# Patient Record
Sex: FEMALE | Race: WHITE | Employment: OTHER | URBAN - METROPOLITAN AREA
[De-identification: names, ages, dates, MRNs, and addresses within clinical notes are randomized per-mention and may not be internally consistent; named-entity substitution may affect disease eponyms.]

---

## 2017-01-01 ENCOUNTER — CLINICAL SUPPORT (OUTPATIENT)
Dept: CARDIOLOGY CLINIC | Age: 77
End: 2017-01-01

## 2017-01-01 ENCOUNTER — TELEPHONE (OUTPATIENT)
Dept: FAMILY MEDICINE CLINIC | Age: 77
End: 2017-01-01

## 2017-01-01 ENCOUNTER — OFFICE VISIT (OUTPATIENT)
Dept: FAMILY MEDICINE CLINIC | Age: 77
End: 2017-01-01

## 2017-01-01 ENCOUNTER — TELEPHONE (OUTPATIENT)
Dept: CARDIOLOGY CLINIC | Age: 77
End: 2017-01-01

## 2017-01-01 ENCOUNTER — OFFICE VISIT (OUTPATIENT)
Dept: CARDIOLOGY CLINIC | Age: 77
End: 2017-01-01

## 2017-01-01 ENCOUNTER — APPOINTMENT (OUTPATIENT)
Dept: CT IMAGING | Age: 77
DRG: 689 | End: 2017-01-01
Attending: EMERGENCY MEDICINE
Payer: MEDICARE

## 2017-01-01 ENCOUNTER — PATIENT OUTREACH (OUTPATIENT)
Dept: FAMILY MEDICINE CLINIC | Age: 77
End: 2017-01-01

## 2017-01-01 ENCOUNTER — APPOINTMENT (OUTPATIENT)
Dept: GENERAL RADIOLOGY | Age: 77
DRG: 689 | End: 2017-01-01
Attending: EMERGENCY MEDICINE
Payer: MEDICARE

## 2017-01-01 ENCOUNTER — HOSPITAL ENCOUNTER (INPATIENT)
Age: 77
LOS: 1 days | Discharge: HOME HEALTH | DRG: 689 | End: 2017-06-09
Attending: EMERGENCY MEDICINE | Admitting: FAMILY MEDICINE
Payer: MEDICARE

## 2017-01-01 VITALS
TEMPERATURE: 97.3 F | RESPIRATION RATE: 26 BRPM | WEIGHT: 139.6 LBS | DIASTOLIC BLOOD PRESSURE: 69 MMHG | HEIGHT: 62 IN | BODY MASS INDEX: 25.69 KG/M2 | HEART RATE: 89 BPM | SYSTOLIC BLOOD PRESSURE: 129 MMHG | OXYGEN SATURATION: 92 %

## 2017-01-01 VITALS
OXYGEN SATURATION: 94 % | WEIGHT: 134.6 LBS | HEART RATE: 60 BPM | SYSTOLIC BLOOD PRESSURE: 127 MMHG | BODY MASS INDEX: 24.77 KG/M2 | DIASTOLIC BLOOD PRESSURE: 70 MMHG | HEIGHT: 62 IN | TEMPERATURE: 97.5 F | RESPIRATION RATE: 24 BRPM

## 2017-01-01 VITALS
BODY MASS INDEX: 24.48 KG/M2 | BODY MASS INDEX: 24.22 KG/M2 | SYSTOLIC BLOOD PRESSURE: 121 MMHG | HEIGHT: 62 IN | HEART RATE: 96 BPM | OXYGEN SATURATION: 96 % | RESPIRATION RATE: 17 BRPM | TEMPERATURE: 97.1 F | SYSTOLIC BLOOD PRESSURE: 142 MMHG | DIASTOLIC BLOOD PRESSURE: 71 MMHG | OXYGEN SATURATION: 97 % | RESPIRATION RATE: 20 BRPM | HEART RATE: 98 BPM | WEIGHT: 133 LBS | WEIGHT: 131.61 LBS | TEMPERATURE: 97.8 F | HEIGHT: 62 IN | DIASTOLIC BLOOD PRESSURE: 63 MMHG

## 2017-01-01 VITALS
HEART RATE: 96 BPM | TEMPERATURE: 98.7 F | BODY MASS INDEX: 24.27 KG/M2 | TEMPERATURE: 97.1 F | WEIGHT: 137 LBS | HEIGHT: 63 IN | RESPIRATION RATE: 20 BRPM | DIASTOLIC BLOOD PRESSURE: 68 MMHG | WEIGHT: 134 LBS | HEIGHT: 62 IN | RESPIRATION RATE: 20 BRPM | HEART RATE: 90 BPM | DIASTOLIC BLOOD PRESSURE: 70 MMHG | OXYGEN SATURATION: 94 % | BODY MASS INDEX: 24.66 KG/M2 | SYSTOLIC BLOOD PRESSURE: 108 MMHG | OXYGEN SATURATION: 93 % | SYSTOLIC BLOOD PRESSURE: 110 MMHG

## 2017-01-01 VITALS
BODY MASS INDEX: 24.48 KG/M2 | RESPIRATION RATE: 18 BRPM | DIASTOLIC BLOOD PRESSURE: 84 MMHG | SYSTOLIC BLOOD PRESSURE: 116 MMHG | HEART RATE: 98 BPM | WEIGHT: 133 LBS | HEIGHT: 62 IN | OXYGEN SATURATION: 92 %

## 2017-01-01 VITALS
OXYGEN SATURATION: 93 % | SYSTOLIC BLOOD PRESSURE: 122 MMHG | RESPIRATION RATE: 16 BRPM | WEIGHT: 137.8 LBS | DIASTOLIC BLOOD PRESSURE: 70 MMHG | HEART RATE: 100 BPM | HEIGHT: 63 IN | BODY MASS INDEX: 24.41 KG/M2

## 2017-01-01 VITALS
OXYGEN SATURATION: 94 % | WEIGHT: 134 LBS | BODY MASS INDEX: 24.66 KG/M2 | HEIGHT: 62 IN | HEART RATE: 109 BPM | SYSTOLIC BLOOD PRESSURE: 116 MMHG | RESPIRATION RATE: 16 BRPM | TEMPERATURE: 97.5 F | DIASTOLIC BLOOD PRESSURE: 72 MMHG

## 2017-01-01 VITALS
SYSTOLIC BLOOD PRESSURE: 115 MMHG | TEMPERATURE: 98.2 F | RESPIRATION RATE: 20 BRPM | WEIGHT: 131 LBS | BODY MASS INDEX: 23.21 KG/M2 | HEIGHT: 63 IN | HEART RATE: 117 BPM | DIASTOLIC BLOOD PRESSURE: 66 MMHG | OXYGEN SATURATION: 94 %

## 2017-01-01 DIAGNOSIS — N39.0 URINARY TRACT INFECTION WITHOUT HEMATURIA, SITE UNSPECIFIED: Primary | ICD-10-CM

## 2017-01-01 DIAGNOSIS — E11.9 CONTROLLED TYPE 2 DIABETES MELLITUS WITHOUT COMPLICATION, WITHOUT LONG-TERM CURRENT USE OF INSULIN (HCC): ICD-10-CM

## 2017-01-01 DIAGNOSIS — J44.9 CHRONIC OBSTRUCTIVE PULMONARY DISEASE, UNSPECIFIED COPD TYPE (HCC): ICD-10-CM

## 2017-01-01 DIAGNOSIS — Z95.2 S/P AVR: ICD-10-CM

## 2017-01-01 DIAGNOSIS — I25.10 CORONARY ARTERY DISEASE INVOLVING NATIVE CORONARY ARTERY OF NATIVE HEART WITHOUT ANGINA PECTORIS: ICD-10-CM

## 2017-01-01 DIAGNOSIS — E11.9 CONTROLLED TYPE 2 DIABETES MELLITUS WITHOUT COMPLICATION, WITHOUT LONG-TERM CURRENT USE OF INSULIN (HCC): Primary | ICD-10-CM
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
## 2017-01-01 DIAGNOSIS — H53.9 VISUAL DISTURBANCES: ICD-10-CM

## 2017-01-01 DIAGNOSIS — E78.5 DYSLIPIDEMIA: ICD-10-CM

## 2017-01-01 DIAGNOSIS — Z00.00 MEDICARE ANNUAL WELLNESS VISIT, SUBSEQUENT: ICD-10-CM

## 2017-01-01 DIAGNOSIS — Z79.01 CHRONIC ANTICOAGULATION: ICD-10-CM

## 2017-01-01 DIAGNOSIS — I48.0 PAROXYSMAL ATRIAL FIBRILLATION (HCC): ICD-10-CM

## 2017-01-01 DIAGNOSIS — J84.10 PULMONARY FIBROSIS (HCC): ICD-10-CM

## 2017-01-01 DIAGNOSIS — I65.23 CAROTID STENOSIS, BILATERAL: ICD-10-CM

## 2017-01-01 DIAGNOSIS — I10 ESSENTIAL HYPERTENSION: ICD-10-CM

## 2017-01-01 DIAGNOSIS — I35.0 NONRHEUMATIC AORTIC VALVE STENOSIS: ICD-10-CM

## 2017-01-01 DIAGNOSIS — I50.32 CHRONIC DIASTOLIC HEART FAILURE (HCC): ICD-10-CM

## 2017-01-01 DIAGNOSIS — R05.9 COUGH: Primary | ICD-10-CM

## 2017-01-01 DIAGNOSIS — R26.9 GAIT ABNORMALITY: ICD-10-CM

## 2017-01-01 DIAGNOSIS — N39.3 FEMALE STRESS INCONTINENCE: ICD-10-CM

## 2017-01-01 DIAGNOSIS — I50.32 CHRONIC DIASTOLIC HEART FAILURE (HCC): Primary | ICD-10-CM

## 2017-01-01 DIAGNOSIS — Z95.2 S/P AVR: Primary | ICD-10-CM

## 2017-01-01 DIAGNOSIS — H54.62 VISUAL LOSS, LEFT EYE: ICD-10-CM

## 2017-01-01 DIAGNOSIS — Z23 ENCOUNTER FOR IMMUNIZATION: ICD-10-CM

## 2017-01-01 DIAGNOSIS — N39.0 URINARY TRACT INFECTION WITHOUT HEMATURIA, SITE UNSPECIFIED: ICD-10-CM

## 2017-01-01 DIAGNOSIS — N39.0 RECURRENT URINARY TRACT INFECTION: ICD-10-CM

## 2017-01-01 DIAGNOSIS — E86.1 HYPOVOLEMIA: ICD-10-CM

## 2017-01-01 DIAGNOSIS — I45.10 RBBB: ICD-10-CM

## 2017-01-01 DIAGNOSIS — R35.0 FREQUENT URINATION: Primary | ICD-10-CM

## 2017-01-01 DIAGNOSIS — Z87.440 HISTORY OF URINARY TRACT INFECTION: ICD-10-CM

## 2017-01-01 DIAGNOSIS — Z13.31 DEPRESSION SCREENING: ICD-10-CM

## 2017-01-01 DIAGNOSIS — Z99.81 O2 DEPENDENT: ICD-10-CM

## 2017-01-01 DIAGNOSIS — R63.0 DECREASED APPETITE: ICD-10-CM

## 2017-01-01 DIAGNOSIS — Z87.440 HX OF URINARY TRACT INFECTION: ICD-10-CM

## 2017-01-01 DIAGNOSIS — R42 DIZZINESS: Primary | ICD-10-CM

## 2017-01-01 DIAGNOSIS — E86.0 DEHYDRATION: Primary | ICD-10-CM

## 2017-01-01 DIAGNOSIS — R42 DIZZINESS: ICD-10-CM

## 2017-01-01 DIAGNOSIS — J44.9 CHRONIC OBSTRUCTIVE PULMONARY DISEASE, UNSPECIFIED COPD TYPE (HCC): Primary | ICD-10-CM

## 2017-01-01 DIAGNOSIS — Z13.39 ALCOHOL SCREENING: ICD-10-CM

## 2017-01-01 DIAGNOSIS — I63.9 CEREBROVASCULAR ACCIDENT (CVA), UNSPECIFIED MECHANISM (HCC): ICD-10-CM

## 2017-01-01 DIAGNOSIS — R41.82 ALTERED MENTAL STATUS, UNSPECIFIED: Primary | ICD-10-CM

## 2017-01-01 DIAGNOSIS — R30.0 DYSURIA: ICD-10-CM

## 2017-01-01 DIAGNOSIS — G47.33 OBSTRUCTIVE SLEEP APNEA SYNDROME: ICD-10-CM

## 2017-01-01 DIAGNOSIS — R53.1 WEAKNESS: ICD-10-CM

## 2017-01-01 LAB
ALBUMIN SERPL BCP-MCNC: 3.3 G/DL (ref 3.5–5)
ALBUMIN SERPL-MCNC: 4.4 G/DL (ref 3.5–4.8)
ALBUMIN SERPL-MCNC: 4.4 G/DL (ref 3.5–4.8)
ALBUMIN SERPL-MCNC: 4.7 G/DL (ref 3.5–4.8)
ALBUMIN/GLOB SERPL: 0.9 {RATIO} (ref 1.1–2.2)
ALBUMIN/GLOB SERPL: 1.8 {RATIO} (ref 1.2–2.2)
ALBUMIN/GLOB SERPL: 1.8 {RATIO} (ref 1.2–2.2)
ALBUMIN/GLOB SERPL: 2.1 {RATIO} (ref 1.2–2.2)
ALP SERPL-CCNC: 42 IU/L (ref 39–117)
ALP SERPL-CCNC: 44 IU/L (ref 39–117)
ALP SERPL-CCNC: 51 IU/L (ref 39–117)
ALP SERPL-CCNC: 54 U/L (ref 45–117)
ALT SERPL-CCNC: 14 IU/L (ref 0–32)
ALT SERPL-CCNC: 19 IU/L (ref 0–32)
ALT SERPL-CCNC: 21 IU/L (ref 0–32)
ALT SERPL-CCNC: 54 U/L (ref 12–78)
ANION GAP BLD CALC-SCNC: 4 MMOL/L (ref 5–15)
ANION GAP BLD CALC-SCNC: 6 MMOL/L (ref 5–15)
ANION GAP BLD CALC-SCNC: 7 MMOL/L (ref 5–15)
ANION GAP BLD CALC-SCNC: 8 MMOL/L (ref 5–15)
APPEARANCE UR: ABNORMAL
APTT PPP: 29.3 SEC (ref 22.1–32.5)
AST SERPL W P-5'-P-CCNC: 40 U/L (ref 15–37)
AST SERPL-CCNC: 16 IU/L (ref 0–40)
AST SERPL-CCNC: 19 IU/L (ref 0–40)
AST SERPL-CCNC: 20 IU/L (ref 0–40)
ATRIAL RATE: 91 BPM
BACTERIA SPEC CULT: ABNORMAL
BACTERIA UR CULT: ABNORMAL
BACTERIA UR CULT: NORMAL
BACTERIA UR CULT: NORMAL
BACTERIA URNS QL MICRO: ABNORMAL /HPF
BASOPHILS # BLD AUTO: 0 K/UL (ref 0–0.1)
BASOPHILS # BLD: 0 % (ref 0–1)
BILIRUB SERPL-MCNC: 0.2 MG/DL (ref 0–1.2)
BILIRUB SERPL-MCNC: 0.3 MG/DL (ref 0.2–1)
BILIRUB SERPL-MCNC: 0.4 MG/DL (ref 0–1.2)
BILIRUB SERPL-MCNC: 0.4 MG/DL (ref 0–1.2)
BILIRUB UR QL STRIP: NEGATIVE
BILIRUB UR QL STRIP: NORMAL
BILIRUB UR QL: NEGATIVE
BUN SERPL-MCNC: 18 MG/DL (ref 6–20)
BUN SERPL-MCNC: 23 MG/DL (ref 6–20)
BUN SERPL-MCNC: 28 MG/DL (ref 6–20)
BUN SERPL-MCNC: 29 MG/DL (ref 8–27)
BUN SERPL-MCNC: 30 MG/DL (ref 8–27)
BUN SERPL-MCNC: 34 MG/DL (ref 6–20)
BUN SERPL-MCNC: 34 MG/DL (ref 8–27)
BUN/CREAT SERPL: 21 (ref 12–20)
BUN/CREAT SERPL: 24 (ref 12–28)
BUN/CREAT SERPL: 26 (ref 12–20)
BUN/CREAT SERPL: 28 (ref 12–20)
BUN/CREAT SERPL: 30 (ref 12–20)
BUN/CREAT SERPL: 35 (ref 12–28)
BUN/CREAT SERPL: 37 (ref 12–28)
CALCIUM SERPL-MCNC: 7.4 MG/DL (ref 8.5–10.1)
CALCIUM SERPL-MCNC: 7.7 MG/DL (ref 8.5–10.1)
CALCIUM SERPL-MCNC: 8.3 MG/DL (ref 8.5–10.1)
CALCIUM SERPL-MCNC: 8.7 MG/DL (ref 8.5–10.1)
CALCIUM SERPL-MCNC: 9 MG/DL (ref 8.7–10.3)
CALCIUM SERPL-MCNC: 9.3 MG/DL (ref 8.7–10.3)
CALCIUM SERPL-MCNC: 9.7 MG/DL (ref 8.7–10.3)
CALCULATED P AXIS, ECG09: 56 DEGREES
CALCULATED R AXIS, ECG10: -70 DEGREES
CALCULATED T AXIS, ECG11: 42 DEGREES
CC UR VC: ABNORMAL
CHLORIDE SERPL-SCNC: 101 MMOL/L (ref 97–108)
CHLORIDE SERPL-SCNC: 102 MMOL/L (ref 97–108)
CHLORIDE SERPL-SCNC: 91 MMOL/L (ref 96–106)
CHLORIDE SERPL-SCNC: 94 MMOL/L (ref 97–108)
CHLORIDE SERPL-SCNC: 95 MMOL/L (ref 96–106)
CHLORIDE SERPL-SCNC: 97 MMOL/L (ref 97–108)
CHLORIDE SERPL-SCNC: 99 MMOL/L (ref 96–106)
CHOLEST SERPL-MCNC: 178 MG/DL (ref 100–199)
CHOLEST SERPL-MCNC: 211 MG/DL (ref 100–199)
CK MB CFR SERPL CALC: NORMAL % (ref 0–2.5)
CK MB SERPL-MCNC: <1 NG/ML (ref 5–25)
CK SERPL-CCNC: 61 U/L (ref 26–192)
CO2 SERPL-SCNC: 27 MMOL/L (ref 18–29)
CO2 SERPL-SCNC: 30 MMOL/L (ref 18–29)
CO2 SERPL-SCNC: 32 MMOL/L (ref 18–29)
CO2 SERPL-SCNC: 33 MMOL/L (ref 21–32)
CO2 SERPL-SCNC: 33 MMOL/L (ref 21–32)
CO2 SERPL-SCNC: 34 MMOL/L (ref 21–32)
CO2 SERPL-SCNC: 36 MMOL/L (ref 21–32)
COLOR UR: ABNORMAL
CREAT SERPL-MCNC: 0.79 MG/DL (ref 0.57–1)
CREAT SERPL-MCNC: 0.83 MG/DL (ref 0.55–1.02)
CREAT SERPL-MCNC: 0.87 MG/DL (ref 0.55–1.02)
CREAT SERPL-MCNC: 0.93 MG/DL (ref 0.55–1.02)
CREAT SERPL-MCNC: 0.98 MG/DL (ref 0.57–1)
CREAT SERPL-MCNC: 1.25 MG/DL (ref 0.57–1)
CREAT SERPL-MCNC: 1.3 MG/DL (ref 0.55–1.02)
DIAGNOSIS, 93000: NORMAL
DIGOXIN SERPL-MCNC: 1.1 NG/ML (ref 0.5–0.9)
DIGOXIN SERPL-MCNC: 1.3 NG/ML (ref 0.9–2)
EOSINOPHIL # BLD: 0 K/UL (ref 0–0.4)
EOSINOPHIL NFR BLD: 0 % (ref 0–7)
EOSINOPHIL NFR BLD: 1 % (ref 0–7)
EOSINOPHIL NFR BLD: 1 % (ref 0–7)
EPITH CASTS URNS QL MICRO: ABNORMAL /LPF
ERYTHROCYTE [DISTWIDTH] IN BLOOD BY AUTOMATED COUNT: 13.5 % (ref 11.5–14.5)
ERYTHROCYTE [DISTWIDTH] IN BLOOD BY AUTOMATED COUNT: 13.6 % (ref 11.5–14.5)
ERYTHROCYTE [DISTWIDTH] IN BLOOD BY AUTOMATED COUNT: 13.7 % (ref 11.5–14.5)
ERYTHROCYTE [DISTWIDTH] IN BLOOD BY AUTOMATED COUNT: 13.8 % (ref 11.5–14.5)
ERYTHROCYTE [DISTWIDTH] IN BLOOD BY AUTOMATED COUNT: 14.9 % (ref 12.3–15.4)
EST. AVERAGE GLUCOSE BLD GHB EST-MCNC: 126 MG/DL
EST. AVERAGE GLUCOSE BLD GHB EST-MCNC: 134 MG/DL
GLOBULIN SER CALC-MCNC: 2.1 G/DL (ref 1.5–4.5)
GLOBULIN SER CALC-MCNC: 2.5 G/DL (ref 1.5–4.5)
GLOBULIN SER CALC-MCNC: 2.6 G/DL (ref 1.5–4.5)
GLOBULIN SER CALC-MCNC: 3.8 G/DL (ref 2–4)
GLUCOSE BLD STRIP.AUTO-MCNC: 107 MG/DL (ref 65–100)
GLUCOSE BLD STRIP.AUTO-MCNC: 147 MG/DL (ref 65–100)
GLUCOSE BLD STRIP.AUTO-MCNC: 151 MG/DL (ref 65–100)
GLUCOSE BLD STRIP.AUTO-MCNC: 171 MG/DL (ref 65–100)
GLUCOSE BLD STRIP.AUTO-MCNC: 194 MG/DL (ref 65–100)
GLUCOSE BLD STRIP.AUTO-MCNC: 267 MG/DL (ref 65–100)
GLUCOSE BLD STRIP.AUTO-MCNC: 300 MG/DL (ref 65–100)
GLUCOSE BLD STRIP.AUTO-MCNC: 335 MG/DL (ref 65–100)
GLUCOSE SERPL-MCNC: 113 MG/DL (ref 65–100)
GLUCOSE SERPL-MCNC: 123 MG/DL (ref 65–99)
GLUCOSE SERPL-MCNC: 128 MG/DL (ref 65–99)
GLUCOSE SERPL-MCNC: 131 MG/DL (ref 65–100)
GLUCOSE SERPL-MCNC: 135 MG/DL (ref 65–100)
GLUCOSE SERPL-MCNC: 192 MG/DL (ref 65–100)
GLUCOSE SERPL-MCNC: 87 MG/DL (ref 65–99)
GLUCOSE UR STRIP.AUTO-MCNC: NEGATIVE MG/DL
GLUCOSE UR-MCNC: NEGATIVE MG/DL
HBA1C MFR BLD HPLC: 6.6 %
HBA1C MFR BLD: 6 % (ref 4.8–5.6)
HBA1C MFR BLD: 6.3 % (ref 4.8–5.6)
HCT VFR BLD AUTO: 29.2 % (ref 35–47)
HCT VFR BLD AUTO: 32 % (ref 35–47)
HCT VFR BLD AUTO: 32.1 % (ref 35–47)
HCT VFR BLD AUTO: 33 % (ref 34–46.6)
HCT VFR BLD AUTO: 34.6 % (ref 34–46.6)
HCT VFR BLD AUTO: 36.6 % (ref 35–47)
HCT VFR BLD AUTO: 36.8 % (ref 34–46.6)
HDLC SERPL-MCNC: 67 MG/DL
HDLC SERPL-MCNC: 72 MG/DL
HGB BLD-MCNC: 10.6 G/DL (ref 11.5–16)
HGB BLD-MCNC: 10.7 G/DL (ref 11.5–16)
HGB BLD-MCNC: 10.9 G/DL (ref 11.1–15.9)
HGB BLD-MCNC: 11.1 G/DL (ref 11.1–15.9)
HGB BLD-MCNC: 11.9 G/DL (ref 11.5–16)
HGB BLD-MCNC: 12.4 G/DL (ref 11.1–15.9)
HGB BLD-MCNC: 9.7 G/DL (ref 11.5–16)
HGB UR QL STRIP: NEGATIVE
HYALINE CASTS URNS QL MICRO: ABNORMAL /LPF (ref 0–5)
INR PPP: 1 (ref 0.9–1.1)
KETONES P FAST UR STRIP-MCNC: NEGATIVE MG/DL
KETONES P FAST UR STRIP-MCNC: NORMAL MG/DL
KETONES UR QL STRIP.AUTO: NEGATIVE MG/DL
LDLC SERPL CALC-MCNC: 109 MG/DL (ref 0–99)
LDLC SERPL CALC-MCNC: 76 MG/DL (ref 0–99)
LEUKOCYTE ESTERASE UR QL STRIP.AUTO: NEGATIVE
LYMPHOCYTES # BLD AUTO: 12 % (ref 12–49)
LYMPHOCYTES # BLD AUTO: 17 % (ref 12–49)
LYMPHOCYTES # BLD AUTO: 8 % (ref 12–49)
LYMPHOCYTES # BLD: 0.6 K/UL (ref 0.8–3.5)
LYMPHOCYTES # BLD: 0.7 K/UL (ref 0.8–3.5)
LYMPHOCYTES # BLD: 0.8 K/UL (ref 0.8–3.5)
MAGNESIUM SERPL-MCNC: 2.2 MG/DL (ref 1.6–2.3)
MCH RBC QN AUTO: 30.6 PG (ref 26–34)
MCH RBC QN AUTO: 30.9 PG (ref 26–34)
MCH RBC QN AUTO: 30.9 PG (ref 26–34)
MCH RBC QN AUTO: 31 PG (ref 26.6–33)
MCH RBC QN AUTO: 31.3 PG (ref 26–34)
MCHC RBC AUTO-ENTMCNC: 32.5 G/DL (ref 30–36.5)
MCHC RBC AUTO-ENTMCNC: 33.1 G/DL (ref 30–36.5)
MCHC RBC AUTO-ENTMCNC: 33.2 G/DL (ref 30–36.5)
MCHC RBC AUTO-ENTMCNC: 33.3 G/DL (ref 30–36.5)
MCHC RBC AUTO-ENTMCNC: 33.6 G/DL (ref 31.5–35.7)
MCV RBC AUTO: 92 FL (ref 79–97)
MCV RBC AUTO: 92.5 FL (ref 80–99)
MCV RBC AUTO: 93 FL (ref 80–99)
MCV RBC AUTO: 93.9 FL (ref 80–99)
MCV RBC AUTO: 95.1 FL (ref 80–99)
MONOCYTES # BLD: 0.4 K/UL (ref 0–1)
MONOCYTES # BLD: 0.5 K/UL (ref 0–1)
MONOCYTES # BLD: 0.7 K/UL (ref 0–1)
MONOCYTES NFR BLD AUTO: 11 % (ref 5–13)
MONOCYTES NFR BLD AUTO: 8 % (ref 5–13)
MONOCYTES NFR BLD AUTO: 9 % (ref 5–13)
NEUTS SEG # BLD: 3.2 K/UL (ref 1.8–8)
NEUTS SEG # BLD: 4.2 K/UL (ref 1.8–8)
NEUTS SEG # BLD: 6.6 K/UL (ref 1.8–8)
NEUTS SEG NFR BLD AUTO: 71 % (ref 32–75)
NEUTS SEG NFR BLD AUTO: 79 % (ref 32–75)
NEUTS SEG NFR BLD AUTO: 83 % (ref 32–75)
NITRITE UR QL STRIP.AUTO: POSITIVE
P-R INTERVAL, ECG05: 208 MS
PH UR STRIP: 5 [PH] (ref 4.6–8)
PH UR STRIP: 5.5 [PH] (ref 4.6–8)
PH UR STRIP: 5.5 [PH] (ref 5–8)
PH UR STRIP: 6 [PH] (ref 4.6–8)
PLATELET # BLD AUTO: 133 K/UL (ref 150–400)
PLATELET # BLD AUTO: 147 K/UL (ref 150–400)
PLATELET # BLD AUTO: 158 K/UL (ref 150–400)
PLATELET # BLD AUTO: 159 K/UL (ref 150–400)
PLATELET # BLD AUTO: 226 X10E3/UL (ref 150–379)
POTASSIUM SERPL-SCNC: 3.5 MMOL/L (ref 3.5–5.1)
POTASSIUM SERPL-SCNC: 3.6 MMOL/L (ref 3.5–5.1)
POTASSIUM SERPL-SCNC: 3.8 MMOL/L (ref 3.5–5.1)
POTASSIUM SERPL-SCNC: 4 MMOL/L (ref 3.5–5.1)
POTASSIUM SERPL-SCNC: 4 MMOL/L (ref 3.5–5.2)
POTASSIUM SERPL-SCNC: 4.2 MMOL/L (ref 3.5–5.2)
POTASSIUM SERPL-SCNC: 4.5 MMOL/L (ref 3.5–5.2)
PROT SERPL-MCNC: 6.5 G/DL (ref 6–8.5)
PROT SERPL-MCNC: 6.9 G/DL (ref 6–8.5)
PROT SERPL-MCNC: 7.1 G/DL (ref 6.4–8.2)
PROT SERPL-MCNC: 7.3 G/DL (ref 6–8.5)
PROT UR QL STRIP: NEGATIVE
PROT UR QL STRIP: NEGATIVE MG/DL
PROT UR QL STRIP: NORMAL MG/DL
PROT UR STRIP-MCNC: NEGATIVE MG/DL
PROTHROMBIN TIME: 9.7 SEC (ref 9–11.1)
Q-T INTERVAL, ECG07: 390 MS
QRS DURATION, ECG06: 160 MS
QTC CALCULATION (BEZET), ECG08: 479 MS
RBC # BLD AUTO: 3.14 M/UL (ref 3.8–5.2)
RBC # BLD AUTO: 3.42 M/UL (ref 3.8–5.2)
RBC # BLD AUTO: 3.46 M/UL (ref 3.8–5.2)
RBC # BLD AUTO: 3.58 X10E6/UL (ref 3.77–5.28)
RBC # BLD AUTO: 3.85 M/UL (ref 3.8–5.2)
RBC #/AREA URNS HPF: ABNORMAL /HPF (ref 0–5)
RBC MORPH BLD: ABNORMAL
SERVICE CMNT-IMP: ABNORMAL
SODIUM SERPL-SCNC: 136 MMOL/L (ref 136–145)
SODIUM SERPL-SCNC: 137 MMOL/L (ref 136–145)
SODIUM SERPL-SCNC: 140 MMOL/L (ref 134–144)
SODIUM SERPL-SCNC: 140 MMOL/L (ref 136–145)
SODIUM SERPL-SCNC: 142 MMOL/L (ref 136–145)
SODIUM SERPL-SCNC: 145 MMOL/L (ref 134–144)
SODIUM SERPL-SCNC: 146 MMOL/L (ref 134–144)
SP GR UR REFRACTOMETRY: 1.01 (ref 1–1.03)
SP GR UR STRIP: 1.01 (ref 1–1.03)
THERAPEUTIC RANGE,PTTT: NORMAL SECS (ref 58–77)
TRIGL SERPL-MCNC: 152 MG/DL (ref 0–149)
TRIGL SERPL-MCNC: 175 MG/DL (ref 0–149)
TROPONIN I SERPL-MCNC: 0.09 NG/ML
TROPONIN I SERPL-MCNC: 0.1 NG/ML
TROPONIN I SERPL-MCNC: 0.13 NG/ML
TROPONIN I SERPL-MCNC: 0.15 NG/ML
TSH SERPL DL<=0.005 MIU/L-ACNC: 0.82 UIU/ML (ref 0.45–4.5)
TSH SERPL DL<=0.005 MIU/L-ACNC: 0.99 UIU/ML (ref 0.45–4.5)
TSH SERPL DL<=0.005 MIU/L-ACNC: 1.14 UIU/ML (ref 0.45–4.5)
UA UROBILINOGEN AMB POC: NORMAL (ref 0.2–1)
URINALYSIS CLARITY POC: CLEAR
URINALYSIS CLARITY POC: NORMAL
URINALYSIS COLOR POC: YELLOW
URINE BLOOD POC: NEGATIVE
URINE BLOOD POC: NORMAL
URINE BLOOD POC: NORMAL
URINE LEUKOCYTES POC: NEGATIVE
URINE LEUKOCYTES POC: NORMAL
URINE NITRITES POC: NEGATIVE
URINE NITRITES POC: POSITIVE
UROBILINOGEN UR QL STRIP.AUTO: 0.2 EU/DL (ref 0.2–1)
VENTRICULAR RATE, ECG03: 91 BPM
VLDLC SERPL CALC-MCNC: 30 MG/DL (ref 5–40)
VLDLC SERPL CALC-MCNC: 35 MG/DL (ref 5–40)
WBC # BLD AUTO: 4.5 K/UL (ref 3.6–11)
WBC # BLD AUTO: 5.2 K/UL (ref 3.6–11)
WBC # BLD AUTO: 5.4 K/UL (ref 3.6–11)
WBC # BLD AUTO: 7.4 X10E3/UL (ref 3.4–10.8)
WBC # BLD AUTO: 7.9 K/UL (ref 3.6–11)
WBC URNS QL MICRO: ABNORMAL /HPF (ref 0–4)

## 2017-01-01 PROCEDURE — 71010 XR CHEST PORT: CPT

## 2017-01-01 PROCEDURE — 96360 HYDRATION IV INFUSION INIT: CPT

## 2017-01-01 PROCEDURE — 99218 HC RM OBSERVATION: CPT

## 2017-01-01 PROCEDURE — 82962 GLUCOSE BLOOD TEST: CPT

## 2017-01-01 PROCEDURE — 87186 SC STD MICRODIL/AGAR DIL: CPT | Performed by: FAMILY MEDICINE

## 2017-01-01 PROCEDURE — 74011250636 HC RX REV CODE- 250/636: Performed by: FAMILY MEDICINE

## 2017-01-01 PROCEDURE — 97530 THERAPEUTIC ACTIVITIES: CPT

## 2017-01-01 PROCEDURE — 94640 AIRWAY INHALATION TREATMENT: CPT

## 2017-01-01 PROCEDURE — 80048 BASIC METABOLIC PNL TOTAL CA: CPT | Performed by: FAMILY MEDICINE

## 2017-01-01 PROCEDURE — 70450 CT HEAD/BRAIN W/O DYE: CPT

## 2017-01-01 PROCEDURE — 74011000258 HC RX REV CODE- 258: Performed by: FAMILY MEDICINE

## 2017-01-01 PROCEDURE — 82550 ASSAY OF CK (CPK): CPT | Performed by: EMERGENCY MEDICINE

## 2017-01-01 PROCEDURE — 80048 BASIC METABOLIC PNL TOTAL CA: CPT

## 2017-01-01 PROCEDURE — 74011250637 HC RX REV CODE- 250/637: Performed by: FAMILY MEDICINE

## 2017-01-01 PROCEDURE — 97163 PT EVAL HIGH COMPLEX 45 MIN: CPT

## 2017-01-01 PROCEDURE — 74011250637 HC RX REV CODE- 250/637: Performed by: NURSE PRACTITIONER

## 2017-01-01 PROCEDURE — 96361 HYDRATE IV INFUSION ADD-ON: CPT

## 2017-01-01 PROCEDURE — A9270 NON-COVERED ITEM OR SERVICE: HCPCS | Performed by: FAMILY MEDICINE

## 2017-01-01 PROCEDURE — 74011000250 HC RX REV CODE- 250: Performed by: FAMILY MEDICINE

## 2017-01-01 PROCEDURE — 65660000000 HC RM CCU STEPDOWN

## 2017-01-01 PROCEDURE — 74011250636 HC RX REV CODE- 250/636: Performed by: EMERGENCY MEDICINE

## 2017-01-01 PROCEDURE — 77030011943

## 2017-01-01 PROCEDURE — 85025 COMPLETE CBC W/AUTO DIFF WBC: CPT | Performed by: FAMILY MEDICINE

## 2017-01-01 PROCEDURE — 84484 ASSAY OF TROPONIN QUANT: CPT

## 2017-01-01 PROCEDURE — 93005 ELECTROCARDIOGRAM TRACING: CPT

## 2017-01-01 PROCEDURE — 87077 CULTURE AEROBIC IDENTIFY: CPT | Performed by: FAMILY MEDICINE

## 2017-01-01 PROCEDURE — 36415 COLL VENOUS BLD VENIPUNCTURE: CPT | Performed by: FAMILY MEDICINE

## 2017-01-01 PROCEDURE — 99285 EMERGENCY DEPT VISIT HI MDM: CPT

## 2017-01-01 PROCEDURE — 36415 COLL VENOUS BLD VENIPUNCTURE: CPT

## 2017-01-01 PROCEDURE — 87086 URINE CULTURE/COLONY COUNT: CPT | Performed by: FAMILY MEDICINE

## 2017-01-01 PROCEDURE — 84484 ASSAY OF TROPONIN QUANT: CPT | Performed by: EMERGENCY MEDICINE

## 2017-01-01 PROCEDURE — 85027 COMPLETE CBC AUTOMATED: CPT | Performed by: FAMILY MEDICINE

## 2017-01-01 PROCEDURE — 74011636637 HC RX REV CODE- 636/637: Performed by: FAMILY MEDICINE

## 2017-01-01 PROCEDURE — 85025 COMPLETE CBC W/AUTO DIFF WBC: CPT

## 2017-01-01 PROCEDURE — 85025 COMPLETE CBC W/AUTO DIFF WBC: CPT | Performed by: EMERGENCY MEDICINE

## 2017-01-01 PROCEDURE — 36415 COLL VENOUS BLD VENIPUNCTURE: CPT | Performed by: EMERGENCY MEDICINE

## 2017-01-01 PROCEDURE — 80053 COMPREHEN METABOLIC PANEL: CPT | Performed by: EMERGENCY MEDICINE

## 2017-01-01 PROCEDURE — 77010033678 HC OXYGEN DAILY

## 2017-01-01 PROCEDURE — 80162 ASSAY OF DIGOXIN TOTAL: CPT | Performed by: FAMILY MEDICINE

## 2017-01-01 PROCEDURE — 81001 URINALYSIS AUTO W/SCOPE: CPT | Performed by: EMERGENCY MEDICINE

## 2017-01-01 PROCEDURE — 85610 PROTHROMBIN TIME: CPT | Performed by: EMERGENCY MEDICINE

## 2017-01-01 PROCEDURE — 85730 THROMBOPLASTIN TIME PARTIAL: CPT | Performed by: EMERGENCY MEDICINE

## 2017-01-01 RX ORDER — CEFDINIR 300 MG/1
600 CAPSULE ORAL DAILY
Qty: 16 CAP | Refills: 0 | Status: SHIPPED | OUTPATIENT
Start: 2017-01-01 | End: 2017-01-01

## 2017-01-01 RX ORDER — INSULIN PUMP SYRINGE, 3 ML
EACH MISCELLANEOUS
Qty: 1 KIT | Refills: 0 | Status: SHIPPED | OUTPATIENT
Start: 2017-01-01 | End: 2018-01-01

## 2017-01-01 RX ORDER — BLOOD-GLUCOSE METER
KIT MISCELLANEOUS
Qty: 100 STRIP | Refills: 3 | Status: SHIPPED | OUTPATIENT
Start: 2017-01-01 | End: 2018-01-01

## 2017-01-01 RX ORDER — FUROSEMIDE 40 MG/1
40 TABLET ORAL DAILY
Status: DISCONTINUED | OUTPATIENT
Start: 2017-01-01 | End: 2017-01-01 | Stop reason: HOSPADM

## 2017-01-01 RX ORDER — SODIUM CHLORIDE 9 MG/ML
75 INJECTION, SOLUTION INTRAVENOUS CONTINUOUS
Status: DISCONTINUED | OUTPATIENT
Start: 2017-01-01 | End: 2017-01-01

## 2017-01-01 RX ORDER — CEFDINIR 300 MG/1
300 CAPSULE ORAL 2 TIMES DAILY
Qty: 10 CAP | Refills: 0 | Status: SHIPPED | OUTPATIENT
Start: 2017-01-01 | End: 2017-01-01

## 2017-01-01 RX ORDER — SULFAMETHOXAZOLE AND TRIMETHOPRIM 800; 160 MG/1; MG/1
1 TABLET ORAL 2 TIMES DAILY
Qty: 14 TAB | Refills: 0 | Status: SHIPPED | OUTPATIENT
Start: 2017-01-01 | End: 2017-01-01

## 2017-01-01 RX ORDER — DILTIAZEM HYDROCHLORIDE 240 MG/1
CAPSULE, COATED, EXTENDED RELEASE ORAL
Qty: 90 CAP | Refills: 2 | Status: SHIPPED | OUTPATIENT
Start: 2017-01-01 | End: 2017-01-01 | Stop reason: SDUPTHER

## 2017-01-01 RX ORDER — LANCETS
EACH MISCELLANEOUS
Qty: 100 EACH | Refills: 3 | Status: SHIPPED | OUTPATIENT
Start: 2017-01-01 | End: 2018-01-01

## 2017-01-01 RX ORDER — ALBUTEROL SULFATE 90 UG/1
1 AEROSOL, METERED RESPIRATORY (INHALATION)
COMMUNITY

## 2017-01-01 RX ORDER — INSULIN LISPRO 100 [IU]/ML
INJECTION, SOLUTION INTRAVENOUS; SUBCUTANEOUS
Status: DISCONTINUED | OUTPATIENT
Start: 2017-01-01 | End: 2017-01-01 | Stop reason: HOSPADM

## 2017-01-01 RX ORDER — NITROFURANTOIN MACROCRYSTALS 50 MG/1
50 CAPSULE ORAL
Qty: 90 CAP | Refills: 1 | Status: SHIPPED | OUTPATIENT
Start: 2017-01-01 | End: 2017-01-01

## 2017-01-01 RX ORDER — LANCETS
EACH MISCELLANEOUS
Qty: 100 EACH | Refills: 3 | Status: SHIPPED | OUTPATIENT
Start: 2017-01-01 | End: 2017-01-01 | Stop reason: SDUPTHER

## 2017-01-01 RX ORDER — PREDNISONE 5 MG/1
10 TABLET ORAL DAILY
Status: DISCONTINUED | OUTPATIENT
Start: 2017-01-01 | End: 2017-01-01 | Stop reason: HOSPADM

## 2017-01-01 RX ORDER — RIVAROXABAN 15 MG/1
TABLET, FILM COATED ORAL
Qty: 90 TAB | Refills: 3 | Status: SHIPPED | OUTPATIENT
Start: 2017-01-01

## 2017-01-01 RX ORDER — ASPIRIN 81 MG/1
81 TABLET ORAL DAILY
Status: DISCONTINUED | OUTPATIENT
Start: 2017-01-01 | End: 2017-01-01 | Stop reason: HOSPADM

## 2017-01-01 RX ORDER — FUROSEMIDE 20 MG/1
TABLET ORAL
Qty: 180 TAB | Refills: 3 | Status: SHIPPED | OUTPATIENT
Start: 2017-01-01

## 2017-01-01 RX ORDER — FERROUS SULFATE 325(65) MG
325 TABLET, DELAYED RELEASE (ENTERIC COATED) ORAL DAILY
Status: DISCONTINUED | OUTPATIENT
Start: 2017-01-01 | End: 2017-01-01 | Stop reason: CLARIF

## 2017-01-01 RX ORDER — DIGOXIN 125 UG/1
TABLET ORAL
Qty: 90 TAB | Refills: 2 | Status: SHIPPED | OUTPATIENT
Start: 2017-01-01 | End: 2018-01-01

## 2017-01-01 RX ORDER — ARFORMOTEROL TARTRATE 15 UG/2ML
15 SOLUTION RESPIRATORY (INHALATION)
Status: DISCONTINUED | OUTPATIENT
Start: 2017-01-01 | End: 2017-01-01 | Stop reason: HOSPADM

## 2017-01-01 RX ORDER — CEFDINIR 300 MG/1
600 CAPSULE ORAL DAILY
Status: DISCONTINUED | OUTPATIENT
Start: 2017-01-01 | End: 2017-01-01 | Stop reason: HOSPADM

## 2017-01-01 RX ORDER — CEPHALEXIN 250 MG/1
250 CAPSULE ORAL
Qty: 30 CAP | Refills: 0 | COMMUNITY
Start: 2017-01-01

## 2017-01-01 RX ORDER — ATORVASTATIN CALCIUM 10 MG/1
20 TABLET, FILM COATED ORAL
Status: DISCONTINUED | OUTPATIENT
Start: 2017-01-01 | End: 2017-01-01 | Stop reason: HOSPADM

## 2017-01-01 RX ORDER — SULFAMETHOXAZOLE AND TRIMETHOPRIM 800; 160 MG/1; MG/1
1 TABLET ORAL 2 TIMES DAILY
Qty: 14 TAB | Refills: 0 | Status: SHIPPED | OUTPATIENT
Start: 2017-01-01 | End: 2017-01-01 | Stop reason: SDUPTHER

## 2017-01-01 RX ORDER — SODIUM CHLORIDE 0.9 % (FLUSH) 0.9 %
5-10 SYRINGE (ML) INJECTION EVERY 8 HOURS
Status: DISCONTINUED | OUTPATIENT
Start: 2017-01-01 | End: 2017-01-01 | Stop reason: HOSPADM

## 2017-01-01 RX ORDER — ALBUTEROL SULFATE 90 UG/1
1 AEROSOL, METERED RESPIRATORY (INHALATION)
Qty: 3 INHALER | Refills: 3 | Status: ON HOLD | OUTPATIENT
Start: 2017-01-01 | End: 2017-01-01

## 2017-01-01 RX ORDER — DIGOXIN 125 MCG
0.12 TABLET ORAL
Qty: 90 TAB | Refills: 3 | Status: SHIPPED | OUTPATIENT
Start: 2017-01-01 | End: 2017-01-01 | Stop reason: SDUPTHER

## 2017-01-01 RX ORDER — TIOTROPIUM BROMIDE INHALATION SPRAY 1.56 UG/1
SPRAY, METERED RESPIRATORY (INHALATION)
COMMUNITY
Start: 2017-01-01 | End: 2017-01-01 | Stop reason: SDUPTHER

## 2017-01-01 RX ORDER — FUROSEMIDE 20 MG/1
TABLET ORAL
Qty: 180 TAB | Refills: 3
Start: 2017-01-01 | End: 2017-01-01 | Stop reason: SDUPTHER

## 2017-01-01 RX ORDER — SODIUM CHLORIDE 0.9 % (FLUSH) 0.9 %
5-10 SYRINGE (ML) INJECTION AS NEEDED
Status: DISCONTINUED | OUTPATIENT
Start: 2017-01-01 | End: 2017-01-01 | Stop reason: HOSPADM

## 2017-01-01 RX ORDER — ALBUTEROL SULFATE 0.83 MG/ML
2.5 SOLUTION RESPIRATORY (INHALATION)
Status: DISCONTINUED | OUTPATIENT
Start: 2017-01-01 | End: 2017-01-01 | Stop reason: HOSPADM

## 2017-01-01 RX ORDER — DIGOXIN 125 MCG
0.12 TABLET ORAL
Status: DISCONTINUED | OUTPATIENT
Start: 2017-01-01 | End: 2017-01-01 | Stop reason: HOSPADM

## 2017-01-01 RX ORDER — ASPIRIN 81 MG/1
162 TABLET ORAL DAILY
COMMUNITY

## 2017-01-01 RX ORDER — METFORMIN HYDROCHLORIDE 500 MG/1
500 TABLET ORAL
Qty: 90 TAB | Refills: 5 | Status: SHIPPED | OUTPATIENT
Start: 2017-01-01 | End: 2017-01-01 | Stop reason: SDUPTHER

## 2017-01-01 RX ORDER — MAGNESIUM SULFATE 100 %
4 CRYSTALS MISCELLANEOUS AS NEEDED
Status: DISCONTINUED | OUTPATIENT
Start: 2017-01-01 | End: 2017-01-01 | Stop reason: HOSPADM

## 2017-01-01 RX ORDER — FUROSEMIDE 20 MG/1
TABLET ORAL
Qty: 360 TAB | Refills: 3 | Status: SHIPPED | OUTPATIENT
Start: 2017-01-01 | End: 2017-01-01 | Stop reason: SDUPTHER

## 2017-01-01 RX ORDER — DILTIAZEM HYDROCHLORIDE 240 MG/1
240 CAPSULE, COATED, EXTENDED RELEASE ORAL
Qty: 90 CAP | Refills: 3 | Status: SHIPPED | OUTPATIENT
Start: 2017-01-01 | End: 2018-01-01

## 2017-01-01 RX ORDER — SULFAMETHOXAZOLE AND TRIMETHOPRIM 800; 160 MG/1; MG/1
1 TABLET ORAL 2 TIMES DAILY
Qty: 20 TAB | Refills: 0 | Status: SHIPPED | OUTPATIENT
Start: 2017-01-01 | End: 2017-01-01

## 2017-01-01 RX ORDER — PREDNISONE 5 MG/1
10 TABLET ORAL DAILY
COMMUNITY

## 2017-01-01 RX ORDER — DEXTROSE 50 % IN WATER (D50W) INTRAVENOUS SYRINGE
12.5-25 AS NEEDED
Status: DISCONTINUED | OUTPATIENT
Start: 2017-01-01 | End: 2017-01-01 | Stop reason: HOSPADM

## 2017-01-01 RX ORDER — LANOLIN ALCOHOL/MO/W.PET/CERES
1 CREAM (GRAM) TOPICAL
Status: DISCONTINUED | OUTPATIENT
Start: 2017-01-01 | End: 2017-01-01 | Stop reason: HOSPADM

## 2017-01-01 RX ORDER — DILTIAZEM HYDROCHLORIDE 240 MG/1
240 CAPSULE, COATED, EXTENDED RELEASE ORAL
Status: DISCONTINUED | OUTPATIENT
Start: 2017-01-01 | End: 2017-01-01 | Stop reason: HOSPADM

## 2017-01-01 RX ADMIN — Medication 10 ML: at 15:57

## 2017-01-01 RX ADMIN — DILTIAZEM HYDROCHLORIDE 240 MG: 240 CAPSULE, COATED, EXTENDED RELEASE ORAL at 00:46

## 2017-01-01 RX ADMIN — PREDNISONE 10 MG: 5 TABLET ORAL at 10:00

## 2017-01-01 RX ADMIN — FERROUS SULFATE TAB 325 MG (65 MG ELEMENTAL FE) 325 MG: 325 (65 FE) TAB at 10:00

## 2017-01-01 RX ADMIN — ATORVASTATIN CALCIUM 20 MG: 10 TABLET, FILM COATED ORAL at 20:59

## 2017-01-01 RX ADMIN — Medication 10 ML: at 00:47

## 2017-01-01 RX ADMIN — DIGOXIN 0.12 MG: 0.12 TABLET ORAL at 21:00

## 2017-01-01 RX ADMIN — INSULIN LISPRO 7 UNITS: 100 INJECTION, SOLUTION INTRAVENOUS; SUBCUTANEOUS at 11:30

## 2017-01-01 RX ADMIN — ALBUTEROL SULFATE 2.5 MG: 2.5 SOLUTION RESPIRATORY (INHALATION) at 19:44

## 2017-01-01 RX ADMIN — ARFORMOTEROL TARTRATE 15 MCG: 15 SOLUTION RESPIRATORY (INHALATION) at 19:44

## 2017-01-01 RX ADMIN — PREDNISONE 10 MG: 5 TABLET ORAL at 09:10

## 2017-01-01 RX ADMIN — ALBUTEROL SULFATE 2.5 MG: 2.5 SOLUTION RESPIRATORY (INHALATION) at 15:21

## 2017-01-01 RX ADMIN — Medication 10 ML: at 08:39

## 2017-01-01 RX ADMIN — INSULIN LISPRO 2 UNITS: 100 INJECTION, SOLUTION INTRAVENOUS; SUBCUTANEOUS at 07:30

## 2017-01-01 RX ADMIN — FERROUS SULFATE TAB 325 MG (65 MG ELEMENTAL FE) 325 MG: 325 (65 FE) TAB at 09:10

## 2017-01-01 RX ADMIN — ARFORMOTEROL TARTRATE 15 MCG: 15 SOLUTION RESPIRATORY (INHALATION) at 08:15

## 2017-01-01 RX ADMIN — SODIUM CHLORIDE 500 ML: 900 INJECTION, SOLUTION INTRAVENOUS at 06:00

## 2017-01-01 RX ADMIN — SODIUM CHLORIDE 500 ML: 900 INJECTION, SOLUTION INTRAVENOUS at 17:05

## 2017-01-01 RX ADMIN — UMECLIDINIUM 1 PUFF: 62.5 AEROSOL, POWDER ORAL at 09:10

## 2017-01-01 RX ADMIN — SODIUM CHLORIDE 500 ML: 900 INJECTION, SOLUTION INTRAVENOUS at 21:25

## 2017-01-01 RX ADMIN — SODIUM CHLORIDE 75 ML/HR: 900 INJECTION, SOLUTION INTRAVENOUS at 00:46

## 2017-01-01 RX ADMIN — INSULIN LISPRO 5 UNITS: 100 INJECTION, SOLUTION INTRAVENOUS; SUBCUTANEOUS at 12:37

## 2017-01-01 RX ADMIN — ALBUTEROL SULFATE 2.5 MG: 2.5 SOLUTION RESPIRATORY (INHALATION) at 16:40

## 2017-01-01 RX ADMIN — CEFTRIAXONE SODIUM 1 G: 1 INJECTION, POWDER, FOR SOLUTION INTRAMUSCULAR; INTRAVENOUS at 23:44

## 2017-01-01 RX ADMIN — RIVAROXABAN 15 MG: 10 TABLET, FILM COATED ORAL at 16:53

## 2017-01-01 RX ADMIN — ASPIRIN 81 MG: 81 TABLET, COATED ORAL at 10:00

## 2017-01-01 RX ADMIN — ASPIRIN 81 MG: 81 TABLET, COATED ORAL at 09:10

## 2017-01-01 RX ADMIN — RIVAROXABAN 15 MG: 10 TABLET, FILM COATED ORAL at 17:01

## 2017-01-01 RX ADMIN — RIVAROXABAN 15 MG: 10 TABLET, FILM COATED ORAL at 00:45

## 2017-01-01 RX ADMIN — Medication 10 ML: at 21:01

## 2017-01-01 RX ADMIN — DIGOXIN 0.12 MG: 0.12 TABLET ORAL at 00:45

## 2017-01-01 RX ADMIN — DILTIAZEM HYDROCHLORIDE 240 MG: 240 CAPSULE, COATED, EXTENDED RELEASE ORAL at 22:00

## 2017-01-01 RX ADMIN — ALBUTEROL SULFATE 2.5 MG: 2.5 SOLUTION RESPIRATORY (INHALATION) at 08:15

## 2017-01-01 RX ADMIN — INSULIN LISPRO 7 UNITS: 100 INJECTION, SOLUTION INTRAVENOUS; SUBCUTANEOUS at 17:01

## 2017-01-01 RX ADMIN — CEFDINIR 600 MG: 300 CAPSULE ORAL at 15:56

## 2017-01-01 RX ADMIN — Medication 10 ML: at 14:12

## 2017-01-01 RX ADMIN — CEFTRIAXONE SODIUM 1 G: 1 INJECTION, POWDER, FOR SOLUTION INTRAMUSCULAR; INTRAVENOUS at 00:45

## 2017-01-01 RX ADMIN — UMECLIDINIUM 1 PUFF: 62.5 AEROSOL, POWDER ORAL at 10:07

## 2017-01-01 RX ADMIN — FUROSEMIDE 40 MG: 40 TABLET ORAL at 09:09

## 2017-01-01 RX ADMIN — FUROSEMIDE 40 MG: 40 TABLET ORAL at 16:53

## 2017-01-01 RX ADMIN — INSULIN LISPRO 2 UNITS: 100 INJECTION, SOLUTION INTRAVENOUS; SUBCUTANEOUS at 10:00

## 2017-01-01 RX ADMIN — SODIUM CHLORIDE 500 ML: 900 INJECTION, SOLUTION INTRAVENOUS at 23:00

## 2017-01-03 ENCOUNTER — OFFICE VISIT (OUTPATIENT)
Dept: FAMILY MEDICINE CLINIC | Age: 77
End: 2017-01-03

## 2017-01-03 VITALS
TEMPERATURE: 97 F | RESPIRATION RATE: 20 BRPM | OXYGEN SATURATION: 91 % | HEIGHT: 63 IN | HEART RATE: 101 BPM | DIASTOLIC BLOOD PRESSURE: 70 MMHG | BODY MASS INDEX: 24.1 KG/M2 | WEIGHT: 136 LBS | SYSTOLIC BLOOD PRESSURE: 115 MMHG

## 2017-01-03 DIAGNOSIS — E78.5 DYSLIPIDEMIA: ICD-10-CM

## 2017-01-03 DIAGNOSIS — N39.3 FEMALE STRESS INCONTINENCE: ICD-10-CM

## 2017-01-03 DIAGNOSIS — I10 ESSENTIAL HYPERTENSION: ICD-10-CM

## 2017-01-03 LAB
ALBUMIN UR QL STRIP: 80 MG/L
BILIRUB UR QL STRIP: NORMAL
CREATININE, URINE POC: 100 MG/DL
GLUCOSE UR-MCNC: NEGATIVE MG/DL
HBA1C MFR BLD HPLC: 6.7 %
KETONES P FAST UR STRIP-MCNC: NORMAL MG/DL
MICROALBUMIN/CREAT RATIO POC: NORMAL MG/G
PH UR STRIP: 5.5 [PH] (ref 4.6–8)
PROT UR QL STRIP: NORMAL MG/DL
SP GR UR STRIP: 1.01 (ref 1–1.03)
UA UROBILINOGEN AMB POC: NORMAL (ref 0.2–1)
URINALYSIS CLARITY POC: CLEAR
URINALYSIS COLOR POC: YELLOW
URINE BLOOD POC: NEGATIVE
URINE LEUKOCYTES POC: NORMAL
URINE NITRITES POC: POSITIVE

## 2017-01-03 RX ORDER — CIPROFLOXACIN 250 MG/1
250 TABLET, FILM COATED ORAL EVERY 12 HOURS
Qty: 14 TAB | Refills: 0 | Status: SHIPPED | OUTPATIENT
Start: 2017-01-03 | End: 2017-01-10

## 2017-01-03 NOTE — PATIENT INSTRUCTIONS

## 2017-01-03 NOTE — PROGRESS NOTES
Progress Note    Patient: Jerel Painting MRN: [id number]  SSN: [id number]    YOB: 1940  Age: 64 y.o. Sex: female        Chief Complaint   Patient presents with    Diabetes     Patient presents for follow up of chronic medical conditions. Patient with hx of DM2, HTN, CAD, HLD, Afib, and COPD. She requests refill of metformin. Patient denies HA, dizziness, SOB, CP, abdominal pain, dysuria, myalgias or arthralgias. Patient does not have new complaints and/or concerns. Diabetes: This patient is being treating under a comprehensive plan of care for diabetes. Overall the patient feels well with good energy level. Insulin dependence: no   Pertinent Labs:     Lab Results   Component Value Date/Time    Hemoglobin A1c 7.4 08/05/2016 10:27 AM    Hemoglobin A1c 6.0 05/10/2012 10:18 AM    Hemoglobin A1c (POC) 6.7 01/03/2017 10:12 AM         Body mass index is 24.09 kg/(m^2). Lab Results   Component Value Date/Time    LDL, calculated 93 01/03/2017 10:38 AM        Wt Readings from Last 3 Encounters:   01/03/17 136 lb (61.7 kg)   11/22/16 142 lb (64.4 kg)   11/04/16 146 lb (66.2 kg)        History   Smoking Status    Never Smoker   Smokeless Tobacco    Never Used        Medications, diet and exercise as means of diabetic control with a goal of an A1C of less than 7.0% discussed. Diabetic foot care and annual eye exam discussed as well. Check blood sugars while fasting just before breakfast on most days and occasionally before dinner. Write down readings in a diabetic log book and bring them to the next visit. Call the office for fasting sugars over 200 or below 75 on two or more occasions. Call immediately if having symptoms of high sugar (frequent urination, always thirsty) or low sugar (dizzy, lethargic, sweaty, nauseated, headache). Our overall goal is to reduce or eliminate the long term consequences of poorly controlled diabetes.  Patient expresses understanding and agreement with our plan of care. Hypertension:  The patient reports:  no TIA's, no chest pain on exertion, no dyspnea on exertion, no swelling of ankles. Lifestyle modification/social history: sedentary     BP Readings from Last 3 Encounters:   01/03/17 115/70   11/22/16 139/85   11/04/16 120/60     Lab Results   Component Value Date/Time    Sodium 146 01/03/2017 10:38 AM    Potassium 4.1 01/03/2017 10:38 AM    Chloride 96 01/03/2017 10:38 AM    CO2 30 01/03/2017 10:38 AM    Anion gap 7 12/09/2015 04:02 AM    Glucose 173 01/03/2017 10:38 AM    BUN 26 01/03/2017 10:38 AM    Creatinine 1.01 01/03/2017 10:38 AM    BUN/Creatinine ratio 26 01/03/2017 10:38 AM    GFR est AA 62 01/03/2017 10:38 AM    GFR est non-AA 53 01/03/2017 10:38 AM    Calcium 9.2 01/03/2017 10:38 AM     Patient advised to log blood pressures at home 2-3 times weekly and bring to next visit. Call office as soon as possible if BP's over 140/90 on multiple occasions or with symptoms of dizziness, chest pain, shortness of breath, headache or ankle swelling. Our goal is to normalize the blood pressure to decrease the risks of strokes and heart attacks. The patient is in agreement with the plan. Subjective:     Encounter Diagnoses   Name Primary?  Uncontrolled type 2 diabetes mellitus with complication, without long-term current use of insulin ([facility] [address] Yes    Female stress incontinence     Essential hypertension     Dyslipidemia        Current and past medical information:    Current Medications after this visit[de-identified]     Current Outpatient Prescriptions   Medication Sig    ciprofloxacin HCl (CIPRO) 250 mg tablet Take 1 Tab by mouth every twelve (12) hours for 7 days.  glucose blood VI test strips (FREESTYLE LITE STRIPS) strip TEST BLOOD SUGAR TWO TIMES DAILY E11.9    metFORMIN (GLUCOPHAGE) 500 mg tablet Take 1 Tab by mouth three (3) times daily (with meals).  rivaroxaban (XARELTO) 15 mg tab tablet Take 1 Tab by mouth daily  (with dinner).     diltiazem CD (CARDIZEM CD) 240 mg ER capsule Take 1 Cap by mouth nightly.  digoxin (LANOXIN) 0.125 mg tablet Take 1 Tab by mouth nightly.  Lancets (FREESTYLE LANCETS) [facility]LOOD [place] THREE TIMES DAILY E11.9    nitrofurantoin (MACRODANTIN) 50 mg capsule Take 1 Cap by mouth nightly.  atorvastatin (LIPITOR) 20 mg tablet Take 1 Tab by mouth nightly.  furosemide (LASIX) 20 mg tablet 2 tabs in AM and pm    predniSONE (DELTASONE) 10 mg tablet Take 1 Tab by mouth daily.  aspirin 81 mg chewable tablet Take 81 mg by mouth nightly.  ferrous sulfate (IRON) 325 mg (65 mg iron) EC tablet Take 325 mg by mouth nightly.  potassium chloride SR (KLOR-CON 10) 10 mEq tablet Take 10 mEq by mouth nightly.  tiotropium bromide (SPIRIVA RESPIMAT) 2.5 mcg/actuation mist Take 2 Puffs by inhalation daily.  arformoterol (BROVANA) 15 mcg/2 mL nebu neb solution 15 mcg by Nebulization route two (2) times a day.  albuterol (PROVENTIL VENTOLIN) 2.5 mg /3 mL (0.083 %) nebulizer solution 3 mL by Nebulization route three (3) times daily.  pirfenidone (ESBRIET) 267 mg cap Take 3 Caps by mouth three (3) times daily. Indications: PULMONARY FIBROSIS    FREESTYLE FREEDOM LITE monitoring kit FOLLOW PACKAGE DIRECTIONS    MISCELLANEOUS MEDICAL SUPPLY (OXYGEN TUBING) 2 L by Does Not Apply route.  albuterol (PROAIR HFA) 90 mcg/actuation inhaler Take 1 Puff by inhalation every four (4) hours as needed for Wheezing. No current facility-administered medications for this visit.         Patient Active Problem List    Diagnosis Date Noted    S/P AVR 11/06/2016    DM II (diabetes mellitus, type II), controlled ([address] 12/08/2015    RBBB 10/16/2015    Frozen shoulder 06/25/2014    Dyslipidemia 03/21/2014    T2DM (type 2 diabetes mellitus) ([facility] [address] 09/06/2013    Chronic diastolic heart failure ([facility] [address] 05/29/2012    Pulmonary fibrosis ([facility] [address] 05/15/2012    Atrial fibrillation ([facility] [address] 05/10/2012    COPD (chronic obstructive pulmonary disease) ([address] 03/16/2012    History of noncompliance with medical treatment 09/13/2011    Arthritis 01/14/2011    Hypertension     Depression     Female stress incontinence     CAD (coronary artery disease)     Aortic stenosis     Sleep apnea        Past Medical History   Diagnosis Date    Aortic stenosis      severe, s/p AVR March 2012 - presented with HF, EF 30%    Asthma 5/10/2012    Atrial fibrillation ([address] 5/10/2012    Bacterial pneumonia, unspecified     CAD (coronary artery disease)      cor calcifications (LAD) by CT     Chronic diastolic heart failure ([address] 5/29/2012    Depression     Dyslipidemia     Female stress incontinence     Hyperlipidemia 9/17/2010    Hypertension     Osteoarthritis     Paroxysmal atrial fibrillation ([address]      post op AVR    Pulmonary fibrosis ([address]     S/P AVR 11/6/2016    S/P AVR (aortic valve replacement) 3/2012     bovine ([facility])    Sleep apnea     T2DM (type 2 diabetes mellitus) ([address] 1/14/2011       No Known Allergies    Past Surgical History   Procedure Laterality Date    Hx cataract removal       bilateral    Echo 2d adult  3/2012     EF 30%, mod LVH, EF 30-35%, PA 50 mmHg    Pr cardiac surg procedure unlist         Social History     Social History    Marital status:      Spouse name: N/A    Number of children: N/A    Years of education: N/A     Social History Main Topics    Smoking status: Never Smoker    Smokeless tobacco: Never Used    Alcohol use No    Drug use: No    Sexual activity: Not Asked     Other Topics Concern    None     Social History Narrative         Objective:     Review of Systems:  Constitutional: Negative for fatigue or malaise  Skin: Negative for rash or lesion  Endo: Negative for unusual thirst or weight changes  HEENT: Negative for acute hearing or vision changes  Cardiovascular: Negative for dizziness, chest pain or palpitations  Respiratory: Negative for cough, wheezing or SOB  Gastreintestinal: Negative for nausea or abdominal pain  Genital/urinary: Negative for dysuria or voiding dysfunction  Muscoloskeletal: Negative for myalgias or arthralgias   Neurological: Negative for headache, weakness or paresthesia  Psychological: Negative for depression or anxiety      Vitals:    01/03/17 0915   BP: 115/70   Pulse: (!) 101   Resp: 20   Temp: 97 °F (36.1 °C)   TempSrc: Oral   SpO2: 91%   Weight: 136 lb (61.7 kg)   Height: 5' 3\" (1.6 m)      Body mass index is 24.09 kg/(m^2). Physical Exam:  Constitutional: Thin, in no acute distress  Skin: warm and dry sans rash or lesion  Head: normocephalic, atraumatic  Eyes: sclera clear, EOMI  Nose: nasal canula in place  Oropharynx: moist mucous membranes sans lesion   Neck: normal range of motion, no lymphadenopathy  CV: normal S1, S2, regular rate and rhythm, no murmur  Respiratory: clear to auscultation bilaterally with symmetrical effort  Abdomen: soft, BS normal  Extremities: in wheelchair No edema  Neurology:  normal strength and sensation  Psych: active, alert and oriented, affect appropriate     Health Maintenance Due   Topic Date Due    DTaP/Tdap/Td series (1 - Tdap) 12/17/1959    ZOSTER VACCINE AGE 60>  12/17/1998    Pneumococcal 65+ Low/Medium Risk (1 of 2 - PCV13) 12/17/2003       Assessment and orders:       ICD-10-CM ICD-9-CM    1. Uncontrolled type 2 diabetes mellitus with complication, without long-term current use of insulin (HCC) E11.8 250.82 AMB POC HEMOGLOBIN A1C    E11.65  AMB POC URINE, MICROALBUMIN, SEMIQUANT (3 RESULTS)      glucose blood VI test strips (FREESTYLE LITE STRIPS) strip      METABOLIC PANEL, COMPREHENSIVE      LIPID PANEL      CBC WITH AUTOMATED DIFF   2. Female stress incontinence N39.3 625.6 AMB POC URINALYSIS DIP STICK MANUAL W/O MICRO      CULTURE, URINE   3. Essential hypertension F32 457.8 METABOLIC PANEL, COMPREHENSIVE      TSH 3RD GENERATION   4.  Dyslipidemia Y34.8 419.6 METABOLIC PANEL, COMPREHENSIVE      LIPID PANEL Plan of care:  Discussed diagnoses in detail with patient. Importance of compliance with all prescribed medications discussed. Including any new medications prescribed today. Medication risks/benefits/side effects discussed with patient. Continue current prescribed medications as written. No medication changes at this time. All of the patient's questions were addressed. The patient understands and agrees with our plan of care. The patient knows to call back if they are unsure of or forget any changes we discussed today or if the symptoms change. The patient received an After-Visit Summary which contains VS, orders, medication list and allergy list.     Patient Care Team:  Jarret Aguirre MD as PCP - General (Family Practice)  Jose Reeder RN as Nurse Gerald Pastrana MD (Pulmonary Disease)  Mayank Christina MD (Pulmonary Disease)  Leigh Alonzo MD (Ophthalmology)  Manolo Siegel MD as Physician (Cardiology)    Follow-up Disposition:  Return in about 3 months (around 4/3/2017), or if symptoms worsen or fail to improve.     Future Appointments  Date Time Provider Gwendolyn Christensen   4/18/2017 8:40 AM Jarret Aguirre MD [facility]   5/12/2017 2:00 PM Gildardo Pump [facility]   5/12/2017 3:20 PM Manolo Siegel MD [address] [facility]       Signed By: Jarret Aguirre MD     January 4, 2017

## 2017-01-03 NOTE — PROGRESS NOTES
Reviewed record in preparation for visit and have obtained necessary documentation. Patient did not bring medications to visit for review. Information provided on Advanced Directive, Living Will. Body mass index is 24.09 kg/(m^2). Health Maintenance Due   Topic Date Due    DTaP/Tdap/Td series (1 - Tdap) 12/17/1959    ZOSTER VACCINE AGE 60>  12/17/1998    Pneumococcal 65+ Low/Medium Risk (1 of 2 - PCV13) 12/17/2003   - check for functional glucose monitor and record keeping system-yes  Twice daily  Pt was given BS record log to document home readings and return to office for review  Diabetic Bundle:  LDL-111  A1C-7.4  BP-  Smoking?no  Anticoagulation medication? yes  Eye exam dilated?   Foot exam?

## 2017-01-03 NOTE — MR AVS SNAPSHOT
Visit Information Date & Time Provider Department Dept. Phone Encounter #  
 1/3/2017  9:00 AM Dereck Mann, George Issa Gill [id number] Follow-up Instructions Return in about 3 months (around 4/3/2017), or if symptoms worsen or fail to improve. Your Appointments 5/12/2017  2:00 PM  
ECHO CARDIOGRAMS 2D with JUDE VANG  
[facility] ([facility]) Appt Note: echo @ 2, to see dr Soraya Jules after [address] [place] [address] [facility] [address] [id number]  
[phone]  
  
   
 [address] [facility] [id number]  
  
    
 5/12/2017  3:20 PM  
ESTABLISHED PATIENT with Noel Solis MD  
[facility] ([facility]) Appt Note: echo @ 2, to see dr Soraya Jules after [address] [place] [address] [facility]  
[address] [facility] [place] [address] [address] Upcoming Health Maintenance Date Due DTaP/Tdap/Td series (1 - Tdap) 12/17/1959 ZOSTER VACCINE AGE 60> 12/17/1998 Pneumococcal 65+ Low/Medium Risk (1 of 2 - PCV13) 12/17/2003 EYE EXAM RETINAL OR DILATED Q1 2/9/2017 HEMOGLOBIN A1C Q6M 4/25/2017 LIPID PANEL Q1 8/5/2017 MEDICARE YEARLY EXAM 10/26/2017 FOOT EXAM Q1 10/27/2017 GLAUCOMA SCREENING Q2Y 2/9/2018 Allergies as of 1/3/2017  Review Complete On: 1/3/2017 By: Taylor Morrow LPN No Known Allergies Current Immunizations  Reviewed on 8/5/2016 Name Date Influenza Vaccine 10/22/2016, 10/16/2015, 1/2/2015, 11/15/2013, 12/7/2012 Not reviewed this visit You Were Diagnosed With   
  
 Codes Comments Uncontrolled type 2 diabetes mellitus with complication, without long-term current use of insulin ([facility] [address]    -  Primary ICD-10-CM: E11.8, E11.65 ICD-9-CM: 250.82 Female stress incontinence     ICD-10-CM: N39.3 ICD-9-CM: 625.6 Essential hypertension     ICD-10-CM: I10 
ICD-9-CM: 401.9 Dyslipidemia     ICD-10-CM: E78.5 ICD-9-CM: 272.4 Vitals BP Pulse Temp Resp Height(growth percentile) Weight(growth percentile) 115/70 (BP 1 Location: Right arm, BP Patient Position: Sitting) (!) 101 97 °F (36.1 °C) (Oral) 20 5' 3\" (1.6 m) 136 lb (61.7 kg) SpO2 BMI OB Status Smoking Status 91% 24.09 kg/m2 Postmenopausal Never Smoker Vitals History BMI and BSA Data Body Mass Index Body Surface Area 24.09 kg/m 2 1.66 m 2 Preferred Pharmacy Pharmacy Name Phone [facility] [address] [place] [address] [address] [phone] Your Updated Medication List  
  
   
This list is accurate as of: 1/3/17 10:31 AM.  Always use your most recent med list.  
  
  
  
  
 * albuterol 90 mcg/actuation inhaler Commonly known as:  PROAIR HFA Take 1 Puff by inhalation every four (4) hours as needed for Wheezing. * albuterol 2.5 mg /3 mL (0.083 %) nebulizer solution Commonly known as:  PROVENTIL VENTOLIN  
3 mL by Nebulization route three (3) times daily. aspirin 81 mg chewable tablet Take 81 mg by mouth nightly. atorvastatin 20 mg tablet Commonly known as:  LIPITOR Take 1 Tab by mouth nightly. BROVANA 15 mcg/2 mL Nebu neb solution Generic drug:  arformoterol 15 mcg by Nebulization route two (2) times a day. ciprofloxacin HCl 250 mg tablet Commonly known as:  CIPRO Take 1 Tab by mouth every twelve (12) hours for 7 days. digoxin 0.125 mg tablet Commonly known as:  LANOXIN Take 1 Tab by mouth nightly. dilTIAZem  mg ER capsule Commonly known as:  CARDIZEM CD Take 1 Cap by mouth nightly. ESBRIET 267 mg Cap Generic drug:  pirfenidone Take 3 Caps by mouth three (3) times daily. Indications: PULMONARY FIBROSIS  
  
 ferrous sulfate 325 mg (65 mg iron) EC tablet Commonly known as:  IRON  
 Take 325 mg by mouth nightly. FREESTYLE FREEDOM LITE monitoring kit Generic drug:  Blood-Glucose Meter FOLLOW PACKAGE DIRECTIONS  
  
 furosemide 20 mg tablet Commonly known as:  LASIX  
2 tabs in AM and pm  
  
 glucose blood VI test strips strip Commonly known as:  FREESTYLE LITE STRIPS  
TEST BLOOD SUGAR TWO TIMES DAILY E11.9 KLOR-CON 10 10 mEq tablet Generic drug:  potassium chloride SR Take 10 mEq by mouth nightly. Lancets Misc Commonly known as:  FREESTYLE LANCETS  
TEST BLOOD SUGAR THREE TIMES DAILY E11.9  
  
 metFORMIN 500 mg tablet Commonly known as:  GLUCOPHAGE Take 1 Tab by mouth three (3) times daily (with meals). nitrofurantoin 50 mg capsule Commonly known as:  MACRODANTIN Take 1 Cap by mouth nightly. OXYGEN TUBING  
2 L by Does Not Apply route. predniSONE 10 mg tablet Commonly known as:  Reino Velazco Take 1 Tab by mouth daily. rivaroxaban 15 mg Tab tablet Commonly known as:  [place] Take 1 Tab by mouth daily  (with dinner). SPIRIVA RESPIMAT 2.5 mcg/actuation inhaler Generic drug:  tiotropium bromide Take 2 Puffs by inhalation daily. * Notice: This list has 2 medication(s) that are the same as other medications prescribed for you. Read the directions carefully, and ask your doctor or other care provider to review them with you. Prescriptions Printed Refills  
 glucose blood VI test strips (FREESTYLE LITE STRIPS) strip 5 Sig: TEST BLOOD SUGAR TWO TIMES DAILY E11.9 Class: Print Prescriptions Sent to Pharmacy Refills  
 ciprofloxacin HCl (CIPRO) 250 mg tablet 0 Sig: Take 1 Tab by mouth every twelve (12) hours for 7 days. Class: Normal  
 Pharmacy: [place] [id number] Ph #: [phone] Route: Oral  
  
Follow-up Instructions Return in about 3 months (around 4/3/2017), or if symptoms worsen or fail to improve. Patient Instructions A Healthy Lifestyle: Care Instructions Your Care Instructions A healthy lifestyle can help you feel good, stay at a healthy weight, and have plenty of energy for both work and play. A healthy lifestyle is something you can share with your whole family. A healthy lifestyle also can lower your risk for serious health problems, such as high blood pressure, heart disease, and diabetes. You can follow a few steps listed below to improve your health and the health of your family. Follow-up care is a key part of your treatment and safety. Be sure to make and go to all appointments, and call your doctor if you are having problems. Its also a good idea to know your test results and keep a list of the medicines you take. How can you care for yourself at home? · Do not eat too much sugar, fat, or fast foods. You can still have dessert and treats now and then. The goal is moderation. · Start small to improve your eating habits. Pay attention to portion sizes, drink less juice and soda pop, and eat more fruits and vegetables. ¨ Eat a healthy amount of food. A 3-ounce serving of meat, for example, is about the size of a deck of cards. Fill the rest of your plate with vegetables and whole grains. ¨ Limit the amount of soda and sports drinks you have every day. Drink more water when you are thirsty. ¨ Eat at least 5 servings of fruits and vegetables every day. It may seem like a lot, but it is not hard to reach this goal. A serving or helping is 1 piece of fruit, 1 cup of vegetables, or 2 cups of leafy, raw vegetables. Have an apple or some carrot sticks as an afternoon snack instead of a candy bar. Try to have fruits and/or vegetables at every meal. 
· Make exercise part of your daily routine. You may want to start with simple activities, such as walking, bicycling, or slow swimming. Try to be active 30 to 60 minutes every day.  You do not need to do all 30 to 60 minutes all at once. For example, you can exercise 3 times a day for 10 or 20 minutes. Moderate exercise is safe for most people, but it is always a good idea to talk to your doctor before starting an exercise program. 
· Keep moving. Kendy Cayden the lawn, work in the garden, or [employer]. Take the stairs instead of the elevator at work. · If you smoke, quit. People who smoke have an increased risk for heart attack, stroke, cancer, and other lung illnesses. Quitting is hard, but there are ways to boost your chance of quitting tobacco for good. ¨ Use nicotine gum, patches, or lozenges. ¨ Ask your doctor about stop-smoking programs and medicines. ¨ Keep trying. In addition to reducing your risk of diseases in the future, you will notice some benefits soon after you stop using tobacco. If you have shortness of breath or asthma symptoms, they will likely get better within a few weeks after you quit. · Limit how much alcohol you drink. Moderate amounts of alcohol (up to 2 drinks a day for men, 1 drink a day for women) are okay. But drinking too much can lead to liver problems, high blood pressure, and other health problems. Family health If you have a family, there are many things you can do together to improve your health. · Eat meals together as a family as often as possible. · Eat healthy foods. This includes fruits, vegetables, lean meats and dairy, and whole grains. · Include your family in your fitness plan. Most people think of activities such as jogging or tennis as the way to fitness, but there are many ways you and your family can be more active. Anything that makes you breathe hard and gets your heart pumping is exercise. Here are some tips: 
¨ Walk to do errands or to take your child to school or the bus. ¨ Go for a family bike ride after dinner instead of watching TV. Where can you learn more? Go to http://lul-ben.info/. Enter G580 in the search box to learn more about \"A Healthy Lifestyle: Care Instructions. \" Current as of: July 26, 2016 Content Version: 11.1 © 5225-4986 [employer]. Care instructions adapted under license by [employer] (which disclaims liability or warranty for this information). If you have questions about a medical condition or this instruction, always ask your healthcare professional. [address] any warranty or liability for your use of this information. Introducing \[facility] [place] [facility]\"" & HEALTH SERVICES! Lexus Santiago introduces [employer] patient portal. Now you can access parts of your medical record, email your doctor's office, and request medication refills online. 1. In your internet browser, go to [email or web address] 2. Click on the First Time User? Click Here link in the Sign In box. You will see the New Member Sign Up page. 3. Enter your [employer] Access Code exactly as it appears below. You will not need to use this code after youve completed the sign-up process. If you do not sign up before the expiration date, you must request a new code. · Vee[employer] Access Code: [email or web address] Expires: 2/2/2017  2:27 PM 
 
4. Enter the last four digits of your Social Security Number (xxxx) and Date of Birth (mm/dd/yyyy) as indicated and click Submit. You will be taken to the next sign-up page. 5. Create a Veebeam ID. This will be your Veebeam login ID and cannot be changed, so think of one that is secure and easy to remember. 6. Create a Veebeam password. You can change your password at any time. 7. Enter your Password Reset Question and Answer. This can be used at a later time if you forget your password. 8. Enter your e-mail address. You will receive e-mail notification when new information is available in [address] 9. Click Sign Up. You can now view and download portions of your medical record. 10. Click the Download Summary menu link to download a portable copy of your medical information. If you have questions, please visit the Frequently Asked Questions section of the [employer] website. Remember, [employer] is NOT to be used for urgent needs. For medical emergencies, dial 911. Now available from your iPhone and Android! Please provide this summary of care documentation to your next provider. Your primary care clinician is listed as Jorge Gibbons. If you have any questions after today's visit, please call [phone].

## 2017-01-04 LAB
ALBUMIN SERPL-MCNC: 4.3 G/DL (ref 3.5–4.8)
ALBUMIN/GLOB SERPL: 1.7 {RATIO} (ref 1.1–2.5)
ALP SERPL-CCNC: 47 IU/L (ref 39–117)
ALT SERPL-CCNC: 24 IU/L (ref 0–32)
AST SERPL-CCNC: 21 IU/L (ref 0–40)
BASOPHILS # BLD AUTO: 0.1 X10E3/UL (ref 0–0.2)
BASOPHILS NFR BLD AUTO: 1 %
BILIRUB SERPL-MCNC: 0.4 MG/DL (ref 0–1.2)
BUN SERPL-MCNC: 26 MG/DL (ref 8–27)
BUN/CREAT SERPL: 26 (ref 11–26)
CALCIUM SERPL-MCNC: 9.2 MG/DL (ref 8.7–10.3)
CHLORIDE SERPL-SCNC: 96 MMOL/L (ref 96–106)
CHOLEST SERPL-MCNC: 186 MG/DL (ref 100–199)
CO2 SERPL-SCNC: 30 MMOL/L (ref 18–29)
CREAT SERPL-MCNC: 1.01 MG/DL (ref 0.57–1)
EOSINOPHIL # BLD AUTO: 0.1 X10E3/UL (ref 0–0.4)
EOSINOPHIL NFR BLD AUTO: 1 %
ERYTHROCYTE [DISTWIDTH] IN BLOOD BY AUTOMATED COUNT: 13.9 % (ref 12.3–15.4)
GLOBULIN SER CALC-MCNC: 2.5 G/DL (ref 1.5–4.5)
GLUCOSE SERPL-MCNC: 173 MG/DL (ref 65–99)
HCT VFR BLD AUTO: 38.3 % (ref 34–46.6)
HDLC SERPL-MCNC: 59 MG/DL
HGB BLD-MCNC: 12.7 G/DL (ref 11.1–15.9)
IMM GRANULOCYTES # BLD: 0.1 X10E3/UL (ref 0–0.1)
IMM GRANULOCYTES NFR BLD: 1 %
LDLC SERPL CALC-MCNC: 93 MG/DL (ref 0–99)
LYMPHOCYTES # BLD AUTO: 1.8 X10E3/UL (ref 0.7–3.1)
LYMPHOCYTES NFR BLD AUTO: 20 %
MCH RBC QN AUTO: 31.1 PG (ref 26.6–33)
MCHC RBC AUTO-ENTMCNC: 33.2 G/DL (ref 31.5–35.7)
MCV RBC AUTO: 94 FL (ref 79–97)
MONOCYTES # BLD AUTO: 0.9 X10E3/UL (ref 0.1–0.9)
MONOCYTES NFR BLD AUTO: 9 %
MORPHOLOGY BLD-IMP: NORMAL
NEUTROPHILS # BLD AUTO: 6.3 X10E3/UL (ref 1.4–7)
NEUTROPHILS NFR BLD AUTO: 68 %
PLATELET # BLD AUTO: 254 X10E3/UL (ref 150–379)
POTASSIUM SERPL-SCNC: 4.1 MMOL/L (ref 3.5–5.2)
PROT SERPL-MCNC: 6.8 G/DL (ref 6–8.5)
RBC # BLD AUTO: 4.09 X10E6/UL (ref 3.77–5.28)
SODIUM SERPL-SCNC: 146 MMOL/L (ref 134–144)
TRIGL SERPL-MCNC: 168 MG/DL (ref 0–149)
TSH SERPL DL<=0.005 MIU/L-ACNC: 0.9 UIU/ML (ref 0.45–4.5)
VLDLC SERPL CALC-MCNC: 34 MG/DL (ref 5–40)
WBC # BLD AUTO: 9.1 X10E3/UL (ref 3.4–10.8)

## 2017-01-05 LAB — BACTERIA UR CULT: ABNORMAL

## 2017-04-26 NOTE — MR AVS SNAPSHOT
Visit Information Date & Time Provider Department Dept. Phone Encounter #  
 4/26/2017  9:40 AM Katarzyna Dior MD [address] [place] [address] [id number] Follow-up Instructions Return in about 3 months (around 7/26/2017), or if symptoms worsen or fail to improve. Your Appointments 5/12/2017  2:00 PM  
ECHO CARDIOGRAMS 2D with JUDE VANG  
[facility] ([address]) Appt Note: echo @ 2, to see dr Phuong Elias after [address] [place] [address]  
[phone]  
  
   
 [address] [facility] [address]  
  
    
 5/12/2017  3:20 PM  
ESTABLISHED PATIENT with Shahbaz Mast MD  
[facility] ([address]) Appt Note: echo @ 2, to see dr Phuong Elias after [address] [place] [address] [id number] [place] [address] Upcoming Health Maintenance Date Due DTaP/Tdap/Td series (1 - Tdap) 12/17/1959 ZOSTER VACCINE AGE 60> 12/17/1998 Pneumococcal 65+ Low/Medium Risk (1 of 2 - PCV13) 12/17/2003 EYE EXAM RETINAL OR DILATED Q1 2/9/2017 HEMOGLOBIN A1C Q6M 7/3/2017 MEDICARE YEARLY EXAM 10/26/2017 FOOT EXAM Q1 10/27/2017 LIPID PANEL Q1 1/3/2018 GLAUCOMA SCREENING Q2Y 2/9/2018 Allergies as of 4/26/2017  Review Complete On: 4/26/2017 By: Shantel Roberson LPN No Known Allergies Current Immunizations  Reviewed on 8/5/2016 Name Date Influenza Vaccine 10/22/2016, 10/16/2015, 1/2/2015, 11/15/2013, 12/7/2012 Not reviewed this visit You Were Diagnosed With   
  
 Codes Comments Controlled type 2 diabetes mellitus without complication, without long-term current use of insulin ([address]    -  Primary ICD-10-CM: E11.9 ICD-9-CM: 250.00  Chronic obstructive pulmonary disease, unspecified COPD type ([address] ICD-10-CM: J44.9 ICD-9-CM: 965 Essential hypertension     ICD-10-CM: I10 
ICD-9-CM: 401.9 Urinary tract infection without hematuria, site unspecified     ICD-10-CM: N39.0 ICD-9-CM: 599.0 Vitals BP Pulse Temp Resp Height(growth percentile) Weight(growth percentile) 108/70 (BP 1 Location: Left arm, BP Patient Position: Sitting) 96 98.7 °F (37.1 °C) (Oral) 20 5' 3\" (1.6 m) 137 lb (62.1 kg) SpO2 BMI OB Status Smoking Status 93% 24.27 kg/m2 Postmenopausal Never Smoker Vitals History BMI and BSA Data Body Mass Index Body Surface Area  
 24.27 kg/m 2 1.66 m 2 Preferred Pharmacy Pharmacy Name Phone [facility] [address] [place] [address] [address] [phone] Your Updated Medication List  
  
   
This list is accurate as of: 4/26/17 10:54 AM.  Always use your most recent med list.  
  
  
  
  
 * albuterol 2.5 mg /3 mL (0.083 %) nebulizer solution Commonly known as:  PROVENTIL VENTOLIN  
3 mL by Nebulization route three (3) times daily. * albuterol 90 mcg/actuation inhaler Commonly known as:  PROAIR HFA Take 1 Puff by inhalation every four (4) hours as needed for Wheezing. aspirin 81 mg chewable tablet Take 81 mg by mouth nightly. atorvastatin 20 mg tablet Commonly known as:  LIPITOR Take 1 Tab by mouth nightly. BROVANA 15 mcg/2 mL Nebu neb solution Generic drug:  arformoterol 15 mcg by Nebulization route two (2) times a day. digoxin 0.125 mg tablet Commonly known as:  LANOXIN Take 1 Tab by mouth nightly. dilTIAZem  mg ER capsule Commonly known as:  CARDIZEM CD Take 1 Cap by mouth nightly. ESBRIET 267 mg Cap capsule Generic drug:  pirfenidone Take 3 Caps by mouth three (3) times daily. Indications: PULMONARY FIBROSIS  
  
 ferrous sulfate 325 mg (65 mg iron) EC tablet Commonly known as:  IRON Take 325 mg by mouth nightly. FREESTYLE FREEDOM LITE monitoring kit Generic drug:  Blood-Glucose Meter FOLLOW PACKAGE DIRECTIONS  
  
 furosemide 20 mg tablet Commonly known as:  LASIX  
2 tabs in AM and pm  
  
 glucose blood VI test strips strip Commonly known as:  FREESTYLE LITE STRIPS  
TEST BLOOD SUGAR TWO TIMES DAILY E11.9 KLOR-CON 10 10 mEq tablet Generic drug:  potassium chloride SR Take 10 mEq by mouth nightly. Lancets Misc Commonly known as:  FREESTYLE LANCETS  
TEST BLOOD SUGAR THREE TIMES DAILY E11.9  
  
 metFORMIN 500 mg tablet Commonly known as:  GLUCOPHAGE Take 1 Tab by mouth three (3) times daily (with meals). nitrofurantoin 50 mg capsule Commonly known as:  MACRODANTIN Take 1 Cap by mouth nightly. OXYGEN TUBING  
2 L by Does Not Apply route. predniSONE 10 mg tablet Commonly known as:  Staci Rae Take 1 Tab by mouth daily. rivaroxaban 15 mg Tab tablet Commonly known as:  Nadiya Better Take 1 Tab by mouth daily  (with dinner). SPIRIVA RESPIMAT 2.5 mcg/actuation inhaler Generic drug:  tiotropium bromide Take 2 Puffs by inhalation daily. trimethoprim-sulfamethoxazole 160-800 mg per tablet Commonly known as:  BACTRIM DS, SEPTRA DS Take 1 Tab by mouth two (2) times a day for 7 days. * Notice: This list has 2 medication(s) that are the same as other medications prescribed for you. Read the directions carefully, and ask your doctor or other care provider to review them with you. Prescriptions Sent to Pharmacy Refills  
 albuterol (PROAIR HFA) 90 mcg/actuation inhaler 3 Sig: Take 1 Puff by inhalation every four (4) hours as needed for Wheezing. Class: Normal  
 Pharmacy: [facility] Ph #: [phone] Route: Inhalation  
 metFORMIN (GLUCOPHAGE) 500 mg tablet 5 Sig: Take 1 Tab by mouth three (3) times daily (with meals).   
 Class: Normal  
 Pharmacy: [address] Ph #: [phone] Route: Oral  
 trimethoprim-sulfamethoxazole (BACTRIM DS, SEPTRA DS) 160-800 mg per tablet 0 Sig: Take 1 Tab by mouth two (2) times a day for 7 days. Class: Normal  
 Pharmacy: [facility] [address] [facility] Ph #: [phone] Route: Oral  
  
We Performed the Following AMB POC HEMOGLOBIN A1C [14525 CPT(R)] AMB POC URINALYSIS DIP STICK AUTO W/O MICRO [96214 CPT(R)] COLLECTION CAPILLARY BLOOD SPECIMEN [72368 CPT(R)] CULTURE, URINE Z774249 CPT(R)] Follow-up Instructions Return in about 3 months (around 7/26/2017), or if symptoms worsen or fail to improve. To-Do List   
 04/26/2017 Lab:  CBC W/O DIFF   
  
 04/26/2017 Lab:  LIPID PANEL   
  
 04/26/2017 Lab:  METABOLIC PANEL, COMPREHENSIVE   
  
 04/26/2017 Lab:  TSH 3RD GENERATION Patient Instructions Dr Mira Lyle, ophthalmologist. 
[address]    
[phone]. Learning About Diabetes Food Guidelines Your Care Instructions Meal planning is important to manage diabetes. It helps keep your blood sugar at a target level (which you set with your doctor). You don't have to eat special foods. You can eat what your family eats, including sweets once in a while. But you do have to pay attention to how often you eat and how much you eat of certain foods. You may want to work with a dietitian or a certified diabetes educator (CDE) to help you plan meals and snacks. A dietitian or CDE can also help you lose weight if that is one of your goals. What should you know about eating carbs? Managing the amount of carbohydrate (carbs) you eat is an important part of healthy meals when you have diabetes. Carbohydrate is found in many foods. · Learn which foods have carbs. And learn the amounts of carbs in different foods. ¨ Bread, cereal, pasta, and rice have about 15 grams of carbs in a serving. A serving is 1 slice of bread (1 ounce), ½ cup of cooked cereal, or 1/3 cup of cooked pasta or rice. ¨ Fruits have 15 grams of carbs in a serving. A serving is 1 small fresh fruit, such as an apple or orange; ½ of a banana; ½ cup of cooked or canned fruit; ½ cup of fruit juice; 1 cup of melon or raspberries; or 2 tablespoons of dried fruit. ¨ Milk and no-sugar-added yogurt have 15 grams of carbs in a serving. A serving is 1 cup of milk or 2/3 cup of no-sugar-added yogurt. ¨ Starchy vegetables have 15 grams of carbs in a serving. A serving is ½ cup of mashed potatoes or sweet potato; 1 cup winter squash; ½ of a small baked potato; ½ cup of cooked beans; or ½ cup cooked corn or green peas. · Learn how much carbs to eat each day and at each meal. A dietitian or CDE can teach you how to keep track of the amount of carbs you eat. This is called carbohydrate counting. · If you are not sure how to count carbohydrate grams, use the Plate Method to plan meals. It is a good, quick way to make sure that you have a balanced meal. It also helps you spread carbs throughout the day. ¨ Divide your plate by types of foods. Put non-starchy vegetables on half the plate, meat or other protein food on one-quarter of the plate, and a grain or starchy vegetable in the final quarter of the plate. To this you can add a small piece of fruit and 1 cup of milk or yogurt, depending on how many carbs you are supposed to eat at a meal. 
· Try to eat about the same amount of carbs at each meal. Do not \"save up\" your daily allowance of carbs to eat at one meal. 
· Proteins have very little or no carbs per serving. Examples of proteins are beef, chicken, turkey, fish, eggs, tofu, cheese, cottage cheese, and peanut butter. A serving size of meat is 3 ounces, which is about the size of a deck of cards.  Examples of meat substitute serving sizes (equal to 1 ounce of meat) are 1/4 cup of cottage cheese, 1 egg, 1 tablespoon of peanut butter, and ½ cup of tofu. How can you eat out and still eat healthy? · Learn to estimate the serving sizes of foods that have carbohydrate. If you measure food at home, it will be easier to estimate the amount in a serving of restaurant food. · If the meal you order has too much carbohydrate (such as potatoes, corn, or baked beans), ask to have a low-carbohydrate food instead. Ask for a salad or green vegetables. · If you use insulin, check your blood sugar before and after eating out to help you plan how much to eat in the future. · If you eat more carbohydrate at a meal than you had planned, take a walk or do other exercise. This will help lower your blood sugar. What else should you know? · Limit saturated fat, such as the fat from meat and dairy products. This is a healthy choice because people who have diabetes are at higher risk of heart disease. So choose lean cuts of meat and nonfat or low-fat dairy products. Use olive or canola oil instead of butter or shortening when cooking. · Don't skip meals. Your blood sugar may drop too low if you skip meals and take insulin or certain medicines for diabetes. · Check with your doctor before you drink alcohol. Alcohol can cause your blood sugar to drop too low. Alcohol can also cause a bad reaction if you take certain diabetes medicines. Follow-up care is a key part of your treatment and safety. Be sure to make and go to all appointments, and call your doctor if you are having problems. It's also a good idea to know your test results and keep a list of the medicines you take. Where can you learn more? Go to http://lul-ben.info/. Enter P139 in the search box to learn more about \"Learning About Diabetes Food Guidelines. \" Current as of: May 23, 2016 Content Version: 11.2 © 7689-0542 [employer].  Care instructions adapted under license by [address] (which disclaims liability or warranty for this information). If you have questions about a medical condition or this instruction, always ask your healthcare professional. [address] any warranty or liability for your use of this information. Introducing [facility]! Phil Verma introduces [employer] patient portal. Now you can access parts of your medical record, email your doctor's office, and request medication refills online. 1. In your internet browser, go to [email or web address] 2. Click on the First Time User? Click Here link in the Sign In box. You will see the New Member Sign Up page. 3. Enter your [employer] Access Code exactly as it appears below. You will not need to use this code after youve completed the sign-up process. If you do not sign up before the expiration date, you must request a new code. · [employer] Access Code: [email or web address] Expires: 7/25/2017  9:12 AM 
 
4. Enter the last four digits of your Social Security Number (xxxx) and Date of Birth (mm/dd/yyyy) as indicated and click Submit. You will be taken to the next sign-up page. 5. Create a [employer] ID. This will be your [employer] login ID and cannot be changed, so think of one that is secure and easy to remember. 6. Create a [employer] password. You can change your password at any time. 7. Enter your Password Reset Question and Answer. This can be used at a later time if you forget your password. 8. Enter your e-mail address. You will receive e-mail notification when new information is available in [address] 9. Click Sign Up. You can now view and download portions of your medical record. 10. Click the Download Summary menu link to download a portable copy of your medical information. If you have questions, please visit the Frequently Asked Questions section of the [employer] website.  Remember, [employer] is NOT to be used for urgent needs. For medical emergencies, dial 911. Now available from your iPhone and Android! Please provide this summary of care documentation to your next provider. Your primary care clinician is listed as Sammie Horan. If you have any questions after today's visit, please call [phone].

## 2017-04-26 NOTE — PATIENT INSTRUCTIONS
Dr Nadira Parra, ophthalmologist.  [address]     [phone]. Learning About Diabetes Food Guidelines  Your Care Instructions  Meal planning is important to manage diabetes. It helps keep your blood sugar at a target level (which you set with your doctor). You don't have to eat special foods. You can eat what your family eats, including sweets once in a while. But you do have to pay attention to how often you eat and how much you eat of certain foods. You may want to work with a dietitian or a certified diabetes educator (CDE) to help you plan meals and snacks. A dietitian or CDE can also help you lose weight if that is one of your goals. What should you know about eating carbs? Managing the amount of carbohydrate (carbs) you eat is an important part of healthy meals when you have diabetes. Carbohydrate is found in many foods. · Learn which foods have carbs. And learn the amounts of carbs in different foods. ¨ Bread, cereal, pasta, and rice have about 15 grams of carbs in a serving. A serving is 1 slice of bread (1 ounce), ½ cup of cooked cereal, or 1/3 cup of cooked pasta or rice. ¨ Fruits have 15 grams of carbs in a serving. A serving is 1 small fresh fruit, such as an apple or orange; ½ of a banana; ½ cup of cooked or canned fruit; ½ cup of fruit juice; 1 cup of melon or raspberries; or 2 tablespoons of dried fruit. ¨ Milk and no-sugar-added yogurt have 15 grams of carbs in a serving. A serving is 1 cup of milk or 2/3 cup of no-sugar-added yogurt. ¨ Starchy vegetables have 15 grams of carbs in a serving. A serving is ½ cup of mashed potatoes or sweet potato; 1 cup winter squash; ½ of a small baked potato; ½ cup of cooked beans; or ½ cup cooked corn or green peas. · Learn how much carbs to eat each day and at each meal. A dietitian or CDE can teach you how to keep track of the amount of carbs you eat. This is called carbohydrate counting.   · If you are not sure how to count carbohydrate grams, use the Plate Method to plan meals. It is a good, quick way to make sure that you have a balanced meal. It also helps you spread carbs throughout the day. ¨ Divide your plate by types of foods. Put non-starchy vegetables on half the plate, meat or other protein food on one-quarter of the plate, and a grain or starchy vegetable in the final quarter of the plate. To this you can add a small piece of fruit and 1 cup of milk or yogurt, depending on how many carbs you are supposed to eat at a meal.  · Try to eat about the same amount of carbs at each meal. Do not \"save up\" your daily allowance of carbs to eat at one meal.  · Proteins have very little or no carbs per serving. Examples of proteins are beef, chicken, turkey, fish, eggs, tofu, cheese, cottage cheese, and peanut butter. A serving size of meat is 3 ounces, which is about the size of a deck of cards. Examples of meat substitute serving sizes (equal to 1 ounce of meat) are 1/4 cup of cottage cheese, 1 egg, 1 tablespoon of peanut butter, and ½ cup of tofu. How can you eat out and still eat healthy? · Learn to estimate the serving sizes of foods that have carbohydrate. If you measure food at home, it will be easier to estimate the amount in a serving of restaurant food. · If the meal you order has too much carbohydrate (such as potatoes, corn, or baked beans), ask to have a low-carbohydrate food instead. Ask for a salad or green vegetables. · If you use insulin, check your blood sugar before and after eating out to help you plan how much to eat in the future. · If you eat more carbohydrate at a meal than you had planned, take a walk or do other exercise. This will help lower your blood sugar. What else should you know? · Limit saturated fat, such as the fat from meat and dairy products. This is a healthy choice because people who have diabetes are at higher risk of heart disease.  So choose lean cuts of meat and nonfat or low-fat dairy products. Use olive or canola oil instead of butter or shortening when cooking. · Don't skip meals. Your blood sugar may drop too low if you skip meals and take insulin or certain medicines for diabetes. · Check with your doctor before you drink alcohol. Alcohol can cause your blood sugar to drop too low. Alcohol can also cause a bad reaction if you take certain diabetes medicines. Follow-up care is a key part of your treatment and safety. Be sure to make and go to all appointments, and call your doctor if you are having problems. It's also a good idea to know your test results and keep a list of the medicines you take. Where can you learn more? Go to http://lul-ben.info/. Enter M541 in the search box to learn more about \"Learning About Diabetes Food Guidelines. \"  Current as of: May 23, 2016  Content Version: 11.2  © 2613-2307 [employer]. Care instructions adapted under license by [employer] (which disclaims liability or warranty for this information). If you have questions about a medical condition or this instruction, always ask your healthcare professional. [address] any warranty or liability for your use of this information.

## 2017-04-26 NOTE — PROGRESS NOTES
Progress Note    Patient: Robbie Baker MRN: [id number]  SSN: [id number]    YOB: 1940  Age: 64 y.o. Sex: female        Chief Complaint   Patient presents with    Diabetes    Hypertension     Patient presents for follow up of chronic medical conditions. Patient with hx of DM2, HTN, CAD, HLD, Afib, and COPD. She requests refill of metformin. Patient denies HA, dizziness, SOB, CP, abdominal pain, dysuria, myalgias or arthralgias. Patient does not have new complaints and/or concerns. Diabetes: This patient is being treating under a comprehensive plan of care for diabetes. Overall the patient feels well with good energy level. Insulin dependence: no   Pertinent Labs:     Lab Results   Component Value Date/Time    Hemoglobin A1c 7.4 08/05/2016 10:27 AM    Hemoglobin A1c (POC) 6.6 04/26/2017 10:43 AM           Body mass index is 24.27 kg/(m^2). Lab Results   Component Value Date/Time    LDL, calculated 93 01/03/2017 10:38 AM        Wt Readings from Last 3 Encounters:   04/26/17 137 lb (62.1 kg)   01/03/17 136 lb (61.7 kg)   11/22/16 142 lb (64.4 kg)      Medications, diet and exercise as means of diabetic control with a goal of an A1C of less than 7.0% discussed. Diabetic foot care and annual eye exam discussed as well. Check blood sugars while fasting just before breakfast on most days and occasionally before dinner. Write down readings in a diabetic log book and bring them to the next visit. Call the office for fasting sugars over 200 or below 75 on two or more occasions. Call immediately if having symptoms of high sugar (frequent urination, always thirsty) or low sugar (dizzy, lethargic, sweaty, nauseated, headache). Our overall goal is to reduce or eliminate the long term consequences of poorly controlled diabetes. Patient expresses understanding and agreement with our plan of care.     Hypertension:  The patient reports:  no TIA's, no chest pain on exertion, no dyspnea on exertion, no swelling of ankles. Lifestyle modification/social history: sedentary     BP Readings from Last 3 Encounters:   04/26/17 108/70   01/03/17 115/70   11/22/16 139/85     Patient advised to log blood pressures at home 2-3 times weekly and bring to next visit. Call office as soon as possible if BP's over 140/90 on multiple occasions or with symptoms of dizziness, chest pain, shortness of breath, headache or ankle swelling. Our goal is to normalize the blood pressure to decrease the risks of strokes and heart attacks. The patient is in agreement with the plan. Subjective:     Encounter Diagnoses   Name Primary?  Controlled type 2 diabetes mellitus without complication, without long-term current use of insulin ([facility]) Yes    Chronic obstructive pulmonary disease, unspecified COPD type ([facility]     Essential hypertension     Urinary tract infection without hematuria, site unspecified        Current and past medical information:    Current Medications after this visit[de-identified]     Current Outpatient Prescriptions   Medication Sig    albuterol (PROAIR HFA) 90 mcg/actuation inhaler Take 1 Puff by inhalation every four (4) hours as needed for Wheezing.  metFORMIN (GLUCOPHAGE) 500 mg tablet Take 1 Tab by mouth three (3) times daily (with meals).  trimethoprim-sulfamethoxazole (BACTRIM DS, SEPTRA DS) 160-800 mg per tablet Take 1 Tab by mouth two (2) times a day for 7 days.  glucose blood VI test strips (FREESTYLE LITE STRIPS) strip TEST BLOOD SUGAR TWO TIMES DAILY E11.9    rivaroxaban (XARELTO) 15 mg tab tablet Take 1 Tab by mouth daily  (with dinner).  diltiazem CD (CARDIZEM CD) 240 mg ER capsule Take 1 Cap by mouth nightly.  digoxin (LANOXIN) 0.125 mg tablet Take 1 Tab by mouth nightly.  Lancets (FREESTYLE LANCETS) misc TEST BLOOD SUGAR THREE TIMES DAILY E11.9    nitrofurantoin (MACRODANTIN) 50 mg capsule Take 1 Cap by mouth nightly.     atorvastatin (LIPITOR) 20 mg tablet Take 1 Tab by mouth nightly.  furosemide (LASIX) 20 mg tablet 2 tabs in AM and pm    predniSONE (DELTASONE) 10 mg tablet Take 1 Tab by mouth daily.  aspirin 81 mg chewable tablet Take 81 mg by mouth nightly.  ferrous sulfate (IRON) 325 mg (65 mg iron) EC tablet Take 325 mg by mouth nightly.  potassium chloride SR (KLOR-CON 10) 10 mEq tablet Take 10 mEq by mouth nightly.  tiotropium bromide (SPIRIVA RESPIMAT) 2.5 mcg/actuation mist Take 2 Puffs by inhalation daily.  arformoterol (BROVANA) 15 mcg/2 mL nebu neb solution 15 mcg by Nebulization route two (2) times a day.  albuterol (PROVENTIL VENTOLIN) 2.5 mg /3 mL (0.083 %) nebulizer solution 3 mL by Nebulization route three (3) times daily.  pirfenidone (ESBRIET) 267 mg cap Take 3 Caps by mouth three (3) times daily. Indications: PULMONARY FIBROSIS    FREESTYLE FREEDOM LITE monitoring kit FOLLOW PACKAGE DIRECTIONS    MISCELLANEOUS MEDICAL SUPPLY (OXYGEN TUBING) 2 L by Does Not Apply route. No current facility-administered medications for this visit.         Patient Active Problem List    Diagnosis Date Noted    S/P AVR 11/06/2016    DM II (diabetes mellitus, type II), controlled ([address] 12/08/2015    RBBB 10/16/2015    Frozen shoulder 06/25/2014    Dyslipidemia 03/21/2014    T2DM (type 2 diabetes mellitus) ([address] 09/06/2013    Chronic diastolic heart failure ([address] 05/29/2012    Pulmonary fibrosis ([address] 05/15/2012    Atrial fibrillation ([address] 05/10/2012    COPD (chronic obstructive pulmonary disease) ([address] 03/16/2012    History of noncompliance with medical treatment 09/13/2011    Arthritis 01/14/2011    Hypertension     Depression     Female stress incontinence     CAD (coronary artery disease)     Aortic stenosis     Sleep apnea        Past Medical History:   Diagnosis Date    Aortic stenosis     severe, s/p AVR March 2012 - presented with HF, EF 30%    Asthma 5/10/2012    Atrial fibrillation ([address] 5/10/2012    Bacterial pneumonia, unspecified     CAD (coronary artery disease)     cor calcifications (LAD) by CT     Chronic diastolic heart failure ([facility] 5/29/2012    Depression     Dyslipidemia     Female stress incontinence     Hyperlipidemia 9/17/2010    Hypertension     Osteoarthritis     Paroxysmal atrial fibrillation (HCC)     post op AVR    Pulmonary fibrosis ([facility]     S/P AVR 11/6/2016    S/P AVR (aortic valve replacement) 3/2012    bovine ([facility])    Sleep apnea     T2DM (type 2 diabetes mellitus) ([facility] 1/14/2011       No Known Allergies    Past Surgical History:   Procedure Laterality Date    CARDIAC SURG PROCEDURE UNLIST      ECHO 2D ADULT  3/2012    EF 30%, mod LVH, EF 30-35%, PA 50 mmHg    HX CATARACT REMOVAL      bilateral       Social History     Social History    Marital status:      Spouse name: N/A    Number of children: N/A    Years of education: N/A     Social History Main Topics    Smoking status: Never Smoker    Smokeless tobacco: Never Used    Alcohol use No    Drug use: No    Sexual activity: Not Asked     Other Topics Concern    None     Social History Narrative         Objective:     Review of Systems:  Constitutional: Negative for fatigue or malaise  Skin: Negative for rash or lesion  Endo: Negative for unusual thirst or weight changes  HEENT: Negative for acute hearing or vision changes  Cardiovascular: Negative for dizziness, chest pain or palpitations  Respiratory: Negative for cough, wheezing or SOB  Gastreintestinal: Negative for nausea or abdominal pain  Genital/urinary: Negative for dysuria or voiding dysfunction  Muscoloskeletal: Negative for myalgias or arthralgias   Neurological: Negative for headache, weakness or paresthesia  Psychological: Negative for depression or anxiety      Vitals:    04/26/17 0946   BP: 108/70   Pulse: 96   Resp: 20   Temp: 98.7 °F (37.1 °C)   TempSrc: Oral   SpO2: 93%   Weight: 137 lb (62.1 kg)   Height: 5' 3\" (1.6 m)      Body mass index is 24.27 kg/(m^2). Physical Exam:  Constitutional: Thin, in no acute distress  Skin: warm and dry sans rash or lesion  Head: normocephalic, atraumatic  Eyes: sclera clear, EOMI  Nose: nasal canula in place  Oropharynx: moist mucous membranes sans lesion   Neck: normal range of motion, no lymphadenopathy  CV: normal S1, S2, regular rate and rhythm, no murmur  Respiratory: clear to auscultation bilaterally with symmetrical effort  Abdomen: soft, BS normal  Extremities: in wheelchair No edema  Neurology:  normal strength and sensation  Psych: active, alert and oriented, affect appropriate     Health Maintenance Due   Topic Date Due    DTaP/Tdap/Td series (1 - Tdap) 12/17/1959    ZOSTER VACCINE AGE 60>  12/17/1998    Pneumococcal 65+ Low/Medium Risk (1 of 2 - PCV13) 12/17/2003    EYE EXAM RETINAL OR DILATED Q1  02/09/2017       Assessment and orders:       ICD-10-CM ICD-9-CM    1. Controlled type 2 diabetes mellitus without complication, without long-term current use of insulin ([facility]) E11.9 250.00 metFORMIN (GLUCOPHAGE) 500 mg tablet      AMB POC HEMOGLOBIN T2B      METABOLIC PANEL, COMPREHENSIVE      CBC W/O DIFF      LIPID PANEL   2. Chronic obstructive pulmonary disease, unspecified COPD type ([facility]) J44.9 496 albuterol (PROAIR HFA) 90 mcg/actuation inhaler      CBC W/O DIFF   3. Essential hypertension T98 318.6 METABOLIC PANEL, COMPREHENSIVE      TSH 3RD GENERATION   4. Urinary tract infection without hematuria, site unspecified N39.0 599.0 AMB POC URINALYSIS DIP STICK AUTO W/O MICRO      trimethoprim-sulfamethoxazole (BACTRIM DS, SEPTRA DS) 160-800 mg per tablet      CULTURE, URINE         Plan of care:  Discussed diagnoses in detail with patient. Importance of compliance with all prescribed medications discussed. Including any new medications prescribed today. Medication risks/benefits/side effects discussed with patient. Continue current prescribed medications as written. No medication changes at this time.    All of the patient's questions were addressed. The patient understands and agrees with our plan of care. The patient knows to call back if they are unsure of or forget any changes we discussed today or if the symptoms change. The patient received an After-Visit Summary which contains VS, orders, medication list and allergy list.     Patient Care Team:  Sammie Horan MD as PCP - General (Family Practice)  Robert Solis RN as Nurse Miles Harvey MD (Pulmonary Disease)  Lorraine Maxwell MD (Pulmonary Disease)  Danis Conner MD (Ophthalmology)  Corrina Irene MD as Physician (Cardiology)    Follow-up Disposition:  Return in about 3 months (around 7/26/2017), or if symptoms worsen or fail to improve.     Future Appointments  Date Time Provider Gwendolyn Christensen   5/12/2017 2:00 PM Marisol SARABIA [facility]   5/12/2017 3:20 PM Corrina Irene MD [address] [place] [address]       Signed By: Sammie Horan MD     April 26, 2017

## 2017-04-26 NOTE — PROGRESS NOTES
Reviewed record in preparation for visit and have necessary documentation  Pt did not bring medication to office visit for review  Opportunity was given for questions  Goals that were addressed and/or need to be completed after this appointment include     Health Maintenance Due   Topic Date Due    DTaP/Tdap/Td series (1 - Tdap) 12/17/1959    ZOSTER VACCINE AGE 60>  12/17/1998    Pneumococcal 65+ Low/Medium Risk (1 of 2 - PCV13) 12/17/2003    EYE EXAM RETINAL OR DILATED Q1  02/09/2017     - check for functional glucose monitor and record keeping system  Pt was given BS record log to document home readings and return to office for review  Diabetic Bundle:  LDL- 93  A1C-6.7  BP-  Smoking? No  Anticoagulation medication? yes  Eye exam dilated? Due  Foot exam?Completed

## 2017-05-01 NOTE — TELEPHONE ENCOUNTER
----- Message from Jennifer Vaughn MD sent at 5/1/2017  9:01 AM EDT -----  More than 2 organisms found. How are her symptoms since start of antibiotic?

## 2017-05-12 NOTE — MR AVS SNAPSHOT
Visit Information Date & Time Provider Department Dept. Phone Encounter #  
 5/12/2017  3:20 PM Foreign Camargo MD [facility] [phone] [id number] Follow-up Instructions Return in about 6 months (around 11/12/2017). Your Appointments 8/1/2017  9:00 AM  
ROUTINE CARE with Zoe Quiñonez MD  
[address] [place] [address] Appt Note: 3 mntn fu /Diabetes/COPD/HTn  
 [address] [address]  
[place] [address] [address] Upcoming Health Maintenance Date Due DTaP/Tdap/Td series (1 - Tdap) 12/17/1959 ZOSTER VACCINE AGE 60> 12/17/1998 Pneumococcal 65+ Low/Medium Risk (1 of 2 - PCV13) 12/17/2003 EYE EXAM RETINAL OR DILATED Q1 2/9/2017 INFLUENZA AGE 9 TO ADULT 8/1/2017 MEDICARE YEARLY EXAM 10/26/2017 HEMOGLOBIN A1C Q6M 10/26/2017 FOOT EXAM Q1 10/27/2017 GLAUCOMA SCREENING Q2Y 2/9/2018 LIPID PANEL Q1 5/1/2018 Allergies as of 5/12/2017  Review Complete On: 5/12/2017 By: Tori Concepcion LPN No Known Allergies Current Immunizations  Reviewed on 8/5/2016 Name Date Influenza Vaccine 10/22/2016, 10/16/2015, 1/2/2015, 11/15/2013, 12/7/2012 Not reviewed this visit You Were Diagnosed With   
  
 Codes Comments Dyslipidemia    -  Primary ICD-10-CM: E78.5 ICD-9-CM: 272.4 Chronic diastolic heart failure (HCC)     ICD-10-CM: I50.32 
ICD-9-CM: 428.32   
 RBBB     ICD-10-CM: I45.10 ICD-9-CM: 426.4 Paroxysmal atrial fibrillation (HCC)     ICD-10-CM: I48.0 ICD-9-CM: 427.31 Essential hypertension     ICD-10-CM: I10 
ICD-9-CM: 401.9 Coronary artery disease due to calcified coronary lesion     ICD-10-CM: I25.10, I25.84 ICD-9-CM: 414.00, 414.4 Aortic valve stenosis, unspecified etiology     ICD-10-CM: I35.0 ICD-9-CM: 424.1 S/P AVR     ICD-10-CM: Z95.2 ICD-9-CM: V43.3 Controlled type 2 diabetes mellitus without complication, without long-term current use of insulin [facility]     ICD-10-CM: E11.9 ICD-9-CM: 250.00 Vitals BP Pulse Resp Height(growth percentile) Weight(growth percentile) SpO2  
 122/70 (BP 1 Location: Left arm, BP Patient Position: Sitting) 100 16 5' 3\" (1.6 m) 137 lb 12.8 oz (62.5 kg) 93% BMI OB Status Smoking Status 24.41 kg/m2 Postmenopausal Never Smoker BMI and BSA Data Body Mass Index Body Surface Area  
 24.41 kg/m 2 1.67 m 2 Preferred Pharmacy Pharmacy Name Phone [address] [facility], [facility] [address] ([phone] Your Updated Medication List  
  
   
This list is accurate as of: 5/12/17  3:41 PM.  Always use your most recent med list.  
  
  
  
  
 * albuterol 2.5 mg /3 mL (0.083 %) nebulizer solution Commonly known as:  PROVENTIL VENTOLIN  
3 mL by Nebulization route three (3) times daily. * albuterol 90 mcg/actuation inhaler Commonly known as:  PROAIR HFA Take 1 Puff by inhalation every four (4) hours as needed for Wheezing. aspirin 81 mg chewable tablet Take 162 mg by mouth every morning. atorvastatin 20 mg tablet Commonly known as:  LIPITOR Take 1 Tab by mouth nightly. BROVANA 15 mcg/2 mL Nebu neb solution Generic drug:  arformoterol 15 mcg by Nebulization route two (2) times a day. digoxin 0.125 mg tablet Commonly known as:  LANOXIN Take 1 Tab by mouth nightly. dilTIAZem  mg ER capsule Commonly known as:  CARDIZEM CD Take 1 Cap by mouth nightly. ESBRIET 267 mg Cap capsule Generic drug:  pirfenidone Take 3 Caps by mouth three (3) times daily. Indications: PULMONARY FIBROSIS  
  
 ferrous sulfate 325 mg (65 mg iron) EC tablet Commonly known as:  IRON Take 325 mg by mouth nightly. FREESTYLE FREEDOM LITE monitoring kit Generic drug:  Blood-Glucose Meter FOLLOW PACKAGE DIRECTIONS  
  
 furosemide 20 mg tablet Commonly known as:  LASIX  
2 tabs in AM and pm  
  
 glucose blood VI test strips strip Commonly known as:  FREESTYLE LITE STRIPS  
TEST BLOOD SUGAR TWO TIMES DAILY E11.9 KLOR-CON 10 10 mEq tablet Generic drug:  potassium chloride SR Take 10 mEq by mouth nightly. Lancets Misc Commonly known as:  FREESTYLE LANCETS  
TEST BLOOD SUGAR THREE TIMES DAILY E11.9  
  
 metFORMIN 500 mg tablet Commonly known as:  GLUCOPHAGE Take 1 Tab by mouth three (3) times daily (with meals). nitrofurantoin 50 mg capsule Commonly known as:  MACRODANTIN Take 1 Cap by mouth nightly. OXYGEN TUBING  
2 L by Does Not Apply route. predniSONE 10 mg tablet Commonly known as:  Shasta Fauzia Take 1 Tab by mouth daily. rivaroxaban 15 mg Tab tablet Commonly known as:  Cornelia Melissaiz Take 1 Tab by mouth daily  (with dinner). SPIRIVA RESPIMAT 2.5 mcg/actuation inhaler Generic drug:  tiotropium bromide Take 2 Puffs by inhalation daily. * Notice: This list has 2 medication(s) that are the same as other medications prescribed for you. Read the directions carefully, and ask your doctor or other care provider to review them with you. Follow-up Instructions Return in about 6 months (around 11/12/2017). Introducing [facility]! [employer] introduces [employer] patient portal. Now you can access parts of your medical record, email your doctor's office, and request medication refills online. 1. In your internet browser, go to [email or web address] 2. Click on the First Time User? Click Here link in the Sign In box. You will see the New Member Sign Up page. 3. Enter your [employer] Access Code exactly as it appears below. You will not need to use this code after youve completed the sign-up process.  If you do not sign up before the expiration date, you must request a new code. · [employer] Access Code: [email or web address] Expires: 7/25/2017  9:12 AM 
 
4. Enter the last four digits of your Social Security Number (xxxx) and Date of Birth (mm/dd/yyyy) as indicated and click Submit. You will be taken to the next sign-up page. 5. Create a [employer] ID. This will be your [employer] login ID and cannot be changed, so think of one that is secure and easy to remember. 6. Create a [employer] password. You can change your password at any time. 7. Enter your Password Reset Question and Answer. This can be used at a later time if you forget your password. 8. Enter your e-mail address. You will receive e-mail notification when new information is available in [address] 9. Click Sign Up. You can now view and download portions of your medical record. 10. Click the Download Summary menu link to download a portable copy of your medical information. If you have questions, please visit the Frequently Asked Questions section of the [employer] website. Remember, [employer] is NOT to be used for urgent needs. For medical emergencies, dial 911. Now available from your iPhone and Android! Please provide this summary of care documentation to your next provider. Your primary care clinician is listed as Tima Levi. If you have any questions after today's visit, please call [phone].

## 2017-05-12 NOTE — PROGRESS NOTES
History of Present Illness    Grant Mortensen is a 64 y.o. female. Last seen 7 months ago. Problem List  Date Reviewed: 4/27/2017          Codes Class Noted    S/P AVR ICD-10-CM: Z95.2  ICD-9-CM: V43.3  11/6/2016        DM II (diabetes mellitus, type II), controlled ([facility] ICD-10-CM: E11.9  ICD-9-CM: 250.00  12/8/2015        RBBB ICD-10-CM: I45.10  ICD-9-CM: 426.4  10/16/2015        Frozen shoulder ICD-10-CM: M75.00  ICD-9-CM: 726.0  6/25/2014        Dyslipidemia ICD-10-CM: E78.5  ICD-9-CM: 272.4  3/21/2014        T2DM (type 2 diabetes mellitus) ([facility] ICD-10-CM: E11.9  ICD-9-CM: 250.00  9/6/2013        Chronic diastolic heart failure ([facility] ICD-10-CM: I50.32  ICD-9-CM: 428.32  5/29/2012        Pulmonary fibrosis ([facility] ICD-10-CM: J84.10  ICD-9-CM: 587  5/15/2012        Atrial fibrillation (HCC) ICD-10-CM: I48.91  ICD-9-CM: 427.31  5/10/2012        COPD (chronic obstructive pulmonary disease) ([facility] ICD-10-CM: J44.9  ICD-9-CM: 496  3/16/2012        History of noncompliance with medical treatment ICD-10-CM: Z91.19  ICD-9-CM: V15.81  9/13/2011        Arthritis ICD-10-CM: M19.90  ICD-9-CM: 716.90  1/14/2011        Hypertension ICD-10-CM: I10  ICD-9-CM: 401.9  Unknown        Depression ICD-10-CM: F32.9  ICD-9-CM: 36  Unknown        Female stress incontinence ICD-10-CM: N39.3  ICD-9-CM: 625.6  Unknown        CAD (coronary artery disease) ICD-10-CM: I25.10  ICD-9-CM: 414.00  Unknown        Aortic stenosis ICD-10-CM: I35.0  ICD-9-CM: 424.1  Unknown        Sleep apnea ICD-10-CM: G47.30  ICD-9-CM: 780.57  Unknown            Cardiac testing  S/P AVR (aortic valve replacement) 3/2012 - bovine ([facility])   Echo 2d adult 3/2012 - EF 30%, mod LVH, EF 30-35%, PA 50 mmHg   Echo May 2013 - EF 50%, inferior HK, normal AVR, moderate MR, PA 50  Echo 7/25/14 - EF 50-55%, AVR with moderately increased velocities, PA systolic 41 mmHg  Echo 0/61/90 - EF 55%, normal AVR function. Mildly elevated PA systolic pressure. HPI    Ms. Mary Pedro has no major interval changes - continues to lead a fairly sedentary lifestyle. She is on oxygen therapy 24/7. She enjoys solving puzzles and sitting outside. She gets easily winded with mild activities. She denies any bleeding issues on Xarelto, but does not easy bruisability. No recurrent AF spells or chest pain. Adherent with diuretics (two in the morning and two at night). Patient denies any exertional chest pain,  palpitations, syncope, orthopnea or paroxysmal nocturnal dyspnea. She is here with her daughters. Current Outpatient Prescriptions on File Prior to Visit   Medication Sig Dispense Refill    albuterol (PROAIR HFA) 90 mcg/actuation inhaler Take 1 Puff by inhalation every four (4) hours as needed for Wheezing. (Patient taking differently: Take 1 Puff by inhalation three (3) times daily.) 3 Inhaler 3    metFORMIN (GLUCOPHAGE) 500 mg tablet Take 1 Tab by mouth three (3) times daily (with meals). 90 Tab 5    rivaroxaban (XARELTO) 15 mg tab tablet Take 1 Tab by mouth daily  (with dinner). 90 Tab 3    diltiazem CD (CARDIZEM CD) 240 mg ER capsule Take 1 Cap by mouth nightly. 90 Cap 3    digoxin (LANOXIN) 0.125 mg tablet Take 1 Tab by mouth nightly. 90 Tab 3    nitrofurantoin (MACRODANTIN) 50 mg capsule Take 1 Cap by mouth nightly. 90 Cap 3    atorvastatin (LIPITOR) 20 mg tablet Take 1 Tab by mouth nightly. 90 Tab 3    furosemide (LASIX) 20 mg tablet 2 tabs in AM and pm 360 Tab 3    predniSONE (DELTASONE) 10 mg tablet Take 1 Tab by mouth daily. 30 Tab 0    aspirin 81 mg chewable tablet Take 162 mg by mouth every morning.  ferrous sulfate (IRON) 325 mg (65 mg iron) EC tablet Take 325 mg by mouth nightly.  potassium chloride SR (KLOR-CON 10) 10 mEq tablet Take 10 mEq by mouth nightly.  tiotropium bromide (SPIRIVA RESPIMAT) 2.5 mcg/actuation mist Take 2 Puffs by inhalation daily.       arformoterol (BROVANA) 15 mcg/2 mL nebu neb solution 15 mcg by Nebulization route two (2) times a day.      albuterol (PROVENTIL VENTOLIN) 2.5 mg /3 mL (0.083 %) nebulizer solution 3 mL by Nebulization route three (3) times daily. 1 Package 6    pirfenidone (ESBRIET) 267 mg cap Take 3 Caps by mouth three (3) times daily. Indications: PULMONARY FIBROSIS      MISCELLANEOUS MEDICAL SUPPLY (OXYGEN TUBING) 2 L by Does Not Apply route.  glucose blood VI test strips (FREESTYLE LITE STRIPS) strip TEST BLOOD SUGAR TWO TIMES DAILY E11.9 100 Strip 5    Lancets (FREESTYLE LANCETS) misc TEST BLOOD SUGAR THREE TIMES DAILY E11.9 100 Each 5    FREESTYLE FREEDOM LITE monitoring kit FOLLOW PACKAGE DIRECTIONS 1 kit 0     No current facility-administered medications on file prior to visit. No Known Allergies    Review of Systems  Constitutional:  Negative for fever, chills and diaphoresis. Respiratory:  Negative for cough, hemoptysis, sputum production and wheezing. Positive or shortness of breath. Cardiovascular:  Negative for palpitations, orthopnea, claudication, and PND. Positive for edema. Gastrointestinal:  Negative for nausea, vomiting, blood in stool and melena. Genitourinary:  Negative for dysuria, urgency and flank pain. Musculoskeletal:  Negative for back pain and joint pain. Skin:  Negative for rash. Neurological:  Negative for dizziness, weakness, seizures, loss of consciousness and headaches. Endo/Heme/Allergies:  Does not bruise/bleed easily. Psychiatric/Behavioral:  Negative for memory loss. The patient does not have insomnia. Visit Vitals    /70 (BP 1 Location: Left arm, BP Patient Position: Sitting)    Pulse 100    Resp 16    Ht 5' 3\" (1.6 m)    Wt 137 lb 12.8 oz (62.5 kg)    SpO2 93%    BMI 24.41 kg/m2     Wt Readings from Last 3 Encounters:   05/12/17 137 lb 12.8 oz (62.5 kg)   04/26/17 137 lb (62.1 kg)   01/03/17 136 lb (61.7 kg)     Physical Exam  Vitals reviewed. Constitutional:  She is oriented to person, place and time. She appears well-nourished.   HENT:  Head: Normocephalic. Neck:  Neck supple. No JVD present. Cardiovascular:  Irregular rhythm, 2/6 holosystolic murmur, no S3, no rubs, no gallops. No JVD. No carotid bruits. Pulmonary/Chest:  Effort normal with NC and O2; some wheezing. Positive for dry crackles at the bases bilaterally   Abdominal:  Soft, nontender. Bowel sounds normal.  Musculoskeletal:  1+ankle edema and trace pre-tibial edema bilaterally   Neurological:  Alert and oriented to person, place and time. Skin:  Skin is warm and dry. Psychiatric:  She has a normal mood and affect. Lab Results   Component Value Date/Time    Hemoglobin A1c 7.4 08/05/2016 10:27 AM     Lab Results   Component Value Date/Time    Cholesterol, total 211 05/01/2017 09:17 AM    HDL Cholesterol 67 05/01/2017 09:17 AM    LDL, calculated 109 05/01/2017 09:17 AM    VLDL, calculated 35 05/01/2017 09:17 AM    Triglyceride 175 05/01/2017 09:17 AM    CHOL/HDL Ratio 6.9 05/15/2012 08:29 PM     Cardiographics    EKG 3/21/14- AF RBBB 90s  Echo 05/12/17-LVEF 55-60%. S/p tissue AVR ( peak velocity 3.9m/sec, mean gradient 36), RV dilated, severe TR, PA sys 53    ASSESSMENT and PLAN    Encounter Diagnoses   Name Primary?  Chronic diastolic heart failure (HCC)     Dyslipidemia Yes    RBBB     Paroxysmal atrial fibrillation (HCC)     Essential hypertension     S/P AVR     Controlled type 2 diabetes mellitus without complication, without long-term current use of insulin ([address]     Coronary artery disease involving native coronary artery of native heart without angina pectoris     Chronic obstructive pulmonary disease, unspecified COPD type ([address]     Pulmonary fibrosis ([address]     Nonrheumatic aortic valve stenosis      Mrs. Kenisha Thorpe history of severe AS s/p AVR 2012 with tissue valve, complicated by PAF and bifasicular block. Prosthetic valve function by echocardiogram today demonstrates increased velocities compared to 1 year ago, but her exam does not suggest significant stenosis. Repeat echo in 6 months. She continues to have class 3 IGNACIO due to pulmonary fibrosis, but has chronic diastolic dysfunction on diuretic therapy. She has moderate PA HTN by echo today. Her volume status seems fine. She leads a very sedentary lifestyle. Recent labs reviewed - potassium and renal function seem fine. Recent hemoglobin was 11. DM monitored by Danielle Patiño MD.      Follow-up Disposition:  Return in about 6 months (around 11/12/2017).      Written by Valentina Jack, as dictated by Justin Black MD.   Justin Black MD

## 2017-06-07 PROBLEM — E86.0 DEHYDRATION: Status: ACTIVE | Noted: 2017-01-01

## 2017-06-07 NOTE — IP AVS SNAPSHOT
Current Discharge Medication List  
  
CONTINUE these medications which have NOT CHANGED Dose & Instructions Dispensing Information Comments Morning Noon Evening Bedtime  
 albuterol 2.5 mg /3 mL (0.083 %) nebulizer solution Commonly known as:  PROVENTIL VENTOLIN Your last dose was: Your next dose is:    
   
   
 Dose:  2.5 mg  
3 mL by Nebulization route three (3) times daily. Quantity:  1 Package Refills:  6  
     
   
   
   
  
 atorvastatin 20 mg tablet Commonly known as:  LIPITOR Your last dose was: Your next dose is:    
   
   
 Dose:  20 mg Take 1 Tab by mouth nightly. Quantity:  90 Tab Refills:  3 BROVANA 15 mcg/2 mL Nebu neb solution Generic drug:  arformoterol Your last dose was: Your next dose is:    
   
   
 Dose:  15 mcg 15 mcg by Nebulization route two (2) times a day. Refills:  0  
     
   
   
   
  
 digoxin 0.125 mg tablet Commonly known as:  LANOXIN Your last dose was: Your next dose is:    
   
   
 Dose:  0.125 mg Take 1 Tab by mouth nightly. Quantity:  90 Tab Refills:  3  
     
   
   
   
  
 dilTIAZem  mg ER capsule Commonly known as:  CARDIZEM CD Your last dose was: Your next dose is:    
   
   
 Dose:  240 mg Take 1 Cap by mouth nightly. Quantity:  90 Cap Refills:  3 ESBRIET 267 mg Cap capsule Generic drug:  pirfenidone Your last dose was: Your next dose is:    
   
   
 Dose:  3 Cap Take 3 Caps by mouth three (3) times daily. Indications: PULMONARY FIBROSIS Refills:  0  
     
   
   
   
  
 ferrous sulfate 325 mg (65 mg iron) EC tablet Commonly known as:  IRON Your last dose was: Your next dose is:    
   
   
 Dose:  325 mg Take 325 mg by mouth nightly. Refills:  0 FREESTYLE FREEDOM LITE monitoring kit Generic drug:  Blood-Glucose Meter Your last dose was: Your next dose is: FOLLOW PACKAGE DIRECTIONS Quantity:  1 kit Refills:  0  
     
   
   
   
  
 furosemide 20 mg tablet Commonly known as:  LASIX Your last dose was: Your next dose is:    
   
   
 2 tabs in AM and pm  
 Quantity:  360 Tab Refills:  3  
     
   
   
   
  
 glucose blood VI test strips strip Commonly known as:  FREESTYLE LITE STRIPS Your last dose was: Your next dose is:    
   
   
 TEST BLOOD SUGAR TWO TIMES DAILY E11.9 Quantity:  100 Strip Refills:  5 For DM-2 treated with insulin 250.00 Lancets Misc Commonly known as:  FREESTYLE LANCETS Your last dose was: Your next dose is:    
   
   
 TEST BLOOD SUGAR THREE TIMES DAILY E11.9 Quantity:  100 Each Refills:  5 For DM-2 treated with insulin Diagnosis code E11.8  
    
   
   
   
  
 metFORMIN 500 mg tablet Commonly known as:  GLUCOPHAGE Your last dose was: Your next dose is:    
   
   
 Dose:  500 mg Take 1 Tab by mouth three (3) times daily (with meals). Quantity:  90 Tab Refills:  5  
     
   
   
   
  
 nitrofurantoin 50 mg capsule Commonly known as:  MACRODANTIN Your last dose was: Your next dose is:    
   
   
 Dose:  50 mg Take 1 Cap by mouth nightly. Quantity:  90 Cap Refills:  3 OXYGEN TUBING Your last dose was: Your next dose is:    
   
   
 Dose:  2 L  
2 L by Does Not Apply route. Refills:  0  
     
   
   
   
  
 predniSONE 10 mg tablet Commonly known as:  [place] Your last dose was: Your next dose is:    
   
   
 Dose:  10 mg Take 1 Tab by mouth daily. Quantity:  30 Tab Refills:  0  
     
   
   
   
  
 rivaroxaban 15 mg Tab tablet Commonly known as:  [place] Your last dose was: Your next dose is: Take 1 Tab by mouth daily  (with dinner). Quantity:  90 Tab Refills:  3 SPIRIVA RESPIMAT 2.5 mcg/actuation inhaler Generic drug:  tiotropium bromide Your last dose was: Your next dose is:    
   
   
 Dose:  2 Puff Take 2 Puffs by inhalation daily. Refills:  0

## 2017-06-07 NOTE — PROGRESS NOTES
Progress Note    Patient: Tesha Bonds MRN: [id number]  SSN: [id number]    YOB: 1940  Age: 64 y.o. Sex: female        Chief Complaint   Patient presents with    Bladder Infection    Hypertension     due for eye exam    Fall    Altered mental status     Patient presents with AMS s/p fall at home yesterday morning. Family members say patient has been confused since fall. Patient found by son on floor after apparent face forward fall from chair. She complains of nose and neck pain. She is anticoagulated on rivaroxaban. Patient with hx of DM2, HTN, CAD, HLD, Afib, COPD and frequent UTI. Patient denies HA, dizziness, tinnitus, SOB, CP, abdominal pain, weakness, acute myalgias or arthralgias. BP Readings from Last 3 Encounters:   06/07/17 115/66   05/12/17 122/70   04/26/17 108/70     Wt Readings from Last 3 Encounters:   06/07/17 131 lb (59.4 kg)   05/12/17 137 lb 12.8 oz (62.5 kg)   04/26/17 137 lb (62.1 kg)     Body mass index is 23.21 kg/(m^2). Lab Results   Component Value Date/Time    Hemoglobin A1c (POC) 6.6 04/26/2017 10:43 AM         Lab Results   Component Value Date/Time    LDL, calculated 109 05/01/2017 09:17 AM         Subjective:     Encounter Diagnoses   Name Primary?  Altered mental status, unspecified Yes    Fall, initial encounter     Chronic anticoagulation     Paroxysmal atrial fibrillation (HCC)     Chronic obstructive pulmonary disease, unspecified [facility] type ([facility]     Hx of urinary tract infection        Current and past medical information:    Current Medications after this visit[de-identified]     Current Outpatient Prescriptions   Medication Sig    digoxin (LANOXIN) 0.125 mg tablet Take 1 Tab by mouth nightly.  dilTIAZem CD (CARDIZEM CD) 240 mg ER capsule Take 1 Cap by mouth nightly.  furosemide (LASIX) 20 mg tablet 2 tabs in AM and pm    albuterol (PROAIR HFA) 90 mcg/actuation inhaler Take 1 Puff by inhalation every four (4) hours as needed for Wheezing. (Patient taking differently: Take 1 Puff by inhalation three (3) times daily.)    metFORMIN (GLUCOPHAGE) 500 mg tablet Take 1 Tab by mouth three (3) times daily (with meals).  glucose blood VI test strips (FREESTYLE LITE STRIPS) strip TEST BLOOD SUGAR TWO TIMES DAILY E11.9    rivaroxaban (XARELTO) 15 mg tab tablet Take 1 Tab by mouth daily  (with dinner).  Lancets (FREESTYLE LANCETS) misc TEST BLOOD SUGAR THREE TIMES DAILY E11.9    nitrofurantoin (MACRODANTIN) 50 mg capsule Take 1 Cap by mouth nightly.  atorvastatin (LIPITOR) 20 mg tablet Take 1 Tab by mouth nightly.  predniSONE (DELTASONE) 10 mg tablet Take 1 Tab by mouth daily.  aspirin 81 mg chewable tablet Take 162 mg by mouth every morning.  ferrous sulfate (IRON) 325 mg (65 mg iron) EC tablet Take 325 mg by mouth nightly.  potassium chloride SR (KLOR-CON 10) 10 mEq tablet Take 10 mEq by mouth nightly.  tiotropium bromide (SPIRIVA RESPIMAT) 2.5 mcg/actuation mist Take 2 Puffs by inhalation daily.  arformoterol (BROVANA) 15 mcg/2 mL nebu neb solution 15 mcg by Nebulization route two (2) times a day.  albuterol (PROVENTIL VENTOLIN) 2.5 mg /3 mL (0.083 %) nebulizer solution 3 mL by Nebulization route three (3) times daily.  pirfenidone (ESBRIET) 267 mg cap Take 3 Caps by mouth three (3) times daily. Indications: PULMONARY FIBROSIS    FREESTYLE FREEDOM LITE monitoring kit FOLLOW PACKAGE DIRECTIONS    MISCELLANEOUS MEDICAL SUPPLY (OXYGEN TUBING) 2 L by Does Not Apply route. No current facility-administered medications for this visit.         Patient Active Problem List    Diagnosis Date Noted    S/P AVR 11/06/2016    DM II (diabetes mellitus, type II), controlled ([address] 12/08/2015    RBBB 10/16/2015    Frozen shoulder 06/25/2014    Dyslipidemia 03/21/2014    Chronic diastolic heart failure ([address] 05/29/2012    Pulmonary fibrosis ([facility] 05/15/2012    Atrial fibrillation ([facility] 05/10/2012    COPD (chronic obstructive pulmonary disease) ([facility] 03/16/2012    History of noncompliance with medical treatment 09/13/2011    Arthritis 01/14/2011    Hypertension     Depression     Female stress incontinence     CAD (coronary artery disease)     Aortic stenosis     Sleep apnea        Past Medical History:   Diagnosis Date    Aortic stenosis     severe, s/p AVR March 2012 - presented with HF, EF 30%    Asthma 5/10/2012    Atrial fibrillation ([facility] 5/10/2012    Bacterial pneumonia, unspecified     CAD (coronary artery disease)     cor calcifications (LAD) by CT     Chronic diastolic heart failure ([facility] 5/29/2012    Depression     Dyslipidemia     Female stress incontinence     Hyperlipidemia 9/17/2010    Hypertension     Osteoarthritis     Paroxysmal atrial fibrillation (HCC)     post op AVR    Pulmonary fibrosis ([facility]     S/P AVR 11/6/2016    S/P AVR (aortic valve replacement) 3/2012    bovine ([facility])    Sleep apnea     T2DM (type 2 diabetes mellitus) ([facility] 1/14/2011       No Known Allergies    Past Surgical History:   Procedure Laterality Date    CARDIAC SURG PROCEDURE UNLIST      ECHO 2D ADULT  3/2012    EF 30%, mod LVH, EF 30-35%, PA 50 mmHg    HX CATARACT REMOVAL      bilateral       Social History     Social History    Marital status:      Spouse name: N/A    Number of children: N/A    Years of education: N/A     Social History Main Topics    Smoking status: Never Smoker    Smokeless tobacco: Never Used    Alcohol use No    Drug use: No    Sexual activity: Not Asked     Other Topics Concern    None     Social History Narrative         Objective:     Review of Systems:  Constitutional: Positive for fatigue or malaise  Skin: Positive for bruising of hands, forearms  Endo: Negative for unusual thirst or weight changes  HEENT: Negative for acute hearing or vision changes  Cardiovascular: Negative for dizziness, chest pain or palpitations  Respiratory: Negative for cough, wheezing or SOB  Gastreintestinal: Negative for nausea or abdominal pain  Genital/urinary: Negative for dysuria or voiding dysfunction  Muscoloskeletal: Negative for myalgias or arthralgias   Neurological: see HPI, Negative for headache, weakness or paresthesia  Psychological: Negative for depression or anxiety      Vitals:    06/07/17 1137   BP: 115/66   Pulse: (!) 117   Resp: 20   Temp: 98.2 °F (36.8 °C)   TempSrc: Oral   SpO2: 94%   Weight: 131 lb (59.4 kg)   Height: 5' 3\" (1.6 m)      Body mass index is 23.21 kg/(m^2). Physical Exam:  Constitutional: Thin, in no acute distress  Skin: warm and dry with ecchymoses arms, forearms,   Head: symmetrical,    Eyes: sclera clear, EOMI  Nose: nasal canula in place  Oropharynx: uvula midline, no lesion   Neck: normal range of motion, posterior TTP  CV: irregular rhythm, tachycardic  Respiratory: clear to auscultation bilaterally with symmetrical effort  Abdomen: soft, BS normal  Extremities: in wheelchair, normal strength  Neurology: 2+ DTRs, difficulty following simple commands during exam  Psych: flat affect    Health Maintenance Due   Topic Date Due    DTaP/Tdap/Td series (1 - Tdap) 12/17/1959    ZOSTER VACCINE AGE 60>  12/17/1998    Pneumococcal 65+ Low/Medium Risk (1 of 2 - PCV13) 12/17/2003    EYE EXAM RETINAL OR DILATED Q1  02/09/2017       Assessment and orders:       ICD-10-CM ICD-9-CM    1. Altered mental status, unspecified R41.82 780.97 AMB POC URINALYSIS DIP STICK AUTO W/O MICRO   2. Fall, initial encounter [address] XXXA E888.9    3. Chronic anticoagulation Z79.01 V58.61    4. Paroxysmal atrial fibrillation (HCC) I48.0 427.31    5. Chronic obstructive pulmonary disease, unspecified COPD type ([facility] J44.9 496    6. Hx of urinary tract infection Z87.440 V13.02 AMB POC URINALYSIS DIP STICK AUTO W/O MICRO         Plan of care:  Discussed diagnoses in detail with patient and family members.     Advised transport to ED for evaluation due to patient's confusion and risk of intracranial bleed  Medication risks/benefits/side effects discussed with patient. Continue current prescribed medications as written. No medication changes at this time. All of the patient's questions were addressed. The patient understands and agrees with our plan of care. The patient knows to call back if they are unsure of or forget any changes we discussed today or if the symptoms change. The patient received an After-Visit Summary which contains VS, orders, medication list and allergy list.     Patient Care Team:  Daniel Ramirez MD as PCP - General (Family Practice)  Hawa Cramer RN as Nurse Bety Soto MD (Pulmonary Disease)  Clint Grossman MD (Pulmonary Disease)  Ming Fox MD (Ophthalmology)  Massimo Restrepo MD as Physician (Cardiology)    Follow-up Disposition:  Return in about 1 week (around 6/14/2017), or if symptoms worsen or fail to improve.     Future Appointments  Date Time Provider Gwendolyn Sinhai   8/1/2017 9:00 AM Daniel Ramirez MD [place] [id number] [facility]   11/10/2017 2:00 PM ECHO, [facility]   11/10/2017 3:20 PM Massimo Restrepo MD [facility]       Signed By: Daniel Ramirez MD     June 7, 2017

## 2017-06-07 NOTE — PATIENT INSTRUCTIONS
Altered Mental Status: Care Instructions  Your Care Instructions  Altered mental status is a change in how well your brain is working. As a result, you may be confused, be less alert than usual, or act in odd ways. This may include seeing or hearing things that aren't really there (hallucinations). A mental status change has many possible causes. For example, it may be the result of an infection, an imbalance of chemicals in the body, or a chronic disease such as diabetes or COPD. It can also be caused by things such as a head injury, taking certain medicines, or using alcohol or drugs. The doctor may do tests to look for the cause. These tests may include urine tests, blood tests, and imaging tests such as a CT scan. Sometimes a clear cause isn't found. But tests can help the doctor rule out a serious cause of your symptoms. A change in mental status can be scary. But mental status will often return to normal when the cause is treated. So it is important to get any follow-up testing or treatment the doctor has suggested. The doctor has checked you carefully, but problems can develop later. If you notice any problems or new symptoms, get medical treatment right away. Follow-up care is a key part of your treatment and safety. Be sure to make and go to all appointments, and call your doctor if you are having problems. It's also a good idea to know your test results and keep a list of the medicines you take. How can you care for yourself at home? · Be safe with medicines. Take your medicines exactly as prescribed. Call your doctor if you think you are having a problem with your medicine. · Have another adult stay with you until you are better. This can help keep you safe. Ask that person to watch for signs that your mental status is getting worse. When should you call for help? Call 911 anytime you think you may need emergency care. For example, call if:  · You passed out (lost consciousness).   Call your doctor now or seek immediate medical care if:  · Your mental status is getting worse. · You have new symptoms, such as a fever, chills, or shortness of breath. · You do not feel safe. Watch closely for changes in your health, and be sure to contact your doctor if:  · You do not get better as expected. Where can you learn more? Go to http://lul-ben.info/. Enter W947 in the search box to learn more about \"Altered Mental Status: Care Instructions. \"  Current as of: October 14, 2016  Content Version: 11.2  © 7456-8879 [employer]. Care instructions adapted under license by [employer] (which disclaims liability or warranty for this information). If you have questions about a medical condition or this instruction, always ask your healthcare professional. [address] any warranty or liability for your use of this information.

## 2017-06-07 NOTE — ED NOTES
Family reported patient becoming dizzy upon rising to get in wheelchair.   Requesting to speak with MD.

## 2017-06-07 NOTE — PROGRESS NOTES
Reviewed record in preparation for visit and have necessary documentation      Body mass index is 23.21 kg/(m^2).     Health Maintenance Due   Topic Date Due    DTaP/Tdap/Td series (1 - Tdap) 12/17/1959    ZOSTER VACCINE AGE 60>  12/17/1998    Pneumococcal 65+ Low/Medium Risk (1 of 2 - PCV13) 12/17/2003    EYE EXAM RETINAL OR DILATED Q1  02/09/2017

## 2017-06-07 NOTE — MR AVS SNAPSHOT
Visit Information Date & Time Provider Department Dept. Phone Encounter #  
 6/7/2017 11:00 AM Mora Santoyo MD 7 [facility] [id number] Your Appointments 8/1/2017  9:00 AM  
ROUTINE CARE with Mora Santoyo MD  
[address] [place] [address] [facility]) Appt Note: 3 mntn fu /Diabetes/COPD/HTn  
 [address]   
[phone]  
  
   
 [address] [address]  
  
    
 11/10/2017  2:00 PM  
ECHO CARDIOGRAMS 2D with ECHO, JUDE  
[facility] ([facility] SCHEDULING) Appt Note: 6 mo fup echo at 2 at 3 20 dr Matthew Neal [address] [facility] [address]  
[phone]  
  
   
 [address] [facility] [address]  
  
    
 11/10/2017  3:20 PM  
ESTABLISHED PATIENT with Liz Retana MD  
[facility] ([facility]) Appt Note: 6 mo fup  
 [address] [place] [address] Road  
[address] [place] [id number] [address] Upcoming Health Maintenance Date Due DTaP/Tdap/Td series (1 - Tdap) 12/17/1959 ZOSTER VACCINE AGE 60> 12/17/1998 Pneumococcal 65+ Low/Medium Risk (1 of 2 - PCV13) 12/17/2003 EYE EXAM RETINAL OR DILATED Q1 2/9/2017 INFLUENZA AGE 9 TO ADULT 8/1/2017 MEDICARE YEARLY EXAM 10/26/2017 HEMOGLOBIN A1C Q6M 10/26/2017 FOOT EXAM Q1 10/27/2017 GLAUCOMA SCREENING Q2Y 2/9/2018 LIPID PANEL Q1 5/1/2018 Allergies as of 6/7/2017  Review Complete On: 6/7/2017 By: Renuka Spencer No Known Allergies Current Immunizations  Reviewed on 8/5/2016 Name Date Influenza Vaccine 10/22/2016, 10/16/2015, 1/2/2015, 11/15/2013, 12/7/2012 Not reviewed this visit You Were Diagnosed With   
  
 Codes Comments Altered mental status, unspecified    -  Primary ICD-10-CM: R41.82 
ICD-9-CM: 780.97 Fall, initial encounter     ICD-10-CM: W19. [place]  ICD-9-CM: A899295. 9 Chronic anticoagulation     ICD-10-CM: Z79.01 
ICD-9-CM: V58.61 Paroxysmal atrial fibrillation (HCC)     ICD-10-CM: I48.0 ICD-9-CM: 427.31 Chronic obstructive pulmonary disease, unspecified COPD type ([facility]     ICD-10-CM: J44.9 ICD-9-CM: 347 Hx of urinary tract infection     ICD-10-CM: Z87.440 ICD-9-CM: V13.02 Vitals BP Pulse Temp Resp Height(growth percentile) Weight(growth percentile)  
 115/66 (!) 117 98.2 °F (36.8 °C) (Oral) 20 5' 3\" (1.6 m) 131 lb (59.4 kg) SpO2 BMI OB Status Smoking Status 94% 23.21 kg/m2 Postmenopausal Never Smoker Vitals History BMI and BSA Data Body Mass Index Body Surface Area  
 23.21 kg/m 2 1.62 m 2 Preferred Pharmacy Pharmacy Name Phone 310 [facility], [facility] 53 [address] ([address] [phone] Your Updated Medication List  
  
   
This list is accurate as of: 6/7/17  1:32 PM.  Always use your most recent med list.  
  
  
  
  
 * albuterol 2.5 mg /3 mL (0.083 %) nebulizer solution Commonly known as:  PROVENTIL VENTOLIN  
3 mL by Nebulization route three (3) times daily. * albuterol 90 mcg/actuation inhaler Commonly known as:  PROAIR HFA Take 1 Puff by inhalation every four (4) hours as needed for Wheezing. aspirin 81 mg chewable tablet Take 162 mg by mouth every morning. atorvastatin 20 mg tablet Commonly known as:  LIPITOR Take 1 Tab by mouth nightly. BROVANA 15 mcg/2 mL Nebu neb solution Generic drug:  arformoterol 15 mcg by Nebulization route two (2) times a day. digoxin 0.125 mg tablet Commonly known as:  LANOXIN Take 1 Tab by mouth nightly. dilTIAZem  mg ER capsule Commonly known as:  CARDIZEM CD Take 1 Cap by mouth nightly. ESBRIET 267 mg Cap capsule Generic drug:  pirfenidone Take 3 Caps by mouth three (3) times daily. Indications: PULMONARY FIBROSIS  
  
 ferrous sulfate 325 mg (65 mg iron) EC tablet Commonly known as:  IRON Take 325 mg by mouth nightly. FREESTYLE FREEDOM LITE monitoring kit Generic drug:  Blood-Glucose Meter FOLLOW PACKAGE DIRECTIONS  
  
 furosemide 20 mg tablet Commonly known as:  LASIX  
2 tabs in AM and pm  
  
 glucose blood VI test strips strip Commonly known as:  FREESTYLE LITE STRIPS  
TEST BLOOD SUGAR TWO TIMES DAILY E11.9 KLOR-CON 10 10 mEq tablet Generic drug:  potassium chloride SR Take 10 mEq by mouth nightly. Lancets Misc Commonly known as:  FREESTYLE LANCETS  
TEST BLOOD SUGAR THREE TIMES DAILY E11.9  
  
 metFORMIN 500 mg tablet Commonly known as:  GLUCOPHAGE Take 1 Tab by mouth three (3) times daily (with meals). nitrofurantoin 50 mg capsule Commonly known as:  MACRODANTIN Take 1 Cap by mouth nightly. OXYGEN TUBING  
2 L by Does Not Apply route. predniSONE 10 mg tablet Commonly known as:  Staci Rae Take 1 Tab by mouth daily. rivaroxaban 15 mg Tab tablet Commonly known as:  Nadiya Better Take 1 Tab by mouth daily  (with dinner). SPIRIVA RESPIMAT 2.5 mcg/actuation inhaler Generic drug:  tiotropium bromide Take 2 Puffs by inhalation daily. * Notice: This list has 2 medication(s) that are the same as other medications prescribed for you. Read the directions carefully, and ask your doctor or other care provider to review them with you. We Performed the Following AMB POC URINALYSIS DIP STICK AUTO W/O MICRO [05109 CPT(R)] Patient Instructions Altered Mental Status: Care Instructions Your Care Instructions Altered mental status is a change in how well your brain is working. As a result, you may be confused, be less alert than usual, or act in odd ways. This may include seeing or hearing things that aren't really there (hallucinations). A mental status change has many possible causes. For example, it may be the result of an infection, an imbalance of chemicals in the body, or a chronic disease such as diabetes or COPD. It can also be caused by things such as a head injury, taking certain medicines, or using alcohol or drugs. The doctor may do tests to look for the cause. These tests may include urine tests, blood tests, and imaging tests such as a CT scan. Sometimes a clear cause isn't found. But tests can help the doctor rule out a serious cause of your symptoms. A change in mental status can be scary. But mental status will often return to normal when the cause is treated. So it is important to get any follow-up testing or treatment the doctor has suggested. The doctor has checked you carefully, but problems can develop later. If you notice any problems or new symptoms, get medical treatment right away. Follow-up care is a key part of your treatment and safety. Be sure to make and go to all appointments, and call your doctor if you are having problems. It's also a good idea to know your test results and keep a list of the medicines you take. How can you care for yourself at home? · Be safe with medicines. Take your medicines exactly as prescribed. Call your doctor if you think you are having a problem with your medicine. · Have another adult stay with you until you are better. This can help keep you safe. Ask that person to watch for signs that your mental status is getting worse. When should you call for help? Call 911 anytime you think you may need emergency care. For example, call if: 
· You passed out (lost consciousness). Call your doctor now or seek immediate medical care if: 
· Your mental status is getting worse. · You have new symptoms, such as a fever, chills, or shortness of breath. · You do not feel safe. Watch closely for changes in your health, and be sure to contact your doctor if: 
· You do not get better as expected. Where can you learn more? Go to http://lul-ben.info/. Enter S176 in the search box to learn more about \"Altered Mental Status: Care Instructions. \" Current as of: October 14, 2016 Content Version: 11.2 © 4311-3283 Brand.[employer]. Care instructions adapted under license by [employer] (which disclaims liability or warranty for this information). If you have questions about a medical condition or this instruction, always ask your healthcare professional. [address] any warranty or liability for your use of this information. Introducing [facility]! Beatriz Rea introduces Funbuilt patient portal. Now you can access parts of your medical record, email your doctor's office, and request medication refills online. 1. In your internet browser, go to [email or web address] 2. Click on the First Time User? Click Here link in the Sign In box. You will see the New Member Sign Up page. 3. Enter your Funbuilt Access Code exactly as it appears below. You will not need to use this code after youve completed the sign-up process. If you do not sign up before the expiration date, you must request a new code. · Funbuilt Access Code: [email or web address] Expires: 7/25/2017  9:12 AM 
 
4. Enter the last four digits of your Social Security Number (xxxx) and Date of Birth (mm/dd/yyyy) as indicated and click Submit. You will be taken to the next sign-up page. 5. Create a Funbuilt ID. This will be your [employer]built login ID and cannot be changed, so think of one that is secure and easy to remember. 6. Create a [employer] password. You can change your password at any time. 7. Enter your Password Reset Question and Answer. This can be used at a later time if you forget your password. 8. Enter your e-mail address. You will receive e-mail notification when new information is available in [address] 9. Click Sign Up. You can now view and download portions of your medical record. 10. Click the Download Summary menu link to download a portable copy of your medical information. If you have questions, please visit the Frequently Asked Questions section of the [employer] website. Remember, [employer] is NOT to be used for urgent needs. For medical emergencies, dial 911. Now available from your iPhone and Android! Please provide this summary of care documentation to your next provider. Your primary care clinician is listed as [facility]. If you have any questions after today's visit, please call [phone].

## 2017-06-07 NOTE — ED PROVIDER NOTES
HPI Comments: 64 y.o. female with extensive past medical history, please see list, significant for HTN, CAD, S/P AVR, A Fib, dyslipidemia, and pulmonary fibrosis who presents from home for evaluation after GLF. Most of the pt's hx is provided by the daughter. Pt reportedly experienced a GLF yesterday where she claimed to feel \"dizzy and light headed\" while washing her face and fell to the carpet, hitting her face on the carpeted floor. Pt reportedly was on the ground for about 30 minutes because she was unable to get up on her own. Pt was then discovered by a relative and assisted up from the ground. Pt has since been seeming \"more slow\", fatigued, disoriented, confused and \"glazed over\". Per daughter, pt has also recently been experiencing decreased appetite and has lost \"about 4 lbs\" over the last 3-4 days. Per daughter, the pt's PCP was called yesterday but the pt was unable to be seen until today, where she was then referred to the ED by her PCP. Pt's daughter claims that they were worried about a possible UTI in the pt, but the pt's urine was checked at the PCP office and was thought to be unremarkable. Per family, pt is taking Xarelto for A-Fib. Pt also reportedly wears 2 L of O2 at all times at baseline due to pulmonary fibrosis. Pt denies pain, nausea, CP, SOB or cough. Per family, pt has leg swelling at baseline. There are no other acute medical concerns at this time. PCP: Zoe Quiñonez MD    Note written by Sara Ramírez. Gabriel East, as dictated by Arsh Thayer MD 4:10 PM      The history is provided by the patient and a relative. No  was used.         Past Medical History:   Diagnosis Date    Aortic stenosis     severe, s/p AVR March 2012 - presented with HF, EF 30%    Asthma 5/10/2012    Atrial fibrillation ([address] 5/10/2012    Bacterial pneumonia, unspecified     CAD (coronary artery disease)     cor calcifications (LAD) by CT     Chronic diastolic heart failure ([address] 5/29/2012    Depression     Dyslipidemia     Female stress incontinence     Hyperlipidemia 9/17/2010    Hypertension     Osteoarthritis     Paroxysmal atrial fibrillation (HCC)     post op AVR    Pulmonary fibrosis ([facility]     S/P AVR 11/6/2016    S/P AVR (aortic valve replacement) 3/2012    bovine ([facility])    Sleep apnea     T2DM (type 2 diabetes mellitus) ([facility] 1/14/2011       Past Surgical History:   Procedure Laterality Date    CARDIAC SURG PROCEDURE UNLIST      ECHO 2D ADULT  3/2012    EF 30%, mod LVH, EF 30-35%, PA 50 mmHg    HX CATARACT REMOVAL      bilateral         Family History:   Problem Relation Age of Onset    Diabetes Mother     Diabetes Father     Hypertension Father        Social History     Social History    Marital status:      Spouse name: N/A    Number of children: N/A    Years of education: N/A     Occupational History    Not on file. Social History Main Topics    Smoking status: Never Smoker    Smokeless tobacco: Never Used    Alcohol use No    Drug use: No    Sexual activity: Not on file     Other Topics Concern    Not on file     Social History Narrative         ALLERGIES: Review of patient's allergies indicates no known allergies. Review of Systems   Constitutional: Positive for appetite change, fatigue and unexpected weight change. Negative for fever. HENT: Negative for facial swelling. Eyes: Negative for visual disturbance. Respiratory: Negative for cough, chest tightness and shortness of breath. Cardiovascular: Negative for chest pain. Gastrointestinal: Negative for abdominal pain and nausea. Genitourinary: Negative for dysuria. Musculoskeletal: Negative for arthralgias. Skin: Negative for rash. Neurological: Positive for dizziness and light-headedness. Hematological: Negative for adenopathy. Psychiatric/Behavioral: Positive for confusion. Negative for suicidal ideas. All other systems reviewed and are negative.       Vitals: 06/07/17 1553   BP: 108/59   Pulse: 99   Resp: 20   Temp: 97.4 °F (36.3 °C)   SpO2: 95%   Weight: 59.7 kg (131 lb 9.6 oz)   Height: 5' 2\" (1.575 m)            Physical Exam   Constitutional: She is oriented to person, place, and time. No distress. Appears elderly and frail. HENT:   Head: Normocephalic and atraumatic. Mouth/Throat: Oropharynx is clear and moist. Mucous membranes are dry. Eyes: Pupils are equal, round, and reactive to light. No scleral icterus. Neck: Normal range of motion. Neck supple. No thyromegaly present. Cardiovascular: Normal rate, regular rhythm and intact distal pulses. Murmur heard. Systolic (with click) murmur is present with a grade of 3/6   Pulmonary/Chest: Effort normal and breath sounds normal. No respiratory distress. Abdominal: Soft. Bowel sounds are normal. She exhibits no distension. There is no tenderness. Musculoskeletal: Normal range of motion. She exhibits no edema. Neurological: She is alert and oriented to person, place, and time. Skin: Skin is warm and dry. Bruising (old of the left forearm) noted. No rash noted. She is not diaphoretic. Nursing note and vitals reviewed. Note written by Mulu Hinojosa. Clint Campos, as dictated by Wing Kan MD 4:10 PM       [facility]  ED Course       Procedures    ED EKG interpretation:  Rhythm: normal sinus rhythm. Rate (approx.): 91. Axis: Left. ST segment:  No concerning ST elevations or depressions. RBBB.  LVH. No sig change compared to 11/4/2016. This EKG was interpreted by Wing Kan MD,ED Provider. CBC unremarkable  BUN/Cr slightly elevated from baseline. CO2 stable  Trop consistent with baseline    Head CT: IMPRESSION: No acute process. 5:54 PM  Pt's work up mostly unremarkable except for slightly increased BUN/Cr likely due to dehydration. VS stable. NO evidence of significant injury or bleeding.     Upon getting patient up for orthostatics she became acutely light-headed and was unable to stand on her own. Required full assistance to transfer into wheelchair. Family states this is a dramatic change and patient was normal this AM.  Not safe situation for patient to go home. Possible explanations include a CNS event, dehydration,     Will discuss admission with hospitalist.    7:59 PM  Discussed with Dr. Karol Sandoval who will eval for admission. 9:50 PM  Dr. Karol Sandoval informed me that the patient belongs to family practice. I spoke with family practice resident who will eval patient in ED.

## 2017-06-07 NOTE — IP AVS SNAPSHOT
Harsha Connors 
 
 
 [address] [facility] [address] [facility] 
[phone] Patient: Michele Allen MRN: [id number] :1940 You are allergic to the following No active allergies Recent Documentation Height Weight BMI OB Status Smoking Status 1.575 m 59.7 kg 24.07 kg/m2 Postmenopausal Never Smoker Unresulted Labs Order Current Status CULTURE, URINE In process Emergency Contacts Name Discharge Info Relation Home Work Mobile Alison Olson [1] Child [2] [phone] About your hospitalization You were admitted on:  2017 You last received care in the:  [facility] 4M POST SURG ORT 1 You were discharged on:  2017 Unit phone number:  [phone] Why you were hospitalized Your primary diagnosis was:  Dehydration Your diagnoses also included:  Atrial Fibrillation (Hcc), Cad (Coronary Artery Disease), Copd (Chronic Obstructive Pulmonary Disease) (Hcc), Arthritis, Chronic Diastolic Heart Failure (Hcc), Dm Ii (Diabetes Mellitus, Type Ii), Controlled (Hcc), Dyslipidemia, Hypertension, Dizziness Providers Seen During Your Hospitalizations Provider Role Specialty Primary office phone Tony Stewart MD Attending Provider Emergency Medicine [phone] Maryana Linares MD Attending Provider Hospitalist [phone] Fátmia Carbajal DO Attending Provider [facility] [phone] Your Primary Care Physician (PCP) Primary Care Physician Office Phone Office Fax Luly CHAIREZ [phone] Follow-up Information Follow up With Details Comments Contact Info Juan Brice MD Schedule an appointment as soon as possible for a visit in 3 days   [address] [address] [phone] [facility] EM[facility]Y [facility]  If symptoms worsen [address] 
[phone] Home medication   Please give back home medication: Esbriet back to patient. Located in patient specific bin. Thanks  
    follow up urine culture  [place] [address] 
[phone] Current Discharge Medication List  
  
START taking these medications Dose & Instructions Dispensing Information Comments Morning Noon Evening Bedtime  
 cefdinir 300 mg capsule Commonly known as:  OMNICEF Your last dose was: Your next dose is:    
   
   
 Dose:  300 mg Take 1 Cap by mouth two (2) times a day for 5 days. Quantity:  10 Cap Refills:  0 CONTINUE these medications which have NOT CHANGED Dose & Instructions Dispensing Information Comments Morning Noon Evening Bedtime * albuterol 90 mcg/actuation inhaler Commonly known as:  PROVENTIL HFA, VENTOLIN HFA, PROAIR HFA Your last dose was: Your next dose is:    
   
   
 Dose:  1 Puff Take 1 Puff by inhalation three (3) times daily as needed for Wheezing. Refills:  0  
     
   
   
   
  
 * albuterol 2.5 mg /3 mL (0.083 %) nebulizer solution Commonly known as:  PROVENTIL VENTOLIN Your last dose was: Your next dose is:    
   
   
 Dose:  2.5 mg  
3 mL by Nebulization route three (3) times daily. Quantity:  1 Package Refills:  6  
     
   
   
   
  
 aspirin delayed-release 81 mg tablet Your last dose was: Your next dose is:    
   
   
 Dose:  81 mg Take 81 mg by mouth daily. Refills:  0  
     
   
   
   
  
 atorvastatin 20 mg tablet Commonly known as:  LIPITOR Your last dose was: Your next dose is:    
   
   
 Dose:  20 mg Take 1 Tab by mouth nightly. Quantity:  90 Tab Refills:  3 BROVANA 15 mcg/2 mL Nebu neb solution Generic drug:  arformoterol Your last dose was: Your next dose is:    
   
   
 Dose:  15 mcg 15 mcg by Nebulization route two (2) times a day. Refills:  0  
     
   
   
   
  
 digoxin 0.125 mg tablet Commonly known as:  LANOXIN Your last dose was: Your next dose is:    
   
   
 Dose:  0.125 mg Take 1 Tab by mouth nightly. Quantity:  90 Tab Refills:  3  
     
   
   
   
  
 dilTIAZem  mg ER capsule Commonly known as:  CARDIZEM CD Your last dose was: Your next dose is:    
   
   
 Dose:  240 mg Take 1 Cap by mouth nightly. Quantity:  90 Cap Refills:  3 ESBRIET 267 mg Cap capsule Generic drug:  pirfenidone Your last dose was: Your next dose is:    
   
   
 Dose:  3 Cap Take 3 Caps by mouth three (3) times daily. Indications: PULMONARY FIBROSIS Refills:  0  
     
   
   
   
  
 ferrous sulfate 325 mg (65 mg iron) EC tablet Commonly known as:  IRON Your last dose was: Your next dose is:    
   
   
 Dose:  325 mg Take 325 mg by mouth daily. Refills:  0 FREESTYLE FREEDOM LITE monitoring kit Generic drug:  Blood-Glucose Meter Your last dose was: Your next dose is: FOLLOW PACKAGE DIRECTIONS Quantity:  1 kit Refills:  0  
     
   
   
   
  
 furosemide 20 mg tablet Commonly known as:  LASIX Your last dose was: Your next dose is:    
   
   
 2 tabs in AM and pm  
 Quantity:  360 Tab Refills:  3  
     
   
   
   
  
 glucose blood VI test strips strip Commonly known as:  FREESTYLE LITE STRIPS Your last dose was: Your next dose is:    
   
   
 TEST BLOOD SUGAR TWO TIMES DAILY E11.9 Quantity:  100 Strip Refills:  5 For DM-2 treated with insulin 250.00 Lancets Misc Commonly known as:  FREESTYLE LANCETS Your last dose was: Your next dose is:    
   
   
 TEST BLOOD SUGAR THREE TIMES DAILY E11.9 Quantity:  100 Each Refills:  5 For DM-2 treated with insulin Diagnosis code E11.8  
    
   
   
   
  
 metFORMIN 500 mg tablet Commonly known as:  GLUCOPHAGE Your last dose was: Your next dose is:    
   
   
 Dose:  500 mg Take 1 Tab by mouth three (3) times daily (with meals). Quantity:  90 Tab Refills:  5  
     
   
   
   
  
 nitrofurantoin 50 mg capsule Commonly known as:  MACRODANTIN Your last dose was: Your next dose is:    
   
   
 Dose:  50 mg Take 1 Cap by mouth nightly. Quantity:  90 Cap Refills:  3 OXYGEN TUBING Your last dose was: Your next dose is:    
   
   
 Dose:  2 L  
2 L by Does Not Apply route. Refills:  0  
     
   
   
   
  
 potassium 99 mg tablet Your last dose was: Your next dose is:    
   
   
 Dose:  99 mg Take 99 mg by mouth two (2) times a day. Refills:  0  
     
   
   
   
  
 predniSONE 5 mg tablet Commonly known as:  Susan Lacey Your last dose was: Your next dose is:    
   
   
 Dose:  10 mg Take 10 mg by mouth daily. Refills:  0  
     
   
   
   
  
 rivaroxaban 15 mg Tab tablet Commonly known as:  [place] Your last dose was: Your next dose is: Take 1 Tab by mouth daily  (with dinner). Quantity:  90 Tab Refills:  3 SPIRIVA RESPIMAT 2.5 mcg/actuation inhaler Generic drug:  tiotropium bromide Your last dose was: Your next dose is:    
   
   
 Dose:  2 Puff Take 2 Puffs by inhalation daily. Refills:  0  
     
   
   
   
  
 * Notice: This list has 2 medication(s) that are the same as other medications prescribed for you. Read the directions carefully, and ask your doctor or other care provider to review them with you. Where to Get Your Medications Information on where to get these meds will be given to you by the nurse or doctor. ! Ask your nurse or doctor about these medications  
  cefdinir 300 mg capsule Discharge Instructions HOME DISCHARGE INSTRUCTIONS Maria Esther Lantigua / [id number] : 1940 Admission date: 2017 Discharge date: 2017 Please bring this form with you to show your care provider at your follow-up appointment. Primary care provider:  Katarzyna Dior MD 
 
Discharging provider:  Hailey Shafer MD  - Family Medicine Resident Francesco Llanes - Attending, Family Medicine You have been admitted to the hospital with the following diagnoses: 
 
ACUTE DIAGNOSES: 
· Dizziness Arvell Getachew . . . . . . . . . . . . . . . . . . . . . . . . . . . . . . . . . . . . . . . . . . . . . . . . . . . . . . . . . . . . . . . . . . . . . . Victor Hugo Chilel FOLLOW-UP CARE RECOMMENDATIONS: 
 
Appointments Follow-up Information Follow up With Details Comments Contact Info Katarzyna Dior MD Schedule an appointment as soon as possible for a visit in 3 days   [place] [phone] [facility]  If symptoms worsen [address] 
[phone] Home medication   Please give back home medication: Esbriet back to patient. Located in patient specific bin. Thanks Follow-up tests needed: CBC at office follow up, POC glucose if not being checked at home Pending test results: At the time of your discharge the following test results are still pending: Urine Culture. Please make sure you review these results with your outpatient follow-up provider(s). Specific symptoms to watch for: chest pain, shortness of breath, fever, chills, nausea, vomiting, diarrhea, change in mentation, falling, weakness, bleeding. DIET/what to eat:  Diabetic Diet ACTIVITY:  Activity as tolerated Wound care: None Equipment needed:  None What to do if new or unexpected symptoms occur? If you experience any of the above symptoms (or should other concerns or questions arise after discharge) please call your primary care physician. Return to the emergency room if you cannot get hold of your doctor. · It is very important that you keep your follow-up appointment(s). · Please bring discharge papers, medication list (and/or medication bottles) to your follow-up appointments for review by your outpatient provider(s). · Please check the list of medications and be sure it includes every medication (even non-prescription medications) that your provider wants you to take. · It is important that you take the medication exactly as they are prescribed. · Keep your medication in the bottles provided by the pharmacist and keep a list of the medication names, dosages, and times to be taken in your wallet. · Do not take other medications without consulting your doctor. · If you have any questions about your medications or other instructions, please talk to your nurse or care provider before you leave the hospital.  
 
Information obtained by:  
 
I understand that if any problems occur once I am at home I am to contact my physician. These instructions were explained to me and I had the opportunity to ask questions. I understand and acknowledge receipt of the instructions indicated above. Physician's or R.N.'s Signature                                                                  Date/Time Patient or Representative Signature                                                          Date/Time Urinary Tract Infection in Women: Care Instructions Your Care Instructions A urinary tract infection, or UTI, is a general term for an infection anywhere between the kidneys and the urethra (where urine comes out). Most UTIs are bladder infections. They often cause pain or burning when you urinate. UTIs are caused by bacteria and can be cured with antibiotics. Be sure to complete your treatment so that the infection goes away. Follow-up care is a key part of your treatment and safety. Be sure to make and go to all appointments, and call your doctor if you are having problems. It's also a good idea to know your test results and keep a list of the medicines you take. How can you care for yourself at home? · Take your antibiotics as directed. Do not stop taking them just because you feel better. You need to take the full course of antibiotics. · Drink extra water and other fluids for the next day or two. This may help wash out the bacteria that are causing the infection. (If you have kidney, heart, or liver disease and have to limit fluids, talk with your doctor before you increase your fluid intake.) · Avoid drinks that are carbonated or have caffeine. They can irritate the bladder. · Urinate often. Try to empty your bladder each time. · To relieve pain, take a hot bath or lay a heating pad set on low over your lower belly or genital area. Never go to sleep with a heating pad in place. To prevent UTIs · Drink plenty of water each day. This helps you urinate often, which clears bacteria from your system. (If you have kidney, heart, or liver disease and have to limit fluids, talk with your doctor before you increase your fluid intake.) · Urinate when you need to. · Urinate right after you have sex. · Change sanitary pads often. · Avoid douches, bubble baths, feminine hygiene sprays, and other feminine hygiene products that have deodorants. · After going to the bathroom, wipe from front to back. When should you call for help? Call your doctor now or seek immediate medical care if: · Symptoms such as fever, chills, nausea, or vomiting get worse or appear for the first time. · You have new pain in your back just below your rib cage. This is called flank pain. · There is new blood or pus in your urine. · You have any problems with your antibiotic medicine. Watch closely for changes in your health, and be sure to contact your doctor if: 
· You are not getting better after taking an antibiotic for 2 days. · Your symptoms go away but then come back. Where can you learn more? Go to http://lul-ben.info/. Enter F519 in the search box to learn more about \"Urinary Tract Infection in Women: Care Instructions. \" Current as of: November 28, 2016 Content Version: 11.2 © 2532-2768 [employer]. Care instructions adapted under license by [employer] (which disclaims liability or warranty for this information). If you have questions about a medical condition or this instruction, always ask your healthcare professional. [place] [address] any warranty or liability for your use of this information. Dehydration: Care Instructions Your Care Instructions Dehydration happens when your body loses too much fluid. This might happen when you do not drink enough water or you lose large amounts of fluids from your body because of diarrhea, vomiting, or sweating. Severe dehydration can be life-threatening. Water and minerals called electrolytes help put your body fluids back in balance. Learn the early signs of fluid loss, and drink more fluids to prevent dehydration. Follow-up care is a key part of your treatment and safety. Be sure to make and go to all appointments, and call your doctor if you are having problems. It's also a good idea to know your test results and keep a list of the medicines you take. How can you care for yourself at home? · To prevent dehydration, drink plenty of fluids, enough so that your urine is light yellow or clear like water. Choose water and other caffeine-free clear liquids until you feel better. If you have kidney, heart, or liver disease and have to limit fluids, talk with your doctor before you increase the amount of fluids you drink. · If you do not feel like eating or drinking, try taking small sips of water, sports drinks, or other rehydration drinks. · Get plenty of rest. 
To prevent dehydration · Add more fluids to your diet and daily routine, unless your doctor has told you not to. · During hot weather, drink more fluids. Drink even more fluids if you exercise a lot. Stay away from drinks with alcohol or caffeine. · Watch for the symptoms of dehydration. These include: ¨ A dry, sticky mouth. ¨ Dark yellow urine, and not much of it. ¨ Dry and sunken eyes. ¨ Feeling very tired. · Learn what problems can lead to dehydration. These include: ¨ Diarrhea, fever, and vomiting. ¨ Any illness with a fever, such as pneumonia or the flu. ¨ Activities that cause heavy sweating, such as endurance races and heavy outdoor work in hot or humid weather. ¨ Alcohol or drug abuse or withdrawal. 
¨ Certain medicines, such as cold and allergy pills (antihistamines), diet pills (diuretics), and laxatives. ¨ Certain diseases, such as diabetes, cancer, and heart or kidney disease. When should you call for help? Call 911 anytime you think you may need emergency care. For example, call if: 
· You passed out (lost consciousness). Call your doctor now or seek immediate medical care if: 
· You are confused and cannot think clearly. · You are dizzy or lightheaded, or you feel like you may faint. · You have signs of needing more fluids. You have sunken eyes and a dry mouth, and you pass only a little dark urine. · You cannot keep fluids down. Watch closely for changes in your health, and be sure to contact your doctor if: · You are not making tears. · Your skin is very dry and sags slowly back into place after you pinch it. · Your mouth and eyes are very dry. Where can you learn more? Go to http://lul-ben.info/. Enter L360 in the search box to learn more about \"Dehydration: Care Instructions. \" Current as of: May 27, 2016 Content Version: 11.2 © 5489-5407 [employer]. Care instructions adapted under license by [employer] (which disclaims liability or warranty for this information). If you have questions about a medical condition or this instruction, always ask your healthcare professional. [place] [address] any warranty or liability for your use of this information. We hope that we have addressed all of your medical concerns. The examination and treatment you received in the Emergency Department were for an emergent problem and were not intended as complete care. It is important that you follow up with your healthcare provider(s) for ongoing care. If your symptoms worsen or do not improve as expected, and you are unable to reach your usual health care provider(s), you should return to the Emergency Department. Today's healthcare is undergoing tremendous change, and patient satisfaction surveys are one of the many tools to assess the quality of medical care. You may receive a survey from the [employer] regarding your experience in the Emergency Department. I hope that your experience has been completely positive, particularly the medical care that I provided. As such, please participate in the survey; anything less than excellent does not meet my expectations or intentions. [facility] and [facility] participate in nationally recognized quality of care measures.   If your blood pressure is greater than 120/80, as reported below, we urge that you seek medical care to address the potential of high blood pressure, commonly known as hypertension. Hypertension can be hereditary or can be caused by certain medical conditions, pain, stress, or \"white coat syndrome. \" Please make an appointment with your health care provider(s) for follow up of your Emergency Department visit. VITALS:  
Patient Vitals for the past 8 hrs: 
 Temp Pulse Resp BP SpO2  
06/07/17 1811 - 87 - 123/60 -  
06/07/17 1730 - - - 114/59 97 % 06/07/17 1615 - - - 135/85 97 % 06/07/17 1553 97.4 °F (36.3 °C) 99 20 108/59 95 % Thank you for allowing us to provide you with medical care today. We realize that you have many choices for your emergency care needs. Please choose us in the future for any continued health care needs. Julio C Lyle MD 
 
[facility], [address] [place] [address].  
Office: [phone] Recent Results (from the past 24 hour(s)) AMB POC URINALYSIS DIP STICK AUTO W/O MICRO Collection Time: 06/07/17 12:59 PM  
Result Value Ref Range Color (UA POC) Yellow Clarity (UA POC) Clear Glucose (UA POC) Negative Negative Bilirubin (UA POC) 1+ Negative Ketones (UA POC) Trace Negative Specific gravity (UA POC) 1.015 1.001 - 1.035 Blood (UA POC) Trace Negative pH (UA POC) 5.5 4.6 - 8.0 Protein (UA POC) 1+ Negative mg/dL Urobilinogen (UA POC) 0.2 mg/dL 0.2 - 1 Nitrites (UA POC) Negative Negative Leukocyte esterase (UA POC) 1+ Negative EKG, 12 LEAD, INITIAL Collection Time: 06/07/17  4:26 PM  
Result Value Ref Range Ventricular Rate 91 BPM  
 Atrial Rate 91 BPM  
 P-R Interval 208 ms QRS Duration 160 ms  
 Q-T Interval 390 ms QTC Calculation (Bezet) 479 ms Calculated P Axis 56 degrees Calculated R Axis -70 degrees Calculated T Axis 42 degrees Diagnosis Normal sinus rhythm Left axis deviation Right bundle branch block Left ventricular hypertrophy with repolarization abnormality Inferior infarct (cited on or before 27-JAN-2013) Anterior infarct (cited on or before 27-JAN-2013) Abnormal ECG When compared with ECG of 09-DEC-2015 13:00, 
premature atrial complexes are no longer present Questionable change in initial forces of Lateral leads CBC WITH AUTOMATED DIFF Collection Time: 06/07/17  4:33 PM  
Result Value Ref Range WBC 7.9 3.6 - 11.0 K/uL  
 RBC 3.85 3.80 - 5.20 M/uL  
 HGB 11.9 11.5 - 16.0 g/dL HCT 36.6 35.0 - 47.0 % MCV 95.1 80.0 - 99.0 FL  
 MCH 30.9 26.0 - 34.0 PG  
 MCHC 32.5 30.0 - 36.5 g/dL  
 RDW 13.7 11.5 - 14.5 % PLATELET 753 577 - 053 K/uL NEUTROPHILS 83 (H) 32 - 75 % LYMPHOCYTES 8 (L) 12 - 49 % MONOCYTES 9 5 - 13 % EOSINOPHILS 0 0 - 7 % BASOPHILS 0 0 - 1 %  
 ABS. NEUTROPHILS 6.6 1.8 - 8.0 K/UL  
 ABS. LYMPHOCYTES 0.6 (L) 0.8 - 3.5 K/UL  
 ABS. MONOCYTES 0.7 0.0 - 1.0 K/UL  
 ABS. EOSINOPHILS 0.0 0.0 - 0.4 K/UL  
 ABS. BASOPHILS 0.0 0.0 - 0.1 K/UL  
 RBC COMMENTS NORMOCYTIC, NORMOCHROMIC METABOLIC PANEL, COMPREHENSIVE Collection Time: 06/07/17  4:33 PM  
Result Value Ref Range Sodium 136 136 - 145 mmol/L Potassium 4.0 3.5 - 5.1 mmol/L Chloride 94 (L) 97 - 108 mmol/L  
 CO2 34 (H) 21 - 32 mmol/L Anion gap 8 5 - 15 mmol/L Glucose 192 (H) 65 - 100 mg/dL BUN 34 (H) 6 - 20 MG/DL Creatinine 1.30 (H) 0.55 - 1.02 MG/DL  
 BUN/Creatinine ratio 26 (H) 12 - 20 GFR est AA 48 (L) >60 ml/min/1.73m2 GFR est non-AA 40 (L) >60 ml/min/1.73m2 Calcium 8.7 8.5 - 10.1 MG/DL Bilirubin, total 0.3 0.2 - 1.0 MG/DL  
 ALT (SGPT) 54 12 - 78 U/L  
 AST (SGOT) 40 (H) 15 - 37 U/L Alk. phosphatase 54 45 - 117 U/L Protein, total 7.1 6.4 - 8.2 g/dL Albumin 3.3 (L) 3.5 - 5.0 g/dL Globulin 3.8 2.0 - 4.0 g/dL A-G Ratio 0.9 (L) 1.1 - 2.2    
TROPONIN I Collection Time: 06/07/17  4:33 PM  
Result Value Ref Range Troponin-I, Qt. 0.10 (H) <0.05 ng/mL CK W/ CKMB & INDEX Collection Time: 06/07/17  4:33 PM  
Result Value Ref Range CK 61 26 - 192 U/L  
 CK - MB <1.0 <3.6 NG/ML  
 CK-MB Index Cannot be calulated 0 - 2.5 PROTHROMBIN TIME + INR Collection Time: 06/07/17  4:33 PM  
Result Value Ref Range INR 1.0 0.9 - 1.1 Prothrombin time 9.7 9.0 - 11.1 sec PTT Collection Time: 06/07/17  4:33 PM  
Result Value Ref Range aPTT 29.3 22.1 - 32.5 sec  
 aPTT, therapeutic range     58.0 - 77.0 SECS Ct Head Wo Cont Result Date: 6/7/2017 INDICATION:   fall, head injury, confusion EXAM:  HEAD CT WITHOUT CONTRAST COMPARISON:  None TECHNIQUE:  Routine noncontrast axial head CT was performed. Coronal and sagittal multiplanar reconstructions were obtained. CT dose reduction was achieved through use of a standardized protocol tailored for this examination and automatic exposure control for dose modulation. FINDINGS: The ventricles and cortical sulci are within normal limits. No evidence for acute intracranial hemorrhage, midline shift, or mass effect. Moderate bilateral subcortical and periventricular areas of patchy low attenuation is nonspecific but likely related to changes of chronic small vessel ischemic disease. The basal cisterns are patent. The visualized paranasal sinuses and mastoid air cells are clear. No calvarial abnormality. IMPRESSION: No evidence for acute intracranial abnormality Discharge Orders None ACO Transitions of Care [facility] [address] [place] [address] offers a voluntary care coordination program to provide high quality service and care to [facility] fee-for-service beneficiaries. Ra Lopez was designed to help you enhance your health and well-being through the following services: ? Transitions of Care  support for individuals who are transitioning from one care setting to another (example: Hospital to home). ? Chronic and Complex Care Coordination  support for individuals and caregivers of those with serious or chronic illnesses or with more than one chronic (ongoing) condition and those who take a number of different medications. If you meet specific medical criteria, a [facility] [address] may call you directly to coordinate your care with your primary care physician and your other care providers. For questions about the [facility] programs, please, contact your physicians office. For general questions or additional information about Accountable Care Organizations: 
Please visit www.medicare.gov/acos. html or call 1-800-MEDICARE (9-734.156.4913) TTY users should call 3-116.200.6826. [place] Announcement We are excited to announce that we are making your provider's discharge notes available to you in [facility]. You will see these notes when they are completed and signed by the physician that discharged you from your recent hospital stay. If you have any questions or concerns about any information you see in [employer], please call the Health Information Department where you were seen or reach out to your Primary Care Provider for more information about your plan of care. Introducing [facility]! Seema Johnson introduces [employer] patient portal. Now you can access parts of your medical record, email your doctor's office, and request medication refills online. 1. In your internet browser, go to [email or web address] 2. Click on the First Time User? Click Here link in the Sign In box. You will see the New Member Sign Up page. 3. Enter your [employer] Access Code exactly as it appears below. You will not need to use this code after youve completed the sign-up process. If you do not sign up before the expiration date, you must request a new code. · [employer] Access Code: [email or web address] Expires: 7/25/2017  9:12 AM 
 
 4. Enter the last four digits of your Social Security Number (xxxx) and Date of Birth (mm/dd/yyyy) as indicated and click Submit. You will be taken to the next sign-up page. 5. Create a [employer] ID. This will be your [employer] login ID and cannot be changed, so think of one that is secure and easy to remember. 6. Create a [employer] password. You can change your password at any time. 7. Enter your Password Reset Question and Answer. This can be used at a later time if you forget your password. 8. Enter your e-mail address. You will receive e-mail notification when new information is available in [address] 9. Click Sign Up. You can now view and download portions of your medical record. 10. Click the Download Summary menu link to download a portable copy of your medical information. If you have questions, please visit the Frequently Asked Questions section of the [employer] website. Remember, [employer] is NOT to be used for urgent needs. For medical emergencies, dial 911. Now available from your iPhone and Android! General Information Please provide this summary of care documentation to your next provider. Patient Signature:  ____________________________________________________________ Date:  ____________________________________________________________  
  
Marissa Dao Provider Signature:  ____________________________________________________________ Date:  ____________________________________________________________

## 2017-06-08 NOTE — PROGRESS NOTES
[facility]: MED RECONCILIATION    Comments/Recommendations:     Medication(s) ADDED to PTA list:  1. none    Medication(s) REMOVED from PTA list:  1. none    Medication(s) ADJUSTED on PTA list:  1. Ferrous sulfate changed to \"daily\" from QHS. 2. Potassium changed to 99 mg BID from 10 mEq nightly      Information obtained from: Rx bottles/ family    Significant PMH/Disease States:   Past Medical History:   Diagnosis Date    Aortic stenosis     severe, s/p AVR March 2012 - presented with HF, EF 30%    Asthma 5/10/2012    Atrial fibrillation ([address] 5/10/2012    Bacterial pneumonia, unspecified     CAD (coronary artery disease)     cor calcifications (LAD) by CT     Chronic diastolic heart failure ([address] 5/29/2012    Depression     Dyslipidemia     Female stress incontinence     Hyperlipidemia 9/17/2010    Hypertension     Osteoarthritis     Paroxysmal atrial fibrillation ([address]     post op AVR    Pulmonary fibrosis ([address]     S/P AVR 11/6/2016    S/P AVR (aortic valve replacement) 3/2012    bovine ([facility])    Sleep apnea     T2DM (type 2 diabetes mellitus) ([facility] 1/14/2011       Chief Complaint for this Admission:   Chief Complaint   Patient presents with    Fall         Allergies: Review of patient's allergies indicates no known allergies. Prior to Admission Medications:     Medication Documentation Review Audit       Reviewed by NIKITA LondonD (Pharmacist) on 06/07/17 at [address] [place] [address] Documenting Provider Last Dose Status Taking? albuterol (PROVENTIL HFA, VENTOLIN HFA, PROAIR HFA) 90 mcg/actuation inhaler Take 1 Puff by inhalation three (3) times daily as needed for Wheezing. Historical Provider  Active Yes    albuterol (PROVENTIL VENTOLIN) 2.5 mg /3 mL (0.083 %) nebulizer solution 3 mL by Nebulization route three (3) times daily.  Lily Porter MD 6/7/2017 Active Yes    arformoterol (BROVANA) 15 mcg/2 mL nebu neb solution 15 mcg by Nebulization route two (2) times a day. Historical Provider 6/7/2017 am Active Yes    aspirin delayed-release 81 mg tablet Take 81 mg by mouth daily. Historical Provider 6/7/2017 am Active Yes    atorvastatin (LIPITOR) 20 mg tablet Take 1 Tab by mouth nightly. Pamela Avilez MD 6/7/2017 Active Yes    digoxin (LANOXIN) 0.125 mg tablet Take 1 Tab by mouth nightly. Alexandra Marie MD 6/6/2017 pm Active Yes    dilTIAZem CD (CARDIZEM CD) 240 mg ER capsule Take 1 Cap by mouth nightly. Alexandra Marie MD 6/6/2017 pm Active Yes    ferrous sulfate (IRON) 325 mg (65 mg iron) EC tablet Take 325 mg by mouth daily. Historical Provider 6/7/2017 am Active Yes    FREESTYLE FREEDOM LITE monitoring kit FOLLOW PACKAGE DIRECTIONS Pankaj Hull MD 6/7/2017 Active Yes    furosemide (LASIX) 20 mg tablet 2 tabs in AM and pm Alexandra Marie MD 6/7/2017 am Active Yes    glucose blood VI test strips (FREESTYLE LITE STRIPS) strip TEST BLOOD SUGAR TWO TIMES DAILY E11.9 Pamela Avilez MD 6/7/2017 Active Yes    Lancets (FREESTYLE LANCETS) misc TEST BLOOD SUGAR THREE TIMES DAILY E11.9 Poonam Patel MD 6/7/2017 Active Yes    metFORMIN (GLUCOPHAGE) 500 mg tablet Take 1 Tab by mouth three (3) times daily (with meals). Pamela Avilez MD 6/7/2017 13:30 Active Yes    MISCELLANEOUS MEDICAL SUPPLY (OXYGEN TUBING) 2 L by Does Not Apply route. Historical Provider 6/7/2017 Active Yes    nitrofurantoin (MACRODANTIN) 50 mg capsule Take 1 Cap by mouth nightly. Pamela Avilez MD 6/7/2017 Active Yes    pirfenidone (ESBRIET) 267 mg cap Take 3 Caps by mouth three (3) times daily. Indications: PULMONARY FIBROSIS Kristin Taylor MD 6/7/2017 am Active Yes    potassium 99 mg tablet Take 99 mg by mouth two (2) times a day. Historical Provider 6/7/2017 am Active Yes    predniSONE (DELTASONE) 5 mg tablet Take 10 mg by mouth daily. Historical Provider 6/7/2017 am Active Yes    rivaroxaban (XARELTO) 15 mg tab tablet Take 1 Tab by mouth daily  (with dinner).  Alexandra Marie MD 6/6/2017 pm Active Yes    tiotropium bromide (SPIRIVA RESPIMAT) 2.5 mcg/actuation mist Take 2 Puffs by inhalation daily.  Historical Provider 6/7/2017 am Active Yes                        Tobias Cassidy, Yamilet Phillips   Contact: [phone]

## 2017-06-08 NOTE — PROGRESS NOTES
1:29 AM  Primary Nurse Uriel Marquez, RN and Joseph Zhang, RN performed a dual skin assessment on this patient No impairment noted  Jamie score is 16. Skin issues only noted are multiple bruising on extremities. No breakdown noted otherwise. Pt. Lost access. 2 attempts for iv's failed, Residents made aware of no access, although iv abt and bolus was scanned, they were not completed r/t loss of access. FP resident will review, Resident called back, plans to see if other one can get iv access, E.D. Called, they will come up and try. 4:44 AM  Another 500ml bolus of ivf per family practice. Bolus was completed. ivf now @ 75 again. 6:31 AM  Incontinence care rendered throughout the night. Urine smell noted strong, briefs are heavy when changed. Skin remains clear/ free of insult re: incontinence. Family states she is a heavy wetter during the night and briefs are worn at home.

## 2017-06-08 NOTE — H&P
[address] [place] [address] Medicine  History & Physical        Date of admission: 2017    Patient name: Akshat Mayorga  MRN: [id number]  : 1940  Age: 64 y.o. Primary care provider:  Leanne Gloria MD     Source of Information: patient, daughter                                 Chief complain: Weakness and confusion    History of present illness  Akshat Mayorga is a 64 y.o. female with PMH Pulmonary fibrosis, COPD (on 2L home O2), G1DD, Dyslipidemia, Paroxysmal A fib, CAD, AVR (2/2 AS), stress incontinence, HTN, Depression who presented with confusion and weakness. Pt family state that 2 days had a GLF while in the bathroom. Pt was noted to have fallen forward. She was found conscious on the floor approx 30 min after, weak on the ground and unable to move. According to pt she does not recall event prior to fall. Taken to PCP on  because pt was confused, speaking at a slower speed, and new shuffling gait. Denies facial drooping, unilateral weakness, change in personality, dysuria, or pain from fall. PCP referred pt to ED for imaging of the head. Per daughter, pt's intake has decreased in last 3 days. Lost approx 4 lbs. Unsure of water intake. Pt lives with son and daughter in law. Pt is taking Xarelto for A-Fib. In the ED, VSS. Unchanged EKG. Trop 0.10, consistent with baseline. CBC WNL. BUN/Cr slightly elevated. CT head showed no acute process. Pt improved with 1L IVF, but was noted to be light-headed and required assistance to stand. Family concerned about the dramatic confusion that pt was showing. Admitted for AMS.      Medications   albuterol (PROVENTIL VENTOLIN) nebulizer solution 2.5 mg (not administered)   arformoterol (BROVANA) neb solution 15 mcg (not administered)   aspirin delayed-release tablet 81 mg (not administered)   atorvastatin (LIPITOR) tablet 20 mg (not administered)   dilTIAZem CD (CARDIZEM CD) capsule 240 mg (not administered)   digoxin (LANOXIN) tablet 0.125 mg (not administered)   rivaroxaban (XARELTO) tablet 15 mg (not administered)   sodium chloride (NS) flush 5-10 mL (not administered)   sodium chloride (NS) flush 5-10 mL (not administered)   0.9% sodium chloride infusion (not administered)   cefTRIAXone (ROCEPHIN) 1 g in 0.9% sodium chloride (MBP/ADV) 50 mL (not administered)   sodium chloride 0.9 % bolus infusion 500 mL (not administered)   ferrous sulfate tablet 325 mg (not administered)   umeclidinium (INCRUSE ELLIPTA) 62.5 mcg/actuation (not administered)   pirfenidone (ESBRIET) capsule 801 mg (not administered)   sodium chloride 0.9 % bolus infusion 500 mL (0 mL IntraVENous IV Completed 6/7/17 1905)   sodium chloride 0.9 % bolus infusion 500 mL (500 mL IntraVENous New Bag 6/7/17 2125)       Past Medical History:   Diagnosis Date    Aortic stenosis     severe, s/p AVR March 2012 - presented with HF, EF 30%    Asthma 5/10/2012    Atrial fibrillation ([address] 5/10/2012    Bacterial pneumonia, unspecified     CAD (coronary artery disease)     cor calcifications (LAD) by CT     Chronic diastolic heart failure ([address] 5/29/2012    Depression     Dyslipidemia     Female stress incontinence     Hyperlipidemia 9/17/2010    Hypertension     Osteoarthritis     Paroxysmal atrial fibrillation ([address]     post op AVR    Pulmonary fibrosis ([address]     S/P AVR 11/6/2016    S/P AVR (aortic valve replacement) 3/2012    bovine ([facility])    Sleep apnea     T2DM (type 2 diabetes mellitus) ([address] 1/14/2011      Past Surgical History:   Procedure Laterality Date    CARDIAC SURG PROCEDURE UNLIST      ECHO 2D ADULT  3/2012    EF 30%, mod LVH, EF 30-35%, PA 50 mmHg    HX CATARACT REMOVAL      bilateral     Prior to Admission medications    Medication Sig Start Date End Date Taking? Authorizing Provider   albuterol (PROVENTIL HFA, VENTOLIN HFA, PROAIR HFA) 90 mcg/actuation inhaler Take 1 Puff by inhalation three (3) times daily as needed for Wheezing.    Yes Historical Provider   aspirin delayed-release 81 mg tablet Take 81 mg by mouth daily. Yes Historical Provider   potassium 99 mg tablet Take 99 mg by mouth two (2) times a day. Yes Historical Provider   predniSONE (DELTASONE) 5 mg tablet Take 10 mg by mouth daily. Yes Historical Provider   digoxin (LANOXIN) 0.125 mg tablet Take 1 Tab by mouth nightly. 5/12/17  Yes Jeremi Villa MD   dilTIAZem CD (CARDIZEM CD) 240 mg ER capsule Take 1 Cap by mouth nightly. 5/12/17  Yes Jeremi Villa MD   furosemide (LASIX) 20 mg tablet 2 tabs in AM and pm 5/12/17  Yes Jeremi Villa MD   metFORMIN (GLUCOPHAGE) 500 mg tablet Take 1 Tab by mouth three (3) times daily (with meals). 4/26/17  Yes Mariola Gibbs MD   glucose blood VI test strips (FREESTYLE LITE STRIPS) strip TEST BLOOD SUGAR TWO TIMES DAILY E11.9 1/3/17  Yes Mariola Gibbs MD   rivaroxaban (XARELTO) 15 mg tab tablet Take 1 Tab by mouth daily  (with dinner). 11/6/16  Yes Jeremi Villa MD   Lancets (FREESTYLE LANCETS) misc TEST BLOOD SUGAR THREE TIMES DAILY E11.9 8/26/16  Yes Valentín Canchola MD   nitrofurantoin (MACRODANTIN) 50 mg capsule Take 1 Cap by mouth nightly. 8/5/16  Yes Mariola Gibbs MD   atorvastatin (LIPITOR) 20 mg tablet Take 1 Tab by mouth nightly. 8/5/16  Yes Mariola Gibbs MD   ferrous sulfate (IRON) 325 mg (65 mg iron) EC tablet Take 325 mg by mouth daily. Yes Historical Provider   tiotropium bromide (SPIRIVA RESPIMAT) 2.5 mcg/actuation mist Take 2 Puffs by inhalation daily. Yes Historical Provider   arformoterol (BROVANA) 15 mcg/2 mL nebu neb solution 15 mcg by Nebulization route two (2) times a day. Yes Historical Provider   albuterol (PROVENTIL VENTOLIN) 2.5 mg /3 mL (0.083 %) nebulizer solution 3 mL by Nebulization route three (3) times daily. 5/13/15  Yes Danny Barnard MD   pirfenidone (ESBRIET) 267 mg cap Take 3 Caps by mouth three (3) times daily.  Indications: PULMONARY FIBROSIS 1/22/15  Yes Danny Barnard MD   [facility] FOLLOW PACKAGE DIRECTIONS 1/19/15  Yes Annetta Mckeon MD   MISCELLANEOUS MEDICAL SUPPLY (OXYGEN TUBING) 2 L by Does Not Apply route. Yes Historical Provider     No Known Allergies   Family History   Problem Relation Age of Onset    Diabetes Mother     Diabetes Father     Hypertension Father       Family history reviewed and non-contributory. Social history  Patient resides    Independently    x  With family care      Assisted living      SNF    Ambulates    Independently    x  With cane       Assisted walker         Alcohol history   x  None     Social     Chronic   Smoking history  x  None     Former smoker     Current smoker     History   Smoking Status    Never Smoker   Smokeless Tobacco    Never Used       Code status  x  Full code     DNR/DNI        Code status discussed with the patient/caregivers. Full Code    Review of systems  Constitutional: Positive for appetite change, fatigue. Negative for fever. HENT: Negative for facial swelling. Eyes: Negative for visual disturbance. Respiratory: Negative for cough, chest tightness and shortness of breath. Cardiovascular: Negative for chest pain. Gastrointestinal: Negative for abdominal pain and nausea. Genitourinary: Negative for dysuria. Musculoskeletal: Negative for arthralgias. Skin: Negative for rash. Neurological: Positive for dizziness and light-headedness. Hematological: Negative for adenopathy. Psychiatric/Behavioral: Positive for confusion. Negative for suicidal ideas. Physical Examination   Visit Vitals    /47 (BP 1 Location: Right arm, BP Patient Position: Standing)    Pulse (!) 106    Temp 97.4 °F (36.3 °C)    Resp 18    Ht 5' 2\" (1.575 m)    Wt 131 lb 9.6 oz (59.7 kg)    SpO2 100%    BMI 24.07 kg/m2      O2 Flow Rate (L/min): 4 l/min   O2 Device: Room air    General: No acute distress. Alert. Cooperative. Head: Normocephalic. Atraumatic. Eyes:  Conjunctiva pink. Sclera white. PERRL. Ears:  Ear canals patent. TM non-erythematous. Nose:  Septum midline. Mucosa pink. No drainage. Throat: Mucosa pink. DRY mucous membranes. No tonsillar exudates or erythema. Palate movement equal bilaterally. Neck: Supple. Normal ROM. No stiffness. Respiratory: Speaking full sentences. No respiratory distress. No wheezing or rales. Cardiovascular: Regular rate. 2/6 Systolic murmur. Normal S1,S2. No r/g. Pulses 2+ throughout. GI: + bowel sounds. Nontender. No rebound tenderness or guarding. Nondistended. Extremities: No edema. No palpable cord. No tenderness. Musculoskeletal: Full ROM in all extremities. Neuro: Alert and oriented. No focal deficits. Sensation intact in all extremities. Skin: Warm and dry. Bruising that are old on extremities. Redness on the nose 2/2 to fall.         Data Review    24 Hour Results:  Recent Results (from the past 24 hour(s))   AMB POC URINALYSIS DIP STICK AUTO W/O MICRO    Collection Time: 06/07/17 12:59 PM   Result Value Ref Range    Color (UA POC) Yellow     Clarity (UA POC) Clear     Glucose (UA POC) Negative Negative    Bilirubin (UA POC) 1+ Negative    Ketones (UA POC) Trace Negative    Specific gravity (UA POC) 1.015 1.001 - 1.035    Blood (UA POC) Trace Negative    pH (UA POC) 5.5 4.6 - 8.0    Protein (UA POC) 1+ Negative mg/dL    Urobilinogen (UA POC) 0.2 mg/dL 0.2 - 1    Nitrites (UA POC) Negative Negative    Leukocyte esterase (UA POC) 1+ Negative   EKG, 12 LEAD, INITIAL    Collection Time: 06/07/17  4:26 PM   Result Value Ref Range    Ventricular Rate 91 BPM    Atrial Rate 91 BPM    P-R Interval 208 ms    QRS Duration 160 ms    Q-T Interval 390 ms    QTC Calculation (Bezet) 479 ms    Calculated P Axis 56 degrees    Calculated R Axis -70 degrees    Calculated T Axis 42 degrees    Diagnosis       Normal sinus rhythm  Left axis deviation  Right bundle branch block  Left ventricular hypertrophy with repolarization abnormality  Inferior infarct (cited on or before 27-JAN-2013)  Anterior infarct (cited on or before 27-JAN-2013)  Abnormal ECG  When compared with ECG of 09-DEC-2015 13:00,  premature atrial complexes are no longer present  Questionable change in initial forces of Lateral leads     CBC WITH AUTOMATED DIFF    Collection Time: 06/07/17  4:33 PM   Result Value Ref Range    WBC 7.9 3.6 - 11.0 K/uL    RBC 3.85 3.80 - 5.20 M/uL    HGB 11.9 11.5 - 16.0 g/dL    HCT 36.6 35.0 - 47.0 %    MCV 95.1 80.0 - 99.0 FL    MCH 30.9 26.0 - 34.0 PG    MCHC 32.5 30.0 - 36.5 g/dL    RDW 13.7 11.5 - 14.5 %    PLATELET 812 707 - 556 K/uL    NEUTROPHILS 83 (H) 32 - 75 %    LYMPHOCYTES 8 (L) 12 - 49 %    MONOCYTES 9 5 - 13 %    EOSINOPHILS 0 0 - 7 %    BASOPHILS 0 0 - 1 %    ABS. NEUTROPHILS 6.6 1.8 - 8.0 K/UL    ABS. LYMPHOCYTES 0.6 (L) 0.8 - 3.5 K/UL    ABS. MONOCYTES 0.7 0.0 - 1.0 K/UL    ABS. EOSINOPHILS 0.0 0.0 - 0.4 K/UL    ABS. BASOPHILS 0.0 0.0 - 0.1 K/UL    RBC COMMENTS NORMOCYTIC, NORMOCHROMIC     METABOLIC PANEL, COMPREHENSIVE    Collection Time: 06/07/17  4:33 PM   Result Value Ref Range    Sodium 136 136 - 145 mmol/L    Potassium 4.0 3.5 - 5.1 mmol/L    Chloride 94 (L) 97 - 108 mmol/L    CO2 34 (H) 21 - 32 mmol/L    Anion gap 8 5 - 15 mmol/L    Glucose 192 (H) 65 - 100 mg/dL    BUN 34 (H) 6 - 20 MG/DL    Creatinine 1.30 (H) 0.55 - 1.02 MG/DL    BUN/Creatinine ratio 26 (H) 12 - 20      GFR est AA 48 (L) >60 ml/min/1.73m2    GFR est non-AA 40 (L) >60 ml/min/1.73m2    Calcium 8.7 8.5 - 10.1 MG/DL    Bilirubin, total 0.3 0.2 - 1.0 MG/DL    ALT (SGPT) 54 12 - 78 U/L    AST (SGOT) 40 (H) 15 - 37 U/L    Alk.  phosphatase 54 45 - 117 U/L    Protein, total 7.1 6.4 - 8.2 g/dL    Albumin 3.3 (L) 3.5 - 5.0 g/dL    Globulin 3.8 2.0 - 4.0 g/dL    A-G Ratio 0.9 (L) 1.1 - 2.2     TROPONIN I    Collection Time: 06/07/17  4:33 PM   Result Value Ref Range    Troponin-I, Qt. 0.10 (H) <0.05 ng/mL   CK W/ CKMB & INDEX    Collection Time: 06/07/17  4:33 PM   Result Value Ref Range    CK 61 26 - 192 U/L    CK - MB <1.0 <3.6 NG/ML    CK-MB Index Cannot be calulated 0 - 2.5     PROTHROMBIN TIME + INR    Collection Time: 06/07/17  4:33 PM   Result Value Ref Range    INR 1.0 0.9 - 1.1      Prothrombin time 9.7 9.0 - 11.1 sec   PTT    Collection Time: 06/07/17  4:33 PM   Result Value Ref Range    aPTT 29.3 22.1 - 32.5 sec    aPTT, therapeutic range     58.0 - 77.0 SECS   URINALYSIS W/MICROSCOPIC    Collection Time: 06/07/17  8:46 PM   Result Value Ref Range    Color YELLOW/STRAW      Appearance CLOUDY (A) CLEAR      Specific gravity 1.014 1.003 - 1.030      pH (UA) 5.5 5.0 - 8.0      Protein NEGATIVE  NEG mg/dL    Glucose NEGATIVE  NEG mg/dL    Ketone NEGATIVE  NEG mg/dL    Bilirubin NEGATIVE  NEG      Blood NEGATIVE  NEG      Urobilinogen 0.2 0.2 - 1.0 EU/dL    Nitrites POSITIVE (A) NEG      Leukocyte Esterase NEGATIVE  NEG      WBC 0-4 0 - 4 /hpf    RBC 0-5 0 - 5 /hpf    Epithelial cells FEW FEW /lpf    Bacteria 3+ (A) NEG /hpf    Hyaline cast 0-2 0 - 5 /lpf   GLUCOSE, POC    Collection Time: 06/07/17 11:03 PM   Result Value Ref Range    Glucose (POC) 171 (H) 65 - 100 mg/dL    Performed by Emile Sosa (PCT)      Recent Labs      06/07/17   1633   WBC  7.9   HGB  11.9   HCT  36.6   PLT  158     Recent Labs      06/07/17   1633   NA  136   K  4.0   CL  94*   CO2  34*   GLU  192*   BUN  34*   CREA  1.30*   CA  8.7   ALB  3.3*   SGOT  40*   ALT  54   INR  1.0       Imaging  Ct head  FINDINGS:     The ventricles and cortical sulci are within normal limits.     No evidence for acute intracranial hemorrhage, midline shift, or mass effect.   Moderate bilateral subcortical and periventricular areas of patchy low  attenuation is nonspecific but likely related to changes of chronic small vessel  ischemic disease.     The basal cisterns are patent.     The visualized paranasal sinuses and mastoid air cells are clear.     No calvarial abnormality.     IMPRESSION  IMPRESSION:     No evidence for acute intracranial abnormality    cxray  FINDINGS: A portable AP radiograph of the chest was obtained at 20:07. hours. There are median sternotomy wires and aortic valve prosthesis redemonstrated. The lungs redemonstrate a stable pattern of chronic interstitial disease with no  acute infiltrate or mass evident. The cardiac and mediastinal contours and  pulmonary vascularity are normal. The chest wall structures and visualized  upper abdomen appear stable with no acute interval change.      IMPRESSION  IMPRESSION: No acute findings with chronic changes as above.     Assessment and Plan   Sonal Pedraza is a 64 y.o. female with PMH of HTN, DM, COPD, pulmonary fibrosis, paroxysmal Afib, and aortic stenosis s/p AVR who presents with AMS. AMS: 2/2 to dehydration vs possible UTI. Normal neuro exam. UA positive for nitrites and 3+ bacteria, asymptomatic. Prior urine cluture in 1/2017 grew E. Coli susceptible to Ceftriaxone  - Will admit to remote telemetry   - 500 cc bolus. Pt receive 1 L bolus in ED, which has helped pt improve alertness according to family.  - Start Ceftriaxone 1 g daily as prophylactic treatment until Ucx results are available. - f/u urine culture and sensitivities   -No MRI head at this time as ABCD2 score is 2 (low risk of stroke event).    Elevated troponin:0.10 on admission, pt asymptomatic with stable EKG that did not have any significant change from 11/2016, may be elevated from chronic pulmonary disease  - trend troponins   - Consider cardiology consult if continuing to be elevated    At risk for VERNON: Cr POA was 1.3, baseline . 9  -IVF     Hx of aortic valve replacement(2012 2/2 AS) and Afib: rate controlled.  Followed by cardiologist Dr. Radha Mckeon   -Continue diltiazem 240mg, digoxin 0.125mg, and Xarelto     Lower extremity edema/Diastolic HF: last echo 3/7877 showed G1DD with EF 55%   -Hold lasix as at risk for VERNON    HTN: stable   - continue home Cardizem and digoxin     Hx of UTI: on nitrofurantoin to prevent infections  - hold home nitrofurantoin     HLD: stable  -Continue liptior 20mg     DM type 2: last HbA1c 6.6 (4/2017)  - Hold home metformin  - poc glucose ACHS  - SSI    - Pulmonary fibrosis:   - continue home pirfenidore and prednisone      FEN/GI - Cardiac diet, pureed  Activity - bed rest   DVT prophylaxis - Xarelto  GI prophylaxis - none  Disposition - TBD     CODE STATUS:  FULL       Signed by:  Jonny Marshall DO, - Family Medicine Resident    June 7, 2017 at 11:55 PM

## 2017-06-08 NOTE — PROGRESS NOTES
Met with pt and spoke with pt's daughter Satya Avalos (c: [phone]) for initial assessment and d/c planning. Pt resides in a mobile home with two of her sons. There is always someone at home with pt. Pt's other son, Ava Perez and his [address], live nearby and assist pt by preparing her meals. Pt's daughter also assists pt with medications, and a family member takes pt to appointments. Ms. Jacob Talley noted that since pt's fall, pt has been too weak and is unable to bathe or dress herself. She has been to rehab in the past at [facility] and [facility]. Pt has Medicare and is on observation status. Pt was provided a copy of the Patient Guide to Observation and Outpatient Care and Medicare Outpatient Observation Notice. Pt's daughter was also informed of pt's observation status. Home DME includes a cane, rollator, and a raised toilet seat. She also has O2 through [facility].  Pt has Rx coverage. Pres[facility] [address]. PCP is Dr. Cr Ching. Nurse navigator Irasema Mitchell was notified of pt's hospitalization. Jackie Ugarte LCSW    Care Management Interventions  PCP Verified by CM: Yes (Dr. Cr Ching)  Last Visit to PCP: 06/07/17  Palliative Care Consult (Criteria: CHF and RRAT>21): No  Discharge Durable Medical Equipment: No  Physical Therapy Consult: Yes  Occupational Therapy Consult: No  Speech Therapy Consult: No  Current Support Network: Relative's Home  Confirm Follow Up Transport: Family  Plan discussed with Pt/Family/Caregiver:  Yes

## 2017-06-08 NOTE — PROGRESS NOTES
BSBAR, ED Summary, Intake/Output, Recent Results and Cardiac Rhythm bedside shift change report given to Vin Lou RN by Mulu Gonzales RN. Report included the following informatiNSR with JYOTHI, ED Summary, Intake/Output, Recent Results and Cardiac Rhythm NSR with BBB.

## 2017-06-08 NOTE — CONSULTS
Georgette Vergara MD [facility]  Office [phone]      Date of  Admission: 6/7/2017  3:55 PM       Assessment/Plan:   1. Low level troponin elevation . 09 - .15 No reports of chest pain. No change in EKG from previous. Not ACS. Likely elevated from VERNON, COPD, PAF. Woould not ck ECHo as this was done in our office about a month ago and was wnl. , No WMA. Would not trend any further troponins. 2.  PAF: would cont anticoagulation, ck dig level in am . Cont diltiazem   3. Hx chronic diastolic CHF: would stop IV fluids , resume lasix at 40 mg daily for now likely resuming her previous home dose on discharge. Ck pBNP. 4. CAD: cont asa. Statin. No BB due to COPD. 5. AS : s/p bioprosthetic AVR: recent ECHO ok.   6.  HTN:  7  COPD/pulmonary fibrosis: chronic class 3 dyspnea. 8  Falls/deconditioning: PT/OT, profoundly weak on exam.  May need rehab/SNF at discharge. Thank you for allowing us to participate in Jennifer Daigle care. Please call for any questions. Will see prn. Cardiology Attending:    Patient personally seen and examined. All the elements of history and examination were personally performed. Assessment and plan was discussed and agree as written above. Improvement in [facility] after supportive care and antibiotics. Closely followed by Dr. Marlon Spence as OP. Jh Funk MD, [facility]         Consult requested by Trena Carrizales DO for dizziness  dizziness    Subjective:  Jennifer Daigle is a 64 y.o. female with PMH Pulmonary fibrosis, COPD (on 2L home O2), G1DD, Dyslipidemia, Paroxysmal A fib, CAD, AVR (2/2 AS), stress incontinence, HTN, Depression who presented with confusion and weakness. 2 days prior to admission she had a GLF while in the bathroom. Pt was noted to have fallen forward. She was found conscious on the floor approx 30 min after, weak on the ground and unable to move.  According to pt she does not recall event prior to fall. Taken to PCP on 6/7 because pt was confused, speaking at a slower speed, and new shuffling gait. Denies facial drooping, unilateral weakness, change in personality, dysuria, or pain from fall. PCP referred pt to ED for imaging of the head.      Per daughter, pt's intake has decreased in last 3 days. Lost approx 4 lbs. Unsure of water intake. Pt lives with son and daughter in law. Pt is taking Xarelto for A-Fib. Found to have UTI on admission. The patient denies chest pain, dependent edema, diaphoresis, IGNACIO, orthopnea, palpitations, PND, shortness of breath, claudication or syncope. No bleeding. LABS:   Troponin:   As above   CXR:  The lungs redemonstrate a stable pattern of chronic interstitial disease with no  acute infiltrate or mass evident. Cardiographics:   Telemetry: SR   ECG:  SR     Cardiac Testing/ Procedures:   S/P AVR (aortic valve replacement) 3/2012 - bovine ([facility])   Echo 2d adult 3/2012 - EF 30%, mod LVH, EF 30-35%, PA 50 mmHg   Echo May 2013 - EF 50%, inferior HK, normal AVR, moderate MR, PA 50  Echo 7/25/14 - EF 50-55%, AVR with moderately increased velocities, PA systolic 41 mmHg  Echo 9/05/07 - EF 55%, normal AVR function. Mildly elevated PA systolic pressure. ECHO 5/ 7/17 EF 55-60% No WMA, Grade 1 DD . S/p tissue AVR peak velocity 3.9m/sec, mean gradient 36          SOCIAL HX: lives with family they assist with meals, meds, appts.         Patient Active Problem List    Diagnosis Date Noted    Dehydration 06/07/2017    S/P AVR 11/06/2016    DM II (diabetes mellitus, type II), controlled ([address] 12/08/2015    RBBB 10/16/2015    Frozen shoulder 06/25/2014    Dyslipidemia 03/21/2014    Chronic diastolic heart failure ([address] 05/29/2012    Pulmonary fibrosis ([address] 05/15/2012    Atrial fibrillation ([address] 05/10/2012    COPD (chronic obstructive pulmonary disease) ([address] 03/16/2012    History of noncompliance with medical treatment 09/13/2011    Arthritis 01/14/2011    Hypertension     Depression     Female stress incontinence     CAD (coronary artery disease)     Aortic stenosis     Sleep apnea       Past Medical History:   Diagnosis Date    Aortic stenosis     severe, s/p AVR March 2012 - presented with HF, EF 30%    Asthma 5/10/2012    Atrial fibrillation ([facility] 5/10/2012    Bacterial pneumonia, unspecified     CAD (coronary artery disease)     cor calcifications (LAD) by CT     Chronic diastolic heart failure ([facility] 5/29/2012    Depression     Dyslipidemia     Female stress incontinence     Hyperlipidemia 9/17/2010    Hypertension     Osteoarthritis     Paroxysmal atrial fibrillation (HCC)     post op AVR    Pulmonary fibrosis ([facility]     S/P AVR 11/6/2016    S/P AVR (aortic valve replacement) 3/2012    bovine ([facility])    Sleep apnea     T2DM (type 2 diabetes mellitus) ([facility] 1/14/2011      Past Surgical History:   Procedure Laterality Date    CARDIAC SURG PROCEDURE UNLIST      ECHO 2D ADULT  3/2012    EF 30%, mod LVH, EF 30-35%, PA 50 mmHg    HX CATARACT REMOVAL      bilateral     No Known Allergies   Current Facility-Administered Medications   Medication Dose Route Frequency    insulin lispro (HUMALOG) injection   SubCUTAneous AC&HS    glucose chewable tablet 16 g  4 Tab Oral PRN    dextrose (D50W) injection syrg 12.5-25 g  12.5-25 g IntraVENous PRN    glucagon (GLUCAGEN) injection 1 mg  1 mg IntraMUSCular PRN    predniSONE (DELTASONE) tablet 10 mg  10 mg Oral DAILY    albuterol (PROVENTIL VENTOLIN) nebulizer solution 2.5 mg  2.5 mg Nebulization TID RT    arformoterol (BROVANA) neb solution 15 mcg  15 mcg Nebulization BID RT    aspirin delayed-release tablet 81 mg  81 mg Oral DAILY    atorvastatin (LIPITOR) tablet 20 mg  20 mg Oral QHS    dilTIAZem CD (CARDIZEM CD) capsule 240 mg  240 mg Oral QHS    digoxin (LANOXIN) tablet 0.125 mg  0.125 mg Oral QHS    rivaroxaban (XARELTO) tablet 15 mg  15 mg Oral DAILY WITH DINNER    sodium chloride (NS) flush 5-10 mL  5-10 mL IntraVENous Q8H    sodium chloride (NS) flush 5-10 mL  5-10 mL IntraVENous PRN    0.9% sodium chloride infusion  75 mL/hr IntraVENous CONTINUOUS    cefTRIAXone (ROCEPHIN) 1 g in 0.9% sodium chloride (MBP/ADV) 50 mL  1 g IntraVENous Q24H    ferrous sulfate tablet 325 mg  1 Tab Oral DAILY WITH BREAKFAST    umeclidinium (INCRUSE ELLIPTA) 62.5 mcg/actuation  1 Puff Inhalation DAILY    pirfenidone (ESBRIET) capsule 801 mg  801 mg Oral TID WITH MEALS          Review of Symptoms:  Constitutional: positive for fatigue  ENT: negative   Respiratory: positive for cough or dyspnea on exertion  Gastrointestinal: negative for abdominal pain  Genitourinary: No dysuria, hematuria, frequency   Musculoskeletal:positive for muscle weakness  Neurological: negative for dizziness  Other systems reviewed and negative except as above. Social History     Social History    Marital status:      Spouse name: N/A    Number of children: N/A    Years of education: N/A     Social History Main Topics    Smoking status: Never Smoker    Smokeless tobacco: Never Used    Alcohol use No    Drug use: No    Sexual activity: Not Asked     Other Topics Concern    None     Social History Narrative     Family History   Problem Relation Age of Onset    Diabetes Mother     Diabetes Father     Hypertension Father          Physical Exam    Visit Vitals    BP 98/78 (BP 1 Location: Left arm, BP Patient Position: At rest)    Pulse 80    Temp 98.7 °F (37.1 °C)    Resp 19    Ht 5' 2\" (1.575 m)    Wt 131 lb 9.8 oz (59.7 kg)    SpO2 90%    BMI 24.07 kg/m2     General: awake, alert, and oriented. MAEx4. No acute distress   HEENT Exam:     Normocephalic, atraumatic. Sclera anicteric . Neck: supple, no lymphadenopathy  Lung Exam:     Chronic dyspnea. Respirations labored. O2 @ 90% 2 liters   Sat:  . Lungs:  Exp wheezing, np cough.       Heart Exam:       PMI laterally displaced, Rate: Rhythm: regular,   2/6 systolic murmur. No JVD, brisk carotid upstroke, no carotid bruits, No HJR      Abdomen Exam:      soft, nontender. + Bowel sounds. No palpable masses; organomegaly. No pulsatile mass. : voiding      Extremities Exam:      Atraumatic. No  edemaor erythema. Vascular Exam:      radial, brachial, dorsalis pedis, posterior tibial, are equal and strong bilaterally     Neuro exam:  No focal deficits   Psy Exam: Normal affect, does not appear anxious or agitated        Recent Results (from the past 12 hour(s))   GLUCOSE, POC    Collection Time: 06/07/17 11:03 PM   Result Value Ref Range    Glucose (POC) 171 (H) 65 - 100 mg/dL    Performed by Keily Tam (PCT)    TROPONIN I    Collection Time: 06/08/17  1:10 AM   Result Value Ref Range    Troponin-I, Qt. 0.09 (H) <8.60 ng/mL   METABOLIC PANEL, BASIC    Collection Time: 06/08/17  1:43 AM   Result Value Ref Range    Sodium 137 136 - 145 mmol/L    Potassium 3.8 3.5 - 5.1 mmol/L    Chloride 97 97 - 108 mmol/L    CO2 33 (H) 21 - 32 mmol/L    Anion gap 7 5 - 15 mmol/L    Glucose 131 (H) 65 - 100 mg/dL    BUN 28 (H) 6 - 20 MG/DL    Creatinine 0.93 0.55 - 1.02 MG/DL    BUN/Creatinine ratio 30 (H) 12 - 20      GFR est AA >60 >60 ml/min/1.73m2    GFR est non-AA 58 (L) >60 ml/min/1.73m2    Calcium 8.3 (L) 8.5 - 10.1 MG/DL   CBC WITH AUTOMATED DIFF    Collection Time: 06/08/17  1:43 AM   Result Value Ref Range    WBC 5.4 3.6 - 11.0 K/uL    RBC 3.42 (L) 3.80 - 5.20 M/uL    HGB 10.7 (L) 11.5 - 16.0 g/dL    HCT 32.1 (L) 35.0 - 47.0 %    MCV 93.9 80.0 - 99.0 FL    MCH 31.3 26.0 - 34.0 PG    MCHC 33.3 30.0 - 36.5 g/dL    RDW 13.8 11.5 - 14.5 %    PLATELET 969 (L) 582 - 400 K/uL    NEUTROPHILS 79 (H) 32 - 75 %    LYMPHOCYTES 12 12 - 49 %    MONOCYTES 8 5 - 13 %    EOSINOPHILS 1 0 - 7 %    BASOPHILS 0 0 - 1 %    ABS. NEUTROPHILS 4.2 1.8 - 8.0 K/UL    ABS. LYMPHOCYTES 0.7 (L) 0.8 - 3.5 K/UL    ABS. MONOCYTES 0.4 0.0 - 1.0 K/UL    ABS.  EOSINOPHILS 0.0 0.0 - 0.4 K/UL ABS. BASOPHILS 0.0 0.0 - 0.1 K/UL   TROPONIN I    Collection Time: 06/08/17  7:05 AM   Result Value Ref Range    Troponin-I, Qt. 0.15 (H) <5.24 ng/mL   METABOLIC PANEL, BASIC    Collection Time: 06/08/17  7:05 AM   Result Value Ref Range    Sodium 140 136 - 145 mmol/L    Potassium 3.5 3.5 - 5.1 mmol/L    Chloride 101 97 - 108 mmol/L    CO2 33 (H) 21 - 32 mmol/L    Anion gap 6 5 - 15 mmol/L    Glucose 135 (H) 65 - 100 mg/dL    BUN 23 (H) 6 - 20 MG/DL    Creatinine 0.83 0.55 - 1.02 MG/DL    BUN/Creatinine ratio 28 (H) 12 - 20      GFR est AA >60 >60 ml/min/1.73m2    GFR est non-AA >60 >60 ml/min/1.73m2    Calcium 7.4 (L) 8.5 - 10.1 MG/DL   GLUCOSE, POC    Collection Time: 06/08/17  9:48 AM   Result Value Ref Range    Glucose (POC) 151 (H) 65 - 100 mg/dL    Performed by Richa Franklin [facility]

## 2017-06-08 NOTE — PROGRESS NOTES
[address] [facility]   Senior Resident Admission Note    CC: AMS    HPI:  Rm Fox is a 64 y.o. female who presents to the ER complaining of AMS. Dizziness, confused, urinary urgency per patient for 1 day. After receiving IV NS in ED, symptoms improved. No slurred speech or weakness per patient and daughter. VSS, afebrile. Dried MM. Elevated BUN/Cr  UA in ED +nitrite, but no LE. 3+ bacteria, but no WBC. However, urinalysis done at the clinic at the same day shows positive LE. Also Hx of multiple UTI and taking macrobid daily for prophylaxis. A/P: A 63 yo female admitted for AMS    1. AMS: likely due to dehydration and possible UTI. Symptoms already improved after IV NS bolus 500cc x 2. Currently Alert and oriented x 4. Normal neuro exam. ABCD2 score of 2, low risk of stroke. Not a typical feature of UTI as no wbc, but prophylactic ABx may change the result. Patient reports urgency too. Will give IV hydration and rocephin. Following up UCx. Neurocheck. I agree with remaining assessment and plan as documented in Dr. Deidra Padilla note. Pt discussed with Dr. Jyoti Rothman.  (on-call attending physician)    Arelis Schreiber MD  Family Medicine Resident

## 2017-06-08 NOTE — PROGRESS NOTES
[address] [facility] RESIDENCY PROGRAM  PROGRESS NOTE     6/8/2017  PCP: Mckenna Duffy MD     Assessment/Plan:   Sloan Franklin is a 64 y.o. female with PMH of HTN, DM, COPD, pulmonary fibrosis, paroxysmal Afib, and aortic stenosis s/p AVR who presents with AMS.    AMS: Improving. Likely 2/2 to dehydration vs possible UTI. Normal neuro exam. UA positive for nitrites and 3+ bacteria, asymptomatic. Prior urine cluture in 1/2017 grew E. Coli susceptible to Ceftriaxone. - IVF @ 75 cc/hr  - Continue Ceftriaxone 1 g daily as prophylactic treatment until Ucx results are available. - f/u urine culture and sensitivities   - No MRI head at this time as ABCD2 score is 2 (low risk of stroke event).     Elevated troponin:0.10 on admission (baseline is 0.9). pt asymptomatic with stable EKG that did not have any significant change from 11/2016. may be elevated from chronic pulmonary disease  - trend troponin: .10--> .9  - Consider cardiology consult if continuing to be elevated     At risk for VERNON: Resolved, baseline . 9    Hx of aortic valve replacement(2012 2/2 AS) and Afib: rate controlled. Followed by cardiologist Dr. Anastasiya Brito   -Continue diltiazem 240mg, digoxin 0.125mg, and Xarelto      Lower extremity edema/Diastolic HF: last echo 9/3928 showed G1DD with EF 55%   -Hold lasix      HTN: stable   - continue home Cardizem and digoxin      Hx of UTI: on nitrofurantoin to prevent infections  - hold home nitrofurantoin      HLD: stable  -Continue liptior 20mg      DMT2: last HbA1c 6.6 (4/2017)  - Hold home metformin  - poc glucose ACHS  - SSI     Pulmonary fibrosis:   - continue home pirfenidore and prednisone       FEN/GI - Cardiac diet, pureed  Activity - bed rest   DVT prophylaxis - Xarelto  GI prophylaxis - none  Disposition - TBD      CODE STATUS:  FULL        Subjective:     Pt was seen and examined at bedside. No acute events overnight. Patient slept. Has no discomfort.       Denies chills, headaches, chest pain, shortness of breath, palpitations, abdominal pain, nausea and vomiting, and LE edema. Tolerating diet. Allergies:  No Known Allergies    Objective:   Physical examination  Visit Vitals    BP 95/55 (BP 1 Location: Left arm, BP Patient Position: At rest)    Pulse 81    Temp 98 °F (36.7 °C)    Resp 18    Ht 5' 2\" (1.575 m)    Wt 131 lb 9.6 oz (59.7 kg)    SpO2 91%    BMI 24.07 kg/m2      Temp (24hrs), Av.9 °F (36.6 °C), Min:97.4 °F (36.3 °C), Max:98.2 °F (36.8 °C)     O2 Flow Rate (L/min): 2 l/min   O2 Device: Nasal cannula      General: No acute distress. Alert. Baseline 2 L NC   Head: Normocephalic. Atraumatic. Eyes:  Conjunctiva pink. Sclera white. PERRL. Ears:  Ear canals patent. TM non-erythematous. Nose:  Septum midline. Mucosa pink. No drainage. Throat: Mucosa pink. Moist mucous membranes. No tonsillar exudates or erythema. Palate movement equal bilaterally. Neck: Supple. Normal ROM. No stiffness. Respiratory: Speaking full sentences. No respiratory distress. No wheezing or rales. Cardiovascular: Regular rate. 2/6 Systolic murmur. Normal S1,S2. No r/g. Pulses 2+ throughout. GI: + bowel sounds. Nontender. No rebound tenderness or guarding. Nondistended. Extremities: No edema. No palpable cord. No tenderness. Musculoskeletal: Full ROM in all extremities. Neuro: Alert and oriented. No focal deficits. Sensation intact in all extremities. Skin: Warm and dry. Bruising that are old on extremities. Redness on the nose 2/2 to fall.               Data Review:     Recent Labs      17   0143  17   1633   WBC  5.4  7.9   HGB  10.7*  11.9   HCT  32.1*  36.6   PLT  147*  158     Recent Labs      17   0143  17   1633   NA  137  136   K  3.8  4.0   CL  97  94*   CO2  33*  34*   GLU  131*  192*   BUN  28*  34*   CREA  0.93  1.30*   CA  8.3*  8.7   ALB   --   3.3*   SGOT   --   40*   ALT   --   54   INR   --   1.0     Medications reviewed  Current Facility-Administered Medications   Medication Dose Route Frequency    insulin lispro (HUMALOG) injection   SubCUTAneous AC&HS    glucose chewable tablet 16 g  4 Tab Oral PRN    dextrose (D50W) injection syrg 12.5-25 g  12.5-25 g IntraVENous PRN    glucagon (GLUCAGEN) injection 1 mg  1 mg IntraMUSCular PRN    predniSONE (DELTASONE) tablet 10 mg  10 mg Oral DAILY    albuterol (PROVENTIL VENTOLIN) nebulizer solution 2.5 mg  2.5 mg Nebulization TID RT    arformoterol (BROVANA) neb solution 15 mcg  15 mcg Nebulization BID RT    aspirin delayed-release tablet 81 mg  81 mg Oral DAILY    atorvastatin (LIPITOR) tablet 20 mg  20 mg Oral QHS    dilTIAZem CD (CARDIZEM CD) capsule 240 mg  240 mg Oral QHS    digoxin (LANOXIN) tablet 0.125 mg  0.125 mg Oral QHS    rivaroxaban (XARELTO) tablet 15 mg  15 mg Oral DAILY WITH DINNER    sodium chloride (NS) flush 5-10 mL  5-10 mL IntraVENous Q8H    sodium chloride (NS) flush 5-10 mL  5-10 mL IntraVENous PRN    0.9% sodium chloride infusion  75 mL/hr IntraVENous CONTINUOUS    cefTRIAXone (ROCEPHIN) 1 g in 0.9% sodium chloride (MBP/ADV) 50 mL  1 g IntraVENous Q24H    ferrous sulfate tablet 325 mg  1 Tab Oral DAILY WITH BREAKFAST    umeclidinium (INCRUSE ELLIPTA) 62.5 mcg/actuation  1 Puff Inhalation DAILY    pirfenidone (ESBRIET) capsule 801 mg  801 mg Oral TID WITH MEALS       Imaging:  Ct head  FINDINGS:      The ventricles and cortical sulci are within normal limits.      No evidence for acute intracranial hemorrhage, midline shift, or mass effect.   Moderate bilateral subcortical and periventricular areas of patchy low  attenuation is nonspecific but likely related to changes of chronic small vessel  ischemic disease.      The basal cisterns are patent.      The visualized paranasal sinuses and mastoid air cells are clear.      No calvarial abnormality.      IMPRESSION  IMPRESSION:      No evidence for acute intracranial abnormality     cxray  FINDINGS: A portable AP radiograph of the chest was obtained at 20:07. hours. There are median sternotomy wires and aortic valve prosthesis redemonstrated. The lungs redemonstrate a stable pattern of chronic interstitial disease with no  acute infiltrate or mass evident. The cardiac and mediastinal contours and  pulmonary vascularity are normal. The chest wall structures and visualized  upper abdomen appear stable with no acute interval change.       IMPRESSION  IMPRESSION: No acute findings with chronic changes as above. Signed:    Adam Hernandez DO   Resident, Family Medicine

## 2017-06-08 NOTE — ED NOTES
TRANSFER - OUT REPORT:    Verbal report given to Sally GATES(name) on Maria Esther Lantigua  being transferred to [facility](unit) for routine progression of care       Report consisted of patients Situation, Background, Assessment and   Recommendations(SBAR). Information from the following report(s) SBAR, Kardex and Recent Results was reviewed with the receiving nurse. Lines:       Opportunity for questions and clarification was provided.       Patient transported with:   [employer]

## 2017-06-08 NOTE — PROGRESS NOTES
Nutrition Assessment:    RECOMMENDATIONS/INTERVENTION(S):   SLP consult -> Recommend upgrading diet texture to Mechanical Soft if no swallowing difficulties detected    Liberalize diet to BRENDA to promote PO  Start ONS trial -> Glucerna w/ meals    Monitor plan of care, PO, ONS tolerance, BG, renal labs, wt  ASSESSMENT:   6/8:  Pt seen for dysphagia diet. 64 yof admitted for dizziness, AMS. Pmhx includes COPD, DM, HTN, pulmonary fibrosis, Dyslipidemia, Paroxysmal A fib, CAD, AVR (2/2 AS). Pt started on Pureed diet - no note of SLP c/s. Per family, Mechanical Soft diet at home. Noted poor PO & wt loss PTA. Visited pt this morning. Eating breakfast, slowly. Confirmed poor appetite. Agreeable to ONS trial.  Denied swallowing problem. Likes soft foods 2/2 edentulism. Labs/meds reviewed. -171; A1C 6.6 (4/26/17) - noted chronic prednisone usage for pulmonary fibrosis. BUN trending down, Cr WDL. NS IVF. Skin: thin, trace edema BLE. Observed muscle wasting to clavicles. Meets Criteria for Moderate Chronic Malnutrition as evidenced by:   [x] Mild muscle wasting, loss of subcutaneous fat   [x] Nutritional intake <75% of recommended intake for >1 month   [x] Weight loss of  5% in 1 month, 7.5% in 3 months, 10% in 6 months, or 20% in 1 year   [x] Mild edema      SUBJECTIVE/OBJECTIVE:     Diet Order: Puree, Cardiac  % Eaten:  No data found.     Pertinent Medications: [x] Reviewed - statin, prednisone, fe sulf    Chemistries:  Lab Results   Component Value Date/Time    Sodium 140 06/08/2017 07:05 AM    Potassium 3.5 06/08/2017 07:05 AM    Chloride 101 06/08/2017 07:05 AM    CO2 33 06/08/2017 07:05 AM    Anion gap 6 06/08/2017 07:05 AM    Glucose 135 06/08/2017 07:05 AM    BUN 23 06/08/2017 07:05 AM    Creatinine 0.83 06/08/2017 07:05 AM    BUN/Creatinine ratio 28 06/08/2017 07:05 AM    GFR est AA >60 06/08/2017 07:05 AM    GFR est non-AA >60 06/08/2017 07:05 AM    Calcium 7.4 06/08/2017 07:05 AM AST (SGOT) 40 06/07/2017 04:33 PM    Alk. phosphatase 54 06/07/2017 04:33 PM    Protein, total 7.1 06/07/2017 04:33 PM    Albumin 3.3 06/07/2017 04:33 PM    Globulin 3.8 06/07/2017 04:33 PM    A-G Ratio 0.9 06/07/2017 04:33 PM    ALT (SGPT) 54 06/07/2017 04:33 PM      Anthropometrics: Height: 5' 2\" (157.5 cm) Weight: 59.7 kg (131 lb 9.8 oz)    IBW (%IBW): 55 kg (121 lb 4.1 oz) (108.55 %) UBW (%UBW):   (  %)    BMI: Body mass index is 24.07 kg/(m^2). This BMI is indicative of:   [] Underweight    [x] Normal - low end per age    [] Overweight    []  Obesity    []  Extreme Obesity (BMI>40)  Estimated Nutrition Needs (Based on): 1589 Kcals/day (RMR (1030) x 1.3 AF + 250) , 66 g (-77 (1.2-1.4 g/kg x IBW)) Protein  Carbohydrate:  At Least 130 g/day  Fluids: 1600 mL/day    Last BM: 6/7, abd WDL   []Active     []Hyperactive  []Hypoactive       [] Absent   BS - not noted  Skin:    [] Intact   [] Incision  [] Breakdown   [] DTI   [] Tears/Excoriation/Abrasion  [x]Edema - trace BLE [x] Other: thin     Wt Readings from Last 30 Encounters:   06/08/17 59.7 kg (131 lb 9.8 oz)   06/07/17 59.4 kg (131 lb)   05/12/17 62.5 kg (137 lb 12.8 oz)   04/26/17 62.1 kg (137 lb)   01/03/17 61.7 kg (136 lb)   11/22/16 64.4 kg (142 lb)   11/04/16 66.2 kg (146 lb)   10/25/16 66.4 kg (146 lb 6.4 oz)   08/05/16 65.6 kg (144 lb 9.6 oz)   04/29/16 64 kg (141 lb)   04/19/16 63.9 kg (140 lb 12.8 oz)   01/19/16 65.2 kg (143 lb 12.8 oz)   12/15/15 64.1 kg (141 lb 6.4 oz)   12/09/15 64.4 kg (141 lb 14.4 oz)   10/16/15 67.1 kg (148 lb)   10/16/15 67.5 kg (148 lb 12.8 oz)   05/13/15 68.4 kg (150 lb 12.8 oz)   04/17/15 69.9 kg (154 lb)   01/22/15 68.5 kg (151 lb)   10/15/14 73.9 kg (163 lb)   09/26/14 73.9 kg (163 lb)   09/16/14 72.2 kg (159 lb 3.2 oz)   07/30/14 69.9 kg (154 lb)   07/26/14 69.9 kg (154 lb 1.6 oz)   07/24/14 71.4 kg (157 lb 6.4 oz)   06/25/14 79.8 kg (176 lb)   06/20/14 74.8 kg (165 lb)   06/18/14 78.5 kg (173 lb)   03/21/14 78.5 kg (173 lb)   03/14/14 79.4 kg (175 lb)      NUTRITION DIAGNOSES:   Problem:  Inadequate protein-energy intake     Etiology: related to conditions associated with a dx: COPD     Signs/Symptoms: as evidenced by reports of poor appetite PTA, unintended wt loss       NUTRITION INTERVENTIONS:  Meals/Snacks: General/healthful diet, Modify diet/texture/consistency/nutrients   Supplements: Commercial supplement Feeding Assistance: Meal setup            GOAL:   Consume/tolerate >/= 50% of meals/ONS in the next 2-4 days    Cultural, Methodist, or Ethnic Dietary Needs: None     LEARNING NEEDS (Diet, Food/Nutrient-Drug Interaction):    [x] None Identified   [] Identified and Education Provided/Documented   [] Identified and Pt declined/was not appropriate      [] Interdisciplinary Care Plan Reviewed/Documented    [] Discharge Needs:    TBD   [] No Nutrition Related Discharge Needs    NUTRITION RISK:   Pt Is At Nutrition Risk  [x]     No Nutrition Risk Identified  []       PT SEEN FOR:    []  MD Consult: []Calorie Count      []Diabetic Diet Education        []Diet Education     []Electrolyte Management     []General Nutrition Management and Supplements     []Management of Tube Feeding     []TPN Recommendations    []  RN Referral:  []MST score >=2     []Enteral/Parenteral Nutrition PTA     []Pregnant: Gestational DM or Multigestation                 [] Pressure Ulcer    []  Low BMI      []  Length of Stay       [x] Dysphagia Diet         [] Ventilator  []  Follow-up     Previous Recommendations:   [] Implemented          [] Not Implemented          [x] Not Applicable    Previous Goal:   [] Met              [] Progressing Towards Goal              [] Not Progressing Towards Goal   [x] Not Applicable            Dalila Mancia, [address] [place] [address]   Pager [phone]

## 2017-06-09 PROBLEM — R42 DIZZINESS: Status: ACTIVE | Noted: 2017-01-01

## 2017-06-09 NOTE — DISCHARGE SUMMARY
[address] [facility] [address] [place] [address]   Office [phone], Fax [phone]        Discharge Summary     Patient: Rosalie Bernard       MRN: [id number]       YOB: 1940       Age: 64 y.o. Date of admission:  6/7/2017    Date of discharge:  6/9/2017    Primary care provider:  Ana Paula Connell MD     Admitting provider:  Rachell Mendes DO    Discharging provider(s): Alex De La Cruz MD - Family Medicine Resident  Mekhi Sidhu MD - [facility] Medicine Attending     Consultations  · Cardiology    Procedures  · None    Discharge destination: Home. The patient is stable for discharge. Admission diagnosis  · dizziness  · dizziness  · Dizziness      Final discharge diagnoses and brief hospital course  As per admitting provider, Dr. Felipe Cheung: \"Belia Sutton is a 64 y.o. female with PMH Pulmonary fibrosis, COPD (on 2L home O2), G1DD, Dyslipidemia, Paroxysmal A fib, CAD, AVR (2/2 AS), stress incontinence, HTN, Depression who presented with confusion and weakness. Pt family state that 2 days had a GLF while in the bathroom. Pt was noted to have fallen forward. She was found conscious on the floor approx 30 min after, weak on the ground and unable to move. According to pt she does not recall event prior to fall. Taken to PCP on 6/7 because pt was confused, speaking at a slower speed, and new shuffling gait. Denies facial drooping, unilateral weakness, change in personality, dysuria, or pain from fall. PCP referred pt to ED for imaging of the head. Per daughter, pt's intake has decreased in last 3 days. Lost approx 4 lbs. Unsure of water intake. Pt lives with son and daughter in law. Pt is taking Xarelto for A-Fib.   In the ED, VSS. Unchanged EKG. Trop 0.10, consistent with baseline. CBC WNL. BUN/Cr slightly elevated. CT head showed no acute process. Pt improved with 1L IVF, but was noted to be light-headed and required assistance to stand.  Family concerned about the dramatic confusion that pt was showing. Admitted for AMS. :     Conditions treated during this hospitalization:    AMS: Improved with IV hydration during admission. At discharge, Patient A/O x3.  - Follow up with PCP    UTI: Most likely source of AMS. Hx of recurrent UTI, on prophylactic Macrobid. There is also Hx of Klebsiella resistant to Macrobid and Bactrim. Urine cultures were collected and to be followed by PCP at OP. Patient was treated with Rocephin inpatient, transitioned to SCHMIDT LATASHA at discharge.      Elevated troponin: POA 0.10 (baseline is 0.9). pt asymptomatic with stable EKG that did not have any significant change from 11/2016. May be elevated from chronic pulmonary disease. On cardiology consult it was determined that the elevation was not related to ACS, possible due to COPD, VERNON, or PAF. Recommended against Echo.       Hx of aortic valve replacement(2012 2/2 AS) and Afib: rate controlled. Followed by cardiologist Dr. Colby Mckenzie   -Continue diltiazem 240mg, digoxin 0.125mg, and Xarelto       Lower extremity edema/Diastolic HF: last echo 7/0961 showed G1DD with EF 55%   -Continuelasix       HTN: stable   -Continue home Cardizem and digoxin      Hx of UTI: on nitrofurantoin to prevent infections. There is Hx of resistance by certain organisms on susceptibility studies.   -Discontinue home nitrofurantoin and do not restart at discharge      HLD: stable  -Continue liptior 20mg      DMT2: last HbA1c 6.6 (4/2017) Stable during admission.   - Restart home metformin  - poc glucose ACHS  - SSI      Pulmonary fibrosis:   - Continue home pirfenidore and prednisone     Labs/Imaging Needed on follow up: As recommended by PCP    Pending test results: At the time of your discharge the following test results are still pending: Final UCx (to be followed by PCP). Please make sure you review these results with your outpatient follow-up provider(s).     Specific symptoms to watch for: chest pain, shortness of breath, fever, chills, nausea, vomiting, diarrhea, change in mentation, falling, weakness, bleeding. DIET/what to eat:  Diabetic Diet    ACTIVITY:  Activity as tolerated    Wound care: None    Equipment needed:  None    Follow-up Care: Follow-up Information     Follow up With Details Comments Contact Info    Nahomi Solorzano MD Schedule an appointment as soon as possible for a visit in 3 days  [address] [facility] [address]      [facility] EMERGENCY DEPT  If symptoms worsen [address]  [phone]    Home medication   Please give back home medication: Esbriet back to patient. Located in patient specific bin. Thanks          Physical examination at discharge  Visit Vitals    /63 (BP 1 Location: Right arm, BP Patient Position: Head of bed elevated (Comment degrees))    Pulse 90    Temp 97.8 °F (36.6 °C)    Resp 17    Ht 5' 2\" (1.575 m)    Wt 131 lb 9.8 oz (59.7 kg)    SpO2 97%    BMI 24.07 kg/m2      Physical Examination:   General: No acute distress. Alert. Baseline 2 L NC   Head: Normocephalic. Atraumatic. Eyes:  Conjunctiva pink. Sclera white. PERRL. Ears:  Ear canals patent. TM non-erythematous. Nose:  Septum midline. Mucosa pink. No drainage. Throat: Mucosa pink. Moist mucous membranes. No tonsillar exudates or erythema. Palate movement equal bilaterally. Neck: Supple. Normal ROM. No stiffness. Respiratory: Speaking full sentences. No respiratory distress. No wheezing or rales. Cardiovascular: Regular rate. 2/6 Systolic murmur. Normal S1,S2. No r/g. Pulses 2+ throughout. GI: + bowel sounds. Nontender. No rebound tenderness or guarding. Nondistended. Extremities: No edema. No palpable cord. No tenderness. Musculoskeletal: Full ROM in all extremities. Neuro: Alert and oriented. No focal deficits. Sensation intact in all extremities. Skin: Warm and dry. Bruising that are old on extremities. Redness on the nose 2/2 to fall. Recent Results (from the past 24 hour(s))   TROPONIN I    Collection Time: 06/08/17  1:03 PM   Result Value Ref Range    Troponin-I, Qt. 0.13 (H) <0.05 ng/mL   GLUCOSE, POC    Collection Time: 06/08/17  4:24 PM   Result Value Ref Range    Glucose (POC) 107 (H) 65 - 100 mg/dL    Performed by Earlie Fothergill (PCT)    GLUCOSE, POC    Collection Time: 06/08/17  9:50 PM   Result Value Ref Range    Glucose (POC) 194 (H) 65 - 100 mg/dL    Performed by Iban Kwong (PCT)    METABOLIC PANEL, BASIC    Collection Time: 06/09/17  1:59 AM   Result Value Ref Range    Sodium 142 136 - 145 mmol/L    Potassium 3.6 3.5 - 5.1 mmol/L    Chloride 102 97 - 108 mmol/L    CO2 36 (H) 21 - 32 mmol/L    Anion gap 4 (L) 5 - 15 mmol/L    Glucose 113 (H) 65 - 100 mg/dL    BUN 18 6 - 20 MG/DL    Creatinine 0.87 0.55 - 1.02 MG/DL    BUN/Creatinine ratio 21 (H) 12 - 20      GFR est AA >60 >60 ml/min/1.73m2    GFR est non-AA >60 >60 ml/min/1.73m2    Calcium 7.7 (L) 8.5 - 10.1 MG/DL   CBC WITH AUTOMATED DIFF    Collection Time: 06/09/17  1:59 AM   Result Value Ref Range    WBC 4.5 3.6 - 11.0 K/uL    RBC 3.14 (L) 3.80 - 5.20 M/uL    HGB 9.7 (L) 11.5 - 16.0 g/dL    HCT 29.2 (L) 35.0 - 47.0 %    MCV 93.0 80.0 - 99.0 FL    MCH 30.9 26.0 - 34.0 PG    MCHC 33.2 30.0 - 36.5 g/dL    RDW 13.6 11.5 - 14.5 %    PLATELET 709 (L) 601 - 400 K/uL    NEUTROPHILS 71 32 - 75 %    LYMPHOCYTES 17 12 - 49 %    MONOCYTES 11 5 - 13 %    EOSINOPHILS 1 0 - 7 %    BASOPHILS 0 0 - 1 %    ABS. NEUTROPHILS 3.2 1.8 - 8.0 K/UL    ABS. LYMPHOCYTES 0.8 0.8 - 3.5 K/UL    ABS. MONOCYTES 0.5 0.0 - 1.0 K/UL    ABS. EOSINOPHILS 0.0 0.0 - 0.4 K/UL    ABS.  BASOPHILS 0.0 0.0 - 0.1 K/UL   GLUCOSE, POC    Collection Time: 06/09/17  8:08 AM   Result Value Ref Range    Glucose (POC) 147 (H) 65 - 100 mg/dL    Performed by Daniel May (PCT)    GLUCOSE, POC    Collection Time: 06/09/17 11:44 AM   Result Value Ref Range    Glucose (POC) 335 (H) 65 - 100 mg/dL    Performed by Daniel May (PCT)      Results for orders placed or performed in visit on 06/07/17   AMB POC URINALYSIS DIP STICK AUTO W/O MICRO     Status: None   Result Value Ref Range Status    Color (UA POC) Yellow  Final    Clarity (UA POC) Clear  Final    Glucose (UA POC) Negative Negative Final    Bilirubin (UA POC) 1+ Negative Final    Ketones (UA POC) Trace Negative Final    Specific gravity (UA POC) 1.015 1.001 - 1.035 Final    Blood (UA POC) Trace Negative Final    pH (UA POC) 5.5 4.6 - 8.0 Final    Protein (UA POC) 1+ Negative mg/dL Final    Urobilinogen (UA POC) 0.2 mg/dL 0.2 - 1 Final    Nitrites (UA POC) Negative Negative Final    Leukocyte esterase (UA POC) 1+ Negative Final       Current Discharge Medication List      START taking these medications    Details   cefdinir (OMNICEF) 300 mg capsule Take 2 Caps by mouth daily for 8 days. Qty: 16 Cap, Refills: 0         CONTINUE these medications which have NOT CHANGED    Details   albuterol (PROVENTIL HFA, VENTOLIN HFA, PROAIR HFA) 90 mcg/actuation inhaler Take 1 Puff by inhalation three (3) times daily as needed for Wheezing. aspirin delayed-release 81 mg tablet Take 81 mg by mouth daily. potassium 99 mg tablet Take 99 mg by mouth two (2) times a day. predniSONE (DELTASONE) 5 mg tablet Take 10 mg by mouth daily. digoxin (LANOXIN) 0.125 mg tablet Take 1 Tab by mouth nightly. Qty: 90 Tab, Refills: 3      dilTIAZem CD (CARDIZEM CD) 240 mg ER capsule Take 1 Cap by mouth nightly. Qty: 90 Cap, Refills: 3      furosemide (LASIX) 20 mg tablet 2 tabs in AM and pm  Qty: 360 Tab, Refills: 3    Associated Diagnoses: Chronic diastolic heart failure (HCC)      metFORMIN (GLUCOPHAGE) 500 mg tablet Take 1 Tab by mouth three (3) times daily (with meals).   Qty: 90 Tab, Refills: 5    Associated Diagnoses: Controlled type 2 diabetes mellitus without complication, without long-term current use of insulin (HCC)      glucose blood VI test strips (FREESTYLE LITE STRIPS) strip TEST BLOOD SUGAR TWO TIMES DAILY E11.9  Qty: 100 Strip, Refills: 5    Comments: For DM-2 treated with insulin 250.00  Associated Diagnoses: Uncontrolled type 2 diabetes mellitus with complication, without long-term current use of insulin ([facility])      rivaroxaban (XARELTO) 15 mg tab tablet Take 1 Tab by mouth daily  (with dinner). Qty: 90 Tab, Refills: 3      Lancets (FREESTYLE LANCETS) [facility] TEST BLOOD SUGAR THREE TIMES DAILY E11.9  Qty: 100 Each, Refills: 5    Comments: For DM-2 treated with insulin Diagnosis code E11.8  Associated Diagnoses: Type 2 diabetes mellitus without complication ([facility])      nitrofurantoin (MACRODANTIN) 50 mg capsule Take 1 Cap by mouth nightly. Qty: 90 Cap, Refills: 3    Associated Diagnoses: Dysuria      atorvastatin (LIPITOR) 20 mg tablet Take 1 Tab by mouth nightly. Qty: 90 Tab, Refills: 3    Associated Diagnoses: Type II diabetes mellitus with complication ([facility])      ferrous sulfate (IRON) 325 mg (65 mg iron) EC tablet Take 325 mg by mouth daily. tiotropium bromide (SPIRIVA RESPIMAT) 2.5 mcg/actuation mist Take 2 Puffs by inhalation daily. arformoterol (BROVANA) 15 mcg/2 mL nebu neb solution 15 mcg by Nebulization route two (2) times a day. Associated Diagnoses: COPD (chronic obstructive pulmonary disease) ([facility])      albuterol (PROVENTIL VENTOLIN) 2.5 mg /3 mL (0.083 %) nebulizer solution 3 mL by Nebulization route three (3) times daily. Qty: 1 Package, Refills: 6    Associated Diagnoses: COPD (chronic obstructive pulmonary disease) ([facility])      pirfenidone (ESBRIET) 267 mg cap Take 3 Caps by mouth three (3) times daily. Indications: PULMONARY FIBROSIS    Associated Diagnoses: COPD (chronic obstructive pulmonary disease) ([facility] [address] Pulmonary fibrosis ([facility])      FREESTYLE FREEDOM LITE monitoring kit FOLLOW PACKAGE DIRECTIONS  Qty: 1 kit, Refills: 0      MISCELLANEOUS MEDICAL SUPPLY (OXYGEN TUBING) 2 L by Does Not Apply route.              Admission imaging studies:      Results from Hospital Encounter encounter on 06/07/17   XR CHEST PORT   Narrative EXAM:  XR CHEST PORT    INDICATION:  Altered Mental Status. Presents from home for evaluation after  GLF. Most of the pt's hx is provided by the daughter. Pt reportedly experienced  a GLF yesterday where she claimed to feel \"dizzy and light headed\" while washing  her face and fell to the carpet, hitting her face on the carpeted floor. Pt  reportedly was on the ground for about 30 minutes because she was unable to get  up on her own. COMPARISON:  CT thorax 2/16/2016. Chest x-ray 1/19/2016. FINDINGS: A portable AP radiograph of the chest was obtained at 20:07. hours. There are median sternotomy wires and aortic valve prosthesis redemonstrated. The lungs redemonstrate a stable pattern of chronic interstitial disease with no  acute infiltrate or mass evident. The cardiac and mediastinal contours and  pulmonary vascularity are normal.  The chest wall structures and visualized  upper abdomen appear stable with no acute interval change. Impression IMPRESSION: No acute findings with chronic changes as above. No results found for this or any previous visit. Results from [place] encounter on 06/07/17   CT HEAD WO CONT   Narrative INDICATION:   fall, head injury, confusion     EXAM:  HEAD CT WITHOUT CONTRAST    COMPARISON:  None    TECHNIQUE:  Routine noncontrast axial head CT was performed. Coronal and  sagittal multiplanar reconstructions were obtained. CT dose reduction was  achieved through use of a standardized protocol tailored for this examination  and automatic exposure control for dose modulation. FINDINGS:    The ventricles and cortical sulci are within normal limits. No evidence for acute intracranial hemorrhage, midline shift, or mass effect.   Moderate bilateral subcortical and periventricular areas of patchy low  attenuation is nonspecific but likely related to changes of chronic small vessel  ischemic disease. The basal cisterns are patent. The visualized paranasal sinuses and mastoid air cells are clear. No calvarial abnormality.          Impression IMPRESSION:    No evidence for acute intracranial abnormality                      No procedure found.      -------------------------------------------------------------------------------------------------------------------    Chronic Diagnoses:    Problem List as of 6/9/2017  Date Reviewed: 6/7/2017          Codes Class Noted - Resolved    Dizziness ICD-10-CM: R42  ICD-9-CM: 780.4  6/9/2017 - Present        * (Principal)Dehydration ICD-10-CM: E86.0  ICD-9-CM: 276.51  6/7/2017 - Present        S/P AVR ICD-10-CM: Z95.2  ICD-9-CM: V43.3  11/6/2016 - Present        DM II (diabetes mellitus, type II), controlled ([facility] ICD-10-CM: E11.9  ICD-9-CM: 250.00  12/8/2015 - Present        RBBB ICD-10-CM: I45.10  ICD-9-CM: 426.4  10/16/2015 - Present        Frozen shoulder ICD-10-CM: M75.00  ICD-9-CM: 726.0  6/25/2014 - Present        Dyslipidemia ICD-10-CM: E78.5  ICD-9-CM: 272.4  3/21/2014 - Present        Chronic diastolic heart failure ([facility] ICD-10-CM: I50.32  ICD-9-CM: 428.32  5/29/2012 - Present        Pulmonary fibrosis ([facility] ICD-10-CM: J84.10  ICD-9-CM: 908  5/15/2012 - Present        Atrial fibrillation ([facility] ICD-10-CM: I48.91  ICD-9-CM: 427.31  5/10/2012 - Present        COPD (chronic obstructive pulmonary disease) ([facility] ICD-10-CM: J44.9  ICD-9-CM: 497  3/16/2012 - Present        History of noncompliance with medical treatment ICD-10-CM: Z91.19  ICD-9-CM: V15.81  9/13/2011 - Present        Arthritis ICD-10-CM: M19.90  ICD-9-CM: 716.90  1/14/2011 - Present        Hypertension ICD-10-CM: I10  ICD-9-CM: 401.9  Unknown - Present        Depression ICD-10-CM: F32.9  ICD-9-CM: 36  Unknown - Present        Female stress incontinence ICD-10-CM: N39.3  ICD-9-CM: 625.6  Unknown - Present        CAD (coronary artery disease) ICD-10-CM: I25.10  ICD-9-CM: 414.00  Unknown - Present        Aortic stenosis ICD-10-CM: I35.0  ICD-9-CM: 424.1  Unknown - Present        Sleep apnea ICD-10-CM: G47.30  ICD-9-CM: 780.57  Unknown - Present        RESOLVED: Acute on chronic respiratory failure with hypoxia (HCC) ICD-10-CM: J96.21  ICD-9-CM: 518.84, 799.02  12/8/2015 - 11/6/2016        RESOLVED: Diastolic CHF, acute on chronic [facility]) ICD-10-CM: I50.33  ICD-9-CM: 428.33, 428.0  12/8/2015 - 4/29/2016        RESOLVED: UTI (lower urinary tract infection) ICD-10-CM: N39.0  ICD-9-CM: 599.0  12/8/2015 - 11/6/2016        RESOLVED: SOB (shortness of breath) ICD-10-CM: R06.02  ICD-9-CM: 786.05  12/7/2015 - 11/6/2016        RESOLVED: Hypotension ICD-10-CM: I95.9  ICD-9-CM: 458.9  9/16/2014 - 11/6/2016        RESOLVED: Hypoxia ICD-10-CM: R09.02  ICD-9-CM: 799.02  7/24/2014 - 4/29/2016        RESOLVED: Elbow pain ICD-10-CM: M25.529  ICD-9-CM: 719.42  6/25/2014 - 11/6/2016        RESOLVED: T2DM (type 2 diabetes mellitus) ([facility] ICD-10-CM: E11.9  ICD-9-CM: 250.00  9/6/2013 - 5/12/2017        RESOLVED: Incontinence ICD-10-CM: R32  ICD-9-CM: 788.30  9/13/2011 - 11/6/2016        RESOLVED: Diabetes ([facility] ICD-10-CM: E11.9  ICD-9-CM: 250.00  1/14/2011 - 6/8/2012        RESOLVED: Renal failure ICD-10-CM: N19  ICD-9-CM: 225  9/17/2010 - 3/21/2014        RESOLVED: Hyperlipidemia ICD-10-CM: E78.5  ICD-9-CM: 272.4  9/17/2010 - 9/26/2014        RESOLVED: Hyperglycemia ICD-10-CM: R73.9  ICD-9-CM: 790.29  9/17/2010 - 3/14/2014        RESOLVED: Fatigue ICD-10-CM: R53.83  ICD-9-CM: 780.79  9/15/2010 - 3/21/2014        RESOLVED: Bacterial pneumonia, unspecified ICD-10-CM: J15.9  ICD-9-CM: 482.9  Unknown - 3/21/2014        RESOLVED: Acute upper respiratory infections of unspecified site ICD-10-CM: J06.9  ICD-9-CM: 465.9  Unknown - 9/6/2013                Signed:      Pinky Lin MD   Family Medicine Resident      6/9/2017     Morales Ortega MD   Family Medicine Attending

## 2017-06-09 NOTE — DISCHARGE INSTRUCTIONS
HOME DISCHARGE INSTRUCTIONS    Donald Aranda / [id number] : 1940    Admission date: 2017 Discharge date: 2017     Please bring this form with you to show your care provider at your follow-up appointment. Primary care provider:  Zoe Quiñonez MD    Discharging provider:  Flakito Gutierrez MD  - Family Medicine Resident  Gina Campbell - Attending, Family Medicine     You have been admitted to the hospital with the following diagnoses:    ACUTE DIAGNOSES:  · Dizziness  . . . . . . . . . . . . . . . . . . . . . . . . . . . . . . . . . . . . . . . . . . . . . . . . . . . . . . . . . . . . . . . . . . . . . . . Berta Cashing FOLLOW-UP CARE RECOMMENDATIONS:    Appointments  Follow-up Information     Follow up With Details Comments Contact Info    Zoe Quiñonez MD Schedule an appointment as soon as possible for a visit in 3 days  [address]      [facility]"" EMERGENCY DEPT  If symptoms worsen [facility]  [phone]    Home medication   Please give back home medication: Esbriet back to patient. Located in patient specific bin. Thanks           Follow-up tests needed: CBC at office follow up, POC glucose if not being checked at home    Pending test results: At the time of your discharge the following test results are still pending: Urine Culture. Please make sure you review these results with your outpatient follow-up provider(s). Specific symptoms to watch for: chest pain, shortness of breath, fever, chills, nausea, vomiting, diarrhea, change in mentation, falling, weakness, bleeding. DIET/what to eat:  Diabetic Diet    ACTIVITY:  Activity as tolerated    Wound care: None    Equipment needed:  None    What to do if new or unexpected symptoms occur? If you experience any of the above symptoms (or should other concerns or questions arise after discharge) please call your primary care physician.  Return to the emergency room if you cannot get hold of your doctor. · It is very important that you keep your follow-up appointment(s). · Please bring discharge papers, medication list (and/or medication bottles) to your follow-up appointments for review by your outpatient provider(s). · Please check the list of medications and be sure it includes every medication (even non-prescription medications) that your provider wants you to take. · It is important that you take the medication exactly as they are prescribed. · Keep your medication in the bottles provided by the pharmacist and keep a list of the medication names, dosages, and times to be taken in your wallet. · Do not take other medications without consulting your doctor. · If you have any questions about your medications or other instructions, please talk to your nurse or care provider before you leave the hospital.     Information obtained by:     I understand that if any problems occur once I am at home I am to contact my physician. These instructions were explained to me and I had the opportunity to ask questions. I understand and acknowledge receipt of the instructions indicated above. [de-identified] or R.N.'s Signature                                                                  Date/Time                                                                                                                                              Patient or Representative Signature                                                          Date/Time           Urinary Tract Infection in Women: Care Instructions  Your Care Instructions    A urinary tract infection, or UTI, is a general term for an infection anywhere between the kidneys and the urethra (where urine comes out). Most UTIs are bladder infections.  They often cause pain or burning when you urinate. UTIs are caused by bacteria and can be cured with antibiotics. Be sure to complete your treatment so that the infection goes away. Follow-up care is a key part of your treatment and safety. Be sure to make and go to all appointments, and call your doctor if you are having problems. It's also a good idea to know your test results and keep a list of the medicines you take. How can you care for yourself at home? · Take your antibiotics as directed. Do not stop taking them just because you feel better. You need to take the full course of antibiotics. · Drink extra water and other fluids for the next day or two. This may help wash out the bacteria that are causing the infection. (If you have kidney, heart, or liver disease and have to limit fluids, talk with your doctor before you increase your fluid intake.)  · Avoid drinks that are carbonated or have caffeine. They can irritate the bladder. · Urinate often. Try to empty your bladder each time. · To relieve pain, take a hot bath or lay a heating pad set on low over your lower belly or genital area. Never go to sleep with a heating pad in place. To prevent UTIs  · Drink plenty of water each day. This helps you urinate often, which clears bacteria from your system. (If you have kidney, heart, or liver disease and have to limit fluids, talk with your doctor before you increase your fluid intake.)  · Urinate when you need to. · Urinate right after you have sex. · Change sanitary pads often. · Avoid douches, bubble baths, feminine hygiene sprays, and other feminine hygiene products that have deodorants. · After going to the bathroom, wipe from front to back. When should you call for help? Call your doctor now or seek immediate medical care if:  · Symptoms such as fever, chills, nausea, or vomiting get worse or appear for the first time. · You have new pain in your back just below your rib cage. This is called flank pain.   · There is new blood or pus in your urine. · You have any problems with your antibiotic medicine. Watch closely for changes in your health, and be sure to contact your doctor if:  · You are not getting better after taking an antibiotic for 2 days. · Your symptoms go away but then come back. Where can you learn more? Go to http://lul-ben.info/. Enter H904 in the search box to learn more about \"Urinary Tract Infection in Women: Care Instructions. \"  Current as of: November 28, 2016  Content Version: 11.2  © 7905-6642 [employer]. Care instructions adapted under license by [employer] (which disclaims liability or warranty for this information). If you have questions about a medical condition or this instruction, always ask your healthcare professional. [address] any warranty or liability for your use of this information. Dehydration: Care Instructions  Your Care Instructions  Dehydration happens when your body loses too much fluid. This might happen when you do not drink enough water or you lose large amounts of fluids from your body because of diarrhea, vomiting, or sweating. Severe dehydration can be life-threatening. Water and minerals called electrolytes help put your body fluids back in balance. Learn the early signs of fluid loss, and drink more fluids to prevent dehydration. Follow-up care is a key part of your treatment and safety. Be sure to make and go to all appointments, and call your doctor if you are having problems. It's also a good idea to know your test results and keep a list of the medicines you take. How can you care for yourself at home? · To prevent dehydration, drink plenty of fluids, enough so that your urine is light yellow or clear like water. Choose water and other caffeine-free clear liquids until you feel better.  If you have kidney, heart, or liver disease and have to limit fluids, talk with your doctor before you increase the amount of fluids you drink. · If you do not feel like eating or drinking, try taking small sips of water, sports drinks, or other rehydration drinks. · Get plenty of rest.  To prevent dehydration  · Add more fluids to your diet and daily routine, unless your doctor has told you not to. · During hot weather, drink more fluids. Drink even more fluids if you exercise a lot. Stay away from drinks with alcohol or caffeine. · Watch for the symptoms of dehydration. These include:  ¨ A dry, sticky mouth. ¨ Dark yellow urine, and not much of it. ¨ Dry and sunken eyes. ¨ Feeling very tired. · Learn what problems can lead to dehydration. These include:  ¨ Diarrhea, fever, and vomiting. ¨ Any illness with a fever, such as pneumonia or the flu. ¨ Activities that cause heavy sweating, such as endurance races and heavy outdoor work in hot or humid weather. ¨ Alcohol or drug abuse or withdrawal.  ¨ Certain medicines, such as cold and allergy pills (antihistamines), diet pills (diuretics), and laxatives. ¨ Certain diseases, such as diabetes, cancer, and heart or kidney disease. When should you call for help? Call 911 anytime you think you may need emergency care. For example, call if:  · You passed out (lost consciousness). Call your doctor now or seek immediate medical care if:  · You are confused and cannot think clearly. · You are dizzy or lightheaded, or you feel like you may faint. · You have signs of needing more fluids. You have sunken eyes and a dry mouth, and you pass only a little dark urine. · You cannot keep fluids down. Watch closely for changes in your health, and be sure to contact your doctor if:  · You are not making tears. · Your skin is very dry and sags slowly back into place after you pinch it. · Your mouth and eyes are very dry. Where can you learn more? Go to http://lul-ben.info/.   Enter A318 in the search box to learn more about \"Dehydration: Care Instructions. \"  Current as of: May 27, 2016  Content Version: 11.2  © 5541-9806 [employer]. Care instructions adapted under license by [employer] (which disclaims liability or warranty for this information). If you have questions about a medical condition or this instruction, always ask your healthcare professional. [address] any warranty or liability for your use of this information. We hope that we have addressed all of your medical concerns. The examination and treatment you received in the Emergency Department were for an emergent problem and were not intended as complete care. It is important that you follow up with your healthcare provider(s) for ongoing care. If your symptoms worsen or do not improve as expected, and you are unable to reach your usual health care provider(s), you should return to the Emergency Department. Today's healthcare is undergoing tremendous change, and patient satisfaction surveys are one of the many tools to assess the quality of medical care. You may receive a survey from the [employer] organization regarding your experience in the Emergency Department. I hope that your experience has been completely positive, particularly the medical care that I provided. As such, please participate in the survey; anything less than excellent does not meet my expectations or intentions. [facility] and [facility] participate in nationally recognized quality of care measures. If your blood pressure is greater than 120/80, as reported below, we urge that you seek medical care to address the potential of high blood pressure, commonly known as hypertension. Hypertension can be hereditary or can be caused by certain medical conditions, pain, stress, or \"white coat syndrome. \"       Please make an appointment with your health care provider(s) for follow up of your Emergency Department visit.       Shanice Client:   Patient Vitals for the past 8 hrs:   Temp Pulse Resp BP SpO2   06/07/17 1811 - 87 - 123/60 -   06/07/17 1730 - - - 114/59 97 %   06/07/17 1615 - - - 135/85 97 %   06/07/17 1553 97.4 °F (36.3 °C) 99 20 108/59 95 %          Thank you for allowing us to provide you with medical care today. We realize that you have many choices for your emergency care needs. Please choose us in the future for any continued health care needs.       Sarah Juarez MD    [address] [facility], [facility].   Office: [phone]            Recent Results (from the past 24 hour(s))   AMB POC URINALYSIS DIP STICK AUTO W/O MICRO    Collection Time: 06/07/17 12:59 PM   Result Value Ref Range    Color (UA POC) Yellow     Clarity (UA POC) Clear     Glucose (UA POC) Negative Negative    Bilirubin (UA POC) 1+ Negative    Ketones (UA POC) Trace Negative    Specific gravity (UA POC) 1.015 1.001 - 1.035    Blood (UA POC) Trace Negative    pH (UA POC) 5.5 4.6 - 8.0    Protein (UA POC) 1+ Negative mg/dL    Urobilinogen (UA POC) 0.2 mg/dL 0.2 - 1    Nitrites (UA POC) Negative Negative    Leukocyte esterase (UA POC) 1+ Negative   EKG, 12 LEAD, INITIAL    Collection Time: 06/07/17  4:26 PM   Result Value Ref Range    Ventricular Rate 91 BPM    Atrial Rate 91 BPM    P-R Interval 208 ms    QRS Duration 160 ms    Q-T Interval 390 ms    QTC Calculation (Bezet) 479 ms    Calculated P Axis 56 degrees    Calculated R Axis -70 degrees    Calculated T Axis 42 degrees    Diagnosis       Normal sinus rhythm  Left axis deviation  Right bundle branch block  Left ventricular hypertrophy with repolarization abnormality  Inferior infarct (cited on or before 27-JAN-2013)  Anterior infarct (cited on or before 27-JAN-2013)  Abnormal ECG  When compared with ECG of 09-DEC-2015 13:00,  premature atrial complexes are no longer present  Questionable change in initial forces of Lateral leads     CBC WITH AUTOMATED DIFF    Collection Time: 06/07/17  4:33 PM   Result Value Ref Range    WBC 7.9 3.6 - 11.0 K/uL    RBC 3.85 3.80 - 5.20 M/uL    HGB 11.9 11.5 - 16.0 g/dL    HCT 36.6 35.0 - 47.0 %    MCV 95.1 80.0 - 99.0 FL    MCH 30.9 26.0 - 34.0 PG    MCHC 32.5 30.0 - 36.5 g/dL    RDW 13.7 11.5 - 14.5 %    PLATELET 859 397 - 485 K/uL    NEUTROPHILS 83 (H) 32 - 75 %    LYMPHOCYTES 8 (L) 12 - 49 %    MONOCYTES 9 5 - 13 %    EOSINOPHILS 0 0 - 7 %    BASOPHILS 0 0 - 1 %    ABS. NEUTROPHILS 6.6 1.8 - 8.0 K/UL    ABS. LYMPHOCYTES 0.6 (L) 0.8 - 3.5 K/UL    ABS. MONOCYTES 0.7 0.0 - 1.0 K/UL    ABS. EOSINOPHILS 0.0 0.0 - 0.4 K/UL    ABS. BASOPHILS 0.0 0.0 - 0.1 K/UL    RBC COMMENTS NORMOCYTIC, NORMOCHROMIC     METABOLIC PANEL, COMPREHENSIVE    Collection Time: 06/07/17  4:33 PM   Result Value Ref Range    Sodium 136 136 - 145 mmol/L    Potassium 4.0 3.5 - 5.1 mmol/L    Chloride 94 (L) 97 - 108 mmol/L    CO2 34 (H) 21 - 32 mmol/L    Anion gap 8 5 - 15 mmol/L    Glucose 192 (H) 65 - 100 mg/dL    BUN 34 (H) 6 - 20 MG/DL    Creatinine 1.30 (H) 0.55 - 1.02 MG/DL    BUN/Creatinine ratio 26 (H) 12 - 20      GFR est AA 48 (L) >60 ml/min/1.73m2    GFR est non-AA 40 (L) >60 ml/min/1.73m2    Calcium 8.7 8.5 - 10.1 MG/DL    Bilirubin, total 0.3 0.2 - 1.0 MG/DL    ALT (SGPT) 54 12 - 78 U/L    AST (SGOT) 40 (H) 15 - 37 U/L    Alk.  phosphatase 54 45 - 117 U/L    Protein, total 7.1 6.4 - 8.2 g/dL    Albumin 3.3 (L) 3.5 - 5.0 g/dL    Globulin 3.8 2.0 - 4.0 g/dL    A-G Ratio 0.9 (L) 1.1 - 2.2     TROPONIN I    Collection Time: 06/07/17  4:33 PM   Result Value Ref Range    Troponin-I, Qt. 0.10 (H) <0.05 ng/mL   CK W/ CKMB & INDEX    Collection Time: 06/07/17  4:33 PM   Result Value Ref Range    CK 61 26 - 192 U/L    CK - MB <1.0 <3.6 NG/ML    CK-MB Index Cannot be calulated 0 - 2.5     PROTHROMBIN TIME + INR    Collection Time: 06/07/17  4:33 PM   Result Value Ref Range    INR 1.0 0.9 - 1.1      Prothrombin time 9.7 9.0 - 11.1 sec   PTT    Collection Time: 06/07/17  4:33 PM Result Value Ref Range    aPTT 29.3 22.1 - 32.5 sec    aPTT, therapeutic range     58.0 - 77.0 SECS       Ct Head Wo Cont    Result Date: 6/7/2017  INDICATION:   fall, head injury, confusion EXAM:  HEAD CT WITHOUT CONTRAST COMPARISON:  None TECHNIQUE:  Routine noncontrast axial head CT was performed. Coronal and sagittal multiplanar reconstructions were obtained. CT dose reduction was achieved through use of a standardized protocol tailored for this examination and automatic exposure control for dose modulation. FINDINGS: The ventricles and cortical sulci are within normal limits. No evidence for acute intracranial hemorrhage, midline shift, or mass effect. Moderate bilateral subcortical and periventricular areas of patchy low attenuation is nonspecific but likely related to changes of chronic small vessel ischemic disease. The basal cisterns are patent. The visualized paranasal sinuses and mastoid air cells are clear. No calvarial abnormality.      IMPRESSION: No evidence for acute intracranial abnormality

## 2017-06-09 NOTE — PROGRESS NOTES
2000 - Mela Anderson 41 residents to verify Troponin Order. MD to d/c order. 2100 - Received order for urine culture, pt incontinent and currently using wick. MD notified, telephone verbal order provided for straight cath one time for sample collection. 0730 - Bedside shift change report given to Mik Shah RN (oncoming nurse) by Barb Deluna RN (offgoing nurse). Report included the following information SBAR, Kardex, Procedure Summary, Intake/Output, MAR and Recent Results.

## 2017-06-09 NOTE — DIABETES MGMT
[facility]    Elevated Blood Glucose Note     Recommendations/ Comments: Chart reviewed for elevated blood glucose ( > 180 mg/dL x 2 in the past 24 hours) . Rhonda Estrada is a 64 y.o. female with a past medical history significant for T2DM per Dr. Daisy Gill, DO's H&P dated 6/7/2017. Fasting glucose today: 147 mg/dL (per morning POCT). Required 9 units of correction in the last 24 hours. Glucose trending towards target range. Will continue to follow. Recent Glucose Results:   Lab Results   Component Value Date/Time     (H) 06/09/2017 01:59 AM    GLUCPOC 147 (H) 06/09/2017 08:08 AM    GLUCPOC 194 (H) 06/08/2017 09:50 PM    GLUCPOC 107 (H) 06/08/2017 04:24 PM        Hospital (inpatient) medications:  1. Correction Scale: Lispro (Humalog) Normal Sensitivity scale to cover for glucose > 139 mg/dL before meals and for glucose >199 at bedtime      Prior to admission medications: per past medical records  -Metformin 500mg TID with meals      Lab Results   Component Value Date/Time    Hemoglobin A1c 7.4 08/05/2016 10:27 AM    Hemoglobin A1c 7.0 01/19/2016 10:42 AM     Estimated Creatinine Clearance: 42.1 mL/min (based on Cr of 0.87). Active Orders   Diet    DIET PUREED 3-4 GM (BRENDA)        Thank you. Marito Holland. JOSEPH HaleN, MPH  Diabetes [address] [place], [place] [id number]    -For most hospitalized persons with hyperglycemia in the intensive care unit (ICU), a glucose range of 140 to 180 mg/dL is recommended, provided this target can be safely achieved. *  - For general medicine and surgery patients in non-ICU settings, a premeal glucose target <140 mg/dL and a random blood glucose <180 mg/dL are recommended. *    TAWANNA Mattson., Gerardo Richards., Rose Saunders., ... & Kellen Dickinson ([id number]).  [place]ristofer [place] [employer] ENDOCRINOLOGISTS AND AMERICAN COLLEGE OF ENDOCRINOLOGY-CLINICAL PRACTICE GUIDELINES FOR DEVELOPING A DIABETES MELLITUS COMPREHENSIVE CARE PLAN-2015-EXECUTIVE SUMMARY: Complete guidelines are available at [email or web address]. aace. com/publications/guidelines. Endocrine Practice, 21(4), [id number].

## 2017-06-09 NOTE — PROGRESS NOTES
[address] [facility] RESIDENCY PROGRAM  PROGRESS NOTE     6/9/2017  PCP: Simone Abrams MD     Assessment/Plan:   Tesha Bonds is a 64 y.o. female with PMH of HTN, DM, COPD, pulmonary fibrosis, paroxysmal Afib, and aortic stenosis s/p AVR who presents with AMS.    UTI: Asymptomatic at this time. Urine cultures were collected and following. Will transition Abx to PO before discharge. - Continue Rocephin IV      Elevated troponin: On cardiology consult it was determined that the elevation was not related to ACS, possible due to COPD, VERNON, or PAF. Recommended against Echo. - Continue monitoring for CP     AMS: Stable. Patient A/O x3  - Continue monitoring for MS changes    Hx of aortic valve replacement(2012 2/2 AS) and Afib: rate controlled. Followed by cardiologist Dr. Sharif Gallegos   -Continue diltiazem 240mg, digoxin 0.125mg, and Xarelto      Lower extremity edema/Diastolic HF: last echo 0/8189 showed G1DD with EF 55%   -Hold lasix      HTN: stable   - continue home Cardizem and digoxin      Hx of UTI: on nitrofurantoin to prevent infections  - Discontinue home nitrofurantoin and do not restart at discharge      HLD: stable  -Continue liptior 20mg      DMT2: last HbA1c 6.6 (4/2017)  - Hold home metformin  - poc glucose ACHS  - SSI     Pulmonary fibrosis:   - continue home pirfenidore and prednisone       FEN/GI - Cardiac diet, pureed  Activity - bed rest   DVT prophylaxis - Xarelto  GI prophylaxis - none  Disposition - TBD      CODE STATUS:  FULL CODE    Case discussed with Dr. Estelita Shaffer (Attending Physician)          Subjective:     Pt was seen and examined at bedside. No acute events overnight. Patient slept. Has no discomfort. Denies chills, headaches, chest pain, shortness of breath, palpitations, abdominal pain, nausea and vomiting, and LE edema. Tolerating diet.     Allergies:  No Known Allergies    Objective:   Physical examination  Visit Vitals    BP 94/55 (BP 1 Location: Left arm, BP Patient Position: At rest)    Pulse 80    Temp 98.2 °F (36.8 °C)    Resp 16    Ht 5' 2\" (1.575 m)    Wt 131 lb 9.8 oz (59.7 kg)    SpO2 96%    BMI 24.07 kg/m2      Temp (24hrs), Av.4 °F (36.9 °C), Min:97.9 °F (36.6 °C), Max:98.8 °F (37.1 °C)     O2 Flow Rate (L/min): 3 l/min (Pt wears home O2 @ 2LPM)   O2 Device: Nasal cannula      General: No acute distress. Alert. Baseline 2 L NC   Head: Normocephalic. Atraumatic. Eyes:  Conjunctiva pink. Sclera white. PERRL. Ears:  Ear canals patent. TM non-erythematous. Nose:  Septum midline. Mucosa pink. No drainage. Throat: Mucosa pink. Moist mucous membranes. No tonsillar exudates or erythema. Palate movement equal bilaterally. Neck: Supple. Normal ROM. No stiffness. Respiratory: Speaking full sentences. No respiratory distress. No wheezing or rales. Cardiovascular: Regular rate. 2/6 Systolic murmur. Normal S1,S2. No r/g. Pulses 2+ throughout. GI: + bowel sounds. Nontender. No rebound tenderness or guarding. Nondistended. Extremities: No edema. No palpable cord. No tenderness. Musculoskeletal: Full ROM in all extremities. Neuro: Alert and oriented. No focal deficits. Sensation intact in all extremities. Skin: Warm and dry. Bruising that are old on extremities. Redness on the nose 2/2 to fall.               Data Review:     Recent Labs      17   0159  17   0143  17   1633   WBC  4.5  5.4  7.9   HGB  9.7*  10.7*  11.9   HCT  29.2*  32.1*  36.6   PLT  133*  147*  158     Recent Labs      17   0159  17   0705  17   0143  17   1633   NA  142  140  137  136   K  3.6  3.5  3.8  4.0   CL  102  101  97  94*   CO2  36*  33*  33*  34*   GLU  113*  135*  131*  192*   BUN  18  23*  28*  34*   CREA  0.87  0.83  0.93  1.30*   CA  7.7*  7.4*  8.3*  8.7   ALB   --    --    --   3.3*   SGOT   --    --    --   40*   ALT   --    --    --   54   INR   --    --    --   1.0     Medications reviewed  Current Facility-Administered Medications   Medication Dose Route Frequency    insulin lispro (HUMALOG) injection   SubCUTAneous AC&HS    glucose chewable tablet 16 g  4 Tab Oral PRN    dextrose (D50W) injection syrg 12.5-25 g  12.5-25 g IntraVENous PRN    glucagon (GLUCAGEN) injection 1 mg  1 mg IntraMUSCular PRN    predniSONE (DELTASONE) tablet 10 mg  10 mg Oral DAILY    furosemide (LASIX) tablet 40 mg  40 mg Oral DAILY    albuterol (PROVENTIL VENTOLIN) nebulizer solution 2.5 mg  2.5 mg Nebulization TID RT    arformoterol (BROVANA) neb solution 15 mcg  15 mcg Nebulization BID RT    aspirin delayed-release tablet 81 mg  81 mg Oral DAILY    atorvastatin (LIPITOR) tablet 20 mg  20 mg Oral QHS    dilTIAZem CD (CARDIZEM CD) capsule 240 mg  240 mg Oral QHS    digoxin (LANOXIN) tablet 0.125 mg  0.125 mg Oral QHS    rivaroxaban (XARELTO) tablet 15 mg  15 mg Oral DAILY WITH DINNER    sodium chloride (NS) flush 5-10 mL  5-10 mL IntraVENous Q8H    sodium chloride (NS) flush 5-10 mL  5-10 mL IntraVENous PRN    cefTRIAXone (ROCEPHIN) 1 g in 0.9% sodium chloride (MBP/ADV) 50 mL  1 g IntraVENous Q24H    ferrous sulfate tablet 325 mg  1 Tab Oral DAILY WITH BREAKFAST    umeclidinium (INCRUSE ELLIPTA) 62.5 mcg/actuation  1 Puff Inhalation DAILY    pirfenidone (ESBRIET) capsule 801 mg  801 mg Oral TID WITH MEALS       Imaging:  Ct head  FINDINGS:      The ventricles and cortical sulci are within normal limits.      No evidence for acute intracranial hemorrhage, midline shift, or mass effect.   Moderate bilateral subcortical and periventricular areas of patchy low  attenuation is nonspecific but likely related to changes of chronic small vessel  ischemic disease.      The basal cisterns are patent.      The visualized paranasal sinuses and mastoid air cells are clear.      No calvarial abnormality.      IMPRESSION  IMPRESSION:      No evidence for acute intracranial abnormality     cxray  FINDINGS: A portable AP radiograph of the chest was obtained at 20:07. hours. There are median sternotomy wires and aortic valve prosthesis redemonstrated. The lungs redemonstrate a stable pattern of chronic interstitial disease with no  acute infiltrate or mass evident. The cardiac and mediastinal contours and  pulmonary vascularity are normal. The chest wall structures and visualized  upper abdomen appear stable with no acute interval change.       IMPRESSION  IMPRESSION: No acute findings with chronic changes as above.     Signed:   Jamie Mccoy MD   Resident, Family Medicine

## 2017-06-09 NOTE — PROGRESS NOTES
Problem: Mobility Impaired (Adult and Pediatric)  Goal: *Acute Goals and Plan of Care (Insert Text)  Physical Therapy Goals  Initiated 6/9/2017  1. Patient will move from supine to sit and sit to supine , scoot up and down and roll side to side in bed with supervision/set-up within 7 day(s). 2. Patient will transfer from bed to chair and chair to bed with supervision/set-up using the least restrictive device within 7 day(s). 3. Patient will perform sit <>stand with supervision/set-up within 7 day(s). 4. Patient will ambulate with contact guard assist for 50 feet with the least restrictive device within 7 day(s). 5. Patient will ascend/descend 4 stairs with 1 handrail(s) with minimal assistance within 7 day(s). PHYSICAL THERAPY EVALUATION  Patient: Donna Lunsford (37 y.o. female)  Date: 6/9/2017  Primary Diagnosis: dizziness  dizziness  Dizziness        Precautions:  Bed Alarm, Fall, WBAT      ASSESSMENT :  Based on the objective data described below, the patient presents with fatigue at rest, poor tolerance for very minimal activity, poor strength x 4 extremities, and poor standing balance. Patient with tachycardia 124 bpm during activity and on 2L drops from 98% to 87% with activity on 2L. Patient incontinent of bowels in supine and reports need for toileting. Called RN for assist for clean up. Patient continues to be incontinent so assisted with depends placement. Patient with IGNACIO. She appears frail fatigued and deconditioned. Recommend SNF for rehab but patient states she does not want to go to facility again. Patient reported and verified as noted elsewhere in chart, that she has assistance 24/7 at home and her family will support her. If patient declines SNF for rehab recommend 24/7 supervision and assist for all mobility at home for fall prevention. Patient would benefit from [address]       Patient will benefit from skilled intervention to address the above impairments.   Patients rehabilitation potential is considered to be Guarded  Factors which may influence rehabilitation potential include:   [ ]         None noted  [ ]         Mental ability/status  [X]         Medical condition  [ ]         Home/family situation and support systems  [X]         Safety awareness  [ ]         Pain tolerance/management  [ ]         Other:        PLAN :  Recommendations and Planned Interventions:  [X]           Bed Mobility Training             [ ]    Neuromuscular Re-Education  [X]           Transfer Training                   [ ]    Orthotic/Prosthetic Training  [X]           Gait Training                         [ ]    Modalities  [X]           Therapeutic Exercises           [ ]    Edema Management/Control  [X]           Therapeutic Activities            [X]    Patient and Family Training/Education  [ ]           Other (comment):     Frequency/Duration: Patient will be followed by physical therapy  5 times a week to address goals. Discharge Recommendations: [facility] vs [facility]  Further Equipment Recommendations for Discharge: none new       SUBJECTIVE:   Patient stated I'd rather go home.       OBJECTIVE DATA SUMMARY:   HISTORY:    Past Medical History:   Diagnosis Date    Aortic stenosis       severe, s/p AVR March 2012 - presented with HF, EF 30%    Asthma 5/10/2012    Atrial fibrillation ([address] 5/10/2012    Bacterial pneumonia, unspecified      CAD (coronary artery disease)       cor calcifications (LAD) by CT     Chronic diastolic heart failure ([address] 5/29/2012    Depression      Dyslipidemia      Female stress incontinence      Hyperlipidemia 9/17/2010    Hypertension      Osteoarthritis      Paroxysmal atrial fibrillation ([address]       post op AVR    Pulmonary fibrosis ([address]      S/P AVR 11/6/2016    S/P AVR (aortic valve replacement) 3/2012     bovine ([facility])    Sleep apnea      T2DM (type 2 diabetes mellitus) ([address] 1/14/2011     Past Surgical History:   Procedure Laterality Date    CARDIAC SURG PROCEDURE UNLIST        ECHO 2D ADULT   3/2012     EF 30%, mod LVH, EF 30-35%, PA 50 mmHg    HX CATARACT REMOVAL         bilateral     Prior Level of Function/Home Situation: baseline level unknown- family not present. Per chart patient has loss independence with bathing and dressing since recent fall and ? UTI  Personal factors and/or comorbidities impacting plan of care: see above PMH and HPI for comorbidities     Home Situation  Home Environment: Trailer/mobile home  # Steps to Enter: 3  Rails to Enter: Yes  Living Alone: Yes (but patient has 24/7 supervision and assistance)  Support Systems: Child(brandon), Friends \ neighbors  Patient Expects to be Discharged to[de-identified] Trailer/mobile home  Current DME Used/Available at Home: Cane, straight, Commode, bedside, Raised toilet seat, Walker, rollator, Walker, rolling     EXAMINATION/PRESENTATION/DECISION MAKING:   Critical Behavior:  Neurologic State: Alert  Orientation Level: Oriented X4  Cognition: Appropriate decision making, Appropriate for age attention/concentration, Appropriate safety awareness, Follows commands     Hearing: Auditory  Auditory Impairment: None     Range Of Motion:     Strength:    Strength: Grossly decreased, non-functional           Functional Mobility:  Bed Mobility:  Rolling: Minimum assistance  Supine to Sit: Minimum assistance  Sit to Supine: Minimum assistance  Scooting: Stand-by asssistance  Transfers:  Sit to Stand: Minimum assistance  Stand to Sit: Minimum assistance  Stand Pivot Transfers: Minimum assistance     Bed to Chair: Minimum assistance              Balance:   Sitting: High guard  Standing: Impaired  Standing - Static: Constant support; Fair  Standing - Dynamic : Poor  Ambulation/Gait Training:  Distance (ft): 10 Feet (ft)  Assistive Device: Gait belt;Walker, rolling  Ambulation - Level of Assistance: Minimal assistance  Gait Abnormalities: Decreased step clearance; Path deviations  Stairs - Level of Assistance:  (NT) Functional Measure:  Barthel Index:      Bathin  Bladder: 5  Bowels: 0  Groomin  Dressin  Feedin  Mobility: 10  Stairs: 0  Toilet Use: 5  Transfer (Bed to Chair and Back): 10  Total: 35         Barthel and G-code impairment scale:  Percentage of impairment CH  0% CI  1-19% CJ  20-39% CK  40-59% CL  60-79% CM  80-99% CN  100%   Barthel Score 0-100 100 99-80 79-60 59-40 20-39 1-19    0   Barthel Score 0-20 20 17-19 13-16 9-12 5-8 1-4 0      The Barthel ADL Index: Guidelines  1. The index should be used as a record of what a patient does, not as a record of what a patient could do. 2. The main aim is to establish degree of independence from any help, physical or verbal, however minor and for whatever reason. 3. The need for supervision renders the patient not independent. 4. A patient's performance should be established using the best available evidence. Asking the patient, friends/relatives and nurses are the usual sources, but direct observation and common sense are also important. However direct testing is not needed. 5. Usually the patient's performance over the preceding 24-48 hours is important, but occasionally longer periods will be relevant. 6. Middle categories imply that the patient supplies over 50 per cent of the effort. 7. Use of aids to be independent is allowed. Scott Borges., Barthel, DManinderW. (5822). Functional evaluation: the Barthel Index. [address] [facility] (14)2. Shantel Ontiveros, J.J.M.ELLEN, Benito Shirley., [facility] Keisha., [place], [address] [place] [address] (). Measuring the change indisability after inpatient rehabilitation; comparison of the responsiveness of the Barthel Index and Functional Teller Measure. Journal of Neurology, Neurosurgery, and Psychiatry, 66(4), 000-577. Elissa Reese, N.J.SIRISHA, JEN Arvizu, & Gisell Alarcon M.A. (2004.) Assessment of post-stroke quality of life in cost-effectiveness studies: The usefulness of the Barthel Index and the EuroQoL-5D.  Quality of Life Research, 13, 322-09         G codes: In compliance with CMSs Claims Based Outcome Reporting, the following G-code set was chosen for this patient based on their primary functional limitation being treated: The outcome measure chosen to determine the severity of the functional limitation was the Barthel Index with a score of 35/100 which was correlated with the impairment scale. · Mobility - Walking and Moving Around:               - CURRENT STATUS:    CL - 60%-79% impaired, limited or restricted               - GOAL STATUS:           CK - 40%-59% impaired, limited or restricted               - D/C STATUS:                       ---------------To be determined---------------      Physical Therapy Evaluation Charge Determination   History Examination Presentation Decision-Making   HIGH Complexity :3+ comorbidities / personal factors will impact the outcome/ POC  LOW Complexity : 1-2 Standardized tests and measures addressing body structure, function, activity limitation and / or participation in recreation  LOW Complexity : Stable, uncomplicated  Other outcome measures Barthel Index HIGH       Based on the above components, the patient evaluation is determined to be of the following complexity level: LOW      Pain:                    Activity Tolerance:   poor  Please refer to the flowsheet for vital signs taken during this treatment. After treatment:   [ ]         Patient left in no apparent distress sitting up in chair  [X]         Patient left in no apparent distress in bed  [X]         Call bell left within reach  [X]         Nursing notified  [ ]         Caregiver present  [X]         Bed alarm activated      COMMUNICATION/EDUCATION:   The patients plan of care was discussed with: Registered Nurse.  [X]         Fall prevention education was provided and the patient/caregiver indicated understanding.   [ ]         Patient/family have participated as able in goal setting and plan of care.  [ ]         Patient/family agree to work toward stated goals and plan of care. [X]         Patient understands intent and goals of therapy, but is neutral about his/her participation. [ ]         Patient is unable to participate in goal setting and plan of care.      Thank you for this referral.  Brad Borges, PT   Time Calculation: 25 mins

## 2017-06-09 NOTE — PROGRESS NOTES
6/9/2017 2:56 PM At  [facility] has accepted pt.     6/9/2017 2:08 PM PT recommendations for home health with 24 hours assist at home discussed. Called and spoke with pt's daughter, Vani Hutton who reported she was in agreement with pt discharging home with home health through [facility]. Vani Hutton reported family will be with the pt 24 hours a day and can assist pt. Vani Hutton reported she is picking up pt's portable O2 tank and will be to [facility] within an hour to transport pt home. Home health referral requested and received. [facility] [employer]. [facility] is aware pt will discharge home today. 6/9/2017  1:40 PM CM will continue to follow for final discharge recommendations.  JOSEPH CifuentesW

## 2017-06-12 NOTE — PROGRESS NOTES
[facility] 6/7-6/9/2017 Dehydration    TRANSITIONS OF CARE NOTE     RRAT score: 20 Medium   Please note functional assessment. NN spoke with patient's daughter, Delilah Laughlin, who is listed on HIPPA. Daughter states \"She's doing okay. She's better then she was I guess\". Patient is currently living with son and daughter in law. Home health set up with [facility]. Per daughter in law, the earliest she can bring patient in for [facility] visit is 6/16/2017 due to her job. NN went over red flags in detail with daughter in law who verbalized understanding of when to seek immediate medical attention. Red flags/sepsis: fever or below normal temperature (97f), chills, nausea, vomiting, diarrhea, chest pain, shortness of breath, unable to take medications, unusual sensations, and BFAST. Goals      Attends follow-up appointments as directed. PCP 6/16/2017       Knowledge and adherence of prescribed medication (ie. action, side effects, missed dose, etc.). Family manages patient's medications.  Patient/Family verbalizes understanding of self-management of chronic disease.  Supportive resources in place to maintain patient in the community (ie. Home Health, DME equipment, refer to, medication assistant plan, etc.)            [facility] health in place with [facility] Understands red flags post discharge. Red flags/sepsis: fever or below normal temperature (97f), chills, nausea, vomiting, diarrhea, chest pain, shortness of breath, unable to take medications, unusual sensations, and BFAST. Advance Medical Directive on file in EMR?  yes has an advanced directive - a copy has been provided    Medical History:     Past Medical History:   Diagnosis Date    Aortic stenosis     severe, s/p AVR March 2012 - presented with HF, EF 30%    Asthma 5/10/2012    Atrial fibrillation ([address] 5/10/2012    Bacterial pneumonia, unspecified     CAD (coronary artery disease)     cor calcifications (LAD) by CT     Chronic diastolic heart failure ([facility] 5/29/2012    Depression     Dyslipidemia     Female stress incontinence     Hyperlipidemia 9/17/2010    Hypertension     Osteoarthritis     Paroxysmal atrial fibrillation (HCC)     post op AVR    Pulmonary fibrosis ([facility] [address]     S/P AVR 11/6/2016    S/P AVR (aortic valve replacement) 3/2012    bovine ([facility])    Sleep apnea     T2DM (type 2 diabetes mellitus) ([facility] [address] 1/14/2011     This represents Transitions of Care b/c NN spoke with patient and/or caregiver within 1 business days of discharge. Pt's TCM follow up appt is scheduled with Dr. Alfonso Galdamez on 6/16/2017, which is within 8 days of discharge. Called patient on 6/12/2017 and verified patient with 2 identifiers. Fall Risk Assessment:    Fall Risk Assessment, last 12 mths 6/7/2017   Able to walk? Yes   Fall in past 12 months? Yes   Fall with injury? No   Number of falls in past 12 months 1   Fall Risk Score 1     Patient presenting symptoms (referenced by Ivelisse Long MD - Family Medicine Resident):   presented with confusion and weakness. Pt family state that 2 days had a GLF while in the bathroom. Pt was noted to have fallen forward. She was found conscious on the floor approx 30 min after, weak on the ground and unable to move. According to pt she does not recall event prior to fall. Taken to PCP on 6/7 because pt was confused, speaking at a slower speed, and new shuffling gait. Denies facial drooping, unilateral weakness, change in personality, dysuria, or pain from fall. PCP referred pt to ED for imaging of the head. Per daughter, pt's intake has decreased in last 3 days. Lost approx 4 lbs. Unsure of water intake. Pt lives with son and daughter in law. Pt is taking Xarelto for A-Fib.   In the ED, VSS. Unchanged EKG. Trop 0.10, consistent with baseline. CBC WNL. BUN/Cr slightly elevated. CT head showed no acute process.  Pt improved with 1L IVF, but was noted to be light-headed and required assistance to stand. Family concerned about the dramatic confusion that pt was showing. Admitted for AMS. :   Course of current Hospitalization (referenced by Deirdre Bales MD - Family Medicine Resident note dated 6/9/2017):   AMS: Improved with IV hydration during admission. At discharge, Patient A/O x3.  - Follow up with PCP     UTI: Most likely source of AMS. Hx of recurrent UTI, on prophylactic Macrobid. There is also Hx of Klebsiella resistant to Macrobid and Bactrim. Urine cultures were collected and to be followed by PCP at OP. Patient was treated with Rocephin inpatient, transitioned to SCHMIDT LATASHA at discharge.      Elevated troponin: POA 0.10 (baseline is 0.9). pt asymptomatic with stable EKG that did not have any significant change from 11/2016. May be elevated from chronic pulmonary disease. On cardiology consult it was determined that the elevation was not related to ACS, possible due to COPD, VERNON, or PAF. Recommended against Echo.      Hx of aortic valve replacement(2012 2/2 AS) and Afib: rate controlled. Followed by cardiologist Dr. Anastasiya Brito   -Continue diltiazem 240mg, digoxin 0.125mg, and Xarelto       Lower extremity edema/Diastolic HF: last echo 7/3536 showed G1DD with EF 55%   -Continuelasix       HTN: stable   -Continue home Cardizem and digoxin      Hx of UTI: on nitrofurantoin to prevent infections. There is Hx of resistance by certain organisms on susceptibility studies.   -Discontinue home nitrofurantoin and do not restart at discharge      HLD: stable  -Continue liptior 20mg      DMT2: last HbA1c 6.6 (4/2017) Stable during admission.   - Restart home metformin  - poc glucose ACHS  - SSI      Pulmonary fibrosis:   - Continue home pirfenidore and prednisone      Labs/Imaging Needed on follow up: As recommended by PCP     Pending test results: At the time of your discharge the following test results are still pending: Final UCx (to be followed by PCP).    Please make sure you review these results with your outpatient follow-up provider(s).    Specific symptoms to watch for: chest pain, shortness of breath, fever, chills, nausea, vomiting, diarrhea, change in mentation, falling, weakness, bleeding.      DIET/what to eat:  Diabetic Diet     ACTIVITY:  Activity as tolerated     Wound care: None     Equipment needed: None     Follow-up Care: Follow-up Information     Follow up With Details Comments Contact Info     Magdalena Tovar MD Schedule an appointment as soon as possible for a visit in 3 days   [address] [facility] Mathias Moritz [facility]     [facility] EMERGENCY DEPT   If symptoms worsen [facility]  [phone]     Home medication     Please give back home medication: Esbriet back to patient. Located in patient specific bin. Thanks       Lab/Diagnostics at time of Discharge:   Results for Nyla Contreras (MRN [id number]) as of 6/13/2017 10:34   Ref. Range 6/9/2017 01:59 6/9/2017 08:08 6/9/2017 11:44 6/9/2017 15:30 6/9/2017 16:04   GLUCOSE,FAST - POC Latest Ref Range: 65 - 100 mg/dL  147 (H) 335 (H)  300 (H)   WBC Latest Ref Range: 3.6 - 11.0 K/uL 4.5   5.2    RBC Latest Ref Range: 3.80 - 5.20 M/uL 3.14 (L)   3.46 (L)    HGB Latest Ref Range: 11.5 - 16.0 g/dL 9.7 (L)   10.6 (L)    HCT Latest Ref Range: 35.0 - 47.0 % 29.2 (L)   32.0 (L)    MCV Latest Ref Range: 80.0 - 99.0 FL 93.0   92.5    MCH Latest Ref Range: 26.0 - 34.0 PG 30.9   30.6    MCHC Latest Ref Range: 30.0 - 36.5 g/dL 33.2   33.1    RDW Latest Ref Range: 11.5 - 14.5 % 13.6   13.5    PLATELET Latest Ref Range: 150 - 400 K/uL 133 (L)   159    NEUTROPHILS Latest Ref Range: 32 - 75 % 71       LYMPHOCYTES Latest Ref Range: 12 - 49 % 17       MONOCYTES Latest Ref Range: 5 - 13 % 11       EOSINOPHILS Latest Ref Range: 0 - 7 % 1       BASOPHILS Latest Ref Range: 0 - 1 % 0       ABS. NEUTROPHILS Latest Ref Range: 1.8 - 8.0 K/UL 3.2       ABS.  LYMPHOCYTES Latest Ref Range: 0.8 - 3.5 K/UL 0.8       ABS. MONOCYTES Latest Ref Range: 0.0 - 1.0 K/UL 0.5       ABS. EOSINOPHILS Latest Ref Range: 0.0 - 0.4 K/UL 0.0       ABS. BASOPHILS Latest Ref Range: 0.0 - 0.1 K/UL 0.0       Sodium Latest Ref Range: 136 - 145 mmol/L 142       Potassium Latest Ref Range: 3.5 - 5.1 mmol/L 3.6       Chloride Latest Ref Range: 97 - 108 mmol/L 102       CO2 Latest Ref Range: 21 - 32 mmol/L 36 (H)       Anion gap Latest Ref Range: 5 - 15 mmol/L 4 (L)       Glucose Latest Ref Range: 65 - 100 mg/dL 113 (H)       BUN Latest Ref Range: 6 - 20 MG/DL 18       Creatinine Latest Ref Range: 0.55 - 1.02 MG/DL 0.87       BUN/Creatinine ratio Latest Ref Range: 12 - 20   21 (H)       Calcium Latest Ref Range: 8.5 - 10.1 MG/DL 7.7 (L)       GFR est non-AA Latest Ref Range: >60 ml/min/1.73m2 >60       GFR est AA Latest Ref Range: >60 ml/min/1.73m2 >60       DIGOXIN Latest Ref Range: 0.90 - 2.00 NG/ML    1.3        Medication Reconciliation completed: yes     Support System consists of: family    [facility] if so what agency? Yes. At [address]    NN remains available to patient and family.

## 2017-06-13 NOTE — TELEPHONE ENCOUNTER
Catina Lockett[address]/[phone]/had her home health PT today. Will see twice a week for 4 weeks.

## 2017-06-16 NOTE — MR AVS SNAPSHOT
Visit Information Date & Time Provider Department Dept. Phone Encounter #  
 6/16/2017  9:30 AM George Lora [id number] Your Appointments 8/1/2017  9:00 AM  
ROUTINE CARE with Jennifer Vaughn MD  
[facility]) Appt Note: 3 mntn fu /Diabetes/COPD/HTn  
 [address]   
[phone]  
  
   
 [address] [address]  
  
    
 11/10/2017  2:00 PM  
ECHO CARDIOGRAMS 2D with ECHO, STTONYAIS  
[facility] SCH[facility]) Appt Note: 6 mo fup echo at 2 at 3 20 dr Kar Kc [address] [place] [address] [facility] [id number]  
[phone]  
  
   
 [address] [facility] [address]  
  
    
 11/10/2017  3:20 PM  
ESTABLISHED PATIENT with Mohsen Rizo MD  
[facility] Appt Note: 6 mo fup  
 [address] [place] [address] [facility]  
[address] [place] [address] [address] Upcoming Health Maintenance Date Due DTaP/Tdap/Td series (1 - Tdap) 12/17/1959 ZOSTER VACCINE AGE 60> 12/17/1998 Pneumococcal 65+ Low/Medium Risk (1 of 2 - PCV13) 12/17/2003 EYE EXAM RETINAL OR DILATED Q1 2/9/2017 INFLUENZA AGE 9 TO ADULT 8/1/2017 MEDICARE YEARLY EXAM 10/26/2017 HEMOGLOBIN A1C Q6M 10/26/2017 FOOT EXAM Q1 10/27/2017 GLAUCOMA SCREENING Q2Y 2/9/2018 LIPID PANEL Q1 5/1/2018 Allergies as of 6/16/2017  Review Complete On: 6/16/2017 By: Jeannie Gabriel LPN No Known Allergies Current Immunizations  Reviewed on 8/5/2016 Name Date Influenza Vaccine 10/22/2016, 10/16/2015, 1/2/2015, 11/15/2013, 12/7/2012 Not reviewed this visit You Were Diagnosed With   
  
 Codes Comments Urinary tract infection without hematuria, site unspecified    -  Primary ICD-10-CM: N39.0 ICD-9-CM: 599.0 Hypovolemia     ICD-10-CM: E86.1 ICD-9-CM: 276.52 Chronic diastolic heart failure (HCC)     ICD-10-CM: I50.32 
ICD-9-CM: 428.32 S/P AVR     ICD-10-CM: Z95.2 ICD-9-CM: V43.3 Vitals BP Pulse Temp Resp Height(growth percentile) Weight(growth percentile) 129/69 (BP 1 Location: Left arm, BP Patient Position: Sitting) 89 97.3 °F (36.3 °C) (Oral) 26 5' 2\" (1.575 m) 139 lb 9.6 oz (63.3 kg) SpO2 BMI OB Status Smoking Status 92% 25.53 kg/m2 Postmenopausal Never Smoker Vitals History BMI and BSA Data Body Mass Index Body Surface Area 25.53 kg/m 2 1.66 m 2 Preferred Pharmacy Pharmacy Name Phone [address] [facility] [place] [address] [facility] [address] [facility] ([address] [phone] Your Updated Medication List  
  
   
This list is accurate as of: 6/16/17 10:10 AM.  Always use your most recent med list.  
  
  
  
  
 * albuterol 90 mcg/actuation inhaler Commonly known as:  PROVENTIL HFA, VENTOLIN HFA, PROAIR HFA Take 1 Puff by inhalation three (3) times daily as needed for Wheezing. * albuterol 2.5 mg /3 mL (0.083 %) nebulizer solution Commonly known as:  PROVENTIL VENTOLIN  
3 mL by Nebulization route three (3) times daily. aspirin delayed-release 81 mg tablet Take 81 mg by mouth daily. atorvastatin 20 mg tablet Commonly known as:  LIPITOR Take 1 Tab by mouth nightly. BROVANA 15 mcg/2 mL Nebu neb solution Generic drug:  arformoterol 15 mcg by Nebulization route two (2) times a day. digoxin 0.125 mg tablet Commonly known as:  LANOXIN Take 1 Tab by mouth nightly. dilTIAZem  mg ER capsule Commonly known as:  CARDIZEM CD Take 1 Cap by mouth nightly. ESBRIET 267 mg Cap capsule Generic drug:  pirfenidone Take 3 Caps by mouth three (3) times daily. Indications: PULMONARY FIBROSIS  
  
 ferrous sulfate 325 mg (65 mg iron) EC tablet Commonly known as:  IRON Take 325 mg by mouth daily. FREESTYLE FREEDOM LITE monitoring kit Generic drug:  Blood-Glucose Meter FOLLOW PACKAGE DIRECTIONS  
  
 furosemide 20 mg tablet Commonly known as:  LASIX  
1 tabs in AM and pm  
  
 glucose blood VI test strips strip Commonly known as:  FREESTYLE LITE STRIPS  
TEST BLOOD SUGAR TWO TIMES DAILY E11.9 Lancets Misc Commonly known as:  FREESTYLE LANCETS  
TEST BLOOD SUGAR THREE TIMES DAILY E11.9  
  
 metFORMIN 500 mg tablet Commonly known as:  GLUCOPHAGE Take 1 Tab by mouth three (3) times daily (with meals). nitrofurantoin 50 mg capsule Commonly known as:  MACRODANTIN Take 1 Cap by mouth nightly. OXYGEN TUBING  
2 L by Does Not Apply route. potassium 99 mg tablet Take 99 mg by mouth two (2) times a day. predniSONE 5 mg tablet Commonly known as:  Jolyne Crea Take 10 mg by mouth daily. rivaroxaban 15 mg Tab tablet Commonly known as:  [place] Take 1 Tab by mouth daily  (with dinner). * SPIRIVA RESPIMAT 2.5 mcg/actuation inhaler Generic drug:  tiotropium bromide Take 2 Puffs by inhalation daily. * SPIRIVA RESPIMAT 1.25 mcg/actuation inhaler Generic drug:  tiotropium bromide * Notice: This list has 4 medication(s) that are the same as other medications prescribed for you. Read the directions carefully, and ask your doctor or other care provider to review them with you. Patient Instructions Learning About Heart Failure Zones What are heart failure zones? Heart failure zones give you an easy way to see changes in your heart failure symptoms. They also tell you when you need to get help. Check every day to see which zone you are in. Green zone. You are doing well. This is where you want to be. · Your weight is stable. This means it is not going up or down. · You breathe easily. · You are sleeping well. You are able to lie flat without shortness of breath. · You can do your usual activities. Yellow zone. Be careful. Your symptoms are changing. Call your doctor. · You have new or increased shortness of breath. · You are dizzy or lightheaded, or you feel like you may faint. · You have sudden weight gain, such as 3 pounds or more in 2 to 3 days. · You have increased swelling in your legs, ankles, or feet. · You are so tired or weak that you cannot do your usual activities. · You are not sleeping well. Shortness of breath wakes you up at night. You need extra pillows. Your doctor's name: ____________________________________________________________ Your doctor's contact information: _________________________________________________ Red zone. This is an emergency. Call 911. You have symptoms of sudden heart failure, such as: 
· You have severe trouble breathing. · You cough up pink, foamy mucus. · You have a new irregular or fast heartbeat. You have symptoms of a heart attack. These may include: · Chest pain or pressure, or a strange feeling in the chest. 
· Sweating. · Shortness of breath. · Nausea or vomiting. · Pain, pressure, or a strange feeling in the back, neck, jaw, or upper belly or in one or both shoulders or arms. · Lightheadedness or sudden weakness. · A fast or irregular heartbeat. If you have symptoms of a heart attack: After you call 911, the  may tell you to chew 1 adult-strength or 2 to 4 low-dose aspirin. Wait for an ambulance. Do not try to drive yourself. Follow-up care is a key part of your treatment and safety. Be sure to make and go to all appointments, and call your doctor if you are having problems. It's also a good idea to know your test results and keep a list of the medicines you take. Where can you learn more? Go to http://lul-ben.info/. Enter T174 in the search box to learn more about \"Learning About Heart Failure Zones. \" Current as of: January 27, 2016 Content Version: 11.2 © 2153-4296 [employer]. Care instructions adapted under license by [employer] (which disclaims liability or warranty for this information). If you have questions about a medical condition or this instruction, always ask your healthcare professional. [address] any warranty or liability for your use of this information. Introducing [facility]! Brynn Mejía introduces Go[employer] Accessories patient portal. Now you can access parts of your medical record, email your doctor's office, and request medication refills online. 1. In your internet browser, go to [email or web address] 2. Click on the First Time User? Click Here link in the Sign In box. You will see the New Member Sign Up page. 3. Enter your [employer] Access[employer] Access Code exactly as it appears below. You will not need to use this code after youve completed the sign-up process. If you do not sign up before the expiration date, you must request a new code. · [employer] Access Code: [email or web address] Expires: 7/25/2017  9:12 AM 
 
4. Enter the last four digits of your Social Security Number (xxxx) and Date of Birth (mm/dd/yyyy) as indicated and click Submit. You will be taken to the next sign-up page. 5. Create a [employer] Access[employer] ID. This will be your [employer] Access[employer] login ID and cannot be changed, so think of one that is secure and easy to remember. 6. Create a [employer] password. You can change your password at any time. 7. Enter your Password Reset Question and Answer. This can be used at a later time if you forget your password. 8. Enter your e-mail address. You will receive e-mail notification when new information is available in [address] 9. Click Sign Up. You can now view and download portions of your medical record. 10. Click the Download Summary menu link to download a portable copy of your medical information. If you have questions, please visit the Frequently Asked Questions section of the Allovue website. Remember, All[employer]ue is NOT to be used for urgent needs. For medical emergencies, dial 911. Now available from your iPhone and Android! Please provide this summary of care documentation to your next provider. Your primary care clinician is listed as Charlette Curry. If you have any questions after today's visit, please call [phone].

## 2017-06-16 NOTE — PATIENT INSTRUCTIONS
Learning About Heart Failure Zones  What are heart failure zones? Heart failure zones give you an easy way to see changes in your heart failure symptoms. They also tell you when you need to get help. Check every day to see which zone you are in. Green zone. You are doing well. This is where you want to be. · Your weight is stable. This means it is not going up or down. · You breathe easily. · You are sleeping well. You are able to lie flat without shortness of breath. · You can do your usual activities. Yellow zone. Be careful. Your symptoms are changing. Call your doctor. · You have new or increased shortness of breath. · You are dizzy or lightheaded, or you feel like you may faint. · You have sudden weight gain, such as 3 pounds or more in 2 to 3 days. · You have increased swelling in your legs, ankles, or feet. · You are so tired or weak that you cannot do your usual activities. · You are not sleeping well. Shortness of breath wakes you up at night. You need extra pillows. Your doctor's name: ____________________________________________________________  Your doctor's contact information: _________________________________________________  Red zone. This is an emergency. Call 911. You have symptoms of sudden heart failure, such as:  · You have severe trouble breathing. · You cough up pink, foamy mucus. · You have a new irregular or fast heartbeat. You have symptoms of a heart attack. These may include:  · Chest pain or pressure, or a strange feeling in the chest.  · Sweating. · Shortness of breath. · Nausea or vomiting. · Pain, pressure, or a strange feeling in the back, neck, jaw, or upper belly or in one or both shoulders or arms. · Lightheadedness or sudden weakness. · A fast or irregular heartbeat. If you have symptoms of a heart attack: After you call 911, the  may tell you to chew 1 adult-strength or 2 to 4 low-dose aspirin. Wait for an ambulance.  Do not try to drive yourself. Follow-up care is a key part of your treatment and safety. Be sure to make and go to all appointments, and call your doctor if you are having problems. It's also a good idea to know your test results and keep a list of the medicines you take. Where can you learn more? Go to http://lul-ebn.info/. Enter T174 in the search box to learn more about \"Learning About Heart Failure Zones. \"  Current as of: January 27, 2016  Content Version: 11.2  © 2466-8431 [employer]wise, Incorporated. Care instructions adapted under license by [employer] (which disclaims liability or warranty for this information). If you have questions about a medical condition or this instruction, always ask your healthcare professional. [address] any warranty or liability for your use of this information.

## 2017-06-16 NOTE — PROGRESS NOTES
Chief Complaint   Patient presents with   [facility] Follow Up     Body mass index is 25.53 kg/(m^2).     Reviewed record in preparation for visit and have necessary documentation  Pt did not bring medication to office visit for review  Information was given to pt on Advanced Directives, Living Will  Information was given on Shingles Vaccine  Opportunity was given for questions  Goals that were addressed and/or need to be completed after this appointment include:     Health Maintenance Due   Topic Date Due    DTaP/Tdap/Td series (1 - Tdap) 12/17/1959    ZOSTER VACCINE AGE 60>  12/17/1998    Pneumococcal 65+ Low/Medium Risk (1 of 2 - PCV13) 12/17/2003    EYE EXAM RETINAL OR DILATED Q1  02/09/2017

## 2017-06-16 NOTE — PROGRESS NOTES
Transition of Care Visit    Patient: Suzie Malloy MRN: [id number]  SSN: [id number]    YOB: 1940  Age: 64 y.o. Sex: female      Hospital:  Dates of admission:  Discharge diagnoses:  State of health at discharge:  Surgical or invasive procedures done:    [facility], 6/7-6/8, AMS 2/2 UTI   Hospitalist: Dr. Juana Shea    \"NNTOCIP [facility] 6/7-6/9/2017 Dehydration   TRANSITIONS OF CARE NOTE      RRAT score: 20 Medium   Please note functional assessment. NN spoke with patient's daughter, Taisha Rodriguez, who is listed on HIPPA. Daughter states \"She's doing okay. She's better then she was I guess\". Patient is currently living with son and daughter in law. Home health set up with [facility]. Per daughter in law, the earliest she can bring patient in for [facility] visit is 6/16/2017 due to her job. NN went over red flags in detail with daughter in law who verbalized understanding of when to seek immediate medical attention. Red flags/sepsis: fever or below normal temperature (97f), chills, nausea, vomiting, diarrhea, chest pain, shortness of breath, unable to take medications, unusual sensations, and BFAST. \"      Amount and/or Complexity of Data Reviewed:   Clinical lab tests: Reviewed or ordered   Tests in the radiology section: reviewed or ordered  Discussion of test results with the patient-yes  Obtain previous medical records or obtain history from someone other than the patient: No  Obtain history from someone other than the patient: no  Review and address past medical records: yes  Discuss the patient with another provider: no  Independant visualization of image, tracing, or specimen: yes     Risk of Significant Complications, Morbidity, and/or Mortality:   Presenting problems: High   Diagnostic procedures: Moderate   Management options:  Moderate     Transition of Care time spent:   Total time providing care and documentation: 40-60 minutes   Progress at discharge:   Stable on Discharge      Progress Note    Patient: Nakia Martinez MRN: [id number]  SSN: [id number]    YOB: 1940  Age: 64 y.o. Sex: female        Chief Complaint   Patient presents with   [facility] Follow Up         Subjective:     Encounter Diagnoses   Name Primary?  Urinary tract infection without hematuria, site unspecified Yes    Hypovolemia     Chronic diastolic heart failure (HCC)     S/P AVR        I personally reviewed the discharge summary, labs and final urine culture (negative). i also discussed patient with and reviewed NN notations. In brief she was admitted for UTI and dehydration. She has frequent UTI and is on macrobid suppressive therapy. Initial Urine Culture was pan sensitive to all except MacroBID. Repeat culture is negative. She has multiple other chronic conditions including AF s/p AVR, HFpEF, HLD, DM, Pulm Fibrosis on home O2. She was discharge in improved condition and was resumed on her home medications. Since discharge she appears very dry to the daughter and she continues to void clear urine frequently and in large volume throughout the day. Patient has moved in with son and daughter who have a better idea of her confusion and state she is improved. ROS complete below. Current and past medical information:    Current Medications after this visit[de-identified]     Current Outpatient Prescriptions   Medication Sig    furosemide (LASIX) 20 mg tablet 1 tabs in AM and pm    albuterol (PROVENTIL HFA, VENTOLIN HFA, PROAIR HFA) 90 mcg/actuation inhaler Take 1 Puff by inhalation three (3) times daily as needed for Wheezing.  aspirin delayed-release 81 mg tablet Take 81 mg by mouth daily.  potassium 99 mg tablet Take 99 mg by mouth two (2) times a day.  predniSONE (DELTASONE) 5 mg tablet Take 10 mg by mouth daily.  digoxin (LANOXIN) 0.125 mg tablet Take 1 Tab by mouth nightly.  dilTIAZem CD (CARDIZEM CD) 240 mg ER capsule Take 1 Cap by mouth nightly.     metFORMIN (GLUCOPHAGE) 500 mg tablet Take 1 Tab by mouth three (3) times daily (with meals).  glucose blood VI test strips (FREESTYLE LITE STRIPS) strip TEST BLOOD SUGAR TWO TIMES DAILY E11.9    rivaroxaban (XARELTO) 15 mg tab tablet Take 1 Tab by mouth daily  (with dinner).  Lancets (FREESTYLE LANCETS) misc TEST BLOOD SUGAR THREE TIMES DAILY E11.9    nitrofurantoin (MACRODANTIN) 50 mg capsule Take 1 Cap by mouth nightly.  atorvastatin (LIPITOR) 20 mg tablet Take 1 Tab by mouth nightly.  ferrous sulfate (IRON) 325 mg (65 mg iron) EC tablet Take 325 mg by mouth daily.  tiotropium bromide (SPIRIVA RESPIMAT) 2.5 mcg/actuation mist Take 2 Puffs by inhalation daily.  arformoterol (BROVANA) 15 mcg/2 mL nebu neb solution 15 mcg by Nebulization route two (2) times a day.  albuterol (PROVENTIL VENTOLIN) 2.5 mg /3 mL (0.083 %) nebulizer solution 3 mL by Nebulization route three (3) times daily.  pirfenidone (ESBRIET) 267 mg cap Take 3 Caps by mouth three (3) times daily. Indications: PULMONARY FIBROSIS    FREESTYLE FREEDOM LITE monitoring kit FOLLOW PACKAGE DIRECTIONS    MISCELLANEOUS MEDICAL SUPPLY (OXYGEN TUBING) 2 L by Does Not Apply route.  SPIRIVA RESPIMAT 1.25 mcg/actuation inhaler      No current facility-administered medications for this visit.         Patient Active Problem List    Diagnosis Date Noted    Dizziness 06/09/2017    Dehydration 06/07/2017    S/P AVR 11/06/2016    DM II (diabetes mellitus, type II), controlled ([facility]) 12/08/2015    RBBB 10/16/2015    Frozen shoulder 06/25/2014    Dyslipidemia 03/21/2014    Chronic diastolic heart failure ([facility]) 05/29/2012    Pulmonary fibrosis ([facility]) 05/15/2012    Atrial fibrillation ([facility]) 05/10/2012    COPD (chronic obstructive pulmonary disease) ([facility]) 03/16/2012    History of noncompliance with medical treatment 09/13/2011    Arthritis 01/14/2011    Hypertension     Depression     Female stress incontinence     CAD (coronary artery disease)     Aortic stenosis     Sleep apnea        Past Medical History:   Diagnosis Date    Aortic stenosis     severe, s/p AVR March 2012 - presented with HF, EF 30%    Asthma 5/10/2012    Atrial fibrillation ([facility] 5/10/2012    Bacterial pneumonia, unspecified     CAD (coronary artery disease)     cor calcifications (LAD) by CT     Chronic diastolic heart failure ([facility] 5/29/2012    Depression     Dyslipidemia     Female stress incontinence     Hyperlipidemia 9/17/2010    Hypertension     Osteoarthritis     Paroxysmal atrial fibrillation (HCC)     post op AVR    Pulmonary fibrosis ([facility]     S/P AVR 11/6/2016    S/P AVR (aortic valve replacement) 3/2012    bovine ([facility])    Sleep apnea     T2DM (type 2 diabetes mellitus) [facility] 1/14/2011       No Known Allergies    Past Surgical History:   Procedure Laterality Date    CARDIAC SURG PROCEDURE UNLIST      ECHO 2D ADULT  3/2012    EF 30%, mod LVH, EF 30-35%, PA 50 mmHg    HX CATARACT REMOVAL      bilateral       Social History     Social History    Marital status:      Spouse name: N/A    Number of children: N/A    Years of education: N/A     Social History Main Topics    Smoking status: Never Smoker    Smokeless tobacco: Never Used    Alcohol use No    Drug use: No    Sexual activity: Not Asked     Other Topics Concern    None     Social History Narrative       Review of Systems   She appears to be back to baseline. Constitutional: Negative. Negative for fever, chills, weight loss, malaise/fatigue and diaphoresis. HENT: Negative. Negative for hearing loss, ear pain, nosebleeds, congestion, sore throat, neck pain, tinnitus and ear discharge. Eyes: Negative. Negative for blurred vision, double vision, photophobia, pain, discharge and redness. Respiratory: Negative. Negative for cough, hemoptysis, sputum production, shortness of breath, or wheezing. Cardiovascular: Negative. Negative for chest pain, palpitations.    Gastrointestinal: Negative. Negative for heartburn, nausea, vomiting, abdominal pain, diarrhea, constipation, blood in stool and melena. Genitourinary: Negative. Negative for dysuria, urgency, frequency, hematuria. Musculoskeletal: Negative. Negative for myalgias, back pain, joint pain and falls. Skin: Negative. Negative for rash. Neurological: Negative. Negative for dizziness, tingling, tremors, sensory change, speech change, focal weakness, seizures weakness and headaches. Endo/Heme/Allergies: Negative. Negative for environmental allergies and polydipsia. Does not bruise/bleed easily. Psychiatric/Behavioral: Negative. Negative for depression, suicidal ideas, hallucinations, memory loss. Objective:     Vitals:    06/16/17 0936   BP: 129/69   Pulse: 89   Resp: 26   Temp: 97.3 °F (36.3 °C)   TempSrc: Oral   SpO2: 92%   Weight: 139 lb 9.6 oz (63.3 kg)   Height: 5' 2\" (1.575 m)      Body mass index is 25.53 kg/(m^2). Physical Exam   Nursing note reviewed. Constitutional: Oriented to person, place, and time. Appears well-developed and well-nourished. No distress. HENT: Oropharynx normal, no adenopathy. Head: Normocephalic and atraumatic. Eyes: Conjunctivae are normal. Pupils are equal, round. No scleral icterus. Neck: Normal range of motion. Neck supple. No JVD present. No tracheal deviation present. No thyromegaly present. No carotid bruit. Cardiovascular: Normal rate, Irregular rhythm 2/6 systolic flow murmur,  Exam reveals no gallop and no friction rub. No murmur heard. Pulmonary/Chest: Effort normal and breath sounds normal. Has no wheezes. Has no rales. Abdominal: Soft. Bowel sounds are normal. No distension. There is no tenderness. There is no rebound and no guarding. Musculoskeletal: Exhibits no edema. Neurological: The patient is alert and oriented to person, place, and time. No tremor. Skin: Skin is warm and dry. No rash noted. Not diaphoretic.    Psychiatric:  Has a normal mood and affect. Behavior is normal. Judgment and thought content normal      Health Maintenance Due   Topic Date Due    DTaP/Tdap/Td series (1 - Tdap) 12/17/1959    ZOSTER VACCINE AGE 60>  12/17/1998    Pneumococcal 65+ Low/Medium Risk (1 of 2 - PCV13) 12/17/2003    EYE EXAM RETINAL OR DILATED Q1  02/09/2017       Assessment and orders:       ICD-10-CM ICD-9-CM    1. Urinary tract infection without hematuria, site unspecified N39.0 599.0    2. Hypovolemia E86.1 276.52    3. Chronic diastolic heart failure (HCC) I50.32 428.32 furosemide (LASIX) 20 mg tablet   4. S/P AVR Z95.2 V43.3      We discussed her diuretic therapy in detail. Her urinary symptoms have resolved and she continues to have large volume diuresis. I will cut the dose in half of her lasix and advised her to continue daily weights, which she is compliant with now. If >3# in 1 day or >5# in 1 week she is to call our office for increased dosage recommendation. She has continued with the macroBID, it was continued on the med rec and in the notes says to discontinue. May consider switching to an alternative prophylactic medication if she has a 2nd failure of suppression therapy. Plan of care:  Discussed diagnoses in detail with patient. Medication risks/benefits/side effects discussed with patient. All of the patient's questions were addressed. The patient understands and agrees with our plan of care. The patient knows to call back if they are unsure of or forget any changes we discussed today or if the symptoms change. The patient received an After-Visit Summary which contains VS, orders, medication list and allergy list. This can be used as a \"mini-medical record\" should they have to seek medical care while out of town.     Follow-up Disposition: Not on File    Future Appointments  Date Time Provider Gwendolyn Christensen   8/1/2017 9:00 AM Nahomi Solorzano MD [place] [id number] [facility]   11/10/2017 2:00 PM ECHO, [place] 11/10/2017 3:20 PM Antonieta Rodriguez MD [address], DO  PGY-3, SFFP    Discussed and to be seen with Dr. Shaji Alegria to follow.

## 2017-06-19 NOTE — PROGRESS NOTES
I saw and evaluated the patient, performing the key elements of the service. I discussed the findings, assessment and plan with the resident and agree with the resident's findings and plan as documented in the resident's note.     Gracie Rayo MD

## 2017-07-03 NOTE — TELEPHONE ENCOUNTER
Received a call from [place] with [facility] regarding humidifier bottle for oxygen concentrator. States patient has been having a dry nose with bleeding occasionally. Would prefer script printed and faxed to [facility] in [place].

## 2017-07-17 NOTE — TELEPHONE ENCOUNTER
Pt gained 2 lbs yesterday morning. It went down 1.4 tenths. She has a cough going on with it now. Please call Pt to let her know what to do about the fluids. With her history need to know what to do. Need a chest x-ray. Did not want to take her to the ER because she always catches something. Can she get an order for chest x-ray? Please call this morning.

## 2017-07-17 NOTE — TELEPHONE ENCOUNTER
Spoke with patient's daughter in law and advised patient needs appointment to be seen for further evaluation. Appointment scheduled for 7/18/17 at 3:00 pm with Dr. Siddhartha Sosa. DIL expressed understanding.

## 2017-07-18 NOTE — PROGRESS NOTES
Reviewed record in preparation for visit and have necessary documentation  Pt did not bring medication to office visit for review  Opportunity was given for questions  Goals that were addressed and/or need to be completed after this appointment include \  Health Maintenance Due   Topic Date Due    DTaP/Tdap/Td series (1 - Tdap) 12/17/1959    ZOSTER VACCINE AGE 60>  12/17/1998    Pneumococcal 65+ Low/Medium Risk (1 of 2 - PCV13) 12/17/2003    EYE EXAM RETINAL OR DILATED Q1  02/09/2017

## 2017-07-18 NOTE — MR AVS SNAPSHOT
Visit Information Date & Time Provider Department Dept. Phone Encounter #  
 7/18/2017  3:00 PM Gisell Tamayo MD [facility] [id number] Your Appointments 8/1/2017  9:00 AM  
ROUTINE CARE with Gisell Tamayo MD  
[address] [facility] [address] [place] [address] Road) Appt Note: 3 mntn fu /Diabetes/COPD/HTn  
 [address]   
[phone]  
  
   
 [address] [address]  
  
    
 10/27/2017  9:20 AM  
Medicare Physical with Gisell Tamayo MD  
[facility] ([address]) Appt Note: -letter mailed [address]   
[phone]  
  
   
 [address] [address]  
  
    
 11/10/2017  2:00 PM  
ECHO CARDIOGRAMS 2D with ECHO, STFRANCIS  
[facility] ([facility]) Appt Note: 6 mo fup echo at 2 at 3 20 dr Eddie Jain [address] [place] [address] [facility] [address]  
[phone]  
  
   
 [address] [facility] [address]  
  
    
 11/10/2017  3:20 PM  
ESTABLISHED PATIENT with Jeremiah Saha MD  
[facility] ([address] [place] [address] Road) Appt Note: 6 mo fup  
 [address] [facility]  
[address] [facility] [place] [address] [address] Upcoming Health Maintenance Date Due DTaP/Tdap/Td series (1 - Tdap) 12/17/1959 ZOSTER VACCINE AGE 60> 12/17/1998 Pneumococcal 65+ Low/Medium Risk (1 of 2 - PCV13) 12/17/2003 EYE EXAM RETINAL OR DILATED Q1 2/9/2017 INFLUENZA AGE 9 TO ADULT 8/1/2017 MEDICARE YEARLY EXAM 10/26/2017 HEMOGLOBIN A1C Q6M 10/26/2017 FOOT EXAM Q1 10/27/2017 GLAUCOMA SCREENING Q2Y 2/9/2018 LIPID PANEL Q1 5/1/2018 Allergies as of 7/18/2017  Review Complete On: 7/18/2017 By: Christiano Bearden LPN  
 No Known Allergies Current Immunizations  Reviewed on 8/5/2016 Name Date Influenza Vaccine 10/22/2016, 10/16/2015, 1/2/2015, 11/15/2013, 12/7/2012 Not reviewed this visit You Were Diagnosed With   
  
 Codes Comments Pulmonary fibrosis ([facility]    -  Primary ICD-10-CM: J84.10 ICD-9-CM: 380 Cough     ICD-10-CM: R05 ICD-9-CM: 992. 2 Chronic obstructive pulmonary disease, unspecified COPD type ([facility]     ICD-10-CM: J44.9 ICD-9-CM: 805 Vitals BP Pulse Temp Resp Height(growth percentile) Weight(growth percentile) 116/72 (!) 109 97.5 °F (36.4 °C) (Oral) 16 5' 2\" (1.575 m) 134 lb (60.8 kg) SpO2 BMI OB Status Smoking Status 94% 24.51 kg/m2 Postmenopausal Never Smoker Vitals History BMI and BSA Data Body Mass Index Body Surface Area 24.51 kg/m 2 1.63 m 2 Preferred Pharmacy Pharmacy Name Phone [address] [place] [address], [address] [place] [address] [address] Mary Kay Aguilera Your Updated Medication List  
  
   
This list is accurate as of: 7/18/17  3:52 PM.  Always use your most recent med list.  
  
  
  
  
 * albuterol 90 mcg/actuation inhaler Commonly known as:  PROVENTIL HFA, VENTOLIN HFA, PROAIR HFA Take 1 Puff by inhalation three (3) times daily as needed for Wheezing. * albuterol 2.5 mg /3 mL (0.083 %) nebulizer solution Commonly known as:  PROVENTIL VENTOLIN  
3 mL by Nebulization route three (3) times daily. aspirin delayed-release 81 mg tablet Take 81 mg by mouth daily. atorvastatin 20 mg tablet Commonly known as:  LIPITOR Take 1 tablet by mouth  nightly BROVANA 15 mcg/2 mL Nebu neb solution Generic drug:  arformoterol 15 mcg by Nebulization route two (2) times a day. digoxin 0.125 mg tablet Commonly known as:  LANOXIN Take 1 Tab by mouth nightly. dilTIAZem  mg ER capsule Commonly known as:  CARDIZEM CD Take 1 Cap by mouth nightly. ESBRIET 267 mg Cap capsule Generic drug:  pirfenidone Take 3 Caps by mouth three (3) times daily. Indications: PULMONARY FIBROSIS  
  
 ferrous sulfate 325 mg (65 mg iron) EC tablet Commonly known as:  IRON Take 325 mg by mouth daily. FREESTYLE FREEDOM LITE monitoring kit Generic drug:  Blood-Glucose Meter FOLLOW PACKAGE DIRECTIONS  
  
 furosemide 20 mg tablet Commonly known as:  LASIX  
1 tabs in AM and pm  
  
 glucose blood VI test strips strip Commonly known as:  FREESTYLE LITE STRIPS  
TEST BLOOD SUGAR TWO TIMES DAILY E11.9  
  
 guaiFENesin-dextromethorphan -30 mg per tablet Commonly known as:  [place] [address] DM Take 1 Tab by mouth two (2) times a day. Lancets Misc Commonly known as:  FREESTYLE LANCETS  
TEST BLOOD SUGAR THREE TIMES DAILY E11.9  
  
 metFORMIN 500 mg tablet Commonly known as:  GLUCOPHAGE Take 1 Tab by mouth three (3) times daily (with meals). nitrofurantoin 50 mg capsule Commonly known as:  MACRODANTIN Take 1 Cap by mouth nightly. OXYGEN TUBING  
2 L by Does Not Apply route. potassium 99 mg tablet Take 99 mg by mouth two (2) times a day. predniSONE 5 mg tablet Commonly known as:  Donnell Clancy Take 10 mg by mouth daily. rivaroxaban 15 mg Tab tablet Commonly known as:  Rosa Wilsonach Take 1 Tab by mouth daily  (with dinner). * SPIRIVA RESPIMAT 2.5 mcg/actuation inhaler Generic drug:  tiotropium bromide Take 2 Puffs by inhalation daily. * SPIRIVA RESPIMAT 1.25 mcg/actuation inhaler Generic drug:  tiotropium bromide * Notice: This list has 4 medication(s) that are the same as other medications prescribed for you. Read the directions carefully, and ask your doctor or other care provider to review them with you. Introducing [facility]!    
 [facility] introduces [employer] patient portal. Now you can access parts of your medical record, email your doctor's office, and request medication refills online. 1. In your internet browser, go to [email or web address] 2. Click on the First Time User? Click Here link in the Sign In box. You will see the New Member Sign Up page. 3. Enter your Klique Access Code exactly as it appears below. You will not need to use this code after youve completed the sign-up process. If you do not sign up before the expiration date, you must request a new code. · [employer]lique Access Code: [email or web address] Expires: 7/25/2017  9:12 AM 
 
4. Enter the last four digits of your Social Security Number (xxxx) and Date of Birth (mm/dd/yyyy) as indicated and click Submit. You will be taken to the next sign-up page. 5. Create a Klique ID. This will be your Klique login ID and cannot be changed, so think of one that is secure and easy to remember. 6. Create a Klique password. You can change your password at any time. 7. Enter your Password Reset Question and Answer. This can be used at a later time if you forget your password. 8. Enter your e-mail address. You will receive e-mail notification when new information is available in [address] 9. Click Sign Up. You can now view and download portions of your medical record. 10. Click the Download Summary menu link to download a portable copy of your medical information. If you have questions, please visit the Frequently Asked Questions section of the K[employer] website. Remember, Klique is NOT to be used for urgent needs. For medical emergencies, dial 911. Now available from your iPhone and Android! Please provide this summary of care documentation to your next provider. Your primary care clinician is listed as Elliott Chavira. If you have any questions after today's visit, please call [phone].

## 2017-07-30 NOTE — PROGRESS NOTES
Progress Note    Patient: Simón Madrigal MRN: [id number]  SSN: [id number]    YOB: 1940  Age: 64 y.o. Sex: female        Chief Complaint   Patient presents with    Cough     home health suggest chest xray    Weight Gain     131.8 on July 15th, 133.4 on July 16th     Patient presents for follow up of chronic medical conditions. Patient with hx of DM2, HTN, CAD, HLD, Afib, and COPD. She complains of non-productive cough. Patient denies HA, dizziness, SOB, CP, abdominal pain, dysuria, myalgias or arthralgias. Patient with weight gain measured by [facility] nurse. In office there is a 5 lbs weight loss since last OV. Wt Readings from Last 3 Encounters:   07/18/17 134 lb (60.8 kg)   06/16/17 139 lb 9.6 oz (63.3 kg)   06/08/17 131 lb 9.8 oz (59.7 kg)     Body mass index is 24.51 kg/(m^2). Subjective:     Encounter Diagnoses   Name Primary?  Cough Yes    Chronic obstructive pulmonary disease, unspecified COPD type ([facility]     Pulmonary fibrosis ([facility]        Current and past medical information:    Current Medications after this visit[de-identified]     Current Outpatient Prescriptions   Medication Sig    guaiFENesin-dextromethorphan SR (MUCINEX DM) 600-30 mg per tablet Take 1 Tab by mouth two (2) times a day.  atorvastatin (LIPITOR) 20 mg tablet Take 1 tablet by mouth  nightly    SPIRIVA RESPIMAT 1.25 mcg/actuation inhaler     albuterol (PROVENTIL HFA, VENTOLIN HFA, PROAIR HFA) 90 mcg/actuation inhaler Take 1 Puff by inhalation three (3) times daily as needed for Wheezing.  aspirin delayed-release 81 mg tablet Take 81 mg by mouth daily.  potassium 99 mg tablet Take 99 mg by mouth two (2) times a day.  predniSONE (DELTASONE) 5 mg tablet Take 10 mg by mouth daily.  digoxin (LANOXIN) 0.125 mg tablet Take 1 Tab by mouth nightly.  dilTIAZem CD (CARDIZEM CD) 240 mg ER capsule Take 1 Cap by mouth nightly.  metFORMIN (GLUCOPHAGE) 500 mg tablet Take 1 Tab by mouth three (3) times daily (with meals).  glucose blood VI test strips (FREESTYLE LITE STRIPS) strip TEST BLOOD SUGAR TWO TIMES DAILY E11.9    rivaroxaban (XARELTO) 15 mg tab tablet Take 1 Tab by mouth daily  (with dinner).  Lancets (FREESTYLE LANCETS) misc TEST BLOOD SUGAR THREE TIMES DAILY E11.9    nitrofurantoin (MACRODANTIN) 50 mg capsule Take 1 Cap by mouth nightly.  ferrous sulfate (IRON) 325 mg (65 mg iron) EC tablet Take 325 mg by mouth daily.  tiotropium bromide (SPIRIVA RESPIMAT) 2.5 mcg/actuation mist Take 2 Puffs by inhalation daily.  arformoterol (BROVANA) 15 mcg/2 mL nebu neb solution 15 mcg by Nebulization route two (2) times a day.  albuterol (PROVENTIL VENTOLIN) 2.5 mg /3 mL (0.083 %) nebulizer solution 3 mL by Nebulization route three (3) times daily.  pirfenidone (ESBRIET) 267 mg cap Take 3 Caps by mouth three (3) times daily. Indications: PULMONARY FIBROSIS    FREESTYLE FREEDOM LITE monitoring kit FOLLOW PACKAGE DIRECTIONS    MISCELLANEOUS MEDICAL SUPPLY (OXYGEN TUBING) 2 L by Does Not Apply route.  furosemide (LASIX) 20 mg tablet Take 1 tablet twice a day. No current facility-administered medications for this visit.         Patient Active Problem List    Diagnosis Date Noted    Dizziness 06/09/2017    Dehydration 06/07/2017    S/P AVR 11/06/2016    DM II (diabetes mellitus, type II), controlled ([address] 12/08/2015    RBBB 10/16/2015    Frozen shoulder 06/25/2014    Dyslipidemia 03/21/2014    Chronic diastolic heart failure ([address] 05/29/2012    Pulmonary fibrosis ([address] 05/15/2012    Atrial fibrillation ([address] 05/10/2012    COPD (chronic obstructive pulmonary disease) ([address] 03/16/2012    History of noncompliance with medical treatment 09/13/2011    Arthritis 01/14/2011    Hypertension     Depression     Female stress incontinence     CAD (coronary artery disease)     Aortic stenosis     Sleep apnea        Past Medical History:   Diagnosis Date    Aortic stenosis     severe, s/p AVR March 2012 - presented with HF, EF 30%    Asthma 5/10/2012    At[facility] fibrillation ([facility] 5/10/2012    Bacterial pneumonia, unspecified     CAD (coronary artery disease)     cor calcifications (LAD) by CT     Chronic diastolic heart failure ([facility] 5/29/2012    Depression     Dyslipidemia     Female stress incontinence     Hyperlipidemia 9/17/2010    Hypertension     Osteoarthritis     Paroxysmal atrial fibrillation (HCC)     post op AVR    Pulmonary fibrosis [facility]     S/P AVR 11/6/2016    S/P AVR (aortic valve replacement) 3/2012    bovine ([facility])    Sleep apnea     T2DM (type 2 diabetes mellitus) ([facility] 1/14/2011       No Known Allergies    Past Surgical History:   Procedure Laterality Date    CARDIAC SURG PROCEDURE UNLIST      ECHO 2D ADULT  3/2012    EF 30%, mod LVH, EF 30-35%, PA 50 mmHg    HX CATARACT REMOVAL      bilateral       Social History     Social History    Marital status:      Spouse name: N/A    Number of children: N/A    Years of education: N/A     Social History Main Topics    Smoking status: Never Smoker    Smokeless tobacco: Never Used    Alcohol use No    Drug use: No    Sexual activity: Not Asked     Other Topics Concern    None     Social History Narrative         Objective:     Review of Systems:  Constitutional: Negative for fatigue or malaise  Skin: Negative for rash or lesion  Endo: Negative for unusual thirst or weight changes  HEENT: Negative for acute hearing or vision changes  Cardiovascular: Negative for dizziness, chest pain or palpitations  Respiratory: see HPI  Gastreintestinal: Negative for nausea or abdominal pain  Genital/urinary: Negative for dysuria or voiding dysfunction  Muscoloskeletal: Negative for myalgias or arthralgias   Neurological: Negative for headache, weakness or paresthesia  Psychological: Negative for depression or anxiety      Vitals:    07/18/17 1507   BP: 116/72   Pulse: (!) 109   Resp: 16   Temp: 97.5 °F (36.4 °C)   TempSrc: Oral   SpO2: 94%   Weight: 134 lb (60.8 kg)   Height: 5' 2\" (1.575 m)      Body mass index is 24.51 kg/(m^2). Physical Exam:  Constitutional: well developed, well nourished, in no acute distress  Skin: warm and dry sans rash or lesion  Head: normocephalic, atraumatic  Eyes: sclera clear, EOMI, PERRL  Oropharynx: moist mucous membranes sans lesion   Neck: normal range of motion, no lymphadenopathy  CV: normal S1, S2, regular rate and rhythm, no murmur  Respiratory: decreased air movement with symmetrical effort  Extremities: full range of motion, No edema  Neurology: normal strength and sensation  Psych: active, alert and oriented, affect appropriate     Health Maintenance Due   Topic Date Due    DTaP/Tdap/Td series (1 - Tdap) 12/17/1959    ZOSTER VACCINE AGE 60>  10/17/1998    Pneumococcal 65+ Low/Medium Risk (1 of 2 - PCV13) 12/17/2003    EYE EXAM RETINAL OR DILATED Q1  02/09/2017       Assessment and orders:       ICD-10-CM ICD-9-CM    1. Cough R05 786.2    2. Chronic obstructive pulmonary disease, unspecified COPD type ([facility] J44.9 496    3. Pulmonary fibrosis ([facility] J84.10 515          Plan of care:  Discussed diagnoses in detail with patient. Medication risks/benefits/side effects discussed with patient. All of the patient's questions were addressed. The patient understands and agrees with our plan of care. The patient knows to call back if they are unsure of or forget any changes we discussed today or if the symptoms change.      The patient received an After-Visit Summary which contains VS, orders, medication list and allergy list.     Patient Care Team:  Aurelio Ferrara MD as PCP - General (Family Practice)  Lance Batres RN as Nurse Abhay Aguirre MD (Pulmonary Disease)  Tarun Walton MD (Pulmonary Disease)  Gil Zhou MD (Ophthalmology)  Licha Melendez MD as Physician (Cardiology)  Amanda Castro RN as Ambulatory Care Navigator    Follow-up Disposition:  Return in about 2 weeks (around 8/1/2017), or if symptoms worsen or fail to improve.     Future Appointments  Date Time Provider Gwendolyn Amparo   8/8/2017 8:40 AM Haseeb Garcia MD [facility] [place] SCHED   10/27/2017 9:20 AM Haseeb Garcia MD [facility] [place] SCHED   11/10/2017 2:00 PM ECHO, [facility] [place] SCHED   11/10/2017 3:20 PM Albania Noble MD [address] [place] [address]       Signed By: Haseeb Garcia MD     July 30, 2017

## 2017-08-15 NOTE — PATIENT INSTRUCTIONS

## 2017-08-15 NOTE — MR AVS SNAPSHOT
Visit Information Date & Time Provider Department Dept. Phone Encounter #  
 8/15/2017  9:40 AM Karen Adams  [facility] [phone] [id number] Follow-up Instructions Return in about 3 months (around 11/15/2017), or if symptoms worsen or fail to improve. Your Appointments 10/27/2017  9:20 AM  
Medicare Physical with Karen Adams MD  
704 [facility] ([address]) Appt Note: -letter mailed [address] [facility]   
[phone]  
  
   
 [address] [address]  
  
    
 11/10/2017  2:00 PM  
ECHO CARDIOGRAMS 2D with ECHO, [facility]  
[facility] ([facility]) Appt Note: 6 mo fup echo at 2 at 3 20 dr Cecy Harrington [address] [place] [address] [facility] [address] [id number]  
[phone]  
  
   
 [address] [place] [address] [facility] [address]  
  
    
 11/10/2017  3:20 PM  
ESTABLISHED PATIENT with Pritesh Smith MD  
[facility] ([address]) Appt Note: 6 mo fup  
 [address] [place] [address] [facility]  
54 [facility] [place] [address] [address] Upcoming Health Maintenance Date Due DTaP/Tdap/Td series (1 - Tdap) 12/17/1959 ZOSTER VACCINE AGE 60> 10/17/1998 Pneumococcal 65+ Low/Medium Risk (1 of 2 - PCV13) 12/17/2003 EYE EXAM RETINAL OR DILATED Q1 2/9/2017 INFLUENZA AGE 9 TO ADULT 8/1/2017 MEDICARE YEARLY EXAM 10/26/2017 HEMOGLOBIN A1C Q6M 10/26/2017 FOOT EXAM Q1 10/27/2017 GLAUCOMA SCREENING Q2Y 2/9/2018 LIPID PANEL Q1 5/1/2018 Allergies as of 8/15/2017  Review Complete On: 8/15/2017 By: Anish Walsh LPN No Known Allergies Current Immunizations  Reviewed on 8/5/2016 Name Date Influenza Vaccine 10/22/2016, 10/16/2015, 1/2/2015, 11/15/2013, 12/7/2012 Not reviewed this visit You Were Diagnosed With   
  
 Codes Comments Controlled type 2 diabetes mellitus without complication, without long-term current use of insulin ([facility]    -  Primary ICD-10-CM: E11.9 ICD-9-CM: 250.00 Essential hypertension     ICD-10-CM: I10 
ICD-9-CM: 401.9 Chronic obstructive pulmonary disease, unspecified COPD type ([facility]     ICD-10-CM: J44.9 ICD-9-CM: 064 Dyslipidemia     ICD-10-CM: E78.5 ICD-9-CM: 272.4 Coronary artery disease involving native coronary artery of native heart without angina pectoris     ICD-10-CM: I25.10 ICD-9-CM: 414.01 Urinary tract infection without hematuria, site unspecified     ICD-10-CM: N39.0 ICD-9-CM: 599.0 Vitals BP Pulse Temp Resp Height(growth percentile) Weight(growth percentile) 110/68 (BP 1 Location: Right arm, BP Patient Position: Sitting) 90 97.1 °F (36.2 °C) (Oral) 20 5' 2\" (1.575 m) 134 lb (60.8 kg) SpO2 BMI OB Status Smoking Status 94% 24.51 kg/m2 Postmenopausal Never Smoker Vitals History BMI and BSA Data Body Mass Index Body Surface Area 24.51 kg/m 2 1.63 m 2 Preferred Pharmacy Pharmacy Name Phone [facility] [address] ([address] [phone] Your Updated Medication List  
  
   
This list is accurate as of: 8/15/17 10:38 AM.  Always use your most recent med list.  
  
  
  
  
 * albuterol 90 mcg/actuation inhaler Commonly known as:  PROVENTIL HFA, VENTOLIN HFA, PROAIR HFA Take 1 Puff by inhalation three (3) times daily as needed for Wheezing. * albuterol 2.5 mg /3 mL (0.083 %) nebulizer solution Commonly known as:  PROVENTIL VENTOLIN  
3 mL by Nebulization route three (3) times daily. aspirin delayed-release 81 mg tablet Take 81 mg by mouth daily. atorvastatin 20 mg tablet Commonly known as:  LIPITOR Take 1 tablet by mouth  nightly BROVANA 15 mcg/2 mL Nebu neb solution Generic drug:  arformoterol 15 mcg by Nebulization route two (2) times a day. * digoxin 0.125 mg tablet Commonly known as:  LANOXIN Take 1 Tab by mouth nightly. * DIGOX 0.125 mg tablet Generic drug:  digoxin Take 1 tablet by mouth  nightly * dilTIAZem  mg ER capsule Commonly known as:  CARDIZEM CD Take 1 Cap by mouth nightly. * dilTIAZem  mg ER capsule Commonly known as:  CARDIZEM CD Take 1 capsule by mouth  nightly  
  
 diph,Pertuss(Acell),Tet Vac-PF 2 Lf-(2.5-5-3-5 mcg)-5Lf/0.5 mL susp Commonly known as: Adacel 0.5 mL by IntraMUSCular route once for 1 dose. ESBRIET 267 mg Cap capsule Generic drug:  pirfenidone Take 3 Caps by mouth three (3) times daily. Indications: PULMONARY FIBROSIS  
  
 ferrous sulfate 325 mg (65 mg iron) EC tablet Commonly known as:  IRON Take 325 mg by mouth daily. FREESTYLE FREEDOM LITE monitoring kit Generic drug:  Blood-Glucose Meter FOLLOW PACKAGE DIRECTIONS  
  
 furosemide 20 mg tablet Commonly known as:  LASIX Take 1 tablet twice a day. glucose blood VI test strips strip Commonly known as:  FREESTYLE LITE STRIPS  
TEST BLOOD SUGAR TWO TIMES DAILY E11.9  
  
 guaiFENesin-dextromethorphan -30 mg per tablet Commonly known as:  [employer] DM Take 1 Tab by mouth two (2) times a day. Lancets Misc Commonly known as:  FREESTYLE LANCETS  
TEST BLOOD SUGAR THREE TIMES DAILY E11.9  
  
 metFORMIN 500 mg tablet Commonly known as:  GLUCOPHAGE Take 1 Tab by mouth three (3) times daily (with meals). nitrofurantoin 50 mg capsule Commonly known as:  MACRODANTIN Take 1 Cap by mouth nightly. OXYGEN TUBING  
2 L by Does Not Apply route. pneumococcal 23-valent 25 mcg/0.5 mL injection Commonly known as:  PNEUMOVAX 23  
0.5 mL by IntraMUSCular route once for 1 dose. potassium 99 mg tablet Take 99 mg by mouth two (2) times a day. predniSONE 5 mg tablet Commonly known as:  Leverne Zeeshan Take 10 mg by mouth daily. rivaroxaban 15 mg Tab tablet Commonly known as:  Des Shaw Take 1 Tab by mouth daily  (with dinner). * SPIRIVA RESPIMAT 2.5 mcg/actuation inhaler Generic drug:  tiotropium bromide Take 2 Puffs by inhalation daily. * SPIRIVA RESPIMAT 1.25 mcg/actuation inhaler Generic drug:  tiotropium bromide  
  
 trimethoprim-sulfamethoxazole 160-800 mg per tablet Commonly known as:  BACTRIM DS, SEPTRA DS Take 1 Tab by mouth two (2) times a day for 7 days. * Notice: This list has 8 medication(s) that are the same as other medications prescribed for you. Read the directions carefully, and ask your doctor or other care provider to review them with you. Prescriptions Printed Refills  
 pneumococcal 23-valent (PNEUMOVAX 23) 25 mcg/0.5 mL injection 0 Si.5 mL by IntraMUSCular route once for 1 dose. Class: Print Route: IntraMUSCular Prescriptions Sent to Pharmacy Refills diph,Pertuss,Acell,,Tet Vac-PF (ADACEL) 2 Lf-(2.5-5-3-5 mcg)-5Lf/0.5 mL susp 0 Si.5 mL by IntraMUSCular route once for 1 dose. Class: Normal  
 Pharmacy: [address] [place] [address] Ave[address] Ph #: [phone] Route: IntraMUSCular  
 trimethoprim-sulfamethoxazole (BACTRIM DS, SEPTRA DS) 160-800 mg per tablet 0 Sig: Take 1 Tab by mouth two (2) times a day for 7 days. Class: Normal  
 Pharmacy: [facility] 53 [address] [facility] [address] Ph #: [phone] Route: Oral  
  
We Performed the Following AMB POC URINALYSIS DIP STICK AUTO W/O MICRO [21371 CPT(R)] CULTURE, URINE [id number] CPT(R)] HEMOGLOBIN A1C WITH EAG [61553 CPT(R)] HGB & HCT [04296 CPT(R)] LIPID PANEL [35993 CPT(R)] MAGNESIUM [id number] CPT(R)] METABOLIC PANEL, COMPREHENSIVE [88861 CPT(R)] TSH 3RD GENERATION [15778 CPT(R)] Follow-up Instructions Return in about 3 months (around 11/15/2017), or if symptoms worsen or fail to improve. Patient Instructions DASH Diet: Care Instructions Your Care Instructions The DASH diet is an eating plan that can help lower your blood pressure. DASH stands for Dietary Approaches to Stop Hypertension. Hypertension is high blood pressure. The DASH diet focuses on eating foods that are high in calcium, potassium, and magnesium. These nutrients can lower blood pressure. The foods that are highest in these nutrients are fruits, vegetables, low-fat dairy products, nuts, seeds, and legumes. But taking calcium, potassium, and magnesium supplements instead of eating foods that are high in those nutrients does not have the same effect. The DASH diet also includes whole grains, fish, and poultry. The DASH diet is one of several lifestyle changes your doctor may recommend to lower your high blood pressure. Your doctor may also want you to decrease the amount of sodium in your diet. Lowering sodium while following the DASH diet can lower blood pressure even further than just the DASH diet alone. Follow-up care is a key part of your treatment and safety. Be sure to make and go to all appointments, and call your doctor if you are having problems. It's also a good idea to know your test results and keep a list of the medicines you take. How can you care for yourself at home? Following the DASH diet · Eat 4 to 5 servings of fruit each day. A serving is 1 medium-sized piece of fruit, ½ cup chopped or canned fruit, 1/4 cup dried fruit, or 4 ounces (½ cup) of fruit juice. Choose fruit more often than fruit juice. · Eat 4 to 5 servings of vegetables each day.  A serving is 1 cup of lettuce or raw leafy vegetables, ½ cup of chopped or cooked vegetables, or 4 ounces (½ cup) of vegetable juice. Choose vegetables more often than vegetable juice. · Get 2 to 3 servings of low-fat and fat-free dairy each day. A serving is 8 ounces of milk, 1 cup of yogurt, or 1 ½ ounces of cheese. · Eat 6 to 8 servings of grains each day. A serving is 1 slice of bread, 1 ounce of dry cereal, or ½ cup of cooked rice, pasta, or cooked cereal. Try to choose whole-grain products as much as possible. · Limit lean meat, poultry, and fish to 2 servings each day. A serving is 3 ounces, about the size of a deck of cards. · Eat 4 to 5 servings of nuts, seeds, and legumes (cooked dried beans, lentils, and split peas) each week. A serving is 1/3 cup of nuts, 2 tablespoons of seeds, or ½ cup of cooked beans or peas. · Limit fats and oils to 2 to 3 servings each day. A serving is 1 teaspoon of vegetable oil or 2 tablespoons of salad dressing. · Limit sweets and added sugars to 5 servings or less a week. A serving is 1 tablespoon jelly or jam, ½ cup sorbet, or 1 cup of lemonade. · Eat less than 2,300 milligrams (mg) of sodium a day. If you limit your sodium to 1,500 mg a day, you can lower your blood pressure even more. Tips for success · Start small. Do not try to make dramatic changes to your diet all at once. You might feel that you are missing out on your favorite foods and then be more likely to not follow the plan. Make small changes, and stick with them. Once those changes become habit, add a few more changes. · Try some of the following: ¨ Make it a goal to eat a fruit or vegetable at every meal and at snacks. This will make it easy to get the recommended amount of fruits and vegetables each day. ¨ Try yogurt topped with fruit and nuts for a snack or healthy dessert. ¨ Add lettuce, tomato, cucumber, and onion to sandwiches. ¨ Combine a ready-made pizza crust with low-fat mozzarella cheese and lots of vegetable toppings.  Try using tomatoes, squash, spinach, broccoli, carrots, cauliflower, and onions. ¨ Have a variety of cut-up vegetables with a low-fat dip as an appetizer instead of chips and dip. ¨ Sprinkle sunflower seeds or chopped almonds over salads. Or try adding chopped walnuts or almonds to cooked vegetables. ¨ Try some vegetarian meals using beans and peas. Add garbanzo or kidney beans to salads. Make burritos and tacos with mashed denise beans or black beans. Where can you learn more? Go to http://lulStartup Freakben.info/. Enter B738 in the search box to learn more about \"DASH Diet: Care Instructions. \" Current as of: April 3, 2017 Content Version: 11.3 © 5224-8264 "[employer]. Care instructions adapted under license by [employer] (which disclaims liability or warranty for this information). If you have questions about a medical condition or this instruction, always ask your healthcare professional. [place] [address] any warranty or liability for your use of this information. Introducing [facility] [place] [facility]! [facility] introduces Run3D patient portal. Now you can access parts of your medical record, email your doctor's office, and request medication refills online. 1. In your internet browser, go to [email or web address] 2. Click on the First Time User? Click Here link in the Sign In box. You will see the New Member Sign Up page. 3. Enter your Run3D Access Code exactly as it appears below. You will not need to use this code after youve completed the sign-up process. If you do not sign up before the expiration date, you must request a new code. · Run3D Access Code: [email or web address] Expires: 11/13/2017  9:15 AM 
 
4. Enter the last four digits of your Social Security Number (xxxx) and Date of Birth (mm/dd/yyyy) as indicated and click Submit. You will be taken to the next sign-up page. 5. Create a Run3D ID.  This will be your Run3D login ID and cannot be changed, so think of one that is secure and easy to remember. 6. Create a SnowGate password. You can change your password at any time. 7. Enter your Password Reset Question and Answer. This can be used at a later time if you forget your password. 8. Enter your e-mail address. You will receive e-mail notification when new information is available in [address] 9. Click Sign Up. You can now view and download portions of your medical record. 10. Click the Download Summary menu link to download a portable copy of your medical information. If you have questions, please visit the Frequently Asked Questions section of the SnowGate website. Remember, SnowGate is NOT to be used for urgent needs. For medical emergencies, dial 911. Now available from your iPhone and Android! Please provide this summary of care documentation to your next provider. Your primary care clinician is listed as Makenna Michael. If you have any questions after today's visit, please call [phone].

## 2017-08-15 NOTE — PROGRESS NOTES
Reviewed record in preparation for visit and have necessary documentation  Pt did not bring medication to office visit for review    Goals that were addressed and/or need to be completed during or after this appointment include   Health Maintenance Due   Topic Date Due    DTaP/Tdap/Td series (1 - Tdap) 12/17/1959    ZOSTER VACCINE AGE 60>  10/17/1998    Pneumococcal 65+ Low/Medium Risk (1 of 2 - PCV13) 12/17/2003    EYE EXAM RETINAL OR DILATED Q1  02/09/2017    INFLUENZA AGE 9 TO ADULT  08/01/2017

## 2017-08-18 NOTE — TELEPHONE ENCOUNTER
----- Message from Liliana Araya MD sent at 8/18/2017  1:08 PM EDT -----  More than 2 organisms identified in urine culture. Please call and check on patient.

## 2017-08-18 NOTE — TELEPHONE ENCOUNTER
Called patient per Dr. Orlando Duncan to advise of message below. No answer, left message to call back.

## 2017-08-25 NOTE — PROGRESS NOTES
Progress Note    Patient: Sharon Marquez MRN: [id number]  SSN: [id number]    YOB: 1940  Age: 64 y.o. Sex: female        Chief Complaint   Patient presents with    Diabetes    COPD     she is a 64y.o. year old female who presents for follow up of chronic medical conditions. Patient with hx of DM2, HTN, CAD, HLD, Afib, and COPD. Patient denies HA, dizziness, SOB, CP, abdominal pain, acute myalgias or arthralgias. Patient does complain of urinary frequency and has a hx of UTI. Diabetes: This patient is being treating under a comprehensive plan of care for diabetes. Overall the patient feels well with good energy level. Insulin dependence: no   Pertinent Labs:     Lab Results   Component Value Date/Time    Hemoglobin A1c 6.3 08/15/2017 05:04 PM    Hemoglobin A1c (POC) 6.6 04/26/2017 10:43 AM             Body mass index is 24.51 kg/(m^2). Lab Results   Component Value Date/Time    LDL, calculated 76 08/15/2017 05:04 PM        Wt Readings from Last 3 Encounters:   08/15/17 134 lb (60.8 kg)   07/18/17 134 lb (60.8 kg)   06/16/17 139 lb 9.6 oz (63.3 kg)      Medications, diet and exercise as means of diabetic control with a goal of an A1C of less than 7.0% discussed. Diabetic foot care and annual eye exam discussed as well. Check blood sugars while fasting just before breakfast on most days and occasionally before dinner. Write down readings in a diabetic log book and bring them to the next visit. Call the office for fasting sugars over 200 or below 75 on two or more occasions. Call immediately if having symptoms of high sugar (frequent urination, always thirsty) or low sugar (dizzy, lethargic, sweaty, nauseated, headache). Our overall goal is to reduce or eliminate the long term consequences of poorly controlled diabetes. Patient expresses understanding and agreement with our plan of care.     Hypertension:  The patient reports:  no TIA's, no chest pain on exertion, no dyspnea on exertion, no swelling of ankles. Lifestyle modification/social history: sedentary     BP Readings from Last 3 Encounters:   08/15/17 110/68   07/18/17 116/72   06/16/17 129/69     Patient advised to log blood pressures at home 2-3 times weekly and bring to next visit. Call office as soon as possible if BP's over 140/90 on multiple occasions or with symptoms of dizziness, chest pain, shortness of breath, headache or ankle swelling. Our goal is to normalize the blood pressure to decrease the risks of strokes and heart attacks. The patient is in agreement with the plan. Subjective:     Encounter Diagnoses   Name Primary?  Urinary tract infection without hematuria, site unspecified Yes    Controlled type 2 diabetes mellitus without complication, without long-term current use of insulin ([facility]     Essential hypertension     Chronic obstructive pulmonary disease, unspecified COPD type ([facility]     O2 dependent     Dyslipidemia     Coronary artery disease involving native coronary artery of native heart without angina pectoris     Female stress incontinence        Current and past medical information:    Current Medications after this visit[de-identified]     Current Outpatient Prescriptions   Medication Sig    DIGOX 125 mcg tablet Take 1 tablet by mouth  nightly    furosemide (LASIX) 20 mg tablet Take 1 tablet twice a day.  atorvastatin (LIPITOR) 20 mg tablet Take 1 tablet by mouth  nightly    SPIRIVA RESPIMAT 1.25 mcg/actuation inhaler     albuterol (PROVENTIL HFA, VENTOLIN HFA, PROAIR HFA) 90 mcg/actuation inhaler Take 1 Puff by inhalation three (3) times daily as needed for Wheezing.  aspirin delayed-release 81 mg tablet Take 81 mg by mouth daily.  potassium 99 mg tablet Take 99 mg by mouth two (2) times a day.  predniSONE (DELTASONE) 5 mg tablet Take 10 mg by mouth daily.  digoxin (LANOXIN) 0.125 mg tablet Take 1 Tab by mouth nightly.     dilTIAZem CD (CARDIZEM CD) 240 mg ER capsule Take 1 Cap by mouth nightly.  metFORMIN (GLUCOPHAGE) 500 mg tablet Take 1 Tab by mouth three (3) times daily (with meals).  glucose blood VI test strips (FREESTYLE LITE STRIPS) strip TEST BLOOD SUGAR TWO TIMES DAILY E11.9    rivaroxaban (XARELTO) 15 mg tab tablet Take 1 Tab by mouth daily  (with dinner).  Lancets (FREESTYLE LANCETS) misc TEST BLOOD SUGAR THREE TIMES DAILY E11.9    nitrofurantoin (MACRODANTIN) 50 mg capsule Take 1 Cap by mouth nightly.  ferrous sulfate (IRON) 325 mg (65 mg iron) EC tablet Take 325 mg by mouth daily.  tiotropium bromide (SPIRIVA RESPIMAT) 2.5 mcg/actuation mist Take 2 Puffs by inhalation daily.  arformoterol (BROVANA) 15 mcg/2 mL nebu neb solution 15 mcg by Nebulization route two (2) times a day.  albuterol (PROVENTIL VENTOLIN) 2.5 mg /3 mL (0.083 %) nebulizer solution 3 mL by Nebulization route three (3) times daily.  pirfenidone (ESBRIET) 267 mg cap Take 3 Caps by mouth three (3) times daily. Indications: PULMONARY FIBROSIS    FREESTYLE FREEDOM LITE monitoring kit FOLLOW PACKAGE DIRECTIONS    MISCELLANEOUS MEDICAL SUPPLY (OXYGEN TUBING) 2 L by Does Not Apply route.  dilTIAZem CD (CARDIZEM CD) 240 mg ER capsule Take 1 capsule by mouth  nightly    guaiFENesin-dextromethorphan SR (MUCINEX DM) 600-30 mg per tablet Take 1 Tab by mouth two (2) times a day. No current facility-administered medications for this visit.         Patient Active Problem List    Diagnosis Date Noted    Dizziness 06/09/2017    Dehydration 06/07/2017    S/P AVR 11/06/2016    DM II (diabetes mellitus, type II), controlled ([facility] [address] 12/08/2015    RBBB 10/16/2015    Frozen shoulder 06/25/2014    Dyslipidemia 03/21/2014    Chronic diastolic heart failure ([facility] 05/29/2012    [facility] 05/15/2012    Atrial fibrillation ([facility] 05/10/2012    COPD (chronic obstructive pulmonary disease) ([facility] [address] 03/16/2012    History of noncompliance with medical treatment 09/13/2011    Arthritis 01/14/2011    Hypertension     Depression     Female stress incontinence     CAD (coronary artery disease)     Aortic stenosis     Sleep apnea        Past Medical History:   Diagnosis Date    Aortic stenosis     severe, s/p AVR March 2012 - presented with HF, EF 30%    Asthma 5/10/2012    Atrial fibrillation ([address] 5/10/2012    Bacterial pneumonia, unspecified     CAD (coronary artery disease)     cor calcifications (LAD) by CT     Chronic diastolic heart failure ([address] 5/29/2012    Depression     Dyslipidemia     Female stress incontinence     Hyperlipidemia 9/17/2010    Hypertension     Osteoarthritis     Paroxysmal atrial fibrillation (HCC)     post op AVR    Pulmonary fibrosis ([address]     S/P AVR 11/6/2016    S/P AVR (aortic valve replacement) 3/2012    bovine ([facility])    Sleep apnea     T2DM (type 2 diabetes mellitus) ([facility] [address] 1/14/2011       No Known Allergies    Past Surgical History:   Procedure Laterality Date    CARDIAC SURG PROCEDURE UNLIST      ECHO 2D ADULT  3/2012    EF 30%, mod LVH, EF 30-35%, PA 50 mmHg    HX CATARACT REMOVAL      bilateral       Social History     Social History    Marital status:      Spouse name: N/A    Number of children: N/A    Years of education: N/A     Social History Main Topics    Smoking status: Never Smoker    Smokeless tobacco: Never Used    Alcohol use No    Drug use: No    Sexual activity: Not Asked     Other Topics Concern    None     Social History Narrative         Objective:     Review of Systems:  Constitutional: Negative for fatigue or malaise  Skin: Negative for rash or lesion  Endo: Negative for unusual thirst or weight changes  HEENT: Negative for acute hearing or vision changes  Cardiovascular: Negative for dizziness, chest pain or palpitations  Respiratory: Negative for cough, wheezing or SOB  Gastreintestinal: Negative for nausea or abdominal pain  Genital/urinary: see HPI  Muscoloskeletal: Negative for myalgias or arthralgias   Neurological: Negative for headache, weakness or paresthesia  Psychological: Negative for depression or anxiety      Vitals:    08/15/17 0942   BP: 110/68   Pulse: 90   Resp: 20   Temp: 97.1 °F (36.2 °C)   TempSrc: Oral   SpO2: 94%   Weight: 134 lb (60.8 kg)   Height: 5' 2\" (1.575 m)      Body mass index is 24.51 kg/(m^2). Physical Exam:  Constitutional: Thin, in no acute distress  Skin: warm and dry sans rash or lesion  Head: normocephalic, atraumatic  Eyes: sclera clear, EOMI  Nose: nasal canula in place  Oropharynx: moist mucous membranes sans lesion   Neck: normal range of motion, no lymphadenopathy  CV: normal S1, S2, regular rate and rhythm, no murmur  Respiratory: clear to auscultation bilaterally with symmetrical effort  Abdomen: soft, BS normal  Extremities: in wheelchair, No edema  Neurology: active, alert and oriented, no focal deficits  Psych: affect appropriate     Health Maintenance Due   Topic Date Due    DTaP/Tdap/Td series (1 - Tdap) 12/17/1959    ZOSTER VACCINE AGE 60>  10/17/1998    Pneumococcal 65+ Low/Medium Risk (1 of 2 - PCV13) 12/17/2003    INFLUENZA AGE 9 TO ADULT  08/01/2017       Assessment and orders:       ICD-10-CM ICD-9-CM    1. Urinary tract infection without hematuria, site unspecified N39.0 599.0 AMB POC URINALYSIS DIP STICK AUTO W/O MICRO      CULTURE, URINE      trimethoprim-sulfamethoxazole (BACTRIM DS, SEPTRA DS) 160-800 mg per tablet   2. Controlled type 2 diabetes mellitus without complication, without long-term current use of insulin (HCC) E11.9 250.00 LIPID PANEL      METABOLIC PANEL, COMPREHENSIVE      HEMOGLOBIN A1C WITH EAG      TSH 3RD GENERATION      HGB & HCT      AMB POC FUNDUS PHOTOGRAPHY WITH INTERP AND REPORT      REFERRAL TO OPHTHALMOLOGY   3. Essential hypertension T31 181.1 METABOLIC PANEL, COMPREHENSIVE      TSH 3RD GENERATION      AMB POC FUNDUS PHOTOGRAPHY WITH INTERP AND REPORT      REFERRAL TO OPHTHALMOLOGY   4.  Chronic obstructive pulmonary disease, unspecified COPD type ([facility] J44.9 496    5. O2 dependent Z99.81 V46.2    6. Dyslipidemia E78.5 272.4 LIPID PANEL      METABOLIC PANEL, COMPREHENSIVE   7. Coronary artery disease involving native coronary artery of native heart without angina pectoris I25.10 414.01 MAGNESIUM   8. Female stress incontinence N39.3 625.6 Continue prophylactic antibiotic         Plan of care:  Patient with acute complaint and multiple co-morbidities requiring complex medical decision making. Discussed diagnoses in detail with patient. Importance of compliance with all prescribed medications discussed. Including any new medications prescribed today. Medication risks/benefits/side effects discussed with patient. Continue current prescribed medications as written. No medication changes at this time. All of the patient's questions were addressed. The patient understands and agrees with our plan of care. The patient knows to call back if they are unsure of or forget any changes we discussed today or if the symptoms change. The patient received an After-Visit Summary which contains VS, orders, medication list and allergy list.     Patient Care Team:  Thao Egan MD as PCP - General (Family Practice)  Steve Victoria, RN as Nurse Navigator  Leydi Mcclure MD (Pulmonary Disease)  Omar Angela MD (Pulmonary Disease)  Delphia Cheadle, MD (Ophthalmology)  Bright Baca MD as Physician (Cardiology)  Linnette Nicole, RN as Ambulatory Care Navigator    Follow-up Disposition:  Return in about 3 months (around 11/15/2017), or if symptoms worsen or fail to improve.     Future Appointments  Date Time Provider [facility]   10/27/2017 9:20 AM [place] [id number]   11/10/2017 2:00 PM ECHO, [facility] [place] SCHED   11/10/2017 3:20 PM MD CORNELL Carmichael [place] SCHED   11/21/2017 10:00 AM MD COOPER Bar[place]ona [place] SCHED       Signed By: Thao Egan MD     August 25, 2017

## 2017-08-25 NOTE — TELEPHONE ENCOUNTER
Randell Lee stated they sent a paper out to us to be sent to [facility] so Pt could get her medication. She test twice a day. Please call Pt to let  Her know what is going on. Thanks. Normally she just calls and goes to get them.

## 2017-08-29 NOTE — TELEPHONE ENCOUNTER
Shania called back. She contacted the pharmacy and was told to have us call [phone] to Medicare to get the supplies authorized. The pharmacy stated they needed the CMN form.

## 2017-08-29 NOTE — TELEPHONE ENCOUNTER
Returned call to Laith Clarke to advise could she please call Richard to have them refax paperwork. No answer, left message to call back.

## 2017-08-29 NOTE — TELEPHONE ENCOUNTER
Dane Mace called in reference to the form that [facility] sent in reference to the patient's diabetic testing supplies. The pharmacy is still waiting and the patient is running out of testing supplies. Please call [phone].

## 2017-09-20 NOTE — PROGRESS NOTES
[facility] 6/7-6/9/2017 Dehydration    This episode closed. Patient attended follow up appointment and had no readmissions during 30 day FELIZ.

## 2017-09-29 NOTE — PROGRESS NOTES
Progress Note    Patient: Elle Hatch MRN: [id number]  SSN: [id number]    YOB: 1940  Age: 64 y.o. Sex: female        Chief Complaint   Patient presents with    Bladder Infection     ?     she is a 64y.o. year old female who presents for possible UTI. Patient with hx of frequent UTI and is on nitrofurantoin for for prophylaxis. Patient with hx of DM2, HTN, CAD, HLD, Afib, and COPD. Patient says she does not feel well. She denies HA, dizziness, SOB, CP, abdominal pain, acute myalgias or arthralgias. BP Readings from Last 3 Encounters:   09/29/17 121/71   08/15/17 110/68   07/18/17 116/72     Wt Readings from Last 3 Encounters:   09/29/17 133 lb (60.3 kg)   08/15/17 134 lb (60.8 kg)   07/18/17 134 lb (60.8 kg)     Body mass index is 24.33 kg/(m^2). Lab Results   Component Value Date/Time    Hemoglobin A1c 6.3 08/15/2017 05:04 PM        Encounter Diagnoses   Name Primary?  Frequent urination Yes    History of urinary tract infection        Current and past medical information:    Current Medications after this visit[de-identified]     Current Outpatient Prescriptions   Medication Sig    XARELTO 15 mg tab tablet Take 1 tablet by mouth  daily with dinner    metFORMIN (GLUCOPHAGE) 500 mg tablet Take 1 tablet by mouth 3  times a day with meals    nitrofurantoin (MACRODANTIN) 50 mg capsule Take 1 Cap by mouth nightly.  Lancets misc TEST BLOOD SUGAR TWO TIMES DAILY E11.9    glucose blood VI test strips (FREESTYLE LITE STRIPS) strip TEST BLOOD SUGAR TWO TIMES DAILY E11.9    dilTIAZem CD (CARDIZEM CD) 240 mg ER capsule Take 1 capsule by mouth  nightly    DIGOX 125 mcg tablet Take 1 tablet by mouth  nightly    furosemide (LASIX) 20 mg tablet Take 1 tablet twice a day.  guaiFENesin-dextromethorphan SR (MUCINEX DM) 600-30 mg per tablet Take 1 Tab by mouth two (2) times a day.     atorvastatin (LIPITOR) 20 mg tablet Take 1 tablet by mouth  nightly    SPIRIVA RESPIMAT 1.25 mcg/actuation inhaler  albuterol (PROVENTIL HFA, VENTOLIN HFA, PROAIR HFA) 90 mcg/actuation inhaler Take 1 Puff by inhalation three (3) times daily as needed for Wheezing.  aspirin delayed-release 81 mg tablet Take 81 mg by mouth daily.  potassium 99 mg tablet Take 99 mg by mouth two (2) times a day.  predniSONE (DELTASONE) 5 mg tablet Take 10 mg by mouth daily.  digoxin (LANOXIN) 0.125 mg tablet Take 1 Tab by mouth nightly.  dilTIAZem CD (CARDIZEM CD) 240 mg ER capsule Take 1 Cap by mouth nightly.  ferrous sulfate (IRON) 325 mg (65 mg iron) EC tablet Take 325 mg by mouth daily.  tiotropium bromide (SPIRIVA RESPIMAT) 2.5 mcg/actuation mist Take 2 Puffs by inhalation daily.  arformoterol (BROVANA) 15 mcg/2 mL nebu neb solution 15 mcg by Nebulization route two (2) times a day.  albuterol (PROVENTIL VENTOLIN) 2.5 mg /3 mL (0.083 %) nebulizer solution 3 mL by Nebulization route three (3) times daily.  pirfenidone (ESBRIET) 267 mg cap Take 3 Caps by mouth three (3) times daily. Indications: PULMONARY FIBROSIS    FREESTYLE FREEDOM LITE monitoring kit FOLLOW PACKAGE DIRECTIONS    MISCELLANEOUS MEDICAL SUPPLY (OXYGEN TUBING) 2 L by Does Not Apply route. No current facility-administered medications for this visit.         Patient Active Problem List    Diagnosis Date Noted    Dizziness 06/09/2017    Dehydration 06/07/2017    S/P AVR 11/06/2016    DM II (diabetes mellitus, type II), controlled ([address] 12/08/2015    RBBB 10/16/2015    Frozen shoulder 06/25/2014    Dyslipidemia 03/21/2014    Chronic diastolic heart failure ([address] 05/29/2012    Pulmonary fibrosis ([address] 05/15/2012    Atrial fibrillation ([address] 05/10/2012    COPD (chronic obstructive pulmonary disease) ([address] 03/16/2012    History of noncompliance with medical treatment 09/13/2011    Arthritis 01/14/2011    Hypertension     Depression     Female stress incontinence     CAD (coronary artery disease)     Aortic stenosis     Sleep apnea Past Medical History:   Diagnosis Date    Aortic stenosis     severe, s/p AVR March 2012 - presented with HF, EF 30%    Asthma 5/10/2012    Atrial fibrillation ([facility] 5/10/2012    Bacterial pneumonia, unspecified     CAD (coronary artery disease)     cor calcifications (LAD) by CT     Chronic diastolic heart failure ([facility] 5/29/2012    Depression     Dyslipidemia     Female stress incontinence     Hyperlipidemia 9/17/2010    Hypertension     Osteoarthritis     Paroxysmal atrial fibrillation (HCC)     post op AVR    Pulmonary fibrosis ([facility]     S/P AVR 11/6/2016    S/P AVR (aortic valve replacement) 3/2012    bovine ([facility])    Sleep apnea     T2DM (type 2 diabetes mellitus) ([facility] 1/14/2011       No Known Allergies    Past Surgical History:   Procedure Laterality Date    CARDIAC SURG PROCEDURE UNLIST      ECHO 2D ADULT  3/2012    EF 30%, mod LVH, EF 30-35%, PA 50 mmHg    HX CATARACT REMOVAL      bilateral       Social History     Social History    Marital status:      Spouse name: N/A    Number of children: N/A    Years of education: N/A     Social History Main Topics    Smoking status: Never Smoker    Smokeless tobacco: Never Used    Alcohol use No    Drug use: No    Sexual activity: Not Asked     Other Topics Concern    None     Social History Narrative         Objective:     Review of Systems:  Constitutional: Negative for fatigue or malaise  Skin: Negative for rash or lesion  Endo: Negative for unusual thirst or weight changes  HEENT: Negative for acute hearing or vision changes  Cardiovascular: Negative for dizziness, chest pain or palpitations  Respiratory: Negative for cough, wheezing or SOB  Gastreintestinal: Negative for nausea or abdominal pain  Genital/urinary: see HPI  Muscoloskeletal: Negative for myalgias or arthralgias   Neurological: Negative for headache, weakness or paresthesia  Psychological: Negative for depression or anxiety      Vitals:    09/29/17 1520 BP: 121/71   Pulse: 98   Resp: 20   Temp: 97.1 °F (36.2 °C)   TempSrc: Oral   SpO2: 97%   Weight: 133 lb (60.3 kg)   Height: 5' 2\" (1.575 m)      Body mass index is 24.33 kg/(m^2). Physical Exam:  Constitutional: Thin, in no acute distress  Skin: warm and dry sans rash or lesion  Head: normocephalic, atraumatic  Eyes: sclera clear, EOMI  Nose: nasal canula in place  Neck: normal range of motion  CV: normal S1, S2, regular rate and rhythm, no murmur  Respiratory: clear to auscultation bilaterally with symmetrical effort  Back: no CVA tenderness  Extremities: in wheelchair, No edema  Neurology: active, alert and oriented, no focal deficits  Psych: affect appropriate     Health Maintenance Due   Topic Date Due    DTaP/Tdap/Td series (1 - Tdap) 12/17/1959    ZOSTER VACCINE AGE 60>  10/17/1998    Pneumococcal 65+ Low/Medium Risk (1 of 2 - PCV13) 12/17/2003    INFLUENZA AGE 9 TO ADULT  08/01/2017    FOOT EXAM Q1  10/27/2017       Assessment and orders:     Diagnoses and all orders for this visit:    1. Frequent urination  -     AMB POC URINALYSIS DIP STICK AUTO W/O MICRO    2. History of urinary tract infection        Plan of care:  Discussed diagnoses in detail with patient. Importance of compliance with all prescribed medications discussed. Including any new medications prescribed today. Medication risks/benefits/side effects discussed with patient. All of the patient's questions were addressed. The patient understands and agrees with our plan of care. The patient knows to call back if they are unsure of or forget any changes we discussed today or if the symptoms change.    The patient received an After-Visit Summary which contains VS, orders, medication list and allergy list.     Patient Care Team:  Frank Kilgore MD as PCP - General (Family Practice)  Damir Christian RN as Nurse Cherie Ngo MD (Pulmonary Disease)  Abel Siddiqui MD (Pulmonary Disease)  Caden Riley MD (Ophthalmology)  Alicia Mac MD as Physician (Cardiology)  Ivette Carrion, RN as Ambulatory Care Navigator    Follow-up Disposition: Not on File    Future Appointments  Date Time Provider Gwendolyn Christensen   10/27/2017 9:20 AM Kiki Ram MD [facility]   11/10/2017 2:00 PM ECHO, [facility] [place] SCHED   11/10/2017 3:20 PM Alicia Mac MD [facility] SCHED   11/21/2017 10:00 AM Kiki Ram MD [facility] SCHED       Signed By: Kiki Ram MD     September 29, 2017

## 2017-09-29 NOTE — PATIENT INSTRUCTIONS
Hold Potassium while taking Bactrim! Influenza (Flu) Vaccine: Care Instructions  Your Care Instructions  Influenza (flu) is an infection in the lungs and breathing passages. It is caused by the influenza virus. There are different strains, or types, of the flu virus every year. The flu comes on quickly. It can cause a cough, stuffy nose, fever, chills, tiredness, and aches and pains. These symptoms may last up to 10 days. The flu can make you feel very sick, but most of the time it doesn't lead to other problems. But it can cause serious problems in people who are older or who have a long-term illness, such as heart disease or diabetes. You can help prevent the flu by getting a flu vaccine every year, as soon as it is available. You cannot get the flu from the vaccine. The vaccine prevents most cases of the flu. But even when the vaccine doesn't prevent the flu, it can make symptoms less severe and reduce the chance of problems from the flu. Sometimes, young children and people who have an immune system problem may have a slight fever or muscle aches or pains 6 to 12 hours after getting the shot. These symptoms usually last 1 or 2 days. Follow-up care is a key part of your treatment and safety. Be sure to make and go to all appointments, and call your doctor if you are having problems. It's also a good idea to know your test results and keep a list of the medicines you take. Who should get the flu vaccine? Everyone age 7 months or older should get a flu vaccine each year. It lowers the chance of getting and spreading the flu. The vaccine is very important for people who are at high risk for getting other health problems from the flu. This includes:  · Anyone 48years of age or older. · People who live in a long-term care center, such as a nursing home. · All children 6 months through 25years of age.   · Adults and children 6 months and older who have long-term heart or lung problems, such as asthma. · Adults and children 6 months and older who needed medical care or were in a hospital during the past year because of diabetes, chronic kidney disease, or a weak immune system (including HIV or AIDS). · Women who will be pregnant during the flu season. · People who have any condition that can make it hard to breathe or swallow (such as a brain injury or muscle disorders). · People who can give the flu to others who are at high risk for problems from the flu. This includes all health care workers and close contacts of people age 72 or older. Who should not get the flu vaccine? The person who gives the vaccine may tell you not to get it if you:  · Have a severe allergy to eggs or any part of the vaccine. · Have had a severe reaction to a flu vaccine in the past.  · Have had Guillain-Barré syndrome (GBS). · Are sick with a fever. (Get the vaccine when symptoms are gone.)  How can you care for yourself at home? · If you or your child has a sore arm or a slight fever after the shot, take an over-the-counter pain medicine, such as acetaminophen (Tylenol) or ibuprofen (Advil, Motrin). Read and follow all instructions on the label. Do not give aspirin to anyone younger than 20. It has been linked to Reye syndrome, a serious illness. · Do not take two or more pain medicines at the same time unless the doctor told you to. Many pain medicines have acetaminophen, which is Tylenol. Too much acetaminophen (Tylenol) can be harmful. When should you call for help? Call 911 anytime you think you may need emergency care. For example, call if after getting the flu vaccine:  · You have symptoms of a severe reaction to the flu vaccine. Symptoms of a severe reaction may include:  ¨ Severe difficulty breathing. ¨ Sudden raised, red areas (hives) all over your body. ¨ Severe lightheadedness.   Call your doctor now or seek immediate medical care if after getting the flu vaccine:  · You think you are having a reaction to the flu vaccine, such as a new fever. Watch closely for changes in your health, and be sure to contact your doctor if you have any problems. Where can you learn more? Go to http://lul-ben.info/. Enter Z124 in the search box to learn more about \"Influenza (Flu) Vaccine: Care Instructions. \"  Current as of: August 1, 2016  Content Version: 11.3  © 3444-2148 [employer]. Care instructions adapted under license by [employer] (which disclaims liability or warranty for this information). If you have questions about a medical condition or this instruction, always ask your healthcare professional. [address] any warranty or liability for your use of this information.

## 2017-09-29 NOTE — MR AVS SNAPSHOT
Visit Information Date & Time Provider Department Dept. Phone Encounter #  
 9/29/2017  3:00 PM Yusra Dobbins MD [address] [place] [address] [id number] Follow-up Instructions Return in about 2 months (around 11/29/2017). Your Appointments 10/27/2017  9:20 AM  
Medicare Physical with Yusra Dobbins MD  
[facility] ([facility]) Appt Note: -[address] [facility] [address]  
[phone]  
  
   
 [address] [address]  
  
    
 11/10/2017  2:00 PM  
ECHO CARDIOGRAMS 2D with ECHO, STTONYAIS  
[facility] (ELLE SCHEDULING) Appt Note: 6 mo fup echo at 2 at 3 20 dr Valerio Puckett [facility] [address] [address]  
[phone]  
  
   
 [address] [address]  
  
    
 11/10/2017  3:20 PM  
ESTABLISHED PATIENT with Chandler Sparrow MD  
[facility] ([address] [place] [address] Road) Appt Note: 6 mo fup  
 [facility] [address] [address]  
[phone]  
  
   
 [address] [address]  
  
    
 11/21/2017 10:00 AM  
ROUTINE CARE with Yusra Dobbins MD  
[address]) Appt Note: 3 mo f/u-Diabetes/HTN  
 [facility] [address] [facility] [address] [address]  
[place] [address] [place] [id number] Upcoming Health Maintenance Date Due DTaP/Tdap/Td series (1 - Tdap) 12/17/1959 ZOSTER VACCINE AGE 60> 10/17/1998 Pneumococcal 65+ Low/Medium Risk (1 of 2 - PCV13) 12/17/2003 INFLUENZA AGE 9 TO ADULT 8/1/2017 FOOT EXAM Q1 10/27/2017 MEDICARE YEARLY EXAM 10/26/2017 GLAUCOMA SCREENING Q2Y 2/9/2018 HEMOGLOBIN A1C Q6M 2/15/2018 LIPID PANEL Q1 8/15/2018 EYE EXAM RETINAL OR DILATED Q1 8/22/2018 Allergies as of 9/29/2017  Review Complete On: 9/29/2017 By: Yusra Dobbins MD  
 No Known Allergies Current Immunizations  Reviewed on 8/5/2016 Name Date Influenza Vaccine 10/22/2016, 10/16/2015, 1/2/2015, 11/15/2013, 12/7/2012 Not reviewed this visit You Were Diagnosed With   
  
 Codes Comments Frequent urination    -  Primary ICD-10-CM: R35.0 ICD-9-CM: 788.41 History of urinary tract infection     ICD-10-CM: Z87.440 ICD-9-CM: V13.02 Controlled type 2 diabetes mellitus without complication, without long-term current use of insulin ([facility] 75.)     ICD-10-CM: E11.9 ICD-9-CM: 250.00 Vitals BP Pulse Temp Resp Height(growth percentile) Weight(growth percentile) 121/71 (BP 1 Location: Left arm, BP Patient Position: Sitting) 98 97.1 °F (36.2 °C) (Oral) 20 5' 2\" (1.575 m) 133 lb (60.3 kg) SpO2 BMI OB Status Smoking Status 97% 24.33 kg/m2 Postmenopausal Never Smoker Vitals History BMI and BSA Data Body Mass Index Body Surface Area  
 24.33 kg/m 2 1.62 m 2 Preferred Pharmacy Pharmacy Name Phone [facility] [address] ([address] [phone] Your Updated Medication List  
  
   
This list is accurate as of: 9/29/17  3:59 PM.  Always use your most recent med list.  
  
  
  
  
 * albuterol 90 mcg/actuation inhaler Commonly known as:  PROVENTIL HFA, VENTOLIN HFA, PROAIR HFA Take 1 Puff by inhalation three (3) times daily as needed for Wheezing. * albuterol 2.5 mg /3 mL (0.083 %) nebulizer solution Commonly known as:  PROVENTIL VENTOLIN  
3 mL by Nebulization route three (3) times daily. aspirin delayed-release 81 mg tablet Take 81 mg by mouth daily. atorvastatin 20 mg tablet Commonly known as:  LIPITOR Take 1 tablet by mouth  nightly BROVANA 15 mcg/2 mL Nebu neb solution Generic drug:  arformoterol 15 mcg by Nebulization route two (2) times a day. * digoxin 0.125 mg tablet Commonly known as:  LANOXIN Take 1 Tab by mouth nightly. * DIGOX 0.125 mg tablet Generic drug:  digoxin Take 1 tablet by mouth  nightly * dilTIAZem  mg ER capsule Commonly known as:  CARDIZEM CD Take 1 Cap by mouth nightly. * dilTIAZem  mg ER capsule Commonly known as:  CARDIZEM CD Take 1 capsule by mouth  nightly ESBRIET 267 mg Cap capsule Generic drug:  pirfenidone Take 3 Caps by mouth three (3) times daily. Indications: PULMONARY FIBROSIS  
  
 ferrous sulfate 325 mg (65 mg iron) EC tablet Commonly known as:  IRON Take 325 mg by mouth daily. FREESTYLE FREEDOM LITE monitoring kit Generic drug:  Blood-Glucose Meter FOLLOW PACKAGE DIRECTIONS  
  
 furosemide 20 mg tablet Commonly known as:  LASIX Take 1 tablet twice a day. glucose blood VI test strips strip Commonly known as:  FREESTYLE LITE STRIPS  
TEST BLOOD SUGAR TWO TIMES DAILY E11.9  
  
 guaiFENesin-dextromethorphan -30 mg per tablet Commonly known as:  Aric & Aric DM Take 1 Tab by mouth two (2) times a day. Lancets Misc TEST BLOOD SUGAR TWO TIMES DAILY E11.9  
  
 metFORMIN 500 mg tablet Commonly known as:  GLUCOPHAGE Take 1 tablet by mouth 3  times a day with meals  
  
 nitrofurantoin 50 mg capsule Commonly known as:  MACRODANTIN Take 1 Cap by mouth nightly. OXYGEN TUBING  
2 L by Does Not Apply route. potassium 99 mg tablet Take 99 mg by mouth two (2) times a day. predniSONE 5 mg tablet Commonly known as:  Shahrzad Pak Take 10 mg by mouth daily. * SPIRIVA RESPIMAT 2.5 mcg/actuation inhaler Generic drug:  tiotropium bromide Take 2 Puffs by inhalation daily. * SPIRIVA RESPIMAT 1.25 mcg/actuation inhaler Generic drug:  tiotropium bromide  
  
 trimethoprim-sulfamethoxazole 160-800 mg per tablet Commonly known as:  BACTRIM DS, SEPTRA DS Take 1 Tab by mouth two (2) times a day for 10 days. XARELTO 15 mg Tab tablet Generic drug:  rivaroxaban Take 1 tablet by mouth  daily with dinner * Notice: This list has 8 medication(s) that are the same as other medications prescribed for you. Read the directions carefully, and ask your doctor or other care provider to review them with you. Prescriptions Sent to Pharmacy Refills  
 trimethoprim-sulfamethoxazole (BACTRIM DS, SEPTRA DS) 160-800 mg per tablet 0 Sig: Take 1 Tab by mouth two (2) times a day for 10 days. Class: Normal  
 Pharmacy: [facility] [address] #: [phone] Route: Oral  
  
We Performed the Following AMB POC URINALYSIS DIP STICK AUTO W/O MICRO [46390 CPT(R)] CULTURE, URINE [id number] CPT(R)] REFERRAL TO UROLOGY [GOQ206 Custom] Comments:  
 Patient with urinary incontinence and frequent UTI. [facility]. Follow-up Instructions Return in about 2 months (around 11/29/2017). Referral Information Referral ID Referred By Referred To  
  
 [id number] [place] BECERRA Not Available Visits Status Start Date End Date 1 New Request 9/29/17 9/29/18 If your referral has a status of pending review or denied, additional information will be sent to support the outcome of this decision. Patient Instructions Hold Potassium while taking Bactrim! Influenza (Flu) Vaccine: Care Instructions Your Care Instructions Influenza (flu) is an infection in the lungs and breathing passages. It is caused by the influenza virus. There are different strains, or types, of the flu virus every year. The flu comes on quickly. It can cause a cough, stuffy nose, fever, chills, tiredness, and aches and pains.  These symptoms may last up to 10 days. The flu can make you feel very sick, but most of the time it doesn't lead to other problems. But it can cause serious problems in people who are older or who have a long-term illness, such as heart disease or diabetes. You can help prevent the flu by getting a flu vaccine every year, as soon as it is available. You cannot get the flu from the vaccine. The vaccine prevents most cases of the flu. But even when the vaccine doesn't prevent the flu, it can make symptoms less severe and reduce the chance of problems from the flu. Sometimes, young children and people who have an immune system problem may have a slight fever or muscle aches or pains 6 to 12 hours after getting the shot. These symptoms usually last 1 or 2 days. Follow-up care is a key part of your treatment and safety. Be sure to make and go to all appointments, and call your doctor if you are having problems. It's also a good idea to know your test results and keep a list of the medicines you take. Who should get the flu vaccine? Everyone age 7 months or older should get a flu vaccine each year. It lowers the chance of getting and spreading the flu. The vaccine is very important for people who are at high risk for getting other health problems from the flu. This includes: · Anyone 48years of age or older. · People who live in a long-term care center, such as a nursing home. · All children 6 months through 25years of age. · Adults and children 6 months and older who have long-term heart or lung problems, such as asthma. · Adults and children 6 months and older who needed medical care or were in a hospital during the past year because of diabetes, chronic kidney disease, or a weak immune system (including HIV or AIDS). · Women who will be pregnant during the flu season. · People who have any condition that can make it hard to breathe or swallow (such as a brain injury or muscle disorders). · People who can give the flu to others who are at high risk for problems from the flu. This includes all health care workers and close contacts of people age 72 or older. Who should not get the flu vaccine? The person who gives the vaccine may tell you not to get it if you: 
· Have a severe allergy to eggs or any part of the vaccine. · Have had a severe reaction to a flu vaccine in the past. 
· Have had Guillain-Barré syndrome (GBS). · Are sick with a fever. (Get the vaccine when symptoms are gone.) How can you care for yourself at home? · If you or your child has a sore arm or a slight fever after the shot, take an over-the-counter pain medicine, such as acetaminophen (Tylenol) or ibuprofen (Advil, Motrin). Read and follow all instructions on the label. Do not give aspirin to anyone younger than 20. It has been linked to Reye syndrome, a serious illness. · Do not take two or more pain medicines at the same time unless the doctor told you to. Many pain medicines have acetaminophen, which is Tylenol. Too much acetaminophen (Tylenol) can be harmful. When should you call for help? Call 911 anytime you think you may need emergency care. For example, call if after getting the flu vaccine: 
· You have symptoms of a severe reaction to the flu vaccine. Symptoms of a severe reaction may include: ¨ Severe difficulty breathing. ¨ Sudden raised, red areas (hives) all over your body. ¨ Severe lightheadedness. Call your doctor now or seek immediate medical care if after getting the flu vaccine: 
· You think you are having a reaction to the flu vaccine, such as a new fever. Watch closely for changes in your health, and be sure to contact your doctor if you have any problems. Where can you learn more? Go to http://lul-ben.info/. Enter U051 in the search box to learn more about \"Influenza (Flu) Vaccine: Care Instructions. \" Current as of: August 1, 2016 Content Version: 11.3 © 5245-1931 [employer]. Care instructions adapted under license by [employer] (which disclaims liability or warranty for this information). If you have questions about a medical condition or this instruction, always ask your healthcare professional. [address] any warranty or liability for your use of this information. Introducing [facility]! [facility] introduces [employer] patient portal. Now you can access parts of your medical record, email your doctor's office, and request medication refills online. 1. In your internet browser, go to [email or web address] 2. Click on the First Time User? Click Here link in the Sign In box. You will see the New Member Sign Up page. 3. Enter your [employer] Access Code exactly as it appears below. You will not need to use this code after youve completed the sign-up process. If you do not sign up before the expiration date, you must request a new code. · [employer] Access Code: [email or web address] Expires: 11/13/2017  9:15 AM 
 
4. Enter the last four digits of your Social Security Number (xxxx) and Date of Birth (mm/dd/yyyy) as indicated and click Submit. You will be taken to the next sign-up page. 5. Create a [employer] ID. This will be your [employer] login ID and cannot be changed, so think of one that is secure and easy to remember. 6. Create a [employer] password. You can change your password at any time. 7. Enter your Password Reset Question and Answer. This can be used at a later time if you forget your password. 8. Enter your e-mail address. You will receive e-mail notification when new information is available in [address] 9. Click Sign Up. You can now view and download portions of your medical record. 10. Click the Download Summary menu link to download a portable copy of your medical information.  
 
If you have questions, please visit the Frequently Asked Questions section of the [employer]. Remember, [employer] is NOT to be used for urgent needs. For medical emergencies, dial 911. Now available from your iPhone and Android! Please provide this summary of care documentation to your next provider. Your primary care clinician is listed as Desi Madsen. If you have any questions after today's visit, please call [phone].

## 2017-09-29 NOTE — PROGRESS NOTES
Reviewed record in preparation for visit and have necessary documentation  Pt did not bring medication to office visit for review    Goals that were addressed and/or need to be completed during or after this appointment include   Health Maintenance Due   Topic Date Due    DTaP/Tdap/Td series (1 - Tdap) 12/17/1959    ZOSTER VACCINE AGE 60>  10/17/1998    Pneumococcal 65+ Low/Medium Risk (1 of 2 - PCV13) 12/17/2003    INFLUENZA AGE 9 TO ADULT  08/01/2017    FOOT EXAM Q1  10/27/2017

## 2017-10-16 NOTE — TELEPHONE ENCOUNTER
Last office visit 09/29/2017      Refill request reads:  Freestyle Lancest 100  Test blood sugar three times daily  #[id number]

## 2017-10-25 NOTE — TELEPHONE ENCOUNTER
Phu  Office Pool                     Pt called concerning getting the hose for her mask for the nebulizer and the DME company stated that she needs a prescription in order to  the equipment. Pt best contact number [phone].

## 2017-11-06 NOTE — TELEPHONE ENCOUNTER
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 Suggested that the patient also have a carotid doppler on Friday if possible. Requests a call back at [phone]. Thanks!

## 2017-11-07 NOTE — TELEPHONE ENCOUNTER
GAIL Hudson, LPN        Caller: Unspecified (Yesterday, 11:58 AM)                     That's seems reasonable.  I don't see that we have ever imaged her carotids while she's been seen in this practice. Justino Dus does however have a history of AF and is anticoagulated. We can use the 4 pm slot, but perhaps if someone cancels, then she can be imaged with her Echo, before her visit.         Left a message for Sid Mendenhall to return my call.

## 2017-11-08 NOTE — TELEPHONE ENCOUNTER
Pt's daughter, Ralph, is calling back. Ralph can be reached at [phone].     Thank you,  Dorene Wolff

## 2017-11-10 NOTE — PROGRESS NOTES
History of Present Illness    Byron Pink is a 64 y.o. female. Last seen 6 months ago. Problem List  Date Reviewed: 9/29/2017          Codes Class Noted    Dizziness ICD-10-CM: R42  ICD-9-CM: 780.4  6/9/2017        Dehydration ICD-10-CM: E86.0  ICD-9-CM: 276.51  6/7/2017        S/P AVR ICD-10-CM: Z95.2  ICD-9-CM: V43.3  11/6/2016        DM II (diabetes mellitus, type II), controlled ([facility] ICD-10-CM: E11.9  ICD-9-CM: 250.00  12/8/2015        RBBB ICD-10-CM: I45.10  ICD-9-CM: 426.4  10/16/2015        Frozen shoulder ICD-10-CM: M75.00  ICD-9-CM: 726.0  6/25/2014        Dyslipidemia ICD-10-CM: E78.5  ICD-9-CM: 272.4  3/21/2014        Chronic diastolic heart failure ([facility] ICD-10-CM: I50.32  ICD-9-CM: 428.32  5/29/2012        Pulmonary fibrosis ([facility] ICD-10-CM: J84.10  ICD-9-CM: 478  5/15/2012        Atrial fibrillation ([facility] ICD-10-CM: I48.91  ICD-9-CM: 427.31  5/10/2012        COPD (chronic obstructive pulmonary disease) ([facility] ICD-10-CM: J44.9  ICD-9-CM: 832  3/16/2012        History of noncompliance with medical treatment ICD-10-CM: Z91.19  ICD-9-CM: V15.81  9/13/2011        Arthritis ICD-10-CM: M19.90  ICD-9-CM: 716.90  1/14/2011        Hypertension ICD-10-CM: I10  ICD-9-CM: 401.9  Unknown        Depression ICD-10-CM: F32.9  ICD-9-CM: 36  Unknown        Female stress incontinence ICD-10-CM: N39.3  ICD-9-CM: 625.6  Unknown        CAD (coronary artery disease) ICD-10-CM: I25.10  ICD-9-CM: 414.00  Unknown        Aortic stenosis ICD-10-CM: I35.0  ICD-9-CM: 424.1  Unknown        Sleep apnea ICD-10-CM: G47.30  ICD-9-CM: 780.57  Unknown            Cardiac testing  S/P AVR (aortic valve replacement) 3/2012 - bovine ([facility])   Echo 2d adult 3/2012 - EF 30%, mod LVH, EF 30-35%, PA 50 mmHg   Echo May 2013 - EF 50%, inferior HK, normal AVR, moderate MR, PA 50  Echo 7/25/14 - EF 50-55%, AVR with moderately increased velocities, PA systolic 41 mmHg  Echo 2/16/85 - EF 55%, normal AVR function.  Mildly elevated PA systolic pressure. Echo 05/12/17-LVEF 55-60%. S/p tissue AVR peak velocity 3.9m/sec, mean gradient 36  Echo 11/10/17- LVEF 55%, normal AVR function. RV dysfunction, PA systolic 70 mmHg    HPI    Stable pattern of IGNACIO. She wears oxygen 24/7 and notes she has been less short of breath since last year. She is fairly sedentary but uses a foot pedal to try to stay active. Patient denies any exertional chest pain,  palpitations, syncope, orthopnea or paroxysmal nocturnal dyspnea. She has had recurrent UTIs despite maintenance abx. Urology evaluation pending. Daughter notes that patient has been more confused since hospitalization in July. She has recently lost vision her left eye. Her optometrist was concerned about ischemic etiology. She is here with her daughter today. Current Outpatient Prescriptions on File Prior to Visit   Medication Sig Dispense Refill    XARELTO 15 mg tab tablet Take 1 tablet by mouth  daily with dinner 90 Tab 3    metFORMIN (GLUCOPHAGE) 500 mg tablet Take 1 tablet by mouth 3  times a day with meals 270 Tab 1    nitrofurantoin (MACRODANTIN) 50 mg capsule Take 1 Cap by mouth nightly. 90 Cap 1    glucose blood VI test strips (FREESTYLE LITE STRIPS) strip TEST BLOOD SUGAR TWO TIMES DAILY E11.9 100 Strip 3    DIGOX 125 mcg tablet Take 1 tablet by mouth  nightly 90 Tab 2    furosemide (LASIX) 20 mg tablet Take 1 tablet twice a day. 180 Tab 3    atorvastatin (LIPITOR) 20 mg tablet Take 1 tablet by mouth  nightly 90 Tab 1    albuterol (PROVENTIL HFA, VENTOLIN HFA, PROAIR HFA) 90 mcg/actuation inhaler Take 1 Puff by inhalation three (3) times daily as needed for Wheezing.  aspirin delayed-release 81 mg tablet Take 81 mg by mouth daily.  potassium 99 mg tablet Take 99 mg by mouth two (2) times a day.  predniSONE (DELTASONE) 5 mg tablet Take 10 mg by mouth daily.  dilTIAZem CD (CARDIZEM CD) 240 mg ER capsule Take 1 Cap by mouth nightly.  90 Cap 3    ferrous sulfate (IRON) 325 mg (65 mg iron) EC tablet Take 325 mg by mouth daily.  tiotropium bromide (SPIRIVA RESPIMAT) 2.5 mcg/actuation mist Take 2 Puffs by inhalation daily.  arformoterol (BROVANA) 15 mcg/2 mL nebu neb solution 15 mcg by Nebulization route two (2) times a day.  albuterol (PROVENTIL VENTOLIN) 2.5 mg /3 mL (0.083 %) nebulizer solution 3 mL by Nebulization route three (3) times daily. 1 Package 6    pirfenidone (ESBRIET) 267 mg cap Take 3 Caps by mouth three (3) times daily. Indications: PULMONARY FIBROSIS      MISCELLANEOUS MEDICAL SUPPLY (OXYGEN TUBING) 2 L by Does Not Apply route.  Lancets misc TEST BLOOD SUGAR DAILY E11.9 100 Each 3    FREESTYLE FREEDOM LITE monitoring kit FOLLOW PACKAGE DIRECTIONS 1 kit 0     No current facility-administered medications on file prior to visit. No Known Allergies    Review of Systems  Constitutional:  Negative for fever, chills and diaphoresis. HEENT: +left eye vision loss  Respiratory:  Negative for cough, hemoptysis, sputum production and wheezing. +chronic SOB  Cardiovascular:  Negative for palpitations, orthopnea, claudication, and PND. Gastrointestinal:  Negative for nausea, vomiting, blood in stool and melena. Genitourinary:  Negative for urgency and flank pain. Musculoskeletal:  Negative for back pain and joint pain. Skin:  Negative for rash. Neurological:  Negative for dizziness, weakness, seizures, loss of consciousness and headaches. Endo/Heme/Allergies:  Does not bruise/bleed easily. Psychiatric/Behavioral: The patient does not have insomnia. +confusion    Visit Vitals    /84    Pulse 98    Resp 18    Ht 5' 2\" (1.575 m)    Wt 133 lb (60.3 kg)    SpO2 92%    BMI 24.33 kg/m2     Wt Readings from Last 3 Encounters:   11/10/17 133 lb (60.3 kg)   09/29/17 133 lb (60.3 kg)   08/15/17 134 lb (60.8 kg)     Physical Exam  Vitals reviewed. Constitutional:  She is oriented to person, place and time.   She appears well-nourished. HENT:  Head:  Normocephalic. Neck:  Neck supple. No JVD present. Cardiovascular:  Irregular rhythm, 2/6 holosystolic murmur, no S3, no rubs, no gallops. No JVD. No carotid bruits. Pulmonary/Chest:  Effort normal with NC and O2; some wheezing. Positive for dry crackles at the bases bilaterally   Abdominal:  Soft, nontender. Bowel sounds normal.  Musculoskeletal:  1+ankle edema and trace pre-tibial edema bilaterally   Neurological:  Alert and oriented to person, place and time. Skin:  Skin is warm and dry. Psychiatric:  She has a normal mood and affect. Lab Results   Component Value Date/Time    Hemoglobin A1c 6.3 08/15/2017 05:04 PM     Lab Results   Component Value Date/Time    Cholesterol, total 178 08/15/2017 05:04 PM    HDL Cholesterol 72 08/15/2017 05:04 PM    LDL, calculated 76 08/15/2017 05:04 PM    VLDL, calculated 30 08/15/2017 05:04 PM    Triglyceride 152 08/15/2017 05:04 PM    CHOL/HDL Ratio 6.9 05/15/2012 08:29 PM     Cardiographics    EKG 3/21/14- AF RBBB 90s  Echo 05/12/17-LVEF 55-60%. S/p tissue AVR ( peak velocity 3.9m/sec, mean gradient 36), RV dilated, severe TR, PA sys 53  Echo 11/10/17- LVEF 55%, normal AVR function. RV dysfunction, PA systolic 70 mmHg    ASSESSMENT and PLAN    Encounter Diagnoses   Name Primary?  RBBB Yes    Dyslipidemia     Chronic diastolic heart failure (HCC)     Paroxysmal atrial fibrillation (HCC)     Essential hypertension     Coronary artery disease involving native coronary artery of native heart without angina pectoris     Nonrheumatic aortic valve stenosis     S/P AVR     Controlled type 2 diabetes mellitus without complication, without long-term current use of insulin (HCC)     Chronic obstructive pulmonary disease, unspecified COPD type ([address]     Obstructive sleep apnea syndrome     Pulmonary fibrosis ([address]     Cerebrovascular accident (CVA), unspecified mechanism ([address]     Visual loss, left eye      Mrs. Porter history of severe AS s/p AVR 2012 with tissue valve, complicated by PAF and bifasicular block. Prosthetic valve function by echocardiogram today is largely unchanged from 6 months ago with mild-moderate prosthetic valve stenosis. She has severe PA HTN with RV dysfunction, likely related to untreated MARIA R, chronic hypoxemia NLID. She does have diastolic dysfunction for which she is on maintenance diuretics. She has chronic rales due to pulmonary fibrosis. Her functional status is limited because of her lung disease, now aggravated by some cognitive changes and visual loss in the left eye. Will obtain carotid duplex today to rule out critical carotid disease. She is chronically anticoagulated due to her hx of AF. Lipids and DM monitored by Bj Rogers MD      Follow-up Disposition:  Return in about 6 months (around 5/10/2018).      Written by Keshia Spence, as dictated by Brooke Michelle MD.   Brooke Michelle MD

## 2017-11-10 NOTE — MR AVS SNAPSHOT
Visit Information Date & Time Provider Department Dept. Phone Encounter #  
 11/10/2017  4:00 PM [facility] [phone] [id number] Your Appointments 11/10/2017  3:20 PM  
ESTABLISHED PATIENT with Pauline Christian MD  
[facility] ([address]) Appt Note: 6 mo fup  
 [address] [place] [facility]  
[phone]  
  
   
 [address] [facility] [place] [address] [place] [address] [address] [address]  
  
    
 11/10/2017  4:00 PM  
VASCULAR TEST with VASCULARJUDE  
[facility] ([facility] SCHEDULING) Appt Note: carotids per dr Jeral Libman [address] [facility] [address]  
[phone]  
  
   
 [address] [place] [address] [place] [address] [address] [address]  
  
    
 11/21/2017 10:00 AM  
ROUTINE CARE with Cedric Nair MD  
[address] [place] [address] [address]) Appt Note: 3 mo f/u-Diabetes/HTN  
 [address] [facility] [address] [address]  
[phone]  
  
   
 [address] [place] [address] [address] [address]  
  
    
 5/25/2018  2:20 PM  
ESTABLISHED PATIENT with Pauline Christian MD  
[facility] ([address]) Appt Note: 6 mo fup  
 [address] [place] [address] [facility]  
[phone] Upcoming Health Maintenance Date Due DTaP/Tdap/Td series (1 - Tdap) 12/17/1959 ZOSTER VACCINE AGE 60> 10/17/1998 Pneumococcal 65+ Low/Medium Risk (1 of 2 - PCV13) 12/17/2003 MEDICARE YEARLY EXAM 10/26/2017 FOOT EXAM Q1 10/27/2017 GLAUCOMA SCREENING Q2Y 2/9/2018 HEMOGLOBIN A1C Q6M 2/15/2018 LIPID PANEL Q1 8/15/2018 EYE EXAM RETINAL OR DILATED Q1 8/22/2018 Allergies as of 11/10/2017  Review Complete On: 11/10/2017 By: Autumn Bocanegra LPN No Known Allergies Current Immunizations  Reviewed on 9/29/2017 Name Date Influenza High Dose Vaccine PF 9/29/2017 Influenza Vaccine 10/22/2016, 10/16/2015, 1/2/2015, 11/15/2013, 12/7/2012 Not reviewed this visit Vitals OB Status Smoking Status Postmenopausal Never Smoker Your Updated Medication List  
  
   
This list is accurate as of: 11/10/17  3:05 PM.  Always use your most recent med list.  
  
  
  
  
 * albuterol 90 mcg/actuation inhaler Commonly known as:  PROVENTIL HFA, VENTOLIN HFA, PROAIR HFA Take 1 Puff by inhalation three (3) times daily as needed for Wheezing. * albuterol 2.5 mg /3 mL (0.083 %) nebulizer solution Commonly known as:  PROVENTIL VENTOLIN  
3 mL by Nebulization route three (3) times daily. aspirin delayed-release 81 mg tablet Take 81 mg by mouth daily. atorvastatin 20 mg tablet Commonly known as:  LIPITOR Take 1 tablet by mouth  nightly BROVANA 15 mcg/2 mL Nebu neb solution Generic drug:  arformoterol 15 mcg by Nebulization route two (2) times a day. DIGOX 0.125 mg tablet Generic drug:  digoxin Take 1 tablet by mouth  nightly  
  
 dilTIAZem  mg ER capsule Commonly known as:  CARDIZEM CD Take 1 Cap by mouth nightly. ESBRIET 267 mg Cap capsule Generic drug:  pirfenidone Take 3 Caps by mouth three (3) times daily. Indications: PULMONARY FIBROSIS  
  
 ferrous sulfate 325 mg (65 mg iron) EC tablet Commonly known as:  IRON Take 325 mg by mouth daily. FREESTYLE FREEDOM LITE monitoring kit Generic drug:  Blood-Glucose Meter FOLLOW PACKAGE DIRECTIONS  
  
 furosemide 20 mg tablet Commonly known as:  LASIX Take 1 tablet twice a day. glucose blood VI test strips strip Commonly known as:  FREESTYLE LITE STRIPS  
TEST BLOOD SUGAR TWO TIMES DAILY E11.9 Lancets Misc TEST BLOOD SUGAR DAILY E11.9  
  
 metFORMIN 500 mg tablet Commonly known as:  GLUCOPHAGE Take 1 tablet by mouth 3  times a day with meals nitrofurantoin 50 mg capsule Commonly known as:  MACRODANTIN Take 1 Cap by mouth nightly. OXYGEN TUBING  
2 L by Does Not Apply route. potassium 99 mg tablet Take 99 mg by mouth two (2) times a day. predniSONE 5 mg tablet Commonly known as:  Alinda Ruchi Take 10 mg by mouth daily. SPIRIVA RESPIMAT 2.5 mcg/actuation inhaler Generic drug:  tiotropium bromide Take 2 Puffs by inhalation daily. XARELTO 15 mg Tab tablet Generic drug:  rivaroxaban Take 1 tablet by mouth  daily with dinner * Notice: This list has 2 medication(s) that are the same as other medications prescribed for you. Read the directions carefully, and ask your doctor or other care provider to review them with you. Introducing [facility]! Rajwinder Villarreal introduces [employer] patient portal. Now you can access parts of your medical record, email your doctor's office, and request medication refills online. 1. In your internet browser, go to [email or web address] 2. Click on the First Time User? Click Here link in the Sign In box. You will see the New Member Sign Up page. 3. Enter your [employer] Access Code exactly as it appears below. You will not need to use this code after youve completed the sign-up process. If you do not sign up before the expiration date, you must request a new code. · [employer] Access Code: [email or web address] Expires: 11/13/2017  8:15 AM 
 
4. Enter the last four digits of your Social Security Number (xxxx) and Date of Birth (mm/dd/yyyy) as indicated and click Submit. You will be taken to the next sign-up page. 5. Create a [employer] ID. This will be your [employer] login ID and cannot be changed, so think of one that is secure and easy to remember. 6. Create a [employer] password. You can change your password at any time. 7. Enter your Password Reset Question and Answer. This can be used at a later time if you forget your password. 8. Enter your e-mail address. You will receive e-mail notification when new information is available in [address] 9. Click Sign Up. You can now view and download portions of your medical record. 10. Click the Download Summary menu link to download a portable copy of your medical information. If you have questions, please visit the Frequently Asked Questions section of the [employer] website. Remember, [employer] is NOT to be used for urgent needs. For medical emergencies, dial 911. Now available from your iPhone and Android! Please provide this summary of care documentation to your next provider. Your primary care clinician is listed as Yonathan Arellano. If you have any questions after today's visit, please call [phone].

## 2017-11-10 NOTE — PROCEDURES
[facility]  *** FINAL REPORT ***    Name: Luly Hyde  MRN: [id number]       Outpatient  : 17 Dec 1940  HIS Order #: [id number]  [address] [facility] Visit #: [id number]  Date: 10 Nov 2017    TYPE OF TEST: Cerebrovascular Duplex    REASON FOR TEST  Retinal vascular occlusion, us    Right Carotid:-             Proximal               Mid                 Distal  cm/s  Systolic  Diastolic  Systolic  Diastolic  Systolic  Diastolic  CCA:     56.7       6.0                            40.0       8.0  Bulb:  ECA:     73.0       0.0  ICA:     33.0      10.0       55.0      14.0       70.0      18.0  ICA/CCA:  0.8       1.3    ICA Stenosis: <50%    Right Vertebral:-  Finding: Antegrade  Sys:  Yenny:    Right Subclavian:    Left Carotid:-            Proximal                Mid                 Distal  cm/s  Systolic  Diastolic  Systolic  Diastolic  Systolic  Diastolic  CCA:     91.5       8.0                            40.0       7.0  Bulb:  ECA:     65.0       0.0  ICA:     49.0      12.0       53.0      12.0       56.0      15.0  ICA/CCA:  1.2       1.7    ICA Stenosis: <50%    Left Vertebral:-  Finding: Antegrade  Sys:       39.0  Yenny:        6.0    Left Subclavian:    INTERPRETATION/FINDINGS  PROCEDURE:  Evaluation of the extracranial cerebrovascular arteries  with ultrasound (B-mode imaging, pulsed Doppler, color Doppler). Includes the common carotid, internal carotid, external carotid, and  vertebral arteries. FINDINGS:    Right:  Mild, scattered mixed plaque is exhibited within the  bifurcation and the proximal segments of the internal and external  carotid arteries. Color flow imaging reveals a mild filling defect  and peak systolic velocities are recorded at 70 cm/s. Left:  Mild calcific plaque is visualized at the level of the  bifurcation extending into the proximal internal carotid artery and  the origin of the external carotid artery. Mild filling defects are  noted on color flow imaging.  The mid and distal ICA is noted to be  somewhat tortuous. Peak systolic velocities are recorded at 56 cm/s. IMPRESSION: Findings are consistent with 10-49% stenosis of the right  internal carotid and 10-49% stenosis of the left internal carotid. Vertebrals are patent with antegrade flow. ADDITIONAL COMMENTS    I have personally reviewed the data relevant to the interpretation of  this  study.     TECHNOLOGIST: Durand Fleischer, RVS  Signed: 11/10/2017 05:23 PM    PHYSICIAN: Chris Cazares MD  Signed: 11/13/2017 10:41 AM

## 2017-11-12 PROBLEM — H54.62 VISUAL LOSS, LEFT EYE: Status: ACTIVE | Noted: 2017-01-01

## 2017-11-13 NOTE — PROGRESS NOTES
Stable mild carotid disease. No evidence of cardioembolic event. She has chronic AF, chronically anticoagulated. Florance Files, can you let her daughter know?

## 2017-11-16 NOTE — TELEPHONE ENCOUNTER
MD Jovani Almaraz MD; Sherice Amanda LPN                   Stable mild carotid disease. No evidence of cardioembolic event. She has chronic AF, chronically anticoagulated. Hermila Stewart, can you let her daughter know? Patient's daughter notified.

## 2017-12-12 NOTE — TELEPHONE ENCOUNTER
She needs the strips, lancets and complete kit. The spring on it is broken. She is having to actually prick the finger with the needle.  Thanks

## 2017-12-15 NOTE — TELEPHONE ENCOUNTER
Ms. Pritesh Hernández called back and requested that these be sent in today as the patient is completely out.

## 2017-12-27 NOTE — TELEPHONE ENCOUNTER
Suzi with [facility] called stating patient is going to have her five year reset. She is requesting most recent office note where oxygen services were discussed and testing faxed to [phone].  Her phone number is [phone]

## 2017-12-27 NOTE — TELEPHONE ENCOUNTER
Last office visit 09/29/2017        Rx number [id number]  Freestyle Lancest 100  Test blood sugar 3 times daily  Qty:100  Last refill 08/24/2017

## 2017-12-28 NOTE — MR AVS SNAPSHOT
Visit Information Date & Time Provider Department Dept. Phone Encounter #  
 12/28/2017 10:40 AM Tyrese Hyde MD  [facility] [id number] Follow-up Instructions Return in about 4 months (around 4/28/2018). Your Appointments 5/25/2018  2:20 PM  
ESTABLISHED PATIENT with Skye Vidal MD  
[facility] ([address]) Appt Note: 6 mo fup  
 [address] [facility] [address] [facility]  
54 [facility] [id number] [address] Upcoming Health Maintenance Date Due Pneumococcal 65+ Low/Medium Risk (1 of 2 - PCV13) 12/17/2003 FOOT EXAM Q1 10/27/2017 GLAUCOMA SCREENING Q2Y 2/9/2018 HEMOGLOBIN A1C Q6M 2/15/2018 LIPID PANEL Q1 8/15/2018 EYE EXAM RETINAL OR DILATED Q1 8/22/2018 MEDICARE YEARLY EXAM 12/29/2018 DTaP/Tdap/Td series (2 - Td) 12/28/2027 Allergies as of 12/28/2017  Review Complete On: 12/28/2017 By: Tanya Vuong No Known Allergies Current Immunizations  Reviewed on 9/29/2017 Name Date Influenza High Dose Vaccine PF 9/29/2017 Influenza Vaccine 10/22/2016, 10/16/2015, 1/2/2015, 11/15/2013, 12/7/2012 Not reviewed this visit You Were Diagnosed With   
  
 Codes Comments Medicare annual wellness visit, subsequent    -  Primary ICD-10-CM: Z00.00 ICD-9-CM: V70.0 Alcohol screening     ICD-10-CM: Z13.89 ICD-9-CM: V79.1 Depression screening     ICD-10-CM: Z13.89 ICD-9-CM: V79.0 Chronic obstructive pulmonary disease, unspecified COPD type ([facility] [address]     ICD-10-CM: J44.9 ICD-9-CM: 989 Coronary artery disease involving native coronary artery of native heart without angina pectoris     ICD-10-CM: I25.10 ICD-9-CM: 414.01 Controlled type 2 diabetes mellitus without complication, without long-term current use of insulin ([facility] [address]     ICD-10-CM: E11.9 ICD-9-CM: 250.00 Recurrent urinary tract infection     ICD-10-CM: N39.0 ICD-9-CM: 599.0 Vitals BP Pulse Temp Resp Height(growth percentile) Weight(growth percentile) 127/70 60 97.5 °F (36.4 °C) (Oral) 24 5' 2\" (1.575 m) 134 lb 9.6 oz (61.1 kg) SpO2 BMI OB Status Smoking Status 94% 24.62 kg/m2 Postmenopausal Never Smoker Vitals History BMI and BSA Data Body Mass Index Body Surface Area  
 24.62 kg/m 2 1.63 m 2 Preferred Pharmacy Pharmacy Name Phone [address] [facility] [address] ([address] [phone] Your Updated Medication List  
  
   
This list is accurate as of: 12/28/17 11:30 AM.  Always use your most recent med list.  
  
  
  
  
 * albuterol 90 mcg/actuation inhaler Commonly known as:  PROVENTIL HFA, VENTOLIN HFA, PROAIR HFA Take 1 Puff by inhalation three (3) times daily as needed for Wheezing. * albuterol 2.5 mg /3 mL (0.083 %) nebulizer solution Commonly known as:  PROVENTIL VENTOLIN  
3 mL by Nebulization route three (3) times daily. aspirin delayed-release 81 mg tablet Take 81 mg by mouth daily. atorvastatin 20 mg tablet Commonly known as:  LIPITOR  
TAKE 1 TABLET BY MOUTH  NIGHTLY Blood-Glucose Meter monitoring kit Commonly known as:  FREESTYLE FREEDOM LITE Check blood sugar twice daily. Dx Code:E11.9 BROVANA 15 mcg/2 mL Nebu neb solution Generic drug:  arformoterol 15 mcg by Nebulization route two (2) times a day. cephALEXin 250 mg capsule Commonly known as:  Devoria Hertz Take 1 Cap by mouth nightly. DIGOX 0.125 mg tablet Generic drug:  digoxin Take 1 tablet by mouth  nightly  
  
 dilTIAZem  mg ER capsule Commonly known as:  CARDIZEM CD Take 1 Cap by mouth nightly. ESBRIET 267 mg Cap capsule Generic drug:  pirfenidone Take 3 Caps by mouth three (3) times daily.  Indications: PULMONARY FIBROSIS  
  
 ferrous sulfate 325 mg (65 mg iron) EC tablet Commonly known as:  IRON Take 325 mg by mouth daily. FREESTYLE LITE STRIPS strip Generic drug:  glucose blood VI test strips TEST BLOOD SUGAR TWO TIIMES DAILY  
  
 furosemide 20 mg tablet Commonly known as:  LASIX Take 1 tablet twice a day. Lancets Misc TEST BLOOD SUGAR DAILY E11.9  
  
 metFORMIN 500 mg tablet Commonly known as:  GLUCOPHAGE Take 1 tablet by mouth 3  times a day with meals OXYGEN TUBING  
2 L by Does Not Apply route. pneumococcal 13 jayce conj dip 0.5 mL Syrg injection Commonly known as:  PREVNAR-13  
0.5 mL by IntraMUSCular route once for 1 dose. potassium 99 mg tablet Take 99 mg by mouth two (2) times a day. predniSONE 5 mg tablet Commonly known as:  Catalino Gelineau Take 10 mg by mouth daily. SPIRIVA RESPIMAT 2.5 mcg/actuation inhaler Generic drug:  tiotropium bromide Take 2 Puffs by inhalation daily. XARELTO 15 mg Tab tablet Generic drug:  rivaroxaban Take 1 tablet by mouth  daily with dinner * Notice: This list has 2 medication(s) that are the same as other medications prescribed for you. Read the directions carefully, and ask your doctor or other care provider to review them with you. Prescriptions Printed Refills  
 pneumococcal 13 jayce conj dip (PREVNAR-13) 0.5 mL syrg injection 0 Si.5 mL by IntraMUSCular route once for 1 dose. Class: Print Route: IntraMUSCular We Performed the Following DIGOXIN [05684 CPT(R)] HEMOGLOBIN A1C WITH EAG [69648 CPT(R)] HGB & HCT [00882 CPT(R)] METABOLIC PANEL, COMPREHENSIVE [10386 CPT(R)] TSH 3RD GENERATION [23697 CPT(R)] Follow-up Instructions Return in about 4 months (around 2018). Patient Instructions Medicare Part B Preventive Services Guidelines/Limitations Date last completed and Frequency Due Date Bone Mass Measurement (age 72 & older, biennial) Requires diagnosis related to osteoporosis or estrogen deficiency. Biennial benefit unless patient has history of long-term glucocorticoid tx or baseline is needed because initial test was by other method Recommended every 2 years As recommended by your PCP or Specialist 
  
Cardiovascular Screening Blood Tests (every 5 years) Total cholesterol, HDL, Triglycerides Order as a panel if possible Completed 8/15/17 As recommended by your PCP As recommended by your PCP or Specialist  
Colorectal Cancer Screening 
-Fecal occult blood test (annual) -Flexible sigmoidoscopy (5y) 
-Screening colonoscopy (10y) -Barium Enema Age 49-80; After age [de-identified] if history of abnormal results Recommended every 5 to 10 years  As recommended by your PCP or Specialist 
  
Counseling to Prevent Tobacco Use (up to 8 sessions per year) - Counseling greater than 3 and up to 10 minutes - Counseling greater than 10 minutes Patients must be asymptomatic of tobacco-related conditions to receive as preventive service N/A N/A Diabetes Screening Tests (at least every 3 years, Medicare covers annually or at 6-month intervals for prediabetic patients) Fasting blood sugar (FBS) or glucose tolerance test (GTT) Patient must be diagnosed with one of the following: 
-Hypertension, Dyslipidemia, obesity, previous impaired FBS or GTT 
Or any two of the following: overweight, FH of diabetes, age ? 72, history of gestational diabetes, birth of baby weighing more than 9 pounds Completed 8/15/17 Recommended every 3 years for non-diabetics Recommended every 3-6 months for Pre-Diabetics and Diabetics As recommended by your PCP or Specialist 
  
Glaucoma Screening (no USPSTF recommendation) Diabetes mellitus, family history, , age 48 or over,  American, age 72 or over Completed [address] Recommended annually As recommended by your PCP or Specialist  
 Seasonal Influenza Vaccination (annually)  Completed 9/29/17 Recommended Annually Due Fall 2017 TDAP Vaccination Recommended every 10 years As recommended by your PCP or Specialist  
Zoster (Shingles) Vaccination Covered by Medicare Part D through the pharmacy- PCP provides prescription Completed Recommended once over age 48  As recommended by your PCP or Specialist  
Pneumococcal Vaccination (once after 72)  Pneumo 23- Recommended once over the age of 72 Prevnar 13-  Recommended once over the age of 72 Prescription printed. Screening Mammography (biennial age 54-69) Annually (age 36 or over) As recommended by your PCP or Specialist 
  
Screening Pap Tests and Pelvic Examination (up to age 79 and after 79 if unknown history or abnormal study last 10 years) Every 24 months except high risk As recommended by your PCP or Specialist 
 As recommended by your PCP or Specialist 
  
Ultrasound Screening for Abdominal Aortic Aneurysm (AAA) (once) Patient must be referred through IPPE and not have had a screening for abdominal aortic aneurysm before under Medicare. Limited to patients who meet one of the following criteria: 
- Men who are 73-68 years old and have smoked more than 100 cigarettes in their lifetime. 
-Anyone with a FH of AAA 
-Anyone recommended for screening by USPSTF Not indicated unless recommended by PCP Not indicated unless recommended by PCP Family Practice Management 2011 Medicare Wellness Visit, Female The best way to live healthy is to have a healthy lifestyle by eating a well-balanced diet, exercising regularly, limiting alcohol and stopping smoking. Regular physical exams and screening tests are another way to keep healthy. Preventive exams provided by your health care provider can find health problems before they become diseases or illnesses.  Preventive services including immunizations, screening tests, monitoring and exams can help you take care of your own health. All people over age 72 should have a pneumovax  and and a prevnar shot to prevent pneumonia. These are once in a lifetime unless you and your provider decide differently. All people over 65 should have a yearly flu shot and a tetanus vaccine every 10 years. A bone mass density to screen for osteoporosis or thinning of the bones should be done every 2 years after 65. Screening for diabetes mellitus with a blood sugar test should be done every year. Glaucoma is a disease of the eye due to increased ocular pressure that can lead to blindness and it should be done every year by an eye professional. 
 
Cardiovascular screening tests that check for elevated lipids (fatty part of blood) which can lead to heart disease and strokes should be done every 5 years. Colorectal screening that evaluates for blood or polyps in your colon should be done yearly as a stool test or every five years as a flexible sigmoidoscope or every 10 years as a colonoscopy up to age 76. Breast cancer screening with a mammogram is recommended biennially  for women age 54-69. Screening for cervical cancer with a pap smear and pelvic exam is recommended for women after age 72 years every 2 years up to age 79 or when the provider and patient decide to stop. If there is a history of cervical abnormalities or other increased risk for cancer then the test is recommended yearly. Hepatitis C screening is also recommended for anyone born between 80 through [address]. A shingles vaccine is also recommended once in a lifetime after age 61. Your Medicare Wellness Exam is recommended annually. Here is a list of your current Health Maintenance items with a due date: 
Health Maintenance Due Topic Date Due  Pneumococcal Vaccine (1 of 2 - PCV13) 12/17/2003  Diabetic Foot Care  10/27/2017  Glaucoma Screening   02/09/2018 Introducing [facility]! Ruddy Champagne introduces [employer] patient portal. Now you can access parts of your medical record, email your doctor's office, and request medication refills online. 1. In your internet browser, go to [email or web address] 2. Click on the First Time User? Click Here link in the Sign In box. You will see the New Member Sign Up page. 3. Enter your Keukey Access Code exactly as it appears below. You will not need to use this code after youve completed the sign-up process. If you do not sign up before the expiration date, you must request a new code. · Ke[employer]key Access Code: Te Simons Expires: 2/12/2018  8:10 AM 
 
4. Enter the last four digits of your Social Security Number (xxxx) and Date of Birth (mm/dd/yyyy) as indicated and click Submit. You will be taken to the next sign-up page. 5. Create a Keukey ID. This will be your Keukey login ID and cannot be changed, so think of one that is secure and easy to remember. 6. Create a Ke[employer]key password. You can change your password at any time. 7. Enter your Password Reset Question and Answer. This can be used at a later time if you forget your password. 8. Enter your e-mail address. You will receive e-mail notification when new information is available in [address] 9. Click Sign Up. You can now view and download portions of your medical record. 10. Click the Download Summary menu link to download a portable copy of your medical information. If you have questions, please visit the Frequently Asked Questions section of the [employer] website. Remember, [employer] is NOT to be used for urgent needs. For medical emergencies, dial 911. Now available from your iPhone and Android! Please provide this summary of care documentation to your next provider. Your primary care clinician is listed as Kriss Jorge. If you have any questions after today's visit, please call [phone].

## 2017-12-28 NOTE — PATIENT INSTRUCTIONS
Medicare Part B Preventive Services Guidelines/Limitations Date last completed and Frequency Due Date   Bone Mass Measurement  (age 72 & older, biennial) Requires diagnosis related to osteoporosis or estrogen deficiency. Biennial benefit unless patient has history of long-term glucocorticoid tx or baseline is needed because initial test was by other method   Recommended every 2 years As recommended by your PCP or Specialist     Cardiovascular Screening Blood Tests (every 5 years)  Total cholesterol, HDL, Triglycerides Order as a panel if possible Completed     8/15/17  As recommended by your PCP As recommended by your PCP or Specialist   Colorectal Cancer Screening  -Fecal occult blood test (annual)  -Flexible sigmoidoscopy (5y)  -Screening colonoscopy (10y)  -Barium Enema Age 49-80; After age [de-identified] if history of abnormal results   Recommended every 5 to 10 years  As recommended by your PCP or Specialist     Counseling to Prevent Tobacco Use (up to 8 sessions per year)  - Counseling greater than 3 and up to 10 minutes  - Counseling greater than 10 minutes Patients must be asymptomatic of tobacco-related conditions to receive as preventive service N/A N/A   Diabetes Screening Tests (at least every 3 years, Medicare covers annually or at 6-month intervals for prediabetic patients)    Fasting blood sugar (FBS) or glucose tolerance test (GTT) Patient must be diagnosed with one of the following:  -Hypertension, Dyslipidemia, obesity, previous impaired FBS or GTT  Or any two of the following: overweight, FH of diabetes, age ? 72, history of gestational diabetes, birth of baby weighing more than 9 pounds Completed     8/15/17    Recommended every 3 years for non-diabetics    Recommended every 3-6 months for Pre-Diabetics and Diabetics As recommended by your PCP or Specialist     Glaucoma Screening (no USPSTF recommendation) Diabetes mellitus, family history, , age 48 or over,  American, age 72 or over Completed   [address]    Recommended annually As recommended by your PCP or Specialist   Seasonal Influenza Vaccination (annually)  Completed     9/29/17    Recommended Annually Due Fall 2017   TDAP Vaccination      Recommended every 10 years As recommended by your PCP or Specialist   Zoster (Shingles) Vaccination Covered by Medicare Part D through the pharmacy- PCP provides prescription Completed     Recommended once over age 48  As recommended by your PCP or Specialist   Pneumococcal Vaccination (once after 72)  Pneumo 23-   Recommended once over the age of 72    Prevnar 15-  Recommended once over the age of 72 Prescription printed. Screening Mammography (biennial age 54-69) Annually (age 36 or over)    As recommended by your PCP or Specialist     Screening Pap Tests and Pelvic Examination (up to age 79 and after 79 if unknown history or abnormal study last 8 years) Every 25 months except high risk As recommended by your PCP or Specialist   As recommended by your PCP or Specialist     Ultrasound Screening for Abdominal Aortic Aneurysm (AAA) (once) Patient must be referred through IPPE and not have had a screening for abdominal aortic aneurysm before under Medicare. Limited to patients who meet one of the following criteria:  - Men who are 73-68 years old and have smoked more than 100 cigarettes in their lifetime.  -Anyone with a FH of AAA  -Anyone recommended for screening by USPSTF Not indicated unless recommended by PCP   Not indicated unless recommended by PCP     Family Practice Management 2011      Medicare Wellness Visit, Female    The best way to live healthy is to have a healthy lifestyle by eating a well-balanced diet, exercising regularly, limiting alcohol and stopping smoking. Regular physical exams and screening tests are another way to keep healthy.  Preventive exams provided by your health care provider can find health problems before they become diseases or illnesses. Preventive services including immunizations, screening tests, monitoring and exams can help you take care of your own health. All people over age 72 should have a pneumovax  and and a prevnar shot to prevent pneumonia. These are once in a lifetime unless you and your provider decide differently. All people over 65 should have a yearly flu shot and a tetanus vaccine every 10 years. A bone mass density to screen for osteoporosis or thinning of the bones should be done every 2 years after 65. Screening for diabetes mellitus with a blood sugar test should be done every year. Glaucoma is a disease of the eye due to increased ocular pressure that can lead to blindness and it should be done every year by an eye professional.    Cardiovascular screening tests that check for elevated lipids (fatty part of blood) which can lead to heart disease and strokes should be done every 5 years. Colorectal screening that evaluates for blood or polyps in your colon should be done yearly as a stool test or every five years as a flexible sigmoidoscope or every 10 years as a colonoscopy up to age 76. Breast cancer screening with a mammogram is recommended biennially  for women age 54-69. Screening for cervical cancer with a pap smear and pelvic exam is recommended for women after age 72 years every 2 years up to age 79 or when the provider and patient decide to stop. If there is a history of cervical abnormalities or other increased risk for cancer then the test is recommended yearly. Hepatitis C screening is also recommended for anyone born between 80 through [address]. A shingles vaccine is also recommended once in a lifetime after age 61. Your Medicare Wellness Exam is recommended annually.     Here is a list of your current Health Maintenance items with a due date:  Health Maintenance Due   Topic Date Due    Pneumococcal Vaccine (1 of 2 - PCV13) 12/17/2003    Diabetic Foot Care  10/27/2017    Glaucoma Screening   02/09/2018          COPD Exacerbation Plan: Care Instructions  Your Care Instructions    If you have chronic obstructive pulmonary disease (COPD), your usual shortness of breath could suddenly get worse. You may start coughing more and have more mucus. This flare-up is called a COPD exacerbation (say \"lz-QCU-kc-BAY-cheyenne\"). A lung infection or air pollution could set off an exacerbation. Sometimes it can happen after a quick change in temperature or being around chemicals. Work with your doctor to make a plan for dealing with an exacerbation. You can better manage it if you plan ahead. Follow-up care is a key part of your treatment and safety. Be sure to make and go to all appointments, and call your doctor if you are having problems. It's also a good idea to know your test results and keep a list of the medicines you take. How can you care for yourself at home? During an exacerbation  · Do not panic if you start to have one. Quick treatment at home may help you prevent serious breathing problems. If you have a COPD exacerbation plan that you developed with your doctor, follow it. · Take your medicines exactly as your doctor tells you. ¨ Use your inhaler as directed by your doctor. If your symptoms do not get better after you use your medicine, have someone take you to the emergency room. Call an ambulance if necessary. ¨ With inhaled medicines, a spacer or a nebulizer may help you get more medicine to your lungs. Ask your doctor or pharmacist how to use them properly. Practice using the spacer in front of a mirror before you have an exacerbation. This may help you get the medicine into your lungs quickly. ¨ If your doctor has given you steroid pills, take them as directed. ¨ Your doctor may have given you a prescription for antibiotics, which you can fill if you need it. ¨ Talk to your doctor if you have any problems with your medicine.  And call your doctor if you have to use your antibiotic or steroid pills. Preventing an exacerbation  · Do not smoke. This is the most important step you can take to prevent more damage to your lungs and prevent problems. If you already smoke, it is never too late to stop. If you need help quitting, talk to your doctor about stop-smoking programs and medicines. These can increase your chances of quitting for good. · Take your daily medicines as prescribed. · Avoid colds and flu. ¨ Get a pneumococcal vaccine. ¨ Get a flu vaccine each year, as soon as it is available. Ask those you live or work with to do the same, so they will not get the flu and infect you. ¨ Try to stay away from people with colds or the flu. ¨ Wash your hands often. · Avoid secondhand smoke; air pollution; cold, dry air; hot, humid air; and high altitudes. Stay at home with your windows closed when air pollution is bad. · Learn breathing techniques for COPD, such as breathing through pursed lips. These techniques can help you breathe easier during an exacerbation. When should you call for help? Call 911 anytime you think you may need emergency care. For example, call if:  ? · You have severe trouble breathing. ? · You have severe chest pain. ?Call your doctor now or seek immediate medical care if:  ? · You have new or worse shortness of breath. ? · You develop new chest pain. ? · You are coughing more deeply or more often, especially if you notice more mucus or a change in the color of your mucus. ? · You cough up blood. ? · You have new or increased swelling in your legs or belly. ? · You have a fever. ? Watch closely for changes in your health, and be sure to contact your doctor if:  ? · You need to use your antibiotic or steroid pills. ? · Your symptoms are getting worse. Where can you learn more? Go to http://lul-ben.info/. Enter J727 in the search box to learn more about \"COPD Exacerbation Plan: Care Instructions. \"  Current as of: May 12, 2017  Content Version: 11.4  © 4999-2176 [employer]. Care instructions adapted under license by [employer] (which disclaims liability or warranty for this information). If you have questions about a medical condition or this instruction, always ask your healthcare professional. [address] any warranty or liability for your use of this information.

## 2017-12-28 NOTE — PROGRESS NOTES
Progress Note    Patient: Julio Cesar Menezes MRN: [id number]  SSN: [id number]    YOB: 1940  Age: 76 y.o. Sex: female        Chief Complaint   Patient presents with    Documentation     o2 renewal     Annual Wellness Visit     she is a 64y.o. year old female who presents for O2 renewal and possible UTI. Patient with hx of frequent UTI and is on cephalexin for prophylaxis. Patient with hx of DM2, HTN, CAD, HLD, Afib, and COPD. Patient due for lab workl. She denies HA, dizziness, SOB, CP, abdominal pain, acute myalgias or arthralgias. BP Readings from Last 3 Encounters:   12/28/17 127/70   11/10/17 116/84   09/29/17 121/71     Wt Readings from Last 3 Encounters:   12/28/17 134 lb 9.6 oz (61.1 kg)   11/10/17 133 lb (60.3 kg)   09/29/17 133 lb (60.3 kg)     Body mass index is 24.62 kg/(m^2). Encounter Diagnoses   Name Primary?  Chronic obstructive pulmonary disease, unspecified COPD type ([facility] Yes    Controlled type 2 diabetes mellitus without complication, without long-term current use of insulin [facility]     Coronary artery disease involving native coronary artery of native heart without angina pectoris     Chronic diastolic heart failure ([facility]     Recurrent urinary tract infection     Medicare annual wellness visit, subsequent     Alcohol screening     Depression screening        Current and past medical information:    Current Medications after this visit[de-identified]     Current Outpatient Prescriptions   Medication Sig    pneumococcal 13 jayce conj dip (PREVNAR-13) 0.5 mL syrg injection 0.5 mL by IntraMUSCular route once for 1 dose.  cephALEXin (KEFLEX) 250 mg capsule Take 1 Cap by mouth nightly.  atorvastatin (LIPITOR) 20 mg tablet TAKE 1 TABLET BY MOUTH  NIGHTLY    FREESTYLE LITE STRIPS strip TEST BLOOD SUGAR TWO TIIMES DAILY    Blood-Glucose Meter (FREESTYLE FREEDOM LITE) monitoring kit Check blood sugar twice daily.  Dx Code:E11.9    XARELTO 15 mg tab tablet Take 1 tablet by mouth daily with dinner    metFORMIN (GLUCOPHAGE) 500 mg tablet Take 1 tablet by mouth 3  times a day with meals    DIGOX 125 mcg tablet Take 1 tablet by mouth  nightly    furosemide (LASIX) 20 mg tablet Take 1 tablet twice a day.  albuterol (PROVENTIL HFA, VENTOLIN HFA, PROAIR HFA) 90 mcg/actuation inhaler Take 1 Puff by inhalation three (3) times daily as needed for Wheezing.  aspirin delayed-release 81 mg tablet Take 81 mg by mouth daily.  potassium 99 mg tablet Take 99 mg by mouth two (2) times a day.  predniSONE (DELTASONE) 5 mg tablet Take 10 mg by mouth daily.  dilTIAZem CD (CARDIZEM CD) 240 mg ER capsule Take 1 Cap by mouth nightly.  ferrous sulfate (IRON) 325 mg (65 mg iron) EC tablet Take 325 mg by mouth daily.  tiotropium bromide (SPIRIVA RESPIMAT) 2.5 mcg/actuation mist Take 2 Puffs by inhalation daily.  arformoterol (BROVANA) 15 mcg/2 mL nebu neb solution 15 mcg by Nebulization route two (2) times a day.  albuterol (PROVENTIL VENTOLIN) 2.5 mg /3 mL (0.083 %) nebulizer solution 3 mL by Nebulization route three (3) times daily.  pirfenidone (ESBRIET) 267 mg cap Take 3 Caps by mouth three (3) times daily. Indications: PULMONARY FIBROSIS    MISCELLANEOUS MEDICAL SUPPLY (OXYGEN TUBING) 2 L by Does Not Apply route.  Lancets misc TEST BLOOD SUGAR DAILY E11.9     No current facility-administered medications for this visit.         Patient Active Problem List    Diagnosis Date Noted    Visual loss, left eye 11/12/2017    Dizziness 06/09/2017    Dehydration 06/07/2017    S/P AVR 11/06/2016    DM II (diabetes mellitus, type II), controlled ([facility] 12/08/2015    RBBB 10/16/2015    Frozen shoulder 06/25/2014    Dyslipidemia 03/21/2014    [facility] 05/29/2012    Pulmonary fibrosis [facility] 05/15/2012    Atrial fibrillation [facility] 05/10/2012    COPD (chronic obstructive pulmonary disease) ([facility] 03/16/2012    History of noncompliance with medical treatment 09/13/2011    Arthritis 01/14/2011    Hypertension     Depression     Female stress incontinence     CAD (coronary artery disease)     Aortic stenosis     Sleep apnea        Past Medical History:   Diagnosis Date    Aortic stenosis     severe, s/p AVR March 2012 - presented with HF, EF 30%    Asthma 5/10/2012    Atrial fibrillation ([facility] 5/10/2012    Bacterial pneumonia, unspecified     CAD (coronary artery disease)     cor calcifications (LAD) by CT     Chronic diastolic heart failure ([facility] 5/29/2012    Depression     Dyslipidemia     Female stress incontinence     Hyperlipidemia 9/17/2010    Hypertension     Osteoarthritis     Paroxysmal atrial fibrillation (HCC)     post op AVR    Pulmonary fibrosis ([address]     S/P AVR 11/6/2016    S/P AVR (aortic valve replacement) 3/2012    bovine ([facility])    Sleep apnea     T2DM (type 2 diabetes mellitus) ([facility] 1/14/2011       No Known Allergies    Past Surgical History:   Procedure Laterality Date    CARDIAC SURG PROCEDURE UNLIST      ECHO 2D ADULT  3/2012    EF 30%, mod LVH, EF 30-35%, PA 50 mmHg    HX CATARACT REMOVAL      bilateral       Social History     Social History    Marital status:      Spouse name: N/A    Number of children: N/A    Years of education: N/A     Social History Main Topics    Smoking status: Never Smoker    Smokeless tobacco: Never Used    Alcohol use No    Drug use: No    Sexual activity: Not Asked     Other Topics Concern    None     Social History Narrative         Objective:     Review of Systems:  Constitutional: Negative for fatigue or malaise  Skin: Negative for rash or lesion  Endo: Negative for unusual thirst or weight changes  HEENT: Negative for acute hearing or vision changes  Cardiovascular: Negative for dizziness, chest pain or palpitations  Respiratory: see HPI, Negative for cough, wheezing or SOB  Gastreintestinal: Negative for nausea or abdominal pain  Genital/urinary: see HPI  Muscoloskeletal: Negative for myalgias or arthralgias   Neurological: Negative for headache, weakness or paresthesia  Psychological: Negative for depression or anxiety      Vitals:    12/28/17 1100   BP: 127/70   Pulse: 60   Resp: 24   Temp: 97.5 °F (36.4 °C)   TempSrc: Oral   SpO2: 94%   Weight: 134 lb 9.6 oz (61.1 kg)   Height: 5' 2\" (1.575 m)      Body mass index is 24.62 kg/(m^2). Physical Exam:  Constitutional: Thin, in no acute distress  Skin: warm and dry sans rash or lesion  Head: normocephalic, atraumatic  Eyes: sclera clear, EOMI  Nose: nasal canula in place  Neck: normal range of motion  CV: normal S1, S2, regular rate and rhythm, no murmur  Respiratory: clear to auscultation bilaterally with symmetrical effort  Back: no CVA tenderness  Extremities: in wheelchair, No edema  Neurology: active, alert and oriented, no focal deficits  Psych: affect appropriate     Health Maintenance Due   Topic Date Due    Pneumococcal 65+ Low/Medium Risk (1 of 2 - PCV13) 12/17/2003    FOOT EXAM Q1  10/27/2017    GLAUCOMA SCREENING Q2Y  02/09/2018     Risk, benefits and potential costs of recommended health maintenance discussed. Patient expressed understanding and declined at this time. Assessment and orders:     Diagnoses and all orders for this visit:    1. Chronic obstructive pulmonary disease, unspecified COPD type (HCC)  -     pneumococcal 13 jayce conj dip (PREVNAR-13) 0.5 mL syrg injection; 0.5 mL by IntraMUSCular route once for 1 dose. -     METABOLIC PANEL, COMPREHENSIVE  -     HGB & HCT    2. Controlled type 2 diabetes mellitus without complication, without long-term current use of insulin (HCC)  -     METABOLIC PANEL, COMPREHENSIVE  -     HEMOGLOBIN A1C WITH EAG  -     TSH 3RD GENERATION  -     HGB & HCT    3. Coronary artery disease involving native coronary artery of native heart without angina pectoris  -     METABOLIC PANEL, COMPREHENSIVE  -     HGB & HCT  -     DIGOXIN    4.  Chronic diastolic heart failure (HCC)  -     DIGOXIN    5. Recurrent urinary tract infection  -     AMB POC URINALYSIS DIP STICK AUTO W/O MICRO  -     CULTURE, URINE    6. Medicare annual wellness visit, subsequent    7. Alcohol screening  -     RI ANNUAL ALCOHOL SCREEN 15 MIN    8. Depression screening  -     RI DEPRESSION SCREEN ANNUAL        Plan of care:  Discussed diagnoses in detail with patient. Importance of compliance with all prescribed medications discussed. Including any new medications prescribed today. Medication risks/benefits/side effects discussed with patient. All of the patient's questions were addressed. The patient understands and agrees with our plan of care. The patient knows to call back if they are unsure of or forget any changes we discussed today or if the symptoms change. The patient received an After-Visit Summary which contains VS, orders, medication list and allergy list.     Patient Care Team:  Arely Carrion MD as PCP - General (Family Practice)  Morena Cuevas, RN as Nurse Nitin East MD (Pulmonary Disease)  Yajaira Roman MD (Pulmonary Disease)  Flaco Michael MD (Ophthalmology)  Thamas Dakin, MD as Physician (Cardiology)  Karan Frazier RN as Ambulatory Care Navigator  Damian Romero MD (Urology)    Follow-up Disposition:  Return in about 4 months (around 4/28/2018).     Future Appointments  Date Time Provider Gwendolyn Christensen   4/27/2018 8:20 AM [facility]   5/25/2018 2:20 PM Thamas Dakin, MD [address] [place] [address]       Signed By: Arely Carrion MD     December 28, 2017

## 2017-12-28 NOTE — ACP (ADVANCE CARE PLANNING)
A copy of patient's completed Advanced Medical Directive is on file in the patient's medical record. RN reviewed Advanced Medical Directive document with patient & patient denies the need for changes/ updates.

## 2017-12-28 NOTE — PROGRESS NOTES
Health Maintenance Due   Topic Date Due    DTaP/Tdap/Td series (1 - Tdap) 12/17/1959    ZOSTER VACCINE AGE 60>  10/17/1998    Pneumococcal 65+ Low/Medium Risk (1 of 2 - PCV13) 12/17/2003    MEDICARE YEARLY EXAM  10/26/2017    FOOT EXAM Q1  10/27/2017    GLAUCOMA SCREENING Q2Y  02/09/2018     o2 renewal    Sitting w/o  88%  Pulse 92    Walking w/o  79%  Pulse 125    Sitting with  93% 104    Walking with  93% 110

## 2017-12-28 NOTE — PROGRESS NOTES
This is a Subsequent Medicare Annual Wellness Exam (AWV) (Performed 12 months after IPPE or effective date of Medicare Part B enrollment)    I have reviewed the patient's medical history in detail and updated the computerized patient record. History     Past Medical History:   Diagnosis Date    Aortic stenosis     severe, s/p AVR March 2012 - presented with HF, EF 30%    Asthma 5/10/2012    Atrial fibrillation ([facility] 5/10/2012    Bacterial pneumonia, unspecified     CAD (coronary artery disease)     cor calcifications (LAD) by CT     Chronic diastolic heart failure ([facility] 5/29/2012    Depression     Dyslipidemia     Female stress incontinence     Hyperlipidemia 9/17/2010    Hypertension     Osteoarthritis     Paroxysmal atrial fibrillation (HCC)     post op AVR    Pulmonary fibrosis ([facility]     S/P AVR 11/6/2016    S/P AVR (aortic valve replacement) 3/2012    bovine ([facility])    Sleep apnea     T2DM (type 2 diabetes mellitus) ([facility] 1/14/2011      Past Surgical History:   Procedure Laterality Date    CARDIAC SURG PROCEDURE UNLIST      ECHO 2D ADULT  3/2012    EF 30%, mod LVH, EF 30-35%, PA 50 mmHg    HX CATARACT REMOVAL      bilateral     Current Outpatient Prescriptions   Medication Sig Dispense Refill    atorvastatin (LIPITOR) 20 mg tablet TAKE 1 TABLET BY MOUTH  NIGHTLY 90 Tab 0    FREESTYLE LITE STRIPS strip TEST BLOOD SUGAR TWO TIIMES DAILY 100 Strip 3    Blood-Glucose Meter (FREESTYLE FREEDOM LITE) monitoring kit Check blood sugar twice daily. Dx Code:E11.9 1 Kit 0    Lancets misc TEST BLOOD SUGAR DAILY E11.9 100 Each 3    XARELTO 15 mg tab tablet Take 1 tablet by mouth  daily with dinner 90 Tab 3    metFORMIN (GLUCOPHAGE) 500 mg tablet Take 1 tablet by mouth 3  times a day with meals 270 Tab 1    DIGOX 125 mcg tablet Take 1 tablet by mouth  nightly 90 Tab 2    furosemide (LASIX) 20 mg tablet Take 1 tablet twice a day.  180 Tab 3    albuterol (PROVENTIL HFA, VENTOLIN HFA, PROAIR HFA) 90 mcg/actuation inhaler Take 1 Puff by inhalation three (3) times daily as needed for Wheezing.  aspirin delayed-release 81 mg tablet Take 81 mg by mouth daily.  potassium 99 mg tablet Take 99 mg by mouth two (2) times a day.  predniSONE (DELTASONE) 5 mg tablet Take 10 mg by mouth daily.  dilTIAZem CD (CARDIZEM CD) 240 mg ER capsule Take 1 Cap by mouth nightly. 90 Cap 3    ferrous sulfate (IRON) 325 mg (65 mg iron) EC tablet Take 325 mg by mouth daily.  tiotropium bromide (SPIRIVA RESPIMAT) 2.5 mcg/actuation mist Take 2 Puffs by inhalation daily.  arformoterol (BROVANA) 15 mcg/2 mL nebu neb solution 15 mcg by Nebulization route two (2) times a day.  albuterol (PROVENTIL VENTOLIN) 2.5 mg /3 mL (0.083 %) nebulizer solution 3 mL by Nebulization route three (3) times daily. 1 Package 6    pirfenidone (ESBRIET) 267 mg cap Take 3 Caps by mouth three (3) times daily. Indications: PULMONARY FIBROSIS      MISCELLANEOUS MEDICAL SUPPLY (OXYGEN TUBING) 2 L by Does Not Apply route. Medication reconciliation completed by MA/ LPN and reviewed by PCP.     No Known Allergies  Family History   Problem Relation Age of Onset    Diabetes Mother     Diabetes Father     Hypertension Father      Social History   Substance Use Topics    Smoking status: Never Smoker    Smokeless tobacco: Never Used    Alcohol use No     Patient Active Problem List   Diagnosis Code    Hypertension I10    Depression F32.9    Female stress incontinence N39.3    CAD (coronary artery disease) I25.10    Aortic stenosis I35.0    Sleep apnea G47.30    Arthritis M19.90    History of noncompliance with medical treatment Z91.19    COPD (chronic obstructive pulmonary disease) ([facility] J44.9    Atrial fibrillation (HCC) I48.91    Pulmonary fibrosis (HCC) J84.10    Chronic diastolic heart failure (HCC) I50.32    Dyslipidemia E78.5    Frozen shoulder M75.00    RBBB I45.10    DM II (diabetes mellitus, type II), controlled ([facility] E11.9    S/P AVR Z95.2    Dehydration E86.0    Dizziness R42    Visual loss, left eye H54.62       Depression Risk Factor Screening:     PHQ over the last two weeks 6/20/2014   Little interest or pleasure in doing things Not at all   Feeling down, depressed or hopeless Not at all   Total Score PHQ 2 0     Patient denies thoughts of harm to self or others. Patient states there is a strong support system within friends & family. Alcohol Risk Factor Screening: You do not drink alcohol or very rarely. Functional Ability and Level of Safety:   Hearing Loss  Hearing is good. Activities of Daily Living  The home contains: cane and walker  Patient needs help with:  shopping, preparing meals, laundry, housework, managing medications, managing money, hygiene and walking    ADL Assessment 12/28/2017   Feeding yourself No Help Needed   Getting from bed to chair No Help Needed   Getting dressed No Help Needed   Bathing or showering Help Needed   Walk across the room (includes cane/walker) Help Needed   Using the telphone No Help Needed   Taking your medications Help Needed   Preparing meals Help Needed   Managing money (expenses/bills) Help Needed   Moderately strenuous housework (laundry) Help Needed   Shopping for personal items (toiletries/medicines) Help Needed   Shopping for groceries Help Needed   Driving Help Needed   Climbing a flight of stairs Help Needed   Getting to places beyond walking distances Help Needed         Fall Risk  Fall Risk Assessment, last 12 mths 12/28/2017   Able to walk? Yes   Fall in past 12 months? Yes   Fall with injury? Yes   Number of falls in past 12 months 1   Fall Risk Score 2     Per caregiver Janet Bishop, patient fell in June 2017 and hit her head. She was admitted to [address] [place] [address] and had a CT but not a MRI. Patient denies any additional falls. Patient denies feeling dizzy, weak, lightheaded & states no loss of consciousness at the time of the fall. Patient verbalized fall prevention strategies. Abuse Screen  Patient is not abused    Cognitive Screening   Evaluation of Cognitive Function:  Has your family/caregiver stated any concerns about your memory: no  Normal    Patient Care Team   Patient Care Team:  Jw Franklin MD as PCP - General (Family Practice)  Ace Chadwick, RN as Nurse Daniele Lim MD (Pulmonary Disease)  Yolanda Fuentes MD (Pulmonary Disease)  Brenda Molina MD (Ophthalmology)  Marielos Flynn MD as Physician (Cardiology)  Bety Chappell, RN as Ambulatory Care Navigator  Elaine Thayer MD (Urology)    Assessment/Plan   Education and counseling provided:  Pneumococcal Vaccine    Diagnoses and all orders for this visit:    1. Medicare annual wellness visit, subsequent    2. Alcohol screening    3. Depression screening    4. Chronic obstructive pulmonary disease, unspecified COPD type ([facility]    Other orders  -     pneumococcal 13 jayce conj dip (PREVNAR-13) 0.5 mL syrg injection; 0.5 mL by IntraMUSCular route once for 1 dose. -     LIPID PANEL  -     METABOLIC PANEL, COMPREHENSIVE  -     HEMOGLOBIN A1C WITH EAG  -     TSH 3RD GENERATION  -     HGB & HCT        Health Maintenance Due   Topic Date Due    Pneumococcal 65+ Low/Medium Risk (1 of 2 - PCV13) 12/17/2003    FOOT EXAM Q1  10/27/2017    GLAUCOMA SCREENING Q2Y  02/09/2018       A copy of patient's completed Advanced Medical Directive is on file in the patient's medical record. RN reviewed Advanced Medical Directive document with patient & patient denies the need for changes/ updates. Patient is up to date on the following immunizations: Seasonal Influenza (completed 9/29/17); Tdap and Zoster (patient declined); Pneumococcal (prescription printed and patient will take to [address]     Due to the patient's age, screening mammograms, screening pap/pelvic & screening colonoscopies are no longer indicated unless recommended by PCP or a specialist.    Per patient's caregiver Jarret Bonds, patient's last eye exam was at [facility]. 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 Patient is compliant with her eye appointments. Fundus scan at [facility] on 8/22/17. Patient verbalized understanding of all information discussed. Patient was given the opportunity to ask questions.

## 2018-01-01 ENCOUNTER — APPOINTMENT (OUTPATIENT)
Dept: GENERAL RADIOLOGY | Age: 78
DRG: 871 | End: 2018-01-01
Attending: FAMILY MEDICINE
Payer: MEDICARE

## 2018-01-01 ENCOUNTER — APPOINTMENT (OUTPATIENT)
Dept: GENERAL RADIOLOGY | Age: 78
DRG: 871 | End: 2018-01-01
Attending: EMERGENCY MEDICINE
Payer: MEDICARE

## 2018-01-01 ENCOUNTER — HOME CARE VISIT (OUTPATIENT)
Dept: HOSPICE | Facility: HOSPICE | Age: 78
End: 2018-01-01
Payer: MEDICARE

## 2018-01-01 ENCOUNTER — HOSPITAL ENCOUNTER (INPATIENT)
Age: 78
LOS: 1 days | DRG: 951 | End: 2018-02-08
Attending: FAMILY MEDICINE | Admitting: FAMILY MEDICINE
Payer: OTHER MISCELLANEOUS

## 2018-01-01 ENCOUNTER — APPOINTMENT (OUTPATIENT)
Dept: CT IMAGING | Age: 78
DRG: 871 | End: 2018-01-01
Attending: INTERNAL MEDICINE
Payer: MEDICARE

## 2018-01-01 ENCOUNTER — HOSPITAL ENCOUNTER (INPATIENT)
Age: 78
LOS: 6 days | Discharge: HOSPICE | DRG: 871 | End: 2018-02-08
Attending: EMERGENCY MEDICINE | Admitting: FAMILY MEDICINE
Payer: MEDICARE

## 2018-01-01 ENCOUNTER — HOSPICE ADMISSION (OUTPATIENT)
Dept: HOSPICE | Facility: HOSPICE | Age: 78
End: 2018-01-01
Payer: MEDICARE

## 2018-01-01 ENCOUNTER — APPOINTMENT (OUTPATIENT)
Dept: GENERAL RADIOLOGY | Age: 78
DRG: 871 | End: 2018-01-01
Attending: NURSE PRACTITIONER
Payer: MEDICARE

## 2018-01-01 ENCOUNTER — APPOINTMENT (OUTPATIENT)
Dept: GENERAL RADIOLOGY | Age: 78
DRG: 871 | End: 2018-01-01
Payer: MEDICARE

## 2018-01-01 ENCOUNTER — TELEPHONE (OUTPATIENT)
Dept: FAMILY MEDICINE CLINIC | Age: 78
End: 2018-01-01

## 2018-01-01 VITALS
DIASTOLIC BLOOD PRESSURE: 79 MMHG | TEMPERATURE: 97.3 F | HEIGHT: 63 IN | SYSTOLIC BLOOD PRESSURE: 109 MMHG | WEIGHT: 130 LBS | HEART RATE: 113 BPM | BODY MASS INDEX: 23.04 KG/M2 | OXYGEN SATURATION: 93 % | RESPIRATION RATE: 24 BRPM

## 2018-01-01 DIAGNOSIS — R06.02 SOB (SHORTNESS OF BREATH): ICD-10-CM

## 2018-01-01 DIAGNOSIS — Z66 DNR (DO NOT RESUSCITATE): ICD-10-CM

## 2018-01-01 DIAGNOSIS — J96.00 ACUTE RESPIRATORY FAILURE, UNSPECIFIED WHETHER WITH HYPOXIA OR HYPERCAPNIA (HCC): ICD-10-CM

## 2018-01-01 DIAGNOSIS — J84.9 ILD (INTERSTITIAL LUNG DISEASE) (HCC): ICD-10-CM

## 2018-01-01 DIAGNOSIS — R45.1 RESTLESSNESS AND AGITATION: ICD-10-CM

## 2018-01-01 DIAGNOSIS — R06.02 SHORTNESS OF BREATH: ICD-10-CM

## 2018-01-01 DIAGNOSIS — J84.9 INTERSTITIAL LUNG DISEASE (HCC): ICD-10-CM

## 2018-01-01 DIAGNOSIS — E88.09 HYPOALBUMINEMIA: ICD-10-CM

## 2018-01-01 DIAGNOSIS — B33.8 RSV (RESPIRATORY SYNCYTIAL VIRUS INFECTION): ICD-10-CM

## 2018-01-01 DIAGNOSIS — Z71.89 DNR (DO NOT RESUSCITATE) DISCUSSION: ICD-10-CM

## 2018-01-01 DIAGNOSIS — R09.02 HYPOXIA: ICD-10-CM

## 2018-01-01 DIAGNOSIS — J96.01 ACUTE RESPIRATORY FAILURE WITH HYPOXIA (HCC): Primary | ICD-10-CM

## 2018-01-01 DIAGNOSIS — Z71.89 GOALS OF CARE, COUNSELING/DISCUSSION: ICD-10-CM

## 2018-01-01 DIAGNOSIS — E11.9 CONTROLLED TYPE 2 DIABETES MELLITUS WITHOUT COMPLICATION, WITHOUT LONG-TERM CURRENT USE OF INSULIN (HCC): ICD-10-CM

## 2018-01-01 DIAGNOSIS — Z71.89 ACP (ADVANCE CARE PLANNING): ICD-10-CM

## 2018-01-01 DIAGNOSIS — J44.1 COPD EXACERBATION (HCC): ICD-10-CM

## 2018-01-01 DIAGNOSIS — J18.9 COMMUNITY ACQUIRED PNEUMONIA OF RIGHT LUNG, UNSPECIFIED PART OF LUNG: ICD-10-CM

## 2018-01-01 LAB
ALBUMIN SERPL-MCNC: 2.3 G/DL (ref 3.5–5)
ALBUMIN SERPL-MCNC: 2.4 G/DL (ref 3.5–5)
ALBUMIN SERPL-MCNC: 2.5 G/DL (ref 3.5–5)
ALBUMIN SERPL-MCNC: 2.7 G/DL (ref 3.5–5)
ALBUMIN SERPL-MCNC: 2.8 G/DL (ref 3.5–5)
ALBUMIN SERPL-MCNC: 3.3 G/DL (ref 3.5–5)
ALBUMIN/GLOB SERPL: 0.6 {RATIO} (ref 1.1–2.2)
ALBUMIN/GLOB SERPL: 0.6 {RATIO} (ref 1.1–2.2)
ALBUMIN/GLOB SERPL: 0.7 {RATIO} (ref 1.1–2.2)
ALBUMIN/GLOB SERPL: 0.8 {RATIO} (ref 1.1–2.2)
ALBUMIN/GLOB SERPL: 0.9 {RATIO} (ref 1.1–2.2)
ALP SERPL-CCNC: 115 U/L (ref 45–117)
ALP SERPL-CCNC: 208 U/L (ref 45–117)
ALP SERPL-CCNC: 212 U/L (ref 45–117)
ALP SERPL-CCNC: 214 U/L (ref 45–117)
ALP SERPL-CCNC: 250 U/L (ref 45–117)
ALP SERPL-CCNC: 251 U/L (ref 45–117)
ALP SERPL-CCNC: 61 U/L (ref 45–117)
ALP SERPL-CCNC: 64 U/L (ref 45–117)
ALT SERPL-CCNC: 123 U/L (ref 12–78)
ALT SERPL-CCNC: 135 U/L (ref 12–78)
ALT SERPL-CCNC: 141 U/L (ref 12–78)
ALT SERPL-CCNC: 176 U/L (ref 12–78)
ALT SERPL-CCNC: 212 U/L (ref 12–78)
ALT SERPL-CCNC: 27 U/L (ref 12–78)
ALT SERPL-CCNC: 31 U/L (ref 12–78)
ALT SERPL-CCNC: 57 U/L (ref 12–78)
ANION GAP SERPL CALC-SCNC: 13 MMOL/L (ref 5–15)
ANION GAP SERPL CALC-SCNC: 4 MMOL/L (ref 5–15)
ANION GAP SERPL CALC-SCNC: 5 MMOL/L (ref 5–15)
ANION GAP SERPL CALC-SCNC: 5 MMOL/L (ref 5–15)
ANION GAP SERPL CALC-SCNC: 7 MMOL/L (ref 5–15)
ANION GAP SERPL CALC-SCNC: 7 MMOL/L (ref 5–15)
ANION GAP SERPL CALC-SCNC: 8 MMOL/L (ref 5–15)
ANION GAP SERPL CALC-SCNC: 9 MMOL/L (ref 5–15)
APPEARANCE UR: ABNORMAL
APTT PPP: 32.4 SEC (ref 22.1–32.5)
ARTERIAL PATENCY WRIST A: ABNORMAL
ARTERIAL PATENCY WRIST A: ABNORMAL
ARTERIAL PATENCY WRIST A: YES
ARTERIAL PATENCY WRIST A: YES
AST SERPL-CCNC: 166 U/L (ref 15–37)
AST SERPL-CCNC: 27 U/L (ref 15–37)
AST SERPL-CCNC: 33 U/L (ref 15–37)
AST SERPL-CCNC: 45 U/L (ref 15–37)
AST SERPL-CCNC: 53 U/L (ref 15–37)
AST SERPL-CCNC: 68 U/L (ref 15–37)
AST SERPL-CCNC: 80 U/L (ref 15–37)
AST SERPL-CCNC: 92 U/L (ref 15–37)
ATRIAL RATE: 101 BPM
ATRIAL RATE: 101 BPM
ATRIAL RATE: 121 BPM
ATRIAL RATE: 133 BPM
ATRIAL RATE: 93 BPM
ATRIAL RATE: 96 BPM
B PERT DNA SPEC QL NAA+PROBE: NOT DETECTED
BACTERIA SPEC CULT: NORMAL
BACTERIA URNS QL MICRO: NEGATIVE /HPF
BASE EXCESS BLDA CALC-SCNC: 0.1 MMOL/L
BASE EXCESS BLDA CALC-SCNC: 1 MMOL/L
BASE EXCESS BLDA CALC-SCNC: 5.1 MMOL/L
BASE EXCESS BLDA CALC-SCNC: 5.2 MMOL/L
BASOPHILS # BLD: 0 K/UL (ref 0–0.1)
BASOPHILS NFR BLD: 0 % (ref 0–1)
BDY SITE: ABNORMAL
BDY SITE: NORMAL
BILIRUB SERPL-MCNC: 0.2 MG/DL (ref 0.2–1)
BILIRUB SERPL-MCNC: 0.3 MG/DL (ref 0.2–1)
BILIRUB SERPL-MCNC: 0.4 MG/DL (ref 0.2–1)
BILIRUB SERPL-MCNC: 0.4 MG/DL (ref 0.2–1)
BILIRUB SERPL-MCNC: 0.5 MG/DL (ref 0.2–1)
BILIRUB SERPL-MCNC: 1 MG/DL (ref 0.2–1)
BILIRUB UR QL: NEGATIVE
BNP SERPL-MCNC: 6734 PG/ML (ref 0–450)
BUN SERPL-MCNC: 28 MG/DL (ref 6–20)
BUN SERPL-MCNC: 30 MG/DL (ref 6–20)
BUN SERPL-MCNC: 33 MG/DL (ref 6–20)
BUN SERPL-MCNC: 34 MG/DL (ref 6–20)
BUN SERPL-MCNC: 36 MG/DL (ref 6–20)
BUN SERPL-MCNC: 48 MG/DL (ref 6–20)
BUN/CREAT SERPL: 33 (ref 12–20)
BUN/CREAT SERPL: 37 (ref 12–20)
BUN/CREAT SERPL: 39 (ref 12–20)
BUN/CREAT SERPL: 39 (ref 12–20)
BUN/CREAT SERPL: 40 (ref 12–20)
BUN/CREAT SERPL: 40 (ref 12–20)
BUN/CREAT SERPL: 45 (ref 12–20)
BUN/CREAT SERPL: 48 (ref 12–20)
C PNEUM DNA SPEC QL NAA+PROBE: NOT DETECTED
CALCIUM SERPL-MCNC: 10 MG/DL (ref 8.5–10.1)
CALCIUM SERPL-MCNC: 7.5 MG/DL (ref 8.5–10.1)
CALCIUM SERPL-MCNC: 7.8 MG/DL (ref 8.5–10.1)
CALCIUM SERPL-MCNC: 7.9 MG/DL (ref 8.5–10.1)
CALCIUM SERPL-MCNC: 8.2 MG/DL (ref 8.5–10.1)
CALCIUM SERPL-MCNC: 8.4 MG/DL (ref 8.5–10.1)
CALCULATED P AXIS, ECG09: 28 DEGREES
CALCULATED P AXIS, ECG09: 32 DEGREES
CALCULATED P AXIS, ECG09: 46 DEGREES
CALCULATED P AXIS, ECG09: 53 DEGREES
CALCULATED P AXIS, ECG09: 59 DEGREES
CALCULATED P AXIS, ECG09: 63 DEGREES
CALCULATED R AXIS, ECG10: -63 DEGREES
CALCULATED R AXIS, ECG10: -66 DEGREES
CALCULATED R AXIS, ECG10: -68 DEGREES
CALCULATED R AXIS, ECG10: -71 DEGREES
CALCULATED R AXIS, ECG10: -74 DEGREES
CALCULATED R AXIS, ECG10: 3 DEGREES
CALCULATED T AXIS, ECG11: 59 DEGREES
CALCULATED T AXIS, ECG11: 60 DEGREES
CALCULATED T AXIS, ECG11: 60 DEGREES
CALCULATED T AXIS, ECG11: 61 DEGREES
CALCULATED T AXIS, ECG11: 72 DEGREES
CALCULATED T AXIS, ECG11: 77 DEGREES
CC UR VC: NORMAL
CHLORIDE SERPL-SCNC: 100 MMOL/L (ref 97–108)
CHLORIDE SERPL-SCNC: 100 MMOL/L (ref 97–108)
CHLORIDE SERPL-SCNC: 103 MMOL/L (ref 97–108)
CHLORIDE SERPL-SCNC: 104 MMOL/L (ref 97–108)
CHLORIDE SERPL-SCNC: 106 MMOL/L (ref 97–108)
CHLORIDE SERPL-SCNC: 109 MMOL/L (ref 97–108)
CHLORIDE SERPL-SCNC: 110 MMOL/L (ref 97–108)
CHLORIDE SERPL-SCNC: 110 MMOL/L (ref 97–108)
CO2 SERPL-SCNC: 24 MMOL/L (ref 21–32)
CO2 SERPL-SCNC: 26 MMOL/L (ref 21–32)
CO2 SERPL-SCNC: 27 MMOL/L (ref 21–32)
CO2 SERPL-SCNC: 29 MMOL/L (ref 21–32)
CO2 SERPL-SCNC: 31 MMOL/L (ref 21–32)
CO2 SERPL-SCNC: 31 MMOL/L (ref 21–32)
CO2 SERPL-SCNC: 33 MMOL/L (ref 21–32)
CO2 SERPL-SCNC: 34 MMOL/L (ref 21–32)
COLOR UR: ABNORMAL
CREAT SERPL-MCNC: 0.58 MG/DL (ref 0.55–1.02)
CREAT SERPL-MCNC: 0.71 MG/DL (ref 0.55–1.02)
CREAT SERPL-MCNC: 0.76 MG/DL (ref 0.55–1.02)
CREAT SERPL-MCNC: 0.82 MG/DL (ref 0.55–1.02)
CREAT SERPL-MCNC: 0.82 MG/DL (ref 0.55–1.02)
CREAT SERPL-MCNC: 0.84 MG/DL (ref 0.55–1.02)
CREAT SERPL-MCNC: 0.91 MG/DL (ref 0.55–1.02)
CREAT SERPL-MCNC: 1.24 MG/DL (ref 0.55–1.02)
DIAGNOSIS, 93000: NORMAL
DIFFERENTIAL METHOD BLD: ABNORMAL
DIGOXIN SERPL-MCNC: 1.7 NG/ML (ref 0.9–2)
EOSINOPHIL # BLD: 0 K/UL (ref 0–0.4)
EOSINOPHIL NFR BLD: 0 % (ref 0–7)
EPAP/CPAP/PEEP, PAPEEP: 6
EPAP/CPAP/PEEP, PAPEEP: 8
EPITH CASTS URNS QL MICRO: ABNORMAL /LPF
ERYTHROCYTE [DISTWIDTH] IN BLOOD BY AUTOMATED COUNT: 14.6 % (ref 11.5–14.5)
ERYTHROCYTE [DISTWIDTH] IN BLOOD BY AUTOMATED COUNT: 14.7 % (ref 11.5–14.5)
ERYTHROCYTE [DISTWIDTH] IN BLOOD BY AUTOMATED COUNT: 14.9 % (ref 11.5–14.5)
FIO2 ON VENT: 60 %
FIO2 ON VENT: 70 %
FIO2 ON VENT: 80 %
FIO2 ON VENT: 90 %
FLUAV AG NPH QL IA: NEGATIVE
FLUAV H1 2009 PAND RNA SPEC QL NAA+PROBE: NOT DETECTED
FLUAV H1 RNA SPEC QL NAA+PROBE: NOT DETECTED
FLUAV H3 RNA SPEC QL NAA+PROBE: NOT DETECTED
FLUAV SUBTYP SPEC NAA+PROBE: NOT DETECTED
FLUBV AG NOSE QL IA: NEGATIVE
FLUBV RNA SPEC QL NAA+PROBE: NOT DETECTED
GAS FLOW.O2 SETTING OXYMISER: 14 L/MIN
GAS FLOW.O2 SETTING OXYMISER: 6 L/MIN
GAS FLOW.O2 SETTING OXYMISER: 6 L/MIN
GLOBULIN SER CALC-MCNC: 3.2 G/DL (ref 2–4)
GLOBULIN SER CALC-MCNC: 3.2 G/DL (ref 2–4)
GLOBULIN SER CALC-MCNC: 3.5 G/DL (ref 2–4)
GLOBULIN SER CALC-MCNC: 3.6 G/DL (ref 2–4)
GLOBULIN SER CALC-MCNC: 3.7 G/DL (ref 2–4)
GLOBULIN SER CALC-MCNC: 3.8 G/DL (ref 2–4)
GLOBULIN SER CALC-MCNC: 3.9 G/DL (ref 2–4)
GLOBULIN SER CALC-MCNC: 4.6 G/DL (ref 2–4)
GLUCOSE BLD STRIP.AUTO-MCNC: 150 MG/DL (ref 65–100)
GLUCOSE BLD STRIP.AUTO-MCNC: 158 MG/DL (ref 65–100)
GLUCOSE BLD STRIP.AUTO-MCNC: 159 MG/DL (ref 65–100)
GLUCOSE BLD STRIP.AUTO-MCNC: 166 MG/DL (ref 65–100)
GLUCOSE BLD STRIP.AUTO-MCNC: 181 MG/DL (ref 65–100)
GLUCOSE BLD STRIP.AUTO-MCNC: 210 MG/DL (ref 65–100)
GLUCOSE BLD STRIP.AUTO-MCNC: 210 MG/DL (ref 65–100)
GLUCOSE BLD STRIP.AUTO-MCNC: 211 MG/DL (ref 65–100)
GLUCOSE BLD STRIP.AUTO-MCNC: 212 MG/DL (ref 65–100)
GLUCOSE BLD STRIP.AUTO-MCNC: 215 MG/DL (ref 65–100)
GLUCOSE BLD STRIP.AUTO-MCNC: 221 MG/DL (ref 65–100)
GLUCOSE BLD STRIP.AUTO-MCNC: 233 MG/DL (ref 65–100)
GLUCOSE BLD STRIP.AUTO-MCNC: 235 MG/DL (ref 65–100)
GLUCOSE BLD STRIP.AUTO-MCNC: 242 MG/DL (ref 65–100)
GLUCOSE BLD STRIP.AUTO-MCNC: 246 MG/DL (ref 65–100)
GLUCOSE BLD STRIP.AUTO-MCNC: 254 MG/DL (ref 65–100)
GLUCOSE BLD STRIP.AUTO-MCNC: 270 MG/DL (ref 65–100)
GLUCOSE BLD STRIP.AUTO-MCNC: 272 MG/DL (ref 65–100)
GLUCOSE BLD STRIP.AUTO-MCNC: 278 MG/DL (ref 65–100)
GLUCOSE BLD STRIP.AUTO-MCNC: 279 MG/DL (ref 65–100)
GLUCOSE BLD STRIP.AUTO-MCNC: 288 MG/DL (ref 65–100)
GLUCOSE BLD STRIP.AUTO-MCNC: 360 MG/DL (ref 65–100)
GLUCOSE SERPL-MCNC: 118 MG/DL (ref 65–100)
GLUCOSE SERPL-MCNC: 149 MG/DL (ref 65–100)
GLUCOSE SERPL-MCNC: 175 MG/DL (ref 65–100)
GLUCOSE SERPL-MCNC: 199 MG/DL (ref 65–100)
GLUCOSE SERPL-MCNC: 213 MG/DL (ref 65–100)
GLUCOSE SERPL-MCNC: 215 MG/DL (ref 65–100)
GLUCOSE SERPL-MCNC: 217 MG/DL (ref 65–100)
GLUCOSE SERPL-MCNC: 245 MG/DL (ref 65–100)
GLUCOSE UR STRIP.AUTO-MCNC: 100 MG/DL
HADV DNA SPEC QL NAA+PROBE: NOT DETECTED
HCO3 BLDA-SCNC: 24 MMOL/L (ref 22–26)
HCO3 BLDA-SCNC: 26 MMOL/L (ref 22–26)
HCO3 BLDA-SCNC: 30 MMOL/L (ref 22–26)
HCO3 BLDA-SCNC: 30 MMOL/L (ref 22–26)
HCOV 229E RNA SPEC QL NAA+PROBE: NOT DETECTED
HCOV HKU1 RNA SPEC QL NAA+PROBE: NOT DETECTED
HCOV NL63 RNA SPEC QL NAA+PROBE: NOT DETECTED
HCOV OC43 RNA SPEC QL NAA+PROBE: NOT DETECTED
HCT VFR BLD AUTO: 26.8 % (ref 35–47)
HCT VFR BLD AUTO: 27.4 % (ref 35–47)
HCT VFR BLD AUTO: 27.9 % (ref 35–47)
HCT VFR BLD AUTO: 28.2 % (ref 35–47)
HCT VFR BLD AUTO: 29.1 % (ref 35–47)
HCT VFR BLD AUTO: 31.6 % (ref 35–47)
HCT VFR BLD AUTO: 32.2 % (ref 35–47)
HCT VFR BLD AUTO: 33.3 % (ref 35–47)
HCT VFR BLD AUTO: 35 % (ref 35–47)
HGB BLD-MCNC: 10 G/DL (ref 11.5–16)
HGB BLD-MCNC: 10.1 G/DL (ref 11.5–16)
HGB BLD-MCNC: 10.5 G/DL (ref 11.5–16)
HGB BLD-MCNC: 11.1 G/DL (ref 11.5–16)
HGB BLD-MCNC: 8.4 G/DL (ref 11.5–16)
HGB BLD-MCNC: 8.6 G/DL (ref 11.5–16)
HGB BLD-MCNC: 8.9 G/DL (ref 11.5–16)
HGB BLD-MCNC: 9 G/DL (ref 11.5–16)
HGB BLD-MCNC: 9 G/DL (ref 11.5–16)
HGB UR QL STRIP: NEGATIVE
HMPV RNA SPEC QL NAA+PROBE: NOT DETECTED
HPIV1 RNA SPEC QL NAA+PROBE: NOT DETECTED
HPIV2 RNA SPEC QL NAA+PROBE: NOT DETECTED
HPIV3 RNA SPEC QL NAA+PROBE: NOT DETECTED
HPIV4 RNA SPEC QL NAA+PROBE: NOT DETECTED
HYALINE CASTS URNS QL MICRO: ABNORMAL /LPF (ref 0–5)
IMM GRANULOCYTES # BLD: 0.1 K/UL (ref 0–0.04)
IMM GRANULOCYTES # BLD: 0.4 K/UL (ref 0–0.04)
IMM GRANULOCYTES NFR BLD AUTO: 1 % (ref 0–0.5)
IMM GRANULOCYTES NFR BLD AUTO: 2 % (ref 0–0.5)
INR PPP: 1 (ref 0.9–1.1)
IPAP/PIP, IPAPIP: 12
IPAP/PIP, IPAPIP: 14
KETONES UR QL STRIP.AUTO: NEGATIVE MG/DL
LACTATE SERPL-SCNC: 0.8 MMOL/L (ref 0.4–2)
LACTATE SERPL-SCNC: 1.2 MMOL/L (ref 0.4–2)
LACTATE SERPL-SCNC: 1.3 MMOL/L (ref 0.4–2)
LACTATE SERPL-SCNC: 1.5 MMOL/L (ref 0.4–2)
LACTATE SERPL-SCNC: 1.7 MMOL/L (ref 0.4–2)
LACTATE SERPL-SCNC: 2.5 MMOL/L (ref 0.4–2)
LACTATE SERPL-SCNC: 2.8 MMOL/L (ref 0.4–2)
LEUKOCYTE ESTERASE UR QL STRIP.AUTO: NEGATIVE
LYMPHOCYTES # BLD: 0.3 K/UL (ref 0.8–3.5)
LYMPHOCYTES # BLD: 0.4 K/UL (ref 0.8–3.5)
LYMPHOCYTES # BLD: 0.6 K/UL (ref 0.8–3.5)
LYMPHOCYTES NFR BLD: 2 % (ref 12–49)
LYMPHOCYTES NFR BLD: 3 % (ref 12–49)
LYMPHOCYTES NFR BLD: 4 % (ref 12–49)
LYMPHOCYTES NFR BLD: 4 % (ref 12–49)
LYMPHOCYTES NFR BLD: 5 % (ref 12–49)
LYMPHOCYTES NFR BLD: 5 % (ref 12–49)
LYMPHOCYTES NFR BLD: 7 % (ref 12–49)
M PNEUMO DNA SPEC QL NAA+PROBE: NOT DETECTED
MAGNESIUM SERPL-MCNC: 2.1 MG/DL (ref 1.6–2.4)
MCH RBC QN AUTO: 29.1 PG (ref 26–34)
MCH RBC QN AUTO: 29.4 PG (ref 26–34)
MCH RBC QN AUTO: 29.5 PG (ref 26–34)
MCH RBC QN AUTO: 29.6 PG (ref 26–34)
MCH RBC QN AUTO: 29.7 PG (ref 26–34)
MCH RBC QN AUTO: 29.8 PG (ref 26–34)
MCH RBC QN AUTO: 29.8 PG (ref 26–34)
MCH RBC QN AUTO: 30 PG (ref 26–34)
MCHC RBC AUTO-ENTMCNC: 30.9 G/DL (ref 30–36.5)
MCHC RBC AUTO-ENTMCNC: 31.3 G/DL (ref 30–36.5)
MCHC RBC AUTO-ENTMCNC: 31.4 G/DL (ref 30–36.5)
MCHC RBC AUTO-ENTMCNC: 31.4 G/DL (ref 30–36.5)
MCHC RBC AUTO-ENTMCNC: 31.5 G/DL (ref 30–36.5)
MCHC RBC AUTO-ENTMCNC: 31.6 G/DL (ref 30–36.5)
MCHC RBC AUTO-ENTMCNC: 31.7 G/DL (ref 30–36.5)
MCHC RBC AUTO-ENTMCNC: 31.9 G/DL (ref 30–36.5)
MCV RBC AUTO: 93.5 FL (ref 80–99)
MCV RBC AUTO: 93.5 FL (ref 80–99)
MCV RBC AUTO: 93.9 FL (ref 80–99)
MCV RBC AUTO: 93.9 FL (ref 80–99)
MCV RBC AUTO: 94.1 FL (ref 80–99)
MCV RBC AUTO: 94.2 FL (ref 80–99)
MCV RBC AUTO: 94.7 FL (ref 80–99)
MCV RBC AUTO: 94.8 FL (ref 80–99)
MONOCYTES # BLD: 0.1 K/UL (ref 0–1)
MONOCYTES # BLD: 0.3 K/UL (ref 0–1)
MONOCYTES # BLD: 0.4 K/UL (ref 0–1)
MONOCYTES # BLD: 0.4 K/UL (ref 0–1)
MONOCYTES # BLD: 0.7 K/UL (ref 0–1)
MONOCYTES # BLD: 0.8 K/UL (ref 0–1)
MONOCYTES # BLD: 1 K/UL (ref 0–1)
MONOCYTES NFR BLD: 2 % (ref 5–13)
MONOCYTES NFR BLD: 4 % (ref 5–13)
MONOCYTES NFR BLD: 4 % (ref 5–13)
MONOCYTES NFR BLD: 5 % (ref 5–13)
MONOCYTES NFR BLD: 8 % (ref 5–13)
NEUTS SEG # BLD: 10.9 K/UL (ref 1.8–8)
NEUTS SEG # BLD: 13.4 K/UL (ref 1.8–8)
NEUTS SEG # BLD: 18.1 K/UL (ref 1.8–8)
NEUTS SEG # BLD: 4.5 K/UL (ref 1.8–8)
NEUTS SEG # BLD: 7.1 K/UL (ref 1.8–8)
NEUTS SEG # BLD: 7.2 K/UL (ref 1.8–8)
NEUTS SEG # BLD: 7.7 K/UL (ref 1.8–8)
NEUTS SEG NFR BLD: 86 % (ref 32–75)
NEUTS SEG NFR BLD: 90 % (ref 32–75)
NEUTS SEG NFR BLD: 91 % (ref 32–75)
NEUTS SEG NFR BLD: 92 % (ref 32–75)
NITRITE UR QL STRIP.AUTO: NEGATIVE
NRBC # BLD: 0 K/UL (ref 0–0.01)
NRBC # BLD: 0.02 K/UL (ref 0–0.01)
NRBC # BLD: 0.03 K/UL (ref 0–0.01)
NRBC BLD-RTO: 0 PER 100 WBC
NRBC BLD-RTO: 0.1 PER 100 WBC
NRBC BLD-RTO: 0.2 PER 100 WBC
NRBC BLD-RTO: 0.3 PER 100 WBC
P-R INTERVAL, ECG05: 132 MS
P-R INTERVAL, ECG05: 148 MS
P-R INTERVAL, ECG05: 158 MS
P-R INTERVAL, ECG05: 182 MS
P-R INTERVAL, ECG05: 182 MS
P-R INTERVAL, ECG05: 196 MS
PCO2 BLDA: 37 MMHG (ref 35–45)
PCO2 BLDA: 44 MMHG (ref 35–45)
PCO2 BLDA: 45 MMHG (ref 35–45)
PCO2 BLDA: 45 MMHG (ref 35–45)
PH BLDA: 7.4 [PH] (ref 7.35–7.45)
PH BLDA: 7.43 [PH] (ref 7.35–7.45)
PH BLDA: 7.44 [PH] (ref 7.35–7.45)
PH BLDA: 7.44 [PH] (ref 7.35–7.45)
PH UR STRIP: 5 [PH] (ref 5–8)
PLATELET # BLD AUTO: 203 K/UL (ref 150–400)
PLATELET # BLD AUTO: 206 K/UL (ref 150–400)
PLATELET # BLD AUTO: 209 K/UL (ref 150–400)
PLATELET # BLD AUTO: 218 K/UL (ref 150–400)
PLATELET # BLD AUTO: 233 K/UL (ref 150–400)
PLATELET # BLD AUTO: 241 K/UL (ref 150–400)
PLATELET # BLD AUTO: 257 K/UL (ref 150–400)
PLATELET # BLD AUTO: 289 K/UL (ref 150–400)
PMV BLD AUTO: 10 FL (ref 8.9–12.9)
PMV BLD AUTO: 10.2 FL (ref 8.9–12.9)
PMV BLD AUTO: 9.3 FL (ref 8.9–12.9)
PMV BLD AUTO: 9.4 FL (ref 8.9–12.9)
PMV BLD AUTO: 9.4 FL (ref 8.9–12.9)
PMV BLD AUTO: 9.5 FL (ref 8.9–12.9)
PMV BLD AUTO: 9.7 FL (ref 8.9–12.9)
PMV BLD AUTO: 9.8 FL (ref 8.9–12.9)
PO2 BLDA: 101 MMHG (ref 80–100)
PO2 BLDA: 105 MMHG (ref 80–100)
PO2 BLDA: 66 MMHG (ref 80–100)
PO2 BLDA: 84 MMHG (ref 80–100)
POTASSIUM SERPL-SCNC: 3.5 MMOL/L (ref 3.5–5.1)
POTASSIUM SERPL-SCNC: 3.7 MMOL/L (ref 3.5–5.1)
POTASSIUM SERPL-SCNC: 3.9 MMOL/L (ref 3.5–5.1)
POTASSIUM SERPL-SCNC: 4 MMOL/L (ref 3.5–5.1)
POTASSIUM SERPL-SCNC: 4.1 MMOL/L (ref 3.5–5.1)
POTASSIUM SERPL-SCNC: 4.2 MMOL/L (ref 3.5–5.1)
POTASSIUM SERPL-SCNC: 4.3 MMOL/L (ref 3.5–5.1)
POTASSIUM SERPL-SCNC: 4.6 MMOL/L (ref 3.5–5.1)
PROT SERPL-MCNC: 5.6 G/DL (ref 6.4–8.2)
PROT SERPL-MCNC: 5.8 G/DL (ref 6.4–8.2)
PROT SERPL-MCNC: 6 G/DL (ref 6.4–8.2)
PROT SERPL-MCNC: 6.1 G/DL (ref 6.4–8.2)
PROT SERPL-MCNC: 6.1 G/DL (ref 6.4–8.2)
PROT SERPL-MCNC: 6.2 G/DL (ref 6.4–8.2)
PROT SERPL-MCNC: 6.6 G/DL (ref 6.4–8.2)
PROT SERPL-MCNC: 7.9 G/DL (ref 6.4–8.2)
PROT UR STRIP-MCNC: ABNORMAL MG/DL
PROTHROMBIN TIME: 10.1 SEC (ref 9–11.1)
Q-T INTERVAL, ECG07: 272 MS
Q-T INTERVAL, ECG07: 366 MS
Q-T INTERVAL, ECG07: 400 MS
Q-T INTERVAL, ECG07: 402 MS
Q-T INTERVAL, ECG07: 408 MS
Q-T INTERVAL, ECG07: 428 MS
QRS DURATION, ECG06: 152 MS
QRS DURATION, ECG06: 154 MS
QRS DURATION, ECG06: 154 MS
QRS DURATION, ECG06: 156 MS
QRS DURATION, ECG06: 158 MS
QRS DURATION, ECG06: 82 MS
QTC CALCULATION (BEZET), ECG08: 404 MS
QTC CALCULATION (BEZET), ECG08: 507 MS
QTC CALCULATION (BEZET), ECG08: 518 MS
QTC CALCULATION (BEZET), ECG08: 519 MS
QTC CALCULATION (BEZET), ECG08: 521 MS
QTC CALCULATION (BEZET), ECG08: 540 MS
RBC # BLD AUTO: 2.83 M/UL (ref 3.8–5.2)
RBC # BLD AUTO: 2.89 M/UL (ref 3.8–5.2)
RBC # BLD AUTO: 2.97 M/UL (ref 3.8–5.2)
RBC # BLD AUTO: 3.09 M/UL (ref 3.8–5.2)
RBC # BLD AUTO: 3.38 M/UL (ref 3.8–5.2)
RBC # BLD AUTO: 3.43 M/UL (ref 3.8–5.2)
RBC # BLD AUTO: 3.56 M/UL (ref 3.8–5.2)
RBC # BLD AUTO: 3.72 M/UL (ref 3.8–5.2)
RBC #/AREA URNS HPF: ABNORMAL /HPF (ref 0–5)
RBC MORPH BLD: ABNORMAL
RSV RNA SPEC QL NAA+PROBE: DETECTED
RV+EV RNA SPEC QL NAA+PROBE: NOT DETECTED
SAO2 % BLD: 94 % (ref 92–97)
SAO2 % BLD: 96 % (ref 92–97)
SAO2 % BLD: 98 % (ref 92–97)
SAO2 % BLD: 98 % (ref 92–97)
SAO2% DEVICE SAO2% SENSOR NAME: ABNORMAL
SAO2% DEVICE SAO2% SENSOR NAME: NORMAL
SERVICE CMNT-IMP: ABNORMAL
SERVICE CMNT-IMP: NORMAL
SODIUM SERPL-SCNC: 139 MMOL/L (ref 136–145)
SODIUM SERPL-SCNC: 139 MMOL/L (ref 136–145)
SODIUM SERPL-SCNC: 140 MMOL/L (ref 136–145)
SODIUM SERPL-SCNC: 142 MMOL/L (ref 136–145)
SODIUM SERPL-SCNC: 143 MMOL/L (ref 136–145)
SODIUM SERPL-SCNC: 144 MMOL/L (ref 136–145)
SP GR UR REFRACTOMETRY: 1.02 (ref 1–1.03)
SPECIMEN SITE: ABNORMAL
SPECIMEN SITE: NORMAL
THERAPEUTIC RANGE,PTTT: NORMAL SECS (ref 58–77)
TROPONIN I SERPL-MCNC: 0.04 NG/ML
TROPONIN I SERPL-MCNC: 0.06 NG/ML
TROPONIN I SERPL-MCNC: 0.08 NG/ML
TROPONIN I SERPL-MCNC: 0.25 NG/ML
TROPONIN I SERPL-MCNC: 0.3 NG/ML
TROPONIN I SERPL-MCNC: 0.33 NG/ML
TROPONIN I SERPL-MCNC: 0.62 NG/ML
TROPONIN I SERPL-MCNC: <0.04 NG/ML
UROBILINOGEN UR QL STRIP.AUTO: 0.2 EU/DL (ref 0.2–1)
VENTILATION MODE VENT: ABNORMAL
VENTRICULAR RATE, ECG03: 101 BPM
VENTRICULAR RATE, ECG03: 101 BPM
VENTRICULAR RATE, ECG03: 121 BPM
VENTRICULAR RATE, ECG03: 133 BPM
VENTRICULAR RATE, ECG03: 93 BPM
VENTRICULAR RATE, ECG03: 96 BPM
WBC # BLD AUTO: 12.6 K/UL (ref 3.6–11)
WBC # BLD AUTO: 13.6 K/UL (ref 3.6–11)
WBC # BLD AUTO: 14.6 K/UL (ref 3.6–11)
WBC # BLD AUTO: 19.7 K/UL (ref 3.6–11)
WBC # BLD AUTO: 5 K/UL (ref 3.6–11)
WBC # BLD AUTO: 7.9 K/UL (ref 3.6–11)
WBC # BLD AUTO: 8 K/UL (ref 3.6–11)
WBC # BLD AUTO: 8.6 K/UL (ref 3.6–11)
WBC URNS QL MICRO: ABNORMAL /HPF (ref 0–4)

## 2018-01-01 PROCEDURE — 80053 COMPREHEN METABOLIC PANEL: CPT | Performed by: FAMILY MEDICINE

## 2018-01-01 PROCEDURE — 74011250637 HC RX REV CODE- 250/637: Performed by: NURSE PRACTITIONER

## 2018-01-01 PROCEDURE — 74011250636 HC RX REV CODE- 250/636: Performed by: FAMILY MEDICINE

## 2018-01-01 PROCEDURE — 80053 COMPREHEN METABOLIC PANEL: CPT | Performed by: EMERGENCY MEDICINE

## 2018-01-01 PROCEDURE — 83605 ASSAY OF LACTIC ACID: CPT | Performed by: FAMILY MEDICINE

## 2018-01-01 PROCEDURE — 74011636637 HC RX REV CODE- 636/637: Performed by: FAMILY MEDICINE

## 2018-01-01 PROCEDURE — 36600 WITHDRAWAL OF ARTERIAL BLOOD: CPT | Performed by: EMERGENCY MEDICINE

## 2018-01-01 PROCEDURE — 74011636637 HC RX REV CODE- 636/637: Performed by: STUDENT IN AN ORGANIZED HEALTH CARE EDUCATION/TRAINING PROGRAM

## 2018-01-01 PROCEDURE — 85027 COMPLETE CBC AUTOMATED: CPT

## 2018-01-01 PROCEDURE — 74011250637 HC RX REV CODE- 250/637: Performed by: FAMILY MEDICINE

## 2018-01-01 PROCEDURE — 77030011256 HC DRSG MEPILEX <16IN NO BORD MOLN -A

## 2018-01-01 PROCEDURE — 74011000258 HC RX REV CODE- 258: Performed by: EMERGENCY MEDICINE

## 2018-01-01 PROCEDURE — 77030038269 HC DRN EXT URIN PURWCK BARD -A

## 2018-01-01 PROCEDURE — 77010033711 HC HIGH FLOW OXYGEN

## 2018-01-01 PROCEDURE — 71045 X-RAY EXAM CHEST 1 VIEW: CPT

## 2018-01-01 PROCEDURE — 77010033678 HC OXYGEN DAILY

## 2018-01-01 PROCEDURE — 74011250636 HC RX REV CODE- 250/636: Performed by: PHYSICIAN ASSISTANT

## 2018-01-01 PROCEDURE — 74011000258 HC RX REV CODE- 258: Performed by: STUDENT IN AN ORGANIZED HEALTH CARE EDUCATION/TRAINING PROGRAM

## 2018-01-01 PROCEDURE — 74011000250 HC RX REV CODE- 250: Performed by: INTERNAL MEDICINE

## 2018-01-01 PROCEDURE — 85025 COMPLETE CBC W/AUTO DIFF WBC: CPT | Performed by: FAMILY MEDICINE

## 2018-01-01 PROCEDURE — 74011250637 HC RX REV CODE- 250/637: Performed by: STUDENT IN AN ORGANIZED HEALTH CARE EDUCATION/TRAINING PROGRAM

## 2018-01-01 PROCEDURE — 96374 THER/PROPH/DIAG INJ IV PUSH: CPT

## 2018-01-01 PROCEDURE — 94660 CPAP INITIATION&MGMT: CPT

## 2018-01-01 PROCEDURE — 87086 URINE CULTURE/COLONY COUNT: CPT | Performed by: FAMILY MEDICINE

## 2018-01-01 PROCEDURE — 87040 BLOOD CULTURE FOR BACTERIA: CPT | Performed by: EMERGENCY MEDICINE

## 2018-01-01 PROCEDURE — 85730 THROMBOPLASTIN TIME PARTIAL: CPT | Performed by: EMERGENCY MEDICINE

## 2018-01-01 PROCEDURE — 94640 AIRWAY INHALATION TREATMENT: CPT

## 2018-01-01 PROCEDURE — 84484 ASSAY OF TROPONIN QUANT: CPT | Performed by: FAMILY MEDICINE

## 2018-01-01 PROCEDURE — 74011000258 HC RX REV CODE- 258: Performed by: INTERNAL MEDICINE

## 2018-01-01 PROCEDURE — 36569 INSJ PICC 5 YR+ W/O IMAGING: CPT | Performed by: FAMILY MEDICINE

## 2018-01-01 PROCEDURE — 74011250636 HC RX REV CODE- 250/636: Performed by: EMERGENCY MEDICINE

## 2018-01-01 PROCEDURE — 74011250636 HC RX REV CODE- 250/636: Performed by: INTERNAL MEDICINE

## 2018-01-01 PROCEDURE — 83605 ASSAY OF LACTIC ACID: CPT

## 2018-01-01 PROCEDURE — 36415 COLL VENOUS BLD VENIPUNCTURE: CPT | Performed by: FAMILY MEDICINE

## 2018-01-01 PROCEDURE — 80162 ASSAY OF DIGOXIN TOTAL: CPT | Performed by: NURSE PRACTITIONER

## 2018-01-01 PROCEDURE — 84484 ASSAY OF TROPONIN QUANT: CPT | Performed by: EMERGENCY MEDICINE

## 2018-01-01 PROCEDURE — 80053 COMPREHEN METABOLIC PANEL: CPT

## 2018-01-01 PROCEDURE — 84484 ASSAY OF TROPONIN QUANT: CPT | Performed by: STUDENT IN AN ORGANIZED HEALTH CARE EDUCATION/TRAINING PROGRAM

## 2018-01-01 PROCEDURE — 74011250636 HC RX REV CODE- 250/636: Performed by: SPECIALIST

## 2018-01-01 PROCEDURE — 82803 BLOOD GASES ANY COMBINATION: CPT | Performed by: FAMILY MEDICINE

## 2018-01-01 PROCEDURE — 82962 GLUCOSE BLOOD TEST: CPT

## 2018-01-01 PROCEDURE — 77030011943

## 2018-01-01 PROCEDURE — 65660000000 HC RM CCU STEPDOWN

## 2018-01-01 PROCEDURE — 77030018719 HC DRSG PTCH ANTIMIC J&J -A

## 2018-01-01 PROCEDURE — 74011250636 HC RX REV CODE- 250/636: Performed by: NURSE PRACTITIONER

## 2018-01-01 PROCEDURE — 74011000250 HC RX REV CODE- 250: Performed by: FAMILY MEDICINE

## 2018-01-01 PROCEDURE — 85018 HEMOGLOBIN: CPT

## 2018-01-01 PROCEDURE — 77030013032 HC MSK BPAP/CPAP FISP -B

## 2018-01-01 PROCEDURE — 96375 TX/PRO/DX INJ NEW DRUG ADDON: CPT

## 2018-01-01 PROCEDURE — 3336500001 HSPC ELECTION

## 2018-01-01 PROCEDURE — C1751 CATH, INF, PER/CENT/MIDLINE: HCPCS

## 2018-01-01 PROCEDURE — 74011000250 HC RX REV CODE- 250: Performed by: STUDENT IN AN ORGANIZED HEALTH CARE EDUCATION/TRAINING PROGRAM

## 2018-01-01 PROCEDURE — 81001 URINALYSIS AUTO W/SCOPE: CPT | Performed by: EMERGENCY MEDICINE

## 2018-01-01 PROCEDURE — 77030013140 HC MSK NEB VYRM -A

## 2018-01-01 PROCEDURE — 76937 US GUIDE VASCULAR ACCESS: CPT

## 2018-01-01 PROCEDURE — 85025 COMPLETE CBC W/AUTO DIFF WBC: CPT | Performed by: EMERGENCY MEDICINE

## 2018-01-01 PROCEDURE — 93306 TTE W/DOPPLER COMPLETE: CPT | Performed by: INTERNAL MEDICINE

## 2018-01-01 PROCEDURE — 65270000029 HC RM PRIVATE

## 2018-01-01 PROCEDURE — 99223 1ST HOSP IP/OBS HIGH 75: CPT | Performed by: FAMILY MEDICINE

## 2018-01-01 PROCEDURE — 87070 CULTURE OTHR SPECIMN AEROBIC: CPT | Performed by: PHYSICIAN ASSISTANT

## 2018-01-01 PROCEDURE — 36415 COLL VENOUS BLD VENIPUNCTURE: CPT

## 2018-01-01 PROCEDURE — 74011000250 HC RX REV CODE- 250: Performed by: PHYSICIAN ASSISTANT

## 2018-01-01 PROCEDURE — G0299 HHS/HOSPICE OF RN EA 15 MIN: HCPCS

## 2018-01-01 PROCEDURE — 74011250636 HC RX REV CODE- 250/636

## 2018-01-01 PROCEDURE — 77030018786 HC NDL GD F/USND BARD -B

## 2018-01-01 PROCEDURE — 36600 WITHDRAWAL OF ARTERIAL BLOOD: CPT | Performed by: FAMILY MEDICINE

## 2018-01-01 PROCEDURE — 83735 ASSAY OF MAGNESIUM: CPT | Performed by: FAMILY MEDICINE

## 2018-01-01 PROCEDURE — 36600 WITHDRAWAL OF ARTERIAL BLOOD: CPT

## 2018-01-01 PROCEDURE — 82803 BLOOD GASES ANY COMBINATION: CPT | Performed by: EMERGENCY MEDICINE

## 2018-01-01 PROCEDURE — 74011000250 HC RX REV CODE- 250: Performed by: EMERGENCY MEDICINE

## 2018-01-01 PROCEDURE — 93005 ELECTROCARDIOGRAM TRACING: CPT

## 2018-01-01 PROCEDURE — 99285 EMERGENCY DEPT VISIT HI MDM: CPT

## 2018-01-01 PROCEDURE — 0656 HSPC GENERAL INPATIENT

## 2018-01-01 PROCEDURE — 85610 PROTHROMBIN TIME: CPT | Performed by: EMERGENCY MEDICINE

## 2018-01-01 PROCEDURE — 74011250636 HC RX REV CODE- 250/636: Performed by: STUDENT IN AN ORGANIZED HEALTH CARE EDUCATION/TRAINING PROGRAM

## 2018-01-01 PROCEDURE — 74011250637 HC RX REV CODE- 250/637: Performed by: EMERGENCY MEDICINE

## 2018-01-01 PROCEDURE — 82803 BLOOD GASES ANY COMBINATION: CPT

## 2018-01-01 PROCEDURE — 83880 ASSAY OF NATRIURETIC PEPTIDE: CPT | Performed by: EMERGENCY MEDICINE

## 2018-01-01 PROCEDURE — 87804 INFLUENZA ASSAY W/OPTIC: CPT | Performed by: EMERGENCY MEDICINE

## 2018-01-01 PROCEDURE — 84484 ASSAY OF TROPONIN QUANT: CPT

## 2018-01-01 PROCEDURE — 76450000000

## 2018-01-01 PROCEDURE — 83605 ASSAY OF LACTIC ACID: CPT | Performed by: EMERGENCY MEDICINE

## 2018-01-01 PROCEDURE — 87798 DETECT AGENT NOS DNA AMP: CPT | Performed by: FAMILY MEDICINE

## 2018-01-01 RX ORDER — SODIUM CHLORIDE 9 MG/ML
50 INJECTION, SOLUTION INTRAVENOUS CONTINUOUS
Status: DISCONTINUED | OUTPATIENT
Start: 2018-01-01 | End: 2018-01-01 | Stop reason: ALTCHOICE

## 2018-01-01 RX ORDER — INSULIN LISPRO 100 [IU]/ML
INJECTION, SOLUTION INTRAVENOUS; SUBCUTANEOUS
Status: DISCONTINUED | OUTPATIENT
Start: 2018-01-01 | End: 2018-01-01 | Stop reason: HOSPADM

## 2018-01-01 RX ORDER — INSULIN GLARGINE 100 [IU]/ML
8 INJECTION, SOLUTION SUBCUTANEOUS
Status: DISCONTINUED | OUTPATIENT
Start: 2018-01-01 | End: 2018-01-01

## 2018-01-01 RX ORDER — FUROSEMIDE 40 MG/1
40 TABLET ORAL DAILY
Status: DISCONTINUED | OUTPATIENT
Start: 2018-01-01 | End: 2018-01-01 | Stop reason: HOSPADM

## 2018-01-01 RX ORDER — IPRATROPIUM BROMIDE AND ALBUTEROL SULFATE 2.5; .5 MG/3ML; MG/3ML
3 SOLUTION RESPIRATORY (INHALATION)
Status: DISCONTINUED | OUTPATIENT
Start: 2018-01-01 | End: 2018-01-01

## 2018-01-01 RX ORDER — SODIUM CHLORIDE 9 MG/ML
100 INJECTION, SOLUTION INTRAVENOUS CONTINUOUS
Status: DISCONTINUED | OUTPATIENT
Start: 2018-01-01 | End: 2018-01-01

## 2018-01-01 RX ORDER — LORAZEPAM 2 MG/ML
1 INJECTION INTRAMUSCULAR
Status: DISCONTINUED | OUTPATIENT
Start: 2018-01-01 | End: 2018-02-09 | Stop reason: HOSPADM

## 2018-01-01 RX ORDER — MAGNESIUM SULFATE 100 %
4 CRYSTALS MISCELLANEOUS AS NEEDED
Status: DISCONTINUED | OUTPATIENT
Start: 2018-01-01 | End: 2018-01-01 | Stop reason: HOSPADM

## 2018-01-01 RX ORDER — MORPHINE SULFATE 2 MG/ML
2 INJECTION, SOLUTION INTRAMUSCULAR; INTRAVENOUS ONCE
Status: COMPLETED | OUTPATIENT
Start: 2018-01-01 | End: 2018-01-01

## 2018-01-01 RX ORDER — LEVOFLOXACIN 500 MG/1
500 TABLET, FILM COATED ORAL DAILY
Qty: 10 TAB | Refills: 0 | Status: SHIPPED | OUTPATIENT
Start: 2018-01-01 | End: 2018-01-01

## 2018-01-01 RX ORDER — MORPHINE SULFATE 2 MG/ML
1 INJECTION, SOLUTION INTRAMUSCULAR; INTRAVENOUS
Status: DISCONTINUED | OUTPATIENT
Start: 2018-01-01 | End: 2018-01-01 | Stop reason: HOSPADM

## 2018-01-01 RX ORDER — SODIUM CHLORIDE 0.9 % (FLUSH) 0.9 %
5-10 SYRINGE (ML) INJECTION EVERY 8 HOURS
Status: DISCONTINUED | OUTPATIENT
Start: 2018-01-01 | End: 2018-01-01 | Stop reason: HOSPADM

## 2018-01-01 RX ORDER — ACETYLCYSTEINE 100 MG/ML
2 SOLUTION ORAL; RESPIRATORY (INHALATION)
Status: DISCONTINUED | OUTPATIENT
Start: 2018-01-01 | End: 2018-01-01 | Stop reason: HOSPADM

## 2018-01-01 RX ORDER — LORAZEPAM 2 MG/ML
1 INJECTION INTRAMUSCULAR
Status: COMPLETED | OUTPATIENT
Start: 2018-01-01 | End: 2018-01-01

## 2018-01-01 RX ORDER — DIGOXIN 125 MCG
0.12 TABLET ORAL EVERY OTHER DAY
Status: DISCONTINUED | OUTPATIENT
Start: 2018-01-01 | End: 2018-01-01 | Stop reason: HOSPADM

## 2018-01-01 RX ORDER — FUROSEMIDE 40 MG/1
40 TABLET ORAL DAILY
Status: DISCONTINUED | OUTPATIENT
Start: 2018-01-01 | End: 2018-01-01

## 2018-01-01 RX ORDER — ACETAMINOPHEN 500 MG
1000 TABLET ORAL
Status: COMPLETED | OUTPATIENT
Start: 2018-01-01 | End: 2018-01-01

## 2018-01-01 RX ORDER — DILTIAZEM HYDROCHLORIDE 240 MG/1
240 CAPSULE, COATED, EXTENDED RELEASE ORAL DAILY
Status: DISCONTINUED | OUTPATIENT
Start: 2018-01-01 | End: 2018-01-01 | Stop reason: HOSPADM

## 2018-01-01 RX ORDER — SCOLOPAMINE TRANSDERMAL SYSTEM 1 MG/1
1 PATCH, EXTENDED RELEASE TRANSDERMAL
Status: DISCONTINUED | OUTPATIENT
Start: 2018-01-01 | End: 2018-01-01 | Stop reason: HOSPADM

## 2018-01-01 RX ORDER — IPRATROPIUM BROMIDE AND ALBUTEROL SULFATE 2.5; .5 MG/3ML; MG/3ML
3 SOLUTION RESPIRATORY (INHALATION)
Status: DISCONTINUED | OUTPATIENT
Start: 2018-01-01 | End: 2018-01-01 | Stop reason: HOSPADM

## 2018-01-01 RX ORDER — INSULIN GLARGINE 100 [IU]/ML
20 INJECTION, SOLUTION SUBCUTANEOUS
Status: DISCONTINUED | OUTPATIENT
Start: 2018-01-01 | End: 2018-01-01

## 2018-01-01 RX ORDER — GLYCOPYRROLATE 0.2 MG/ML
0.2 INJECTION INTRAMUSCULAR; INTRAVENOUS
Status: DISCONTINUED | OUTPATIENT
Start: 2018-01-01 | End: 2018-02-09 | Stop reason: HOSPADM

## 2018-01-01 RX ORDER — ATORVASTATIN CALCIUM 20 MG/1
TABLET, FILM COATED ORAL
Qty: 90 TAB | Refills: 1 | Status: SHIPPED | OUTPATIENT
Start: 2018-01-01

## 2018-01-01 RX ORDER — FUROSEMIDE 10 MG/ML
40 INJECTION INTRAMUSCULAR; INTRAVENOUS ONCE
Status: COMPLETED | OUTPATIENT
Start: 2018-01-01 | End: 2018-01-01

## 2018-01-01 RX ORDER — IPRATROPIUM BROMIDE AND ALBUTEROL SULFATE 2.5; .5 MG/3ML; MG/3ML
3 SOLUTION RESPIRATORY (INHALATION)
Status: COMPLETED | OUTPATIENT
Start: 2018-01-01 | End: 2018-01-01

## 2018-01-01 RX ORDER — ACETAMINOPHEN 650 MG/1
650 SUPPOSITORY RECTAL
Status: DISCONTINUED | OUTPATIENT
Start: 2018-01-01 | End: 2018-02-09 | Stop reason: HOSPADM

## 2018-01-01 RX ORDER — METFORMIN HYDROCHLORIDE 500 MG/1
TABLET ORAL
Qty: 270 TAB | Refills: 1 | OUTPATIENT
Start: 2018-01-01

## 2018-01-01 RX ORDER — SODIUM CHLORIDE 0.9 % (FLUSH) 0.9 %
5-10 SYRINGE (ML) INJECTION AS NEEDED
Status: DISCONTINUED | OUTPATIENT
Start: 2018-01-01 | End: 2018-01-01 | Stop reason: HOSPADM

## 2018-01-01 RX ORDER — MORPHINE SULFATE 2 MG/ML
INJECTION, SOLUTION INTRAMUSCULAR; INTRAVENOUS
Status: COMPLETED
Start: 2018-01-01 | End: 2018-01-01

## 2018-01-01 RX ORDER — SCOLOPAMINE TRANSDERMAL SYSTEM 1 MG/1
1 PATCH, EXTENDED RELEASE TRANSDERMAL
Status: DISCONTINUED | OUTPATIENT
Start: 2018-01-01 | End: 2018-02-09 | Stop reason: HOSPADM

## 2018-01-01 RX ORDER — LORAZEPAM 0.5 MG/1
0.5 TABLET ORAL ONCE
Status: COMPLETED | OUTPATIENT
Start: 2018-01-01 | End: 2018-01-01

## 2018-01-01 RX ORDER — ATORVASTATIN CALCIUM 20 MG/1
20 TABLET, FILM COATED ORAL
Status: DISCONTINUED | OUTPATIENT
Start: 2018-01-01 | End: 2018-01-01 | Stop reason: HOSPADM

## 2018-01-01 RX ORDER — ONDANSETRON 2 MG/ML
4 INJECTION INTRAMUSCULAR; INTRAVENOUS
Status: DISCONTINUED | OUTPATIENT
Start: 2018-01-01 | End: 2018-02-09 | Stop reason: HOSPADM

## 2018-01-01 RX ORDER — METFORMIN HYDROCHLORIDE 500 MG/1
TABLET ORAL
Qty: 270 TAB | Refills: 1 | Status: SHIPPED | OUTPATIENT
Start: 2018-01-01

## 2018-01-01 RX ORDER — MORPHINE SULFATE 2 MG/ML
1 INJECTION, SOLUTION INTRAMUSCULAR; INTRAVENOUS ONCE
Status: COMPLETED | OUTPATIENT
Start: 2018-01-01 | End: 2018-01-01

## 2018-01-01 RX ORDER — METRONIDAZOLE 500 MG/100ML
500 INJECTION, SOLUTION INTRAVENOUS EVERY 8 HOURS
Status: DISCONTINUED | OUTPATIENT
Start: 2018-01-01 | End: 2018-01-01 | Stop reason: HOSPADM

## 2018-01-01 RX ORDER — LORAZEPAM 2 MG/ML
0.5 INJECTION INTRAMUSCULAR ONCE
Status: COMPLETED | OUTPATIENT
Start: 2018-01-01 | End: 2018-01-01

## 2018-01-01 RX ORDER — POTASSIUM CHLORIDE 750 MG/1
40 TABLET, FILM COATED, EXTENDED RELEASE ORAL
Status: COMPLETED | OUTPATIENT
Start: 2018-01-01 | End: 2018-01-01

## 2018-01-01 RX ORDER — DILTIAZEM HYDROCHLORIDE 240 MG/1
240 CAPSULE, COATED, EXTENDED RELEASE ORAL DAILY
COMMUNITY

## 2018-01-01 RX ORDER — LORAZEPAM 2 MG/ML
INJECTION INTRAMUSCULAR
Status: COMPLETED
Start: 2018-01-01 | End: 2018-01-01

## 2018-01-01 RX ORDER — FUROSEMIDE 10 MG/ML
20 INJECTION INTRAMUSCULAR; INTRAVENOUS ONCE
Status: COMPLETED | OUTPATIENT
Start: 2018-01-01 | End: 2018-01-01

## 2018-01-01 RX ORDER — ARFORMOTEROL TARTRATE 15 UG/2ML
15 SOLUTION RESPIRATORY (INHALATION)
Status: DISCONTINUED | OUTPATIENT
Start: 2018-01-01 | End: 2018-01-01 | Stop reason: HOSPADM

## 2018-01-01 RX ORDER — INSULIN GLARGINE 100 [IU]/ML
10 INJECTION, SOLUTION SUBCUTANEOUS
Status: DISCONTINUED | OUTPATIENT
Start: 2018-01-01 | End: 2018-01-01

## 2018-01-01 RX ORDER — DILTIAZEM HYDROCHLORIDE 5 MG/ML
10 INJECTION INTRAVENOUS ONCE
Status: COMPLETED | OUTPATIENT
Start: 2018-01-01 | End: 2018-01-01

## 2018-01-01 RX ORDER — ACETYLCYSTEINE 100 MG/ML
2 SOLUTION ORAL; RESPIRATORY (INHALATION)
Status: DISCONTINUED | OUTPATIENT
Start: 2018-01-01 | End: 2018-01-01

## 2018-01-01 RX ORDER — INSULIN GLARGINE 100 [IU]/ML
6 INJECTION, SOLUTION SUBCUTANEOUS
Status: DISCONTINUED | OUTPATIENT
Start: 2018-01-01 | End: 2018-01-01

## 2018-01-01 RX ORDER — FACIAL-BODY WIPES
10 EACH TOPICAL DAILY PRN
Status: DISCONTINUED | OUTPATIENT
Start: 2018-01-01 | End: 2018-02-09 | Stop reason: HOSPADM

## 2018-01-01 RX ORDER — LORAZEPAM 2 MG/ML
1 INJECTION INTRAMUSCULAR
Status: DISCONTINUED | OUTPATIENT
Start: 2018-01-01 | End: 2018-01-01 | Stop reason: HOSPADM

## 2018-01-01 RX ORDER — DEXTROSE 50 % IN WATER (D50W) INTRAVENOUS SYRINGE
12.5-25 AS NEEDED
Status: DISCONTINUED | OUTPATIENT
Start: 2018-01-01 | End: 2018-01-01 | Stop reason: HOSPADM

## 2018-01-01 RX ORDER — ATORVASTATIN CALCIUM 20 MG/1
TABLET, FILM COATED ORAL
Qty: 90 TAB | Refills: 1 | OUTPATIENT
Start: 2018-01-01

## 2018-01-01 RX ORDER — LORAZEPAM 2 MG/ML
1 INJECTION INTRAMUSCULAR ONCE
Status: COMPLETED | OUTPATIENT
Start: 2018-01-01 | End: 2018-01-01

## 2018-01-01 RX ORDER — MORPHINE SULFATE 2 MG/ML
4 INJECTION, SOLUTION INTRAMUSCULAR; INTRAVENOUS ONCE
Status: COMPLETED | OUTPATIENT
Start: 2018-01-01 | End: 2018-01-01

## 2018-01-01 RX ORDER — MORPHINE SULFATE 2 MG/ML
2 INJECTION, SOLUTION INTRAMUSCULAR; INTRAVENOUS
Status: DISCONTINUED | OUTPATIENT
Start: 2018-01-01 | End: 2018-02-09 | Stop reason: HOSPADM

## 2018-01-01 RX ORDER — DIGOXIN 125 MCG
0.12 TABLET ORAL DAILY
COMMUNITY

## 2018-01-01 RX ORDER — LORAZEPAM 2 MG/ML
1 INJECTION INTRAMUSCULAR
Status: DISCONTINUED | OUTPATIENT
Start: 2018-01-01 | End: 2018-01-01

## 2018-01-01 RX ORDER — LORAZEPAM 2 MG/ML
2 INJECTION INTRAMUSCULAR ONCE
Status: COMPLETED | OUTPATIENT
Start: 2018-01-01 | End: 2018-01-01

## 2018-01-01 RX ORDER — GLYCOPYRROLATE 0.2 MG/ML
0.2 INJECTION INTRAMUSCULAR; INTRAVENOUS
Status: DISCONTINUED | OUTPATIENT
Start: 2018-01-01 | End: 2018-01-01 | Stop reason: HOSPADM

## 2018-01-01 RX ORDER — DIGOXIN 125 MCG
0.12 TABLET ORAL DAILY
Status: DISCONTINUED | OUTPATIENT
Start: 2018-01-01 | End: 2018-01-01

## 2018-01-01 RX ORDER — ALBUTEROL SULFATE 0.83 MG/ML
2.5 SOLUTION RESPIRATORY (INHALATION)
Status: DISCONTINUED | OUTPATIENT
Start: 2018-01-01 | End: 2018-01-01

## 2018-01-01 RX ADMIN — Medication 10 ML: at 21:29

## 2018-01-01 RX ADMIN — DILTIAZEM HYDROCHLORIDE 240 MG: 240 CAPSULE, COATED, EXTENDED RELEASE ORAL at 09:25

## 2018-01-01 RX ADMIN — ATORVASTATIN CALCIUM 20 MG: 20 TABLET, FILM COATED ORAL at 21:06

## 2018-01-01 RX ADMIN — DIGOXIN 0.12 MG: 125 TABLET ORAL at 08:41

## 2018-01-01 RX ADMIN — METRONIDAZOLE 500 MG: 500 INJECTION, SOLUTION INTRAVENOUS at 23:02

## 2018-01-01 RX ADMIN — INSULIN LISPRO 12 UNITS: 100 INJECTION, SOLUTION INTRAVENOUS; SUBCUTANEOUS at 16:30

## 2018-01-01 RX ADMIN — DOXYCYCLINE 100 MG: 100 INJECTION, POWDER, LYOPHILIZED, FOR SOLUTION INTRAVENOUS at 13:18

## 2018-01-01 RX ADMIN — METHYLPREDNISOLONE SODIUM SUCCINATE 60 MG: 40 INJECTION, POWDER, FOR SOLUTION INTRAMUSCULAR; INTRAVENOUS at 05:47

## 2018-01-01 RX ADMIN — Medication 10 ML: at 13:19

## 2018-01-01 RX ADMIN — MORPHINE SULFATE 2 MG: 2 INJECTION, SOLUTION INTRAMUSCULAR; INTRAVENOUS at 10:16

## 2018-01-01 RX ADMIN — METHYLPREDNISOLONE SODIUM SUCCINATE 125 MG: 125 INJECTION, POWDER, FOR SOLUTION INTRAMUSCULAR; INTRAVENOUS at 23:53

## 2018-01-01 RX ADMIN — CEFEPIME HYDROCHLORIDE 2 G: 1 INJECTION, POWDER, FOR SOLUTION INTRAMUSCULAR; INTRAVENOUS at 12:11

## 2018-01-01 RX ADMIN — INSULIN LISPRO 5 UNITS: 100 INJECTION, SOLUTION INTRAVENOUS; SUBCUTANEOUS at 08:25

## 2018-01-01 RX ADMIN — INSULIN LISPRO 4 UNITS: 100 INJECTION, SOLUTION INTRAVENOUS; SUBCUTANEOUS at 12:12

## 2018-01-01 RX ADMIN — CEFEPIME HYDROCHLORIDE 2 G: 1 INJECTION, POWDER, FOR SOLUTION INTRAMUSCULAR; INTRAVENOUS at 01:29

## 2018-01-01 RX ADMIN — POTASSIUM CHLORIDE 40 MEQ: 750 TABLET, FILM COATED, EXTENDED RELEASE ORAL at 09:24

## 2018-01-01 RX ADMIN — Medication 10 ML: at 22:56

## 2018-01-01 RX ADMIN — AZITHROMYCIN MONOHYDRATE 500 MG: 500 INJECTION, POWDER, LYOPHILIZED, FOR SOLUTION INTRAVENOUS at 16:59

## 2018-01-01 RX ADMIN — INSULIN LISPRO 4 UNITS: 100 INJECTION, SOLUTION INTRAVENOUS; SUBCUTANEOUS at 11:46

## 2018-01-01 RX ADMIN — INSULIN GLARGINE 10 UNITS: 100 INJECTION, SOLUTION SUBCUTANEOUS at 22:40

## 2018-01-01 RX ADMIN — INSULIN LISPRO 3 UNITS: 100 INJECTION, SOLUTION INTRAVENOUS; SUBCUTANEOUS at 08:41

## 2018-01-01 RX ADMIN — METHYLPREDNISOLONE SODIUM SUCCINATE 60 MG: 125 INJECTION, POWDER, FOR SOLUTION INTRAMUSCULAR; INTRAVENOUS at 05:15

## 2018-01-01 RX ADMIN — SODIUM CHLORIDE 1000 ML: 900 INJECTION, SOLUTION INTRAVENOUS at 04:28

## 2018-01-01 RX ADMIN — INSULIN LISPRO 7 UNITS: 100 INJECTION, SOLUTION INTRAVENOUS; SUBCUTANEOUS at 12:32

## 2018-01-01 RX ADMIN — LORAZEPAM 2 MG: 2 INJECTION, SOLUTION INTRAMUSCULAR; INTRAVENOUS at 21:40

## 2018-01-01 RX ADMIN — ACETYLCYSTEINE 200 MG: 100 SOLUTION ORAL; RESPIRATORY (INHALATION) at 20:17

## 2018-01-01 RX ADMIN — INSULIN LISPRO 2 UNITS: 100 INJECTION, SOLUTION INTRAVENOUS; SUBCUTANEOUS at 23:02

## 2018-01-01 RX ADMIN — METRONIDAZOLE 500 MG: 500 INJECTION, SOLUTION INTRAVENOUS at 15:05

## 2018-01-01 RX ADMIN — METHYLPREDNISOLONE SODIUM SUCCINATE 60 MG: 40 INJECTION, POWDER, FOR SOLUTION INTRAMUSCULAR; INTRAVENOUS at 01:15

## 2018-01-01 RX ADMIN — RIVAROXABAN 15 MG: 10 TABLET, FILM COATED ORAL at 17:05

## 2018-01-01 RX ADMIN — LORAZEPAM 0.5 MG: 0.5 TABLET ORAL at 04:41

## 2018-01-01 RX ADMIN — METHYLPREDNISOLONE SODIUM SUCCINATE 60 MG: 125 INJECTION, POWDER, FOR SOLUTION INTRAMUSCULAR; INTRAVENOUS at 23:35

## 2018-01-01 RX ADMIN — ACETYLCYSTEINE 200 MG: 100 SOLUTION ORAL; RESPIRATORY (INHALATION) at 15:12

## 2018-01-01 RX ADMIN — Medication 10 ML: at 07:00

## 2018-01-01 RX ADMIN — METHYLPREDNISOLONE SODIUM SUCCINATE 60 MG: 125 INJECTION, POWDER, FOR SOLUTION INTRAMUSCULAR; INTRAVENOUS at 12:35

## 2018-01-01 RX ADMIN — ATORVASTATIN CALCIUM 20 MG: 20 TABLET, FILM COATED ORAL at 21:21

## 2018-01-01 RX ADMIN — Medication 10 ML: at 05:12

## 2018-01-01 RX ADMIN — IPRATROPIUM BROMIDE AND ALBUTEROL SULFATE 3 ML: .5; 3 SOLUTION RESPIRATORY (INHALATION) at 19:14

## 2018-01-01 RX ADMIN — METHYLPREDNISOLONE SODIUM SUCCINATE 60 MG: 125 INJECTION, POWDER, FOR SOLUTION INTRAMUSCULAR; INTRAVENOUS at 05:12

## 2018-01-01 RX ADMIN — ATORVASTATIN CALCIUM 20 MG: 20 TABLET, FILM COATED ORAL at 21:27

## 2018-01-01 RX ADMIN — METHYLPREDNISOLONE SODIUM SUCCINATE 60 MG: 125 INJECTION, POWDER, FOR SOLUTION INTRAMUSCULAR; INTRAVENOUS at 17:52

## 2018-01-01 RX ADMIN — IPRATROPIUM BROMIDE AND ALBUTEROL SULFATE 3 ML: .5; 3 SOLUTION RESPIRATORY (INHALATION) at 15:05

## 2018-01-01 RX ADMIN — IPRATROPIUM BROMIDE AND ALBUTEROL SULFATE 3 ML: .5; 3 SOLUTION RESPIRATORY (INHALATION) at 15:02

## 2018-01-01 RX ADMIN — CEFTRIAXONE 2 G: 2 INJECTION, POWDER, FOR SOLUTION INTRAMUSCULAR; INTRAVENOUS at 16:12

## 2018-01-01 RX ADMIN — INSULIN LISPRO 3 UNITS: 100 INJECTION, SOLUTION INTRAVENOUS; SUBCUTANEOUS at 17:44

## 2018-01-01 RX ADMIN — DOXYCYCLINE 100 MG: 100 INJECTION, POWDER, LYOPHILIZED, FOR SOLUTION INTRAVENOUS at 21:21

## 2018-01-01 RX ADMIN — IPRATROPIUM BROMIDE AND ALBUTEROL SULFATE 3 ML: .5; 3 SOLUTION RESPIRATORY (INHALATION) at 20:17

## 2018-01-01 RX ADMIN — INSULIN LISPRO 7 UNITS: 100 INJECTION, SOLUTION INTRAVENOUS; SUBCUTANEOUS at 11:58

## 2018-01-01 RX ADMIN — FUROSEMIDE 40 MG: 10 INJECTION, SOLUTION INTRAMUSCULAR; INTRAVENOUS at 15:35

## 2018-01-01 RX ADMIN — RIVAROXABAN 15 MG: 10 TABLET, FILM COATED ORAL at 17:12

## 2018-01-01 RX ADMIN — SODIUM CHLORIDE 500 ML: 900 INJECTION, SOLUTION INTRAVENOUS at 00:24

## 2018-01-01 RX ADMIN — METHYLPREDNISOLONE SODIUM SUCCINATE 60 MG: 125 INJECTION, POWDER, FOR SOLUTION INTRAMUSCULAR; INTRAVENOUS at 11:58

## 2018-01-01 RX ADMIN — MORPHINE SULFATE 1 MG: 2 INJECTION, SOLUTION INTRAMUSCULAR; INTRAVENOUS at 12:12

## 2018-01-01 RX ADMIN — IPRATROPIUM BROMIDE AND ALBUTEROL SULFATE 3 ML: .5; 3 SOLUTION RESPIRATORY (INHALATION) at 07:20

## 2018-01-01 RX ADMIN — METHYLPREDNISOLONE SODIUM SUCCINATE 60 MG: 125 INJECTION, POWDER, FOR SOLUTION INTRAMUSCULAR; INTRAVENOUS at 17:02

## 2018-01-01 RX ADMIN — CEFEPIME HYDROCHLORIDE 2 G: 1 INJECTION, POWDER, FOR SOLUTION INTRAMUSCULAR; INTRAVENOUS at 06:28

## 2018-01-01 RX ADMIN — METHYLPREDNISOLONE SODIUM SUCCINATE 125 MG: 125 INJECTION, POWDER, FOR SOLUTION INTRAMUSCULAR; INTRAVENOUS at 06:41

## 2018-01-01 RX ADMIN — INSULIN GLARGINE 8 UNITS: 100 INJECTION, SOLUTION SUBCUTANEOUS at 21:27

## 2018-01-01 RX ADMIN — Medication 10 ML: at 14:00

## 2018-01-01 RX ADMIN — ACETYLCYSTEINE 200 MG: 100 SOLUTION ORAL; RESPIRATORY (INHALATION) at 15:11

## 2018-01-01 RX ADMIN — INSULIN LISPRO 2 UNITS: 100 INJECTION, SOLUTION INTRAVENOUS; SUBCUTANEOUS at 21:22

## 2018-01-01 RX ADMIN — ACETYLCYSTEINE: 100 SOLUTION ORAL; RESPIRATORY (INHALATION) at 08:20

## 2018-01-01 RX ADMIN — IPRATROPIUM BROMIDE AND ALBUTEROL SULFATE 3 ML: .5; 3 SOLUTION RESPIRATORY (INHALATION) at 15:00

## 2018-01-01 RX ADMIN — INSULIN LISPRO 4 UNITS: 100 INJECTION, SOLUTION INTRAVENOUS; SUBCUTANEOUS at 22:39

## 2018-01-01 RX ADMIN — METRONIDAZOLE 500 MG: 500 INJECTION, SOLUTION INTRAVENOUS at 17:14

## 2018-01-01 RX ADMIN — Medication 10 ML: at 06:00

## 2018-01-01 RX ADMIN — Medication 10 ML: at 13:34

## 2018-01-01 RX ADMIN — IPRATROPIUM BROMIDE AND ALBUTEROL SULFATE 3 ML: .5; 3 SOLUTION RESPIRATORY (INHALATION) at 11:22

## 2018-01-01 RX ADMIN — METHYLPREDNISOLONE SODIUM SUCCINATE 125 MG: 125 INJECTION, POWDER, FOR SOLUTION INTRAMUSCULAR; INTRAVENOUS at 16:08

## 2018-01-01 RX ADMIN — Medication 10 ML: at 13:30

## 2018-01-01 RX ADMIN — RIVAROXABAN 15 MG: 10 TABLET, FILM COATED ORAL at 17:43

## 2018-01-01 RX ADMIN — ACETYLCYSTEINE 200 MG: 100 SOLUTION ORAL; RESPIRATORY (INHALATION) at 11:22

## 2018-01-01 RX ADMIN — DOXYCYCLINE 100 MG: 100 INJECTION, POWDER, LYOPHILIZED, FOR SOLUTION INTRAVENOUS at 20:48

## 2018-01-01 RX ADMIN — ARFORMOTEROL TARTRATE 15 MCG: 15 SOLUTION RESPIRATORY (INHALATION) at 10:18

## 2018-01-01 RX ADMIN — DIGOXIN 0.12 MG: 125 TABLET ORAL at 09:25

## 2018-01-01 RX ADMIN — SODIUM CHLORIDE 1000 ML: 900 INJECTION, SOLUTION INTRAVENOUS at 19:07

## 2018-01-01 RX ADMIN — Medication 10 ML: at 21:23

## 2018-01-01 RX ADMIN — ATORVASTATIN CALCIUM 20 MG: 20 TABLET, FILM COATED ORAL at 22:41

## 2018-01-01 RX ADMIN — IPRATROPIUM BROMIDE AND ALBUTEROL SULFATE 3 ML: .5; 3 SOLUTION RESPIRATORY (INHALATION) at 15:14

## 2018-01-01 RX ADMIN — ACETAMINOPHEN 1000 MG: 500 TABLET ORAL at 16:08

## 2018-01-01 RX ADMIN — IPRATROPIUM BROMIDE AND ALBUTEROL SULFATE 3 ML: .5; 3 SOLUTION RESPIRATORY (INHALATION) at 11:04

## 2018-01-01 RX ADMIN — HUMAN INSULIN 12 UNITS: 100 INJECTION, SUSPENSION SUBCUTANEOUS at 17:14

## 2018-01-01 RX ADMIN — METHYLPREDNISOLONE SODIUM SUCCINATE 60 MG: 40 INJECTION, POWDER, FOR SOLUTION INTRAMUSCULAR; INTRAVENOUS at 11:46

## 2018-01-01 RX ADMIN — ARFORMOTEROL TARTRATE 15 MCG: 15 SOLUTION RESPIRATORY (INHALATION) at 21:19

## 2018-01-01 RX ADMIN — CEFTRIAXONE 2 G: 2 INJECTION, POWDER, FOR SOLUTION INTRAMUSCULAR; INTRAVENOUS at 16:08

## 2018-01-01 RX ADMIN — RIVAROXABAN 15 MG: 10 TABLET, FILM COATED ORAL at 17:52

## 2018-01-01 RX ADMIN — METHYLPREDNISOLONE SODIUM SUCCINATE 60 MG: 125 INJECTION, POWDER, FOR SOLUTION INTRAMUSCULAR; INTRAVENOUS at 11:40

## 2018-01-01 RX ADMIN — HUMAN INSULIN 12 UNITS: 100 INJECTION, SUSPENSION SUBCUTANEOUS at 08:41

## 2018-01-01 RX ADMIN — Medication 10 ML: at 05:48

## 2018-01-01 RX ADMIN — DOXYCYCLINE 100 MG: 100 INJECTION, POWDER, LYOPHILIZED, FOR SOLUTION INTRAVENOUS at 10:08

## 2018-01-01 RX ADMIN — INSULIN LISPRO 7 UNITS: 100 INJECTION, SOLUTION INTRAVENOUS; SUBCUTANEOUS at 13:45

## 2018-01-01 RX ADMIN — INSULIN LISPRO 4 UNITS: 100 INJECTION, SOLUTION INTRAVENOUS; SUBCUTANEOUS at 16:51

## 2018-01-01 RX ADMIN — CEFTRIAXONE 2 G: 2 INJECTION, POWDER, FOR SOLUTION INTRAMUSCULAR; INTRAVENOUS at 15:35

## 2018-01-01 RX ADMIN — IPRATROPIUM BROMIDE AND ALBUTEROL SULFATE 3 ML: .5; 3 SOLUTION RESPIRATORY (INHALATION) at 07:33

## 2018-01-01 RX ADMIN — INSULIN LISPRO 7 UNITS: 100 INJECTION, SOLUTION INTRAVENOUS; SUBCUTANEOUS at 12:35

## 2018-01-01 RX ADMIN — METRONIDAZOLE 500 MG: 500 INJECTION, SOLUTION INTRAVENOUS at 05:09

## 2018-01-01 RX ADMIN — INSULIN LISPRO 3 UNITS: 100 INJECTION, SOLUTION INTRAVENOUS; SUBCUTANEOUS at 17:02

## 2018-01-01 RX ADMIN — ATORVASTATIN CALCIUM 20 MG: 20 TABLET, FILM COATED ORAL at 22:55

## 2018-01-01 RX ADMIN — DILTIAZEM HYDROCHLORIDE 240 MG: 240 CAPSULE, COATED, EXTENDED RELEASE ORAL at 10:52

## 2018-01-01 RX ADMIN — FUROSEMIDE 40 MG: 10 INJECTION, SOLUTION INTRAMUSCULAR; INTRAVENOUS at 09:25

## 2018-01-01 RX ADMIN — Medication 10 ML: at 15:03

## 2018-01-01 RX ADMIN — METHYLPREDNISOLONE SODIUM SUCCINATE 60 MG: 40 INJECTION, POWDER, FOR SOLUTION INTRAMUSCULAR; INTRAVENOUS at 18:13

## 2018-01-01 RX ADMIN — INSULIN LISPRO 4 UNITS: 100 INJECTION, SOLUTION INTRAVENOUS; SUBCUTANEOUS at 21:28

## 2018-01-01 RX ADMIN — METHYLPREDNISOLONE SODIUM SUCCINATE 60 MG: 125 INJECTION, POWDER, FOR SOLUTION INTRAMUSCULAR; INTRAVENOUS at 23:18

## 2018-01-01 RX ADMIN — Medication 10 ML: at 05:15

## 2018-01-01 RX ADMIN — IPRATROPIUM BROMIDE AND ALBUTEROL SULFATE 3 ML: .5; 3 SOLUTION RESPIRATORY (INHALATION) at 15:11

## 2018-01-01 RX ADMIN — FUROSEMIDE 20 MG: 10 INJECTION, SOLUTION INTRAMUSCULAR; INTRAVENOUS at 13:59

## 2018-01-01 RX ADMIN — IPRATROPIUM BROMIDE AND ALBUTEROL SULFATE 3 ML: .5; 3 SOLUTION RESPIRATORY (INHALATION) at 08:20

## 2018-01-01 RX ADMIN — LORAZEPAM 1 MG: 2 INJECTION, SOLUTION INTRAMUSCULAR; INTRAVENOUS at 10:16

## 2018-01-01 RX ADMIN — SODIUM CHLORIDE 100 ML/HR: 9 INJECTION, SOLUTION INTRAVENOUS at 20:27

## 2018-01-01 RX ADMIN — METHYLPREDNISOLONE SODIUM SUCCINATE 60 MG: 125 INJECTION, POWDER, FOR SOLUTION INTRAMUSCULAR; INTRAVENOUS at 12:00

## 2018-01-01 RX ADMIN — METRONIDAZOLE 500 MG: 500 INJECTION, SOLUTION INTRAVENOUS at 22:10

## 2018-01-01 RX ADMIN — IPRATROPIUM BROMIDE AND ALBUTEROL SULFATE 3 ML: .5; 3 SOLUTION RESPIRATORY (INHALATION) at 11:08

## 2018-01-01 RX ADMIN — DILTIAZEM HYDROCHLORIDE 10 MG: 5 INJECTION INTRAVENOUS at 13:44

## 2018-01-01 RX ADMIN — MORPHINE SULFATE 1 MG: 2 INJECTION, SOLUTION INTRAMUSCULAR; INTRAVENOUS at 15:17

## 2018-01-01 RX ADMIN — RIVAROXABAN 15 MG: 10 TABLET, FILM COATED ORAL at 21:06

## 2018-01-01 RX ADMIN — METHYLPREDNISOLONE SODIUM SUCCINATE 60 MG: 125 INJECTION, POWDER, FOR SOLUTION INTRAMUSCULAR; INTRAVENOUS at 00:55

## 2018-01-01 RX ADMIN — METRONIDAZOLE 500 MG: 500 INJECTION, SOLUTION INTRAVENOUS at 10:53

## 2018-01-01 RX ADMIN — IPRATROPIUM BROMIDE AND ALBUTEROL SULFATE 3 ML: .5; 3 SOLUTION RESPIRATORY (INHALATION) at 15:12

## 2018-01-01 RX ADMIN — CEFEPIME HYDROCHLORIDE 2 G: 1 INJECTION, POWDER, FOR SOLUTION INTRAMUSCULAR; INTRAVENOUS at 19:48

## 2018-01-01 RX ADMIN — RIVAROXABAN 15 MG: 10 TABLET, FILM COATED ORAL at 17:14

## 2018-01-01 RX ADMIN — MORPHINE SULFATE 4 MG: 2 INJECTION, SOLUTION INTRAMUSCULAR; INTRAVENOUS at 21:39

## 2018-01-01 RX ADMIN — IPRATROPIUM BROMIDE AND ALBUTEROL SULFATE 3 ML: .5; 3 SOLUTION RESPIRATORY (INHALATION) at 11:00

## 2018-01-01 RX ADMIN — Medication 10 ML: at 15:35

## 2018-01-01 RX ADMIN — METHYLPREDNISOLONE SODIUM SUCCINATE 60 MG: 125 INJECTION, POWDER, FOR SOLUTION INTRAMUSCULAR; INTRAVENOUS at 01:25

## 2018-01-01 RX ADMIN — LORAZEPAM 0.5 MG: 2 INJECTION, SOLUTION INTRAMUSCULAR; INTRAVENOUS at 14:00

## 2018-01-01 RX ADMIN — IPRATROPIUM BROMIDE AND ALBUTEROL SULFATE 3 ML: .5; 3 SOLUTION RESPIRATORY (INHALATION) at 15:13

## 2018-01-01 RX ADMIN — IPRATROPIUM BROMIDE AND ALBUTEROL SULFATE 3 ML: .5; 3 SOLUTION RESPIRATORY (INHALATION) at 20:22

## 2018-01-01 RX ADMIN — DOXYCYCLINE 100 MG: 100 INJECTION, POWDER, LYOPHILIZED, FOR SOLUTION INTRAVENOUS at 08:38

## 2018-01-01 RX ADMIN — METHYLPREDNISOLONE SODIUM SUCCINATE 60 MG: 125 INJECTION, POWDER, FOR SOLUTION INTRAMUSCULAR; INTRAVENOUS at 17:43

## 2018-01-01 RX ADMIN — INSULIN LISPRO 3 UNITS: 100 INJECTION, SOLUTION INTRAVENOUS; SUBCUTANEOUS at 07:39

## 2018-01-01 RX ADMIN — LORAZEPAM 1 MG: 2 INJECTION INTRAMUSCULAR at 10:16

## 2018-01-01 RX ADMIN — DOXYCYCLINE 100 MG: 100 INJECTION, POWDER, LYOPHILIZED, FOR SOLUTION INTRAVENOUS at 23:02

## 2018-01-01 RX ADMIN — Medication 10 ML: at 13:59

## 2018-01-01 RX ADMIN — Medication 10 ML: at 21:08

## 2018-01-01 RX ADMIN — INSULIN GLARGINE 20 UNITS: 100 INJECTION, SOLUTION SUBCUTANEOUS at 21:21

## 2018-01-01 RX ADMIN — IPRATROPIUM BROMIDE AND ALBUTEROL SULFATE 3 ML: .5; 3 SOLUTION RESPIRATORY (INHALATION) at 07:18

## 2018-01-01 RX ADMIN — CEFTRIAXONE SODIUM 2 G: 2 INJECTION, POWDER, FOR SOLUTION INTRAMUSCULAR; INTRAVENOUS at 17:02

## 2018-01-01 RX ADMIN — DOXYCYCLINE 100 MG: 100 INJECTION, POWDER, LYOPHILIZED, FOR SOLUTION INTRAVENOUS at 08:54

## 2018-01-01 RX ADMIN — INSULIN LISPRO 4 UNITS: 100 INJECTION, SOLUTION INTRAVENOUS; SUBCUTANEOUS at 09:24

## 2018-01-01 RX ADMIN — DOXYCYCLINE 100 MG: 100 INJECTION, POWDER, LYOPHILIZED, FOR SOLUTION INTRAVENOUS at 09:26

## 2018-01-01 RX ADMIN — DOXYCYCLINE 100 MG: 100 INJECTION, POWDER, LYOPHILIZED, FOR SOLUTION INTRAVENOUS at 20:05

## 2018-01-01 RX ADMIN — METHYLPREDNISOLONE SODIUM SUCCINATE 60 MG: 125 INJECTION, POWDER, FOR SOLUTION INTRAMUSCULAR; INTRAVENOUS at 06:59

## 2018-01-01 RX ADMIN — DOXYCYCLINE 100 MG: 100 INJECTION, POWDER, LYOPHILIZED, FOR SOLUTION INTRAVENOUS at 20:53

## 2018-01-01 RX ADMIN — DILTIAZEM HYDROCHLORIDE 240 MG: 240 CAPSULE, COATED, EXTENDED RELEASE ORAL at 08:41

## 2018-01-01 RX ADMIN — Medication 10 ML: at 22:41

## 2018-01-01 RX ADMIN — FUROSEMIDE 40 MG: 40 TABLET ORAL at 10:53

## 2018-01-01 RX ADMIN — LORAZEPAM 1 MG: 2 INJECTION, SOLUTION INTRAMUSCULAR; INTRAVENOUS at 09:39

## 2018-01-01 RX ADMIN — DILTIAZEM HYDROCHLORIDE 240 MG: 240 CAPSULE, COATED, EXTENDED RELEASE ORAL at 08:30

## 2018-01-01 RX ADMIN — MORPHINE SULFATE 1 MG: 2 INJECTION, SOLUTION INTRAMUSCULAR; INTRAVENOUS at 11:14

## 2018-01-01 RX ADMIN — ALBUTEROL SULFATE 2.5 MG: 2.5 SOLUTION RESPIRATORY (INHALATION) at 21:19

## 2018-01-01 RX ADMIN — DIGOXIN 0.12 MG: 125 TABLET ORAL at 08:25

## 2018-01-01 RX ADMIN — LORAZEPAM 1 MG: 2 INJECTION, SOLUTION INTRAMUSCULAR; INTRAVENOUS at 15:17

## 2018-01-01 RX ADMIN — Medication 10 ML: at 22:42

## 2018-01-01 RX ADMIN — METHYLPREDNISOLONE SODIUM SUCCINATE 60 MG: 125 INJECTION, POWDER, FOR SOLUTION INTRAMUSCULAR; INTRAVENOUS at 17:14

## 2018-01-01 RX ADMIN — IPRATROPIUM BROMIDE AND ALBUTEROL SULFATE 3 ML: .5; 3 SOLUTION RESPIRATORY (INHALATION) at 20:08

## 2018-01-01 RX ADMIN — INSULIN LISPRO 3 UNITS: 100 INJECTION, SOLUTION INTRAVENOUS; SUBCUTANEOUS at 17:14

## 2018-01-01 RX ADMIN — METHYLPREDNISOLONE SODIUM SUCCINATE 60 MG: 125 INJECTION, POWDER, FOR SOLUTION INTRAMUSCULAR; INTRAVENOUS at 12:12

## 2018-01-01 RX ADMIN — DIGOXIN 0.12 MG: 125 TABLET ORAL at 08:29

## 2018-01-01 RX ADMIN — FUROSEMIDE 40 MG: 40 TABLET ORAL at 08:41

## 2018-01-01 RX ADMIN — DOXYCYCLINE 100 MG: 100 INJECTION, POWDER, LYOPHILIZED, FOR SOLUTION INTRAVENOUS at 08:26

## 2018-01-01 RX ADMIN — ALBUTEROL SULFATE 2.5 MG: 2.5 SOLUTION RESPIRATORY (INHALATION) at 08:00

## 2018-01-01 RX ADMIN — HUMAN INSULIN 12 UNITS: 100 INJECTION, SUSPENSION SUBCUTANEOUS at 17:53

## 2018-01-01 RX ADMIN — DILTIAZEM HYDROCHLORIDE 240 MG: 240 CAPSULE, COATED, EXTENDED RELEASE ORAL at 08:25

## 2018-01-01 RX ADMIN — DOXYCYCLINE 100 MG: 100 INJECTION, POWDER, LYOPHILIZED, FOR SOLUTION INTRAVENOUS at 21:00

## 2018-01-01 RX ADMIN — ACETYLCYSTEINE 200 MG: 100 SOLUTION ORAL; RESPIRATORY (INHALATION) at 11:00

## 2018-01-01 RX ADMIN — SODIUM CHLORIDE 50 ML/HR: 900 INJECTION, SOLUTION INTRAVENOUS at 08:24

## 2018-01-03 NOTE — TELEPHONE ENCOUNTER
Ms Aracelis Lobo blood sugar last pm was 221 & this am 154  Daughter, Gigi Rayo, is unable to bring in to see MD  Please contact Shania at

## 2018-01-03 NOTE — TELEPHONE ENCOUNTER
Pts daughter is concerned about her mother states she is losing weight and her blood sugars are going up. Daughter also stated that she was going to cancel the urologist appointment on Friday due to the cold and reschedule for when it is a little bit warmer next week. Wants to know if we can go ahead and start treating her UTI.

## 2018-01-03 NOTE — TELEPHONE ENCOUNTER
Per Dr. Misty White, \"Patient with two bacteria types identified. If not going to urology, she needs to come in for an injection of ceftriaxone due to antibiotic resistance. levaquin sent to pharmacy. \"   Nacho Montero expressed understanding. She states patient is rescheduled to see Urology 1/9/18.

## 2018-01-09 NOTE — TELEPHONE ENCOUNTER
Suzi with [facility] called requesting last office note where pt's oxygen was discussed. She says she needs this for patient's one year recert.  Please advise     [phone]

## 2018-02-02 PROBLEM — J18.9 CAP (COMMUNITY ACQUIRED PNEUMONIA): Status: ACTIVE | Noted: 2018-01-01

## 2018-02-02 PROBLEM — J96.00 ACUTE RESPIRATORY FAILURE (HCC): Status: ACTIVE | Noted: 2018-01-01

## 2018-02-02 PROBLEM — J44.1 COPD EXACERBATION (HCC): Status: ACTIVE | Noted: 2018-01-01

## 2018-02-02 NOTE — ED TRIAGE NOTES
[facility] arrived [facility], patient was sating 68% on 2LPM NC (what she is on chronically at home), started on 10 LPM NRB, on arrival sating 88% on 10LPM NRB, given neb treatment en route by EMS.  Patient tripoding

## 2018-02-02 NOTE — ED PROVIDER NOTES
HPI Comments: Chronically O2 dependent. Was seen at [facility] prior to arrival and found to be in resp distress with sats in the high 60's. Arrives via EMS with NRB in place and sats in mid 90's. Patient is a 76 y.o. female presenting with shortness of breath. The history is provided by the patient and the EMS personnel. The history is limited by the condition of the patient. Shortness of Breath   This is a new problem. The average episode lasts 3 days. The problem occurs continuously. Episode onset: 3-4 days. The problem has been gradually worsening. Associated symptoms include cough and wheezing. Pertinent negatives include no fever, no headaches, no coryza, no rhinorrhea, no sore throat, no swollen glands, no ear pain, no neck pain, no sputum production, no hemoptysis, no PND, no orthopnea, no chest pain, no syncope, no vomiting, no abdominal pain, no rash, no leg pain, no leg swelling and no claudication. It is unknown what precipitated the problem. She has tried beta-agonist inhalers for the symptoms. The treatment provided mild relief. She has had prior hospitalizations. She has had prior ED visits. Associated medical issues include COPD, pneumonia, chronic lung disease and CAD. Associated medical issues do not include asthma, PE, heart failure, past MI, DVT or recent surgery.         Past Medical History:   Diagnosis Date    Aortic stenosis     severe, s/p AVR March 2012 - presented with HF, EF 30%    Asthma 5/10/2012    Atrial fibrillation ([address] 5/10/2012    Bacterial pneumonia, unspecified     CAD (coronary artery disease)     cor calcifications (LAD) by CT     Chronic diastolic heart failure ([address] 5/29/2012    Depression     Dyslipidemia     Female stress incontinence     Hyperlipidemia 9/17/2010    Hypertension     Osteoarthritis     Paroxysmal atrial fibrillation ([address]     post op AVR    Pulmonary fibrosis ([address]     S/P AVR 11/6/2016    S/P AVR (aortic valve replacement) 3/2012    bovine ([facility])    Sleep apnea     T2DM (type 2 diabetes mellitus) ([facility] 1/14/2011       Past Surgical History:   Procedure Laterality Date    CARDIAC SURG PROCEDURE UNLIST      ECHO 2D ADULT  3/2012    EF 30%, mod LVH, EF 30-35%, PA 50 mmHg    HX CATARACT REMOVAL      bilateral         Family History:   Problem Relation Age of Onset    Diabetes Mother     Diabetes Father     Hypertension Father        Social History     Social History    Marital status:      Spouse name: N/A    Number of children: N/A    Years of education: N/A     Occupational History    Not on file. Social History Main Topics    Smoking status: Never Smoker    Smokeless tobacco: Never Used    Alcohol use No    Drug use: No    Sexual activity: Not on file     Other Topics Concern    Not on file     Social History Narrative         ALLERGIES: Review of patient's allergies indicates no known allergies. Review of Systems   Constitutional: Negative. Negative for appetite change, fever and unexpected weight change. HENT: Negative. Negative for ear pain, hearing loss, nosebleeds, rhinorrhea, sore throat and trouble swallowing. Respiratory: Positive for cough, shortness of breath and wheezing. Negative for hemoptysis, sputum production and chest tightness. Cardiovascular: Negative. Negative for chest pain, palpitations, orthopnea, claudication, leg swelling, syncope and PND. Gastrointestinal: Negative. Negative for abdominal distention, abdominal pain, blood in stool and vomiting. Endocrine: Negative. Genitourinary: Negative for dysuria and hematuria. Musculoskeletal: Negative. Negative for back pain, myalgias and neck pain. Skin: Negative. Negative for rash. Allergic/Immunologic: Negative. Neurological: Negative. Negative for dizziness, syncope, weakness, numbness and headaches. Hematological: Negative. Psychiatric/Behavioral: Negative.     All other systems reviewed and are negative. Vitals:    02/02/18 1442 02/02/18 1445 02/02/18 1449 02/02/18 1458   BP:    (!) 132/91   Pulse:    (!) 115   Resp:    28   Temp:    99.9 °F (37.7 °C)   SpO2: (!) 86% 98% 98% 100%   Weight:    63 kg (139 lb)   Height:    5' 3\" (1.6 m)            Physical Exam   Constitutional: She is oriented to person, place, and time. She appears well-developed and well-nourished. She appears distressed. Chronically ill appearing in severe resp distress     HENT:   Head: Normocephalic and atraumatic. Right Ear: External ear normal.   Left Ear: External ear normal.   Nose: Nose normal.   Mouth/Throat: Oropharynx is clear and moist.   Eyes: Conjunctivae and EOM are normal. Pupils are equal, round, and reactive to light. Neck: Normal range of motion. Neck supple. No JVD present. No thyromegaly present. Cardiovascular: Normal rate, regular rhythm, normal heart sounds and intact distal pulses. No murmur heard. Pulmonary/Chest: She is in respiratory distress. She has wheezes. She has no rales. Course breath sounds bilaterally in all fields   Abdominal: Soft. Bowel sounds are normal. She exhibits no distension. There is no tenderness. Musculoskeletal: Normal range of motion. She exhibits no edema. No calf swelling or tenderness. Neurological: She is alert and oriented to person, place, and time. No cranial nerve deficit. Skin: Skin is warm and dry. No rash noted. Psychiatric: She has a normal mood and affect. Her behavior is normal. Thought content normal.   Vitals reviewed. MDM  Number of Diagnoses or Management Options  Diagnosis management comments: 3:50 PM  Patient stable on bipap. Given sequential nebs in ED. Will cover with antibiotics for community acquired pneumonia due to low grade fever, elevation of wbc count, and chest xray findings. Will require admission to hospital for further management. Patient understands and agrees with plan.       Total critical care time spent exclusive of procedures:  35 min         Amount and/or Complexity of Data Reviewed  Clinical lab tests: ordered and reviewed  Tests in the radiology section of CPT®: ordered and reviewed  Review and summarize past medical records: yes  Discuss the patient with other providers: yes    Risk of Complications, Morbidity, and/or Mortality  Presenting problems: high  Diagnostic procedures: high  Management options: high    Patient Progress  Patient progress: stable        ED Course       EKG  Date/Time: 2/2/2018 2:49 PM  Performed by: Robinson Barksdale by: Zachary Melendrez     ECG reviewed by ED Physician in the absence of a cardiologist: yes    Previous ECG:     Previous ECG:  Compared to current    Similarity:  No change  Interpretation:     Interpretation: abnormal    Rate:     ECG rate:  121    ECG rate assessment: tachycardic    Rhythm:     Rhythm: sinus rhythm    Ectopy:     Ectopy: PVCs    QRS:     QRS axis:  Left  Conduction:     Conduction: abnormal      Abnormal conduction: complete RBBB    ST segments:     ST segments:  Non-specific  T waves:     T waves: non-specific    Other findings:     Other findings: LVH

## 2018-02-02 NOTE — H&P
[facility] [address] [place] [address]   Office [phone], Fax [phone]      History & Physical    Date of admission: 2/2/2018    Patient name: Akanksha Briceno  MRN: [id number]  YOB: 1940  Age: 76 y.o. Primary care provider:  Jovan Segal MD     Source of Information: patient and medical records                                      Subjective:       Chief complain: SOB for 3 days    History of present illness:  Ms. Aracelis Lobo is a 76 y.o.  female with a history of hypertension, pulmonary fibrosis, CAD, aortic stenosis s/p AVR who presented to the Emergency Department today complaining of 3 days h/o sudden onset of SOB at rest that is progressively worsening. Patient woke up from bed SOB. She is on home oxygen 2L continuous. SOB is associated with increased phlegm production, and purulence described as brown and pinking. No CP, palpitation dizziness, cough, edema, fever, sick contact. Daughter took her to the clinic and she was so winded that they called EMS and sent her to the ED. Her Sat had dropped to 63% per daughter.        Emergency Room Course:   Patient Vitals for the past 12 hrs:   Temp Pulse Resp BP SpO2   02/02/18 1515 100.1 °F (37.8 °C) - - - -   02/02/18 1515 - (!) 119 23 (!) 128/93 100 %   02/02/18 1500 - (!) 111 27 121/59 100 %   02/02/18 1458 99.9 °F (37.7 °C) (!) 115 28 (!) 132/91 100 %   02/02/18 1449 - - - - 98 %   02/02/18 1445 - - - (!) 132/91 (!) 87 %   02/02/18 1442 - - - - (!) 86 %      Labs: remarkable for WBCs: 12.6, LA: 2.8, CXR  Diffuse patchy opacity, ProBNP: 6734    Pt was treated with   Medications   sodium chloride (NS) flush 5-10 mL (10 mL IntraVENous Given 2/2/18 1503)   sodium chloride (NS) flush 5-10 mL (not administered)   cefTRIAXone (ROCEPHIN) 2 g in 0.9% sodium chloride (MBP/ADV) 50 mL (0 g IntraVENous IV Completed 2/2/18 1645)   azithromycin (ZITHROMAX) 500 mg in 0.9% sodium chloride (MBP/ADV) 250 mL (500 mg IntraVENous New Bag 2/2/18 9837) albuterol-ipratropium (DUO-NEB) 2.5 MG-0.5 MG/3 ML (3 mL Nebulization Given 2/2/18 1502)   albuterol-ipratropium (DUO-NEB) 2.5 MG-0.5 MG/3 ML (3 mL Nebulization Given 2/2/18 1502)   albuterol-ipratropium (DUO-NEB) 2.5 MG-0.5 MG/3 ML (3 mL Nebulization Given 2/2/18 1502)   methylPREDNISolone (PF) (SOLU-MEDROL) injection 125 mg (125 mg IntraVENous Given 2/2/18 1608)   acetaminophen (TYLENOL) tablet 1,000 mg (1,000 mg Oral Given 2/2/18 1608)         No Known Allergies    Prior to Admission Medications   Prescriptions Last Dose Informant Patient Reported? Taking? XARELTO 15 mg tab tablet 2/1/2018 at PM Child No Yes   Sig: Take 1 tablet by mouth  daily with dinner   albuterol (PROVENTIL HFA, VENTOLIN HFA, PROAIR HFA) 90 mcg/actuation inhaler 2/2/2018 at AM Child Yes Yes   Sig: Take 1 Puff by inhalation three (3) times daily as needed for Wheezing. albuterol (PROVENTIL VENTOLIN) 2.5 mg /3 mL (0.083 %) nebulizer solution 2/2/2018 at AM Child No Yes   Sig: 3 mL by Nebulization route three (3) times daily. arformoterol (BROVANA) 15 mcg/2 mL nebu neb solution 1/26/2018 Child Yes Yes   Sig: 15 mcg by Nebulization route two (2) times a day. aspirin delayed-release 81 mg tablet 2/2/2018 at AM Child Yes Yes   Sig: Take 162 mg by mouth daily. atorvastatin (LIPITOR) 20 mg tablet 2/1/2018 at PM Child No Yes   Sig: TAKE 1 TABLET BY MOUTH  NIGHTLY   cephALEXin (KEFLEX) 250 mg capsule 2/1/2018 at PM Child Yes Yes   Sig: Take 250 mg by mouth nightly. Indications: UTI prophylaxis   digoxin (LANOXIN) 0.125 mg tablet 2/2/2018 at AM Child Yes Yes   Sig: Take 0.125 mg by mouth daily. dilTIAZem CD (CARDIZEM CD) 240 mg ER capsule 2/2/2018 at AM Child Yes Yes   Sig: Take 240 mg by mouth daily. ferrous sulfate (IRON) 325 mg (65 mg iron) EC tablet 2/2/2018 at AM Child Yes Yes   Sig: Take 325 mg by mouth daily. furosemide (LASIX) 20 mg tablet 2/2/2018 at AM Child No Yes   Sig: Take 1 tablet twice a day.    metFORMIN (GLUCOPHAGE) 500 mg tablet 2/2/2018 at noon Child No Yes   Sig: Take 1 tablet by mouth 3  times a day with meals   pirfenidone (ESBRIET) 267 mg cap 2/2/2018 at noon Child Yes Yes   Sig: Take 3 Caps by mouth three (3) times daily. Indications: PULMONARY FIBROSIS   potassium 99 mg tablet 2/2/2018 at AM Child Yes Yes   Sig: Take 99 mg by mouth two (2) times a day. predniSONE (DELTASONE) 5 mg tablet 2/2/2018 at AM Child Yes Yes   Sig: Take 10 mg by mouth daily. tiotropium bromide (SPIRIVA RESPIMAT) 2.5 mcg/actuation mist 2/2/2018 at AM Child Yes Yes   Sig: Take 2 Puffs by inhalation daily. Facility-Administered Medications: None       Past Medical History:   Diagnosis Date    Aortic stenosis     severe, s/p AVR March 2012 - presented with HF, EF 30%    Asthma 5/10/2012    Atrial fibrillation ([facility] 5/10/2012    Bacterial pneumonia, unspecified     CAD (coronary artery disease)     cor calcifications (LAD) by CT     Chronic diastolic heart failure ([address] 5/29/2012    Depression     Dyslipidemia     Female stress incontinence     Hyperlipidemia 9/17/2010    Hypertension     Osteoarthritis     Paroxysmal atrial fibrillation ([facility])     post op AVR    Pulmonary fibrosis ([facility]     S/P AVR 11/6/2016    S/P AVR (aortic valve replacement) 3/2012    bovine ([facility])    Sleep apnea     T2DM (type 2 diabetes mellitus) ([facility] [address] 1/14/2011        Past Surgical History:   Procedure Laterality Date    CARDIAC SURG PROCEDURE UNLIST      ECHO 2D ADULT  3/2012    EF 30%, mod LVH, EF 30-35%, PA 50 mmHg    HX CATARACT REMOVAL      bilateral          SOCIAL HISTORY    - Alcohol history: Not at all    - Smoking history: Non-smoker    - Illicit drug history: Not at all    - Living arrangement: patient lives with their family.     - Ambulates: Assisted walker      Preventive history:  Immunization History   Administered Date(s) Administered    Influenza High Dose Vaccine PF 09/29/2017    Influenza Vaccine 12/07/2012, 11/15/2013, 2015, 10/16/2015, 10/22/2016       CODE STATUS discussed with the patient/caregivers  DNR      The following are negative unless bolded. General Fever, chills and unexpected weight change. HENT Ear pain, sinus pressure and sore throat. Eyes Visual disturbance. Respiratory Cough, chest tightness, shortness of breath and wheezing. Cardio Chest pain, palpitations and leg swelling. GI Nausea, vomiting, abd pain, diarrhea, constipation and blood in stool.  Dysuria. Extremities Myalgias and arthralgias. Skin Color change and pallor. Neuro Weakness and headaches. Behavioral Confusion, nervous, anxious. The remainder of the review of systems was reviewed and is noncontributory. Objective:    Patient Vitals for the past 12 hrs:   BP Temp Pulse Resp SpO2 Height Weight   18 1515 - 100.1 °F (37.8 °C) - - - - -   18 1515 (!) 128/93 - (!) 119 23 100 % - -   18 1500 121/59 - (!) 111 27 100 % - -   18 1458 (!) 132/91 99.9 °F (37.7 °C) (!) 115 28 100 % 5' 3\" (1.6 m) 139 lb (63 kg)   18 1449 - - - - 98 % - -   18 1445 (!) 132/91 - - - (!) 87 % - -   18 1442 - - - - (!) 86 % - -     Temp (24hrs), Av °F (37.8 °C), Min:99.9 °F (37.7 °C), Max:100.1 °F (37.8 °C)    No intake or output data in the 24 hours ending 18 1743   O2 Flow Rate (L/min): 10 l/min   O2 Device: BIPAP      Physical Exam  Visit Vitals    BP (!) 128/93    Pulse (!) 119    Temp 100.1 °F (37.8 °C)    Resp 23    Ht 5' 3\" (1.6 m)    Wt 139 lb (63 kg)    SpO2 100%    BMI 24.62 kg/m2       Vitals Reviewed. General Oriented to person, place, and time and well-developed. Appears well-nourished, no distress. Not diaphoretic. HENT Head Normocephalic and atraumatic. Eyes Conjunctivae are normal, no discharge. No scleral icterus. Nose Nose normal, clear turbinates. Oral Oropharynx is clear and moist. No oropharyngeal exudate. Neck No thyromegaly present.  No cervical adenopathy. Cardio Tachycardic, regular rhythm. Exam reveals no gallop and no friction rub. No murmur heard. No chest wall tenderness. Pulmonary Increased respiratory efforts on bipap. Good air movement. Diffuse high pitch rales bilaterally     Abdominal Soft. Bowel sounds normal. No distension. No tenderness.  Deferred. Extremities No edema of lower extremities. No tenderness. Distal pulses intact, varicose veins. Neurological Alert and oriented to person, place, and time. Dermatology Skin is warm and dry. No rash noted. No erythema or pallor. Psychiatric Affect and judgment normal.        Data Review    Laboratory Data  Recent Results (from the past 8 hour(s))   METABOLIC PANEL, COMPREHENSIVE    Collection Time: 02/02/18  2:58 PM   Result Value Ref Range    Sodium 140 136 - 145 mmol/L    Potassium 4.6 3.5 - 5.1 mmol/L    Chloride 100 97 - 108 mmol/L    CO2 27 21 - 32 mmol/L    Anion gap 13 5 - 15 mmol/L    Glucose 245 (H) 65 - 100 mg/dL    BUN 48 (H) 6 - 20 MG/DL    Creatinine 1.24 (H) 0.55 - 1.02 MG/DL    BUN/Creatinine ratio 39 (H) 12 - 20      GFR est AA 51 (L) >60 ml/min/1.73m2    GFR est non-AA 42 (L) >60 ml/min/1.73m2    Calcium 10.0 8.5 - 10.1 MG/DL    Bilirubin, total 0.4 0.2 - 1.0 MG/DL    ALT (SGPT) 27 12 - 78 U/L    AST (SGOT) 33 15 - 37 U/L    Alk.  phosphatase 64 45 - 117 U/L    Protein, total 7.9 6.4 - 8.2 g/dL    Albumin 3.3 (L) 3.5 - 5.0 g/dL    Globulin 4.6 (H) 2.0 - 4.0 g/dL    A-G Ratio 0.7 (L) 1.1 - 2.2     CBC WITH AUTOMATED DIFF    Collection Time: 02/02/18  2:58 PM   Result Value Ref Range    WBC 12.6 (H) 3.6 - 11.0 K/uL    RBC 3.72 (L) 3.80 - 5.20 M/uL    HGB 11.1 (L) 11.5 - 16.0 g/dL    HCT 35.0 35.0 - 47.0 %    MCV 94.1 80.0 - 99.0 FL    MCH 29.8 26.0 - 34.0 PG    MCHC 31.7 30.0 - 36.5 g/dL    RDW 14.6 (H) 11.5 - 14.5 %    PLATELET 521 180 - 424 K/uL    MPV 10.0 8.9 - 12.9 FL    NRBC 0.2 (H) 0  WBC    ABSOLUTE NRBC 0.02 (H) 0.00 - 0.01 K/uL NEUTROPHILS 86 (H) 32 - 75 %    LYMPHOCYTES 5 (L) 12 - 49 %    MONOCYTES 8 5 - 13 %    EOSINOPHILS 0 0 - 7 %    BASOPHILS 0 0 - 1 %    IMMATURE GRANULOCYTES 1 (H) 0.0 - 0.5 %    ABS. NEUTROPHILS 10.9 (H) 1.8 - 8.0 K/UL    ABS. LYMPHOCYTES 0.6 (L) 0.8 - 3.5 K/UL    ABS. MONOCYTES 1.0 0.0 - 1.0 K/UL    ABS. EOSINOPHILS 0.0 0.0 - 0.4 K/UL    ABS. BASOPHILS 0.0 0.0 - 0.1 K/UL    ABS. IMM.  GRANS. 0.1 (H) 0.00 - 0.04 K/UL    DF SMEAR SCANNED      RBC COMMENTS NORMOCYTIC, NORMOCHROMIC     TROPONIN I    Collection Time: 02/02/18  2:58 PM   Result Value Ref Range    Troponin-I, Qt. 0.04 <0.05 ng/mL   NT-PRO BNP    Collection Time: 02/02/18  2:58 PM   Result Value Ref Range    NT pro-BNP 6734 (H) 0 - 450 PG/ML   PTT    Collection Time: 02/02/18  2:58 PM   Result Value Ref Range    aPTT 32.4 22.1 - 32.5 sec    aPTT, therapeutic range     58.0 - 77.0 SECS   PROTHROMBIN TIME + INR    Collection Time: 02/02/18  2:58 PM   Result Value Ref Range    INR 1.0 0.9 - 1.1      Prothrombin time 10.1 9.0 - 11.1 sec   LACTIC ACID    Collection Time: 02/02/18  3:12 PM   Result Value Ref Range    Lactic acid 2.8 (HH) 0.4 - 2.0 MMOL/L   INFLUENZA A & B AG (RAPID TEST)    Collection Time: 02/02/18  3:54 PM   Result Value Ref Range    Influenza A Antigen NEGATIVE  NEG      Influenza B Antigen NEGATIVE  NEG     BLOOD GAS, ARTERIAL    Collection Time: 02/02/18  3:55 PM   Result Value Ref Range    pH 7.43 7.35 - 7.45      PCO2 37 35.0 - 45.0 mmHg    PO2 101 (H) 80 - 100 mmHg    O2 SAT 98 (H) 92 - 97 %    BICARBONATE 24 22 - 26 mmol/L    BASE EXCESS 0.1 mmol/L    O2 METHOD BIPAP      FIO2 60 %    SET RATE 14      IPAP/PIP 12.0      EPAP/CPAP/PEEP 6.0      Sample source ARTERIAL      SITE RIGHT RADIAL      AUBREY'S TEST YES     URINALYSIS W/ RFLX MICROSCOPIC    Collection Time: 02/02/18  4:20 PM   Result Value Ref Range    Color YELLOW/STRAW      Appearance CLOUDY (A) CLEAR      Specific gravity 1.019 1.003 - 1.030      pH (UA) 5.0 5.0 - 8.0 Protein TRACE (A) NEG mg/dL    Glucose 100 (A) NEG mg/dL    Ketone NEGATIVE  NEG mg/dL    Bilirubin NEGATIVE  NEG      Blood NEGATIVE  NEG      Urobilinogen 0.2 0.2 - 1.0 EU/dL    Nitrites NEGATIVE  NEG      Leukocyte Esterase NEGATIVE  NEG      WBC 0-4 0 - 4 /hpf    RBC 0-5 0 - 5 /hpf    Epithelial cells FEW FEW /lpf    Bacteria NEGATIVE  NEG /hpf    Hyaline cast 2-5 0 - 5 /lpf       Imaging  CXR Results  (Last 48 hours)               02/02/18 1524  XR CHEST PORT Final result    Impression:  IMPRESSION:   1. Background changes of interstitial prominence with increased patchy opacities   scattered throughout the lungs which may airspace disease. Recommend follow-up   to ensure resolution. Narrative:  INDICATION: . sob, hypoxia   COMPARISON: Chest x-ray, 6/7/2017, 1/19/2016, 12/9/2015, 12/7/2015 and   7/24/2014. LIMITATIONS: Portable technique. Yohannes De Guzman FINDINGS: Single frontal view of the chest.    .   Lines/tubes/surgical: None. Heart/mediastinum: Status post median sternotomy. .    Lungs/pleura: Chronic appearing lung changes with diffuse interstitial   opacities. Relatively increased appearance of patchy opacities. Low lung   volumes. No visible pneumothorax. Additional Comments: Degenerative changes in both shoulders. .           CT Results  (Last 48 hours)    None          EKG: sinus tachycardia, RBBB, occasional PVC noted, unifocal, prolonged QT interval, left axis deviation, LVH. Assessment and Plan     Indu Payton is a 76 y.o. female with h/o hypertension, CAD, pulmonary fibrosis who is admitted for acute hypoxic respiratory failure 2/2 CAP. Acute Hypoxic Respiratory Failure: Most likely from Severe sepsis 2/2 CAP ( 3/4 SIRS). Lung exam : diffuse rales. CXR diffuse opacties. Dropped to 86% while on room air, now on bipap. ABG wnl. LA: 2.8 On home 2L oxygen, on continuous prednisone for pulmonary fibrosis.  Flu neg  - Admit to step down  - Vital signs per unit protocol  - bipap,   - albuterol, Brovana   - ceftriaxone 2g and doxi ( long QT)  - Solumedrol 125mg  then will switch to Prednisone as tolerated. - 1L NS, bolus f/u blood  And sputum culture,CBC, repeat LA , pneumo, legionnella, mycoplasma  - pulm consult,     CAD: stable. Trop X1 neg  - continue ASA, Lipitor  - not on BB due to pulmonary fibrosis/ COPD    Hypertension  - BP on admission stable. - Continuing home medications of Cardizem. Holding Lasix  - Will continue to monitor at this time and readjust as BP's trend. Diabetes Mellitus T2  - Last HgA1c 6.0. On 12/28  - holding Metformin .  - Insulin Sliding Scale normal sensitivity with AC&HS glucose checks. - Hypoglycemia protocols ordered. Pulmonary fibrosis: likely decompensated with CAD in the setting of increased BNP   - Holding home pirfenidore and prednisone  - solumedrol as above. Hx of aortic valve replacement(2012 2/2 AS): stable     Paroxysmal AFib: s/p AVR. Chads >2. NSR rate controlled. Followed by cardiologist Dr. Catalino Villanueva   -Continue diltiazem 240mg, digoxin 0.125mg, and Xarelto       HFpEF: last echo 11/2017 showed with EF 60-65% . ProBNP: 6743: Possible HF with decompensation? No leg swelling or JVD. Tachycardia + or increase BNP due to increased creat vs pulmonary hypertension 2/2 pulmonary fibrosis?  - holding lasix ( for sepsis), no BB   - will consider repeating echo  - trend trops    QT prolongation: POA: 519  Avoid QT prolonging meds      Hx of recurrent UTI: on Keflex at home- Urology following. Stable. Hold Keflex while inpatient      HLD: stable  -Continue lipitor            FEN/GI - NPO. NS at 100 mL/hr. Activity - Out of bed with assistance  DVT prophylaxis - SCDs  GI prophylaxis - Not indicated at this time  Disposition - Admit to Stepdown. Plan to d/c to Home. Code Status - DNR, discussed with patient / caregivers. Patient Tasneem Dates to be discussed Dr. Juliette Ferguson (Attending Physician).     5:43 PM, 02/02/18  Leo VERA MD [place]  Family Medicine Resident    For Billing   Chief Complaint   Patient presents with    [facility] Health Problems  Date Reviewed: 12/28/2017          Codes Class Noted POA    Acute respiratory failure [facility]) ICD-10-CM: J96.00  ICD-9-CM: 518.81  2/2/2018 Unknown

## 2018-02-02 NOTE — PROGRESS NOTES
[facility]: MED RECONCILIATION    Comments/Recommendations:    Medication reconciliation completed by family who was very knowledgeable regarding medications.  Patient took all morning medications today and had noon doses of metformin and Esbriet.  Family aware that Carli Maynard is not on hospital formulary and they have medication with them if ordered by attending, policy of using patient's own medication explained to them.  Patient takes cephalexin 250mg daily for UTI prevention.  Patient has not used Brovana nebulizer treatment in ~1 week due to waiting for a new prescription to be approved by insurance.  Confirmed NKDA and pharmacy. Medications added:     · none    Medications removed:    · none    Medications adjusted:    · Aspirin 162mg daily adjusted from 81mg daily     Information obtained from: family, Rx query    Allergies: Review of patient's allergies indicates no known allergies. Prior to Admission Medications:     Medication Documentation Review Audit       Reviewed by Abram Edge (Pharmacist) on 02/02/18 at 1629         Medication Sig Documenting Provider Last Dose Status Taking? albuterol (PROVENTIL HFA, VENTOLIN HFA, PROAIR HFA) 90 mcg/actuation inhaler Take 1 Puff by inhalation three (3) times daily as needed for Wheezing. Historical Provider 2/2/2018 AM Active Yes    albuterol (PROVENTIL VENTOLIN) 2.5 mg /3 mL (0.083 %) nebulizer solution 3 mL by Nebulization route three (3) times daily. Trina Cazares MD 2/2/2018 AM Active Yes    arformoterol (BROVANA) 15 mcg/2 mL nebu neb solution 15 mcg by Nebulization route two (2) times a day. Historical Provider 1/26/2018 Active Yes    aspirin delayed-release 81 mg tablet Take 162 mg by mouth daily. Historical Provider 2/2/2018 AM Active Yes    atorvastatin (LIPITOR) 20 mg tablet TAKE 1 TABLET BY MOUTH  NIGHTLY Anika Banuelos MD 2/1/2018 PM Active Yes    cephALEXin (KEFLEX) 250 mg capsule Take 250 mg by mouth nightly. Indications: UTI prophylaxis Eden Rushing MD 2/1/2018 PM Active Yes    digoxin (LANOXIN) 0.125 mg tablet Take 0.125 mg by mouth daily. Historical Provider 2/2/2018 AM Active Yes    dilTIAZem CD (CARDIZEM CD) 240 mg ER capsule Take 240 mg by mouth daily. Historical Provider 2/2/2018 AM Active Yes    ferrous sulfate (IRON) 325 mg (65 mg iron) EC tablet Take 325 mg by mouth daily. Historical Provider 2/2/2018 AM Active Yes    furosemide (LASIX) 20 mg tablet Take 1 tablet twice a day. Nighat Franklin MD 2/2/2018 AM Active Yes    metFORMIN (GLUCOPHAGE) 500 mg tablet Take 1 tablet by mouth 3  times a day with meals Eden Rushing MD 2/2/2018 noon Active Yes    pirfenidone (ESBRIET) 267 mg cap Take 3 Caps by mouth three (3) times daily. Indications: PULMONARY FIBROSIS Suhas Lamas MD 2/2/2018 noon Active Yes    potassium 99 mg tablet Take 99 mg by mouth two (2) times a day. Historical Provider 2/2/2018 AM Active Yes    predniSONE (DELTASONE) 5 mg tablet Take 10 mg by mouth daily. Historical Provider 2/2/2018 AM Active Yes    tiotropium bromide (SPIRIVA RESPIMAT) 2.5 mcg/actuation mist Take 2 Puffs by inhalation daily.  Historical Provider 2/2/2018 AM Active Yes    XARELTO 15 mg tab tablet Take 1 tablet by mouth  daily with dinner Nighat Franklin MD 2/1/2018 PM Active Yes                  Josr Paz, PharmD   Contact: [phone]

## 2018-02-03 NOTE — PROGRESS NOTES
[facility] RESIDENCY PROGRAM  PROGRESS NOTE     2/3/2018  PCP: Jade Murray MD     Assessment/Plan:   Maile Goldberg is a 76 y.o. female with h/o hypertension, CAD, pulmonary fibrosis who is admitted for acute hypoxic respiratory failure 2/2 CAP.     No acute events overnight.      Acute Hypoxic Respiratory Failure: Most likely from Severe sepsis 2/2 CAP ( 3/4 SIRS). Lung exam : diffuse rales. CXR diffuse opacties. Dropped to 86% while on room air, now on bipap. ABG wnl. On home 2L oxygen, on continuous prednisone for pulmonary fibrosis. Flu neg. LA: 2.8->0.8  - wean off bipap,   - duoneb, Brovana   - ceftriaxone 2g and doxi ( long QT)  - Solumedrol 125mg to Prednisone 60 mg q6 as tolerated. - 1L NS, bolus f/u blood  And sputum culture,CBC, repeat LA , pneumo, legionnella, mycoplasma, RVP  - pulm consult,      CAD: stable. Trop X1 neg  - continue ASA, Lipitor  - not on BB due to pulmonary fibrosis/ COPD     Hypertension  - BP on admission stable. - Continuing home medications of Cardizem. Holding Lasix ( BP in the 27P systolic)  - Will continue to monitor at this time and readjust as BP's trend.     Diabetes Mellitus T2  - Last HgA1c 6.0. On 12/28  - holding Metformin .  - Insulin Sliding Scale normal sensitivity with AC&HS glucose checks. - Hypoglycemia protocols ordered.     Pulmonary fibrosis: likely decompensated with CAD in the setting of increased BNP   - Holding home pirfenidore and prednisone  - solumedrol as above.      Hx of aortic valve replacement(2012 2/2 AS): stable      Paroxysmal AFib: s/p AVR. Chads >2. NSR rate controlled. Followed by cardiologist Dr. Jazlyn Zamora   -Continue diltiazem 240mg, digoxin 0.125mg, and Xarelto       HFpEF: last echo 11/2017 showed with EF 60-65% . ProBNP: 6743: Possible HF with decompensation? No leg swelling or JVD. trops neg x 3  - holding lasix ( for sepsis), no BB  - f/u TTE      QT prolongation: POA: 519. Resolved.  Now 407  Avoid QT prolonging meds      Hx of recurrent UTI: on Keflex at home- Urology following. Stable. Hold Keflex while inpatient      HLD: stable  -Continue lipitor     Acute on chronic anemia: drop 11.1-> 8.9, possible hemodilution. Drop is associated with drop in WBC and plt   -f/u repeat Hb            FEN/GI - NPO. NS at 100 mL/hr. Activity - Out of bed with assistance  DVT prophylaxis - SCDs  GI prophylaxis - Not indicated at this time  Disposition - Admit to Stepdown. Plan to d/c to Home. Code Status - DNR, discussed with patient / caregivers. Subjective:   Pt was seen and examined at bedside. Afebrile and hemodynamically stable. Denies chest pain, cough, SOB, nausea, vomiting, abdominal pain, dizziness.      Objective:   Physical examination  Visit Vitals    BP 95/51    Pulse 97    Temp 97.8 °F (36.6 °C)    Resp 24    Ht 5' 3\" (1.6 m)    Wt 131 lb 3.2 oz (59.5 kg)    SpO2 93%    BMI 23.24 kg/m2      Temp (24hrs), Av.5 °F (36.9 °C), Min:97.3 °F (36.3 °C), Max:100.1 °F (37.8 °C)     O2 Flow Rate (L/min): 40 l/min   O2 Device: Hi flow nasal cannula      Date 18 - 18 - 18 0659   Shift 8377-7724 6234-3577 24 Hour Total 1900-0659 24 Hour Total   I  N  T  A  K  E   Shift Total  (mL/kg)         O  U  T  P  U  T   Urine  (mL/kg/hr)            Urine Occurrence(s)  2 x 2 x       Shift Total  (mL/kg)         NET         Weight (kg) 63 59.5 59.5 59.5 59.5 59.5         Last 3 shifts:         General:   Alert, cooperative, no acute distress   Head:   Atraumatic   Eyes:   Conjunctivae clear   ENT:  Oral mucosa normal   Neck:  Supple, trachea midline, no adenopathy   No JVD   Back:    No CVA tenderness    Chest wall:    No tenderness or deformities    Lungs:   on bipap, bilateral crackles   Heart:   Regular rhythm, no murmur   Abdomen:    Soft, non-tender   No masses or organomegaly    Extremities:  No edema or DVT signs   Pulses:  Symmetric all extremities   Skin:  Warm and dry, varicoses   Neurologic:  Oriented   No focal deficits         Data Review:     Recent Labs      02/03/18   0330  02/02/18   1458   WBC  5.0  12.6*   HGB  8.9*  11.1*   HCT  27.9*  35.0   PLT  206  289     Recent Labs      02/03/18   0330  02/02/18   1458   NA  139  140   K  4.3  4.6   CL  106  100   CO2  24  27   GLU  213*  245*   BUN  36*  48*   CREA  0.91  1.24*   CA  8.4*  10.0   ALB  2.4*  3.3*   TBILI  0.3  0.4   SGOT  27  33   ALT  31  27   INR   --   1.0     Medications reviewed  Current Facility-Administered Medications   Medication Dose Route Frequency    albuterol-ipratropium (DUO-NEB) 2.5 MG-0.5 MG/3 ML  3 mL Nebulization QID RT    methylPREDNISolone (PF) (SOLU-MEDROL) injection 60 mg  60 mg IntraVENous Q6H    sodium chloride (NS) flush 5-10 mL  5-10 mL IntraVENous Q8H    sodium chloride (NS) flush 5-10 mL  5-10 mL IntraVENous PRN    cefTRIAXone (ROCEPHIN) 2 g in 0.9% sodium chloride (MBP/ADV) 50 mL  2 g IntraVENous Q24H    sodium chloride (NS) flush 5-10 mL  5-10 mL IntraVENous Q8H    sodium chloride (NS) flush 5-10 mL  5-10 mL IntraVENous PRN    insulin lispro (HUMALOG) injection   SubCUTAneous AC&HS    glucose chewable tablet 16 g  4 Tab Oral PRN    dextrose (D50W) injection syrg 12.5-25 g  12.5-25 g IntraVENous PRN    glucagon (GLUCAGEN) injection 1 mg  1 mg IntraMUSCular PRN    digoxin (LANOXIN) tablet 0.125 mg  0.125 mg Oral DAILY    dilTIAZem CD (CARDIZEM CD) capsule 240 mg  240 mg Oral DAILY    atorvastatin (LIPITOR) tablet 20 mg  20 mg Oral QHS    rivaroxaban (XARELTO) tablet 15 mg  15 mg Oral DAILY WITH DINNER    arformoterol (BROVANA) neb solution 15 mcg  15 mcg Nebulization BID RT    doxycycline (VIBRAMYCIN) 100 mg in 0.9% sodium chloride (MBP/ADV) 100 mL MBP  100 mg IntraVENous Q12H    pirfenidone (ESBRIET) capsule 801 mg  (Patient Supplied)  801 mg Oral TID WITH MEALS         Signed:   Leo Pittman MD   Resident, Family Medicine      Attending note:    Attending note to follow. ..

## 2018-02-03 NOTE — ROUTINE PROCESS
Bedside shift change report given to Nancy (oncoming nurse) by Alexi Lim (offgoing nurse). Report included the following information SBAR, Kardex, Intake/Output and MAR.

## 2018-02-03 NOTE — PROGRESS NOTES
[address] RESIDENCY PROGRAM  PROGRESS NOTE     2/4/2018  PCP: Unique Dockery MD     Assessment/Plan:   Fritz Marti is a 76 y.o. female with h/o hypertension, CAD, pulmonary fibrosis who is admitted for acute hypoxic respiratory failure 2/2 CAP.    24 hour Events: Nursing attempted to wean Pt off BiPAP last night but Pt desaturated to low 80s. No other acute events overnight.      Acute on chronic Hypoxic Respiratory Failure requiring bipap: Most likely from Severe sepsis 2/2 CAP ( 3/4 SIRS) vs Viral etiology. At home on 2L oxygen and prednisone for pulmonary fibrosis, now requiring bipap. RSV positive. - wean off bipap while alternating with Hiflo as tolerated  - Continue ceftriaxone 2g (day 3) and doxi (day 3) ( long QT)  - RVP was RSV positive. - Continue duoneb, Brovana   - Per pulm: Discontinue IVF  - Continue Solumedrol 60 q6  - Blood Cx: No growth for 2 days. - CT chest once Pt is stable off Bipap  - f/u TTE  - f/u Mycoplasma, leigonella, Sputum cx and S. pneumo     CAD: stable. Trop X 3 neg  - continue ASA, Lipitor  - not on BB due to pulmonary fibrosis/ COPD     Hypertension  - BP on admission stable. - Continuing home medications of Cardizem. Holding Lasix for now  - Will continue to monitor at this time and readjust as BP's trend.     Diabetes Mellitus T2: Last HgA1c 6.0. On 12/28. BS high in the 200s yesterday, likely due to solumedrol.   - Increase Lantus to 10 units  - holding Metformin  - Insulin Sliding Scale normal sensitivity with AC&HS glucose checks. - Hypoglycemia protocols ordered.     Pulmonary fibrosis: likely decompensated with CAD in the setting of increased BNP   - Holding home pirfenidore and prednisone  - solumedrol as above.      Hx of aortic valve replacement(2012 2/2 AS): stable      Paroxysmal AFib: s/p AVR. Chads >2. NSR rate controlled.  Followed by cardiologist Dr. Jorge A Shah   -Continue diltiazem 240mg, digoxin 0.125mg, and Xarelto       HFpEF: last echo 2017 showed with EF 60-65% . ProBNP: 6743: Possible HF with decompensation? No leg swelling or JVD. trops neg x 3  - holding lasix ( for sepsis), no BB  - f/u TTE     QT prolongation: POA: 519. Resolved. Now 407  Avoid QT prolonging meds      Hx of recurrent UTI: on Keflex at home- Urology following. Stable. Hold Keflex while inpatient      HLD: stable  -Continue lipitor     Acute on chronic anemia: drop 8.9->8.4, possible hemodilution. Drop is associated with drop in WBC and plt    FEN/GI - NPO but can come off Bipap to eat/drink if tolerated  Activity - Out of bed with assistance  DVT prophylaxis - Fully anticoagulated  GI prophylaxis - Not indicated at this time  Disposition - TBD. Code Status - DNR, discussed with patient / caregivers. Subjective:   Pt was seen and examined at bedside. Denies chest pain, cough, nausea, vomiting, abdominal pain and dizziness. No other complaints this morning. Objective:   Physical examination  Visit Vitals    /63    Pulse 88    Temp 97.9 °F (36.6 °C)    Resp 20    Ht 5' 3\" (1.6 m)    Wt 132 lb 3.2 oz (60 kg)    SpO2 94%    BMI 23.42 kg/m2      Temp (24hrs), Av.9 °F (36.6 °C), Min:97.4 °F (36.3 °C), Max:98.2 °F (36.8 °C)     O2 Flow Rate (L/min): 40 l/min   O2 Device: Hi flow nasal cannula      Date 18 - 18 - 18 06   Shift [id number] 24 Hour Total 3347-4680 8392-7781 24 Hour Total   I  N  T  A  K  E   P.O. 400  400         P. O. 400  400       I.V.  (mL/kg/hr)  0  (0) 0  (0)         Volume (sodium chloride 0.9 % bolus infusion 1,000 mL)  0 0       Shift Total  (mL/kg) 400  (6.7) 0  (0) 400  (6.7)      O  U  T  P  U  T   Urine  (mL/kg/hr) 700  (1) 200  (0.3) 900  (0.6)         Urine Occurrence(s)  1 x 1 x         Urine Output (mL) (External Female Catheter 18) 700 200 900       Shift Total  (mL/kg) 700  (11.8) 200  (3.3) 900  (15)      NET -300 -200 -500      Weight (kg) 59.5 60 60 60 60 60 Last 3 shifts:    02/02 1901 - 02/04 0700  In: 400 [P.O.:400]  Out: 900 [Urine:900]    General:   Alert, cooperative, no acute distress   Head:   Atraumatic   Eyes:   Conjunctivae clear   ENT:  Oral mucosa normal   Neck:  Supple, trachea midline, no adenopathy   No JVD   Back:    No CVA tenderness    Chest wall:    No tenderness or deformities    Lungs:   on bipap, bilateral crackles lower lung   Heart:   Regular rhythm, no murmur   Abdomen:    Soft, non-tender   No masses or organomegaly    Extremities:  Swollen UE, unchanged from yesterday.    Pulses:  Symmetric all extremities   Skin:  Warm and dry, varicoses   Neurologic:  Oriented   No focal deficits         Data Review:     Recent Labs      02/04/18   0400  02/03/18   1340  02/03/18   0330  02/02/18   1458   WBC  8.0   --   5.0  12.6*   HGB  8.4*  9.0*  8.9*  11.1*   HCT  26.8*  28.2*  27.9*  35.0   PLT  241   --   206  289     Recent Labs      02/04/18   0400  02/03/18   0330  02/02/18   1458   NA  143  139  140   K  4.2  4.3  4.6   CL  110*  106  100   CO2  26  24  27   GLU  217*  213*  245*   BUN  28*  36*  48*   CREA  0.84  0.91  1.24*   CA  8.2*  8.4*  10.0   ALB  2.4*  2.4*  3.3*   TBILI  0.2  0.3  0.4   SGOT  45*  27  33   ALT  57  31  27   INR   --    --   1.0     Medications reviewed  Current Facility-Administered Medications   Medication Dose Route Frequency    insulin glargine (LANTUS) injection 10 Units  10 Units SubCUTAneous QHS    And    methylPREDNISolone (PF) (SOLU-MEDROL) injection 60 mg  60 mg IntraVENous Q6H    albuterol-ipratropium (DUO-NEB) 2.5 MG-0.5 MG/3 ML  3 mL Nebulization QID RT    sodium chloride (NS) flush 5-10 mL  5-10 mL IntraVENous Q8H    sodium chloride (NS) flush 5-10 mL  5-10 mL IntraVENous PRN    cefTRIAXone (ROCEPHIN) 2 g in 0.9% sodium chloride (MBP/ADV) 50 mL  2 g IntraVENous Q24H    sodium chloride (NS) flush 5-10 mL  5-10 mL IntraVENous Q8H    sodium chloride (NS) flush 5-10 mL  5-10 mL IntraVENous PRN  insulin lispro (HUMALOG) injection   SubCUTAneous AC&HS    glucose chewable tablet 16 g  4 Tab Oral PRN    dextrose (D50W) injection syrg 12.5-25 g  12.5-25 g IntraVENous PRN    glucagon (GLUCAGEN) injection 1 mg  1 mg IntraMUSCular PRN    digoxin (LANOXIN) tablet 0.125 mg  0.125 mg Oral DAILY    dilTIAZem CD (CARDIZEM CD) capsule 240 mg  240 mg Oral DAILY    atorvastatin (LIPITOR) tablet 20 mg  20 mg Oral QHS    rivaroxaban (XARELTO) tablet 15 mg  15 mg Oral DAILY WITH DINNER    arformoterol (BROVANA) neb solution 15 mcg  15 mcg Nebulization BID RT    doxycycline (VIBRAMYCIN) 100 mg in 0.9% sodium chloride (MBP/ADV) 100 mL MBP  100 mg IntraVENous Q12H    pirfenidone (ESBRIET) capsule 801 mg  (Patient Supplied)  801 mg Oral TID WITH MEALS         Signed:   Andres Del Toro MD   Resident, Family Medicine      Attending note: Attending note to follow. ..

## 2018-02-03 NOTE — ED NOTES
Verbal report given to Irlanda(name) on Carlos Cochran being transferred to P[facility](unit) for routine progression of care    Report consisted of patient's Situation, Background, Assessment and Recommendations (SBAR)    Information from the following report(s)  SBAR, ED Summary, MAR, Recent Results and Cardiac Rhythm wide complex tachycardia was reviewed with the receiving nurse. Opportunity for questions and clarification was provided. Patient transported with:  Tech    Last Filed Values:  Temp: 100.1 °F (37.8 °C) (02/02/18 1515)  Pulse (Heart Rate): 98 (02/02/18 1805)  Resp Rate: 22 (02/02/18 1805)  O2 Sat (%): 93 % (02/02/18 1805)  BP: 114/69 (02/02/18 1800)  MAP (Monitor): 81 (02/02/18 1800)  MAP (Calculated): 105 (02/02/18 1458)  Level of Consciousness: Alert (02/02/18 1458)      WBC   Date Value Ref Range Status   02/02/2018 12.6 (H) 3.6 - 11.0 K/uL Final     Comment:     Due to mathematical rounding between the [address], and the new [employer] analyzers, the reported automated differential may vary by up to +/- 0.5% per cell line. This finding may produce a result that is 100% +/- 3%, which is clinically insignificant.      06/09/2017 5.2 3.6 - 11.0 K/uL Final   06/09/2017 4.5 3.6 - 11.0 K/uL Final       Blood Cultures Drawn:  yes    Initial Lactic Acid (LA):  Time 1458,  Result 2.8    Repeat LA:  Time Due 9358-1923, nurse told during time     All Antibiotics Started:  yes    Vasopressor Infusion:  no     Provider Reassessment needed and notified:  yes

## 2018-02-03 NOTE — PROGRESS NOTES
Problem: Falls - Risk of  Goal: *Absence of Falls  Document Rina Fall Risk and appropriate interventions in the flowsheet. Outcome: Progressing Towards Goal  Fall Risk Interventions:  Mobility Interventions: Bed/chair exit alarm, Patient to call before getting OOB         Medication Interventions: Bed/chair exit alarm, Patient to call before getting OOB    Elimination Interventions: Bed/chair exit alarm, Call light in reach. .    0700- Bedside and Verbal shift change report given to lucas crespo (oncoming nurse) by felipe rn (offgoing nurse). Report included the following information SBAR, Kardex and Recent Results. ...     1300- H & H to lab. .    1900- Bedside and Verbal shift change report given to [address] [place] [address]  (oncoming nurse) by Stephanie Arredondo RN (offgoing nurse). Report included the following information SBAR, Kardex and Recent Results. ...

## 2018-02-03 NOTE — PROGRESS NOTES
TRANSFER - IN REPORT:    Verbal report received from Ju Fu (name) on Laurie Pop  being received from ED(unit) for routine progression of care      Report consisted of patients Situation, Background, Assessment and   Recommendations(SBAR). Information from the following report(s) SBAR, Kardex and MAR was reviewed with the receiving nurse. Opportunity for questions and clarification was provided. Assessment completed upon patients arrival to unit and care assumed.

## 2018-02-03 NOTE — PROGRESS NOTES
0030- patient is resting comfortably in bed, with her bipap on. RN placed foam on the bridge of her nose, and forehead to protect skin. Wound on L buttock (POA) was cleaned and meplex placed. Wound care consulted. Pt tend to softly moan but in no distress. She denies any SOB or pain at this time. Will continue to monitor.

## 2018-02-03 NOTE — PROGRESS NOTES
Physical Therapy Note;  Chart reviewed, PT orders acknowledged and RN consulted. RN stating to please defer at this time due to pt needing Hi flow from Bipap with compromised breathing. Will continue to follow for PT eval tomorrow.

## 2018-02-03 NOTE — PROGRESS NOTES
Primary Nurse Prudencio Lopez RN and Breanne Light RN performed a dual skin assessment on this patient Impairment noted- see wound doc flow sheet  Jamie score is 12    2300  Bedside and Verbal shift change report given to Lars Ellison (oncoming nurse) by Layla Balbuena (offgoing nurse). Report included the following information SBAR, Kardex, MAR and Recent Results.

## 2018-02-03 NOTE — CONSULTS
Consult Date: 2/3/2018    IP CONSULT TO PULMONOLOGY  Consult performed by: Michael Nelson  Consult ordered by: Rajwinder Menon   76year old female with ILD (unclear etiology), chronic hypoxemic respiratory failure who presented to the ER yesterday with worsening dyspnea. Was hypoxic and in some respiratory distress so placed on bipap and admitted. Pulmonary has been asked to comment. On my interview she is resting comfortably on bipap with daughter at bedside. She states that she is currently feeling better; main complaint now is a dry mouth. However, over the past few days she developed gradually worsening dyspnea; yesterday was going to go be seen as an outpatient, however, clinic felt that she should go to the ER instead. Only real symptom is increased dyspnea--denies cough, fevers, chills, chest pain. No edema although daughter notes this morning that her arms appear swollen. Has been taking all meds as prescribed. Patient is followed in our clinic for her ILD and other respiratory issues. The etiology of her ILD is not known--not classic for IPF, has some features suggestive of NSIP but no systemic symptoms. Has had negative MBS in the past. At her last visit a couple of months ago felt to be too sick to undergo surgical lung biopsy. Wears 2L around the clock and takes 10 mg prednisone daily and esbriet. Last vitaly with FEV1 0.8L (40% pred). Is a DNR. Past Medical History:    Aortic stenosis     Comment: severe, s/p AVR March 2012 - presented with               HF, EF 30%   Asthma  Atrial fibrillation (HCC)   CAD (coronary artery disease)     Comment: cor calcifications (LAD) by CT    Chronic diastolic heart failure (HCC)   Depression   Dyslipidemia   Female stress incontinence   Hyperlipidemia   Hypertension  Osteoarthritis   Paroxysmal atrial fibrillation (HCC)     Comment: post op AVR  Pulmonary fibrosis ([facility] [address]  3/2012: S/P AVR (aortic valve replacement)     Comment: bovine ([facility])  Sleep apnea, intolerant of CPAP   T2DM (type 2 diabetes mellitus) ([facility] [address]     Past Surgical History:  No date: CARDIAC SURG PROCEDURE UNLIST  3/2012: ECHO 2D ADULT     Comment: EF 30%, mod LVH, EF 30-35%, PA 50 mmHg  No date: HX CATARACT REMOVAL     Comment: bilateral    Family History    Diabetes Mother     Diabetes Father     Hypertension Father          Smoking status: Never Smoker    Smokeless tobacco: Never Used    Alcohol use No       Current Facility-Administered Medications:  albuterol-ipratropium (DUO-NEB) 2.5 MG-0.5 MG/3 ML 3 mL Nebulization QID RT Quyen Quinn MD     methylPREDNISolone (PF) (SOLU-MEDROL) injection 60 mg 60 mg IntraVENous Q6H Aranza Johnson DO     sodium chloride (NS) flush 5-10 mL 5-10 mL IntraVENous Q8H Alvin K Dajuan Oneill MD 10 mL at 02/03/18 0600    sodium chloride (NS) flush 5-10 mL 5-10 mL IntraVENous PRN Alvin Rutherford MD     cefTRIAXone (ROCEPHIN) 2 g in 0.9% sodium chloride (MBP/ADV) 50 mL 2 g IntraVENous Q24H Giorgio Mariee MD Stopped at 02/02/18 1645    sodium chloride (NS) flush 5-10 mL 5-10 mL IntraVENous Q8H Quyen Quinn MD 10 mL at 02/03/18 0600    sodium chloride (NS) flush 5-10 mL 5-10 mL IntraVENous PRN Aisha Ellison MD     insulin lispro (HUMALOG) injection  SubCUTAneous AC&HS Aisha Ellison MD 2 Units at 02/02/18 2122    glucose chewable tablet 16 g 4 Tab Oral PRN Quyen Quinn MD     dextrose (D50W) injection syrg 12.5-25 g 12.5-25 g IntraVENous PRN Quyen Quinn MD     glucagon (GLUCAGEN) injection 1 mg 1 mg IntraMUSCular PRN uQyen Quinn MD     digoxin (LANOXIN) tablet 0.125 mg 0.125 mg Oral DAILY Qyuen Quinn MD     dilTIAZem CD (CARDIZEM CD) capsule 240 mg 240 mg Oral DAILY Quyen Quinn MD     atorvastatin (LIPITOR) tablet 20 mg 20 mg Oral QHS Quyen Quinn MD 20 mg at 02/02/18 2106    rivaroxaban (XARELTO) tablet 15 mg 15 mg Oral DAILY WITH Yelitza Edmonds MD 15 mg at 02/02/18 2106    arformoterol (BROVANA) neb solution 15 mcg 15 mcg Nebulization BID RT Shaq Martinez MD 15 mcg at 02/02/18 2119    doxycycline (VIBRAMYCIN) 100 mg in 0.9% sodium chloride (MBP/ADV) 100 mL  mg IntraVENous Q12H Leo Pittman MD Last Rate: 100 mL/hr at 02/02/18 2100 100 mg at 02/02/18 2100   pirfenidone (ESBRIET) capsule 801 mg  (Patient Supplied) 801 mg Oral TID WITH MEALS Duarte Mitchell  mg at 02/02/18 2106         Review of Systems   Constitutional: Positive for appetite change. Negative for chills and fever. HENT: Negative for congestion, rhinorrhea, sinus pain and sinus pressure. Eyes: Negative for photophobia. Respiratory: Positive for shortness of breath. Negative for cough and chest tightness. Cardiovascular: Negative for chest pain and leg swelling. Gastrointestinal: Negative for abdominal pain, diarrhea, nausea and vomiting. Endocrine: Negative for polyuria. Genitourinary: Negative for difficulty urinating and dysuria. Musculoskeletal: Negative for joint swelling and myalgias. Skin: Negative for rash. Allergic/Immunologic: Negative for immunocompromised state. Neurological: Negative for dizziness, syncope and headaches. Psychiatric/Behavioral: Negative for confusion.        Objective    Vital signs for last 24 hours:  /64  Pulse 81  Temp 97.9 °F (36.6 °C)  Resp 22  Ht 5' 3\" (1.6 m)  Wt 59.5 kg (131 lb 3.2 oz)  SpO2 97%  BMI 23.24 kg/m2    Intake/Output this shift:  Current Shift:    Last 3 Shifts:      Data Review:   Recent Results (from the past 24 hour(s))  -EKG, 12 LEAD, INITIAL   Collection Time: 02/02/18  2:49 PM   Result Value Ref Range   Ventricular Rate 121 BPM   Atrial Rate 121 BPM   P-R Interval 132 ms   QRS Duration 152 ms   Q-T Interval 366 ms   QTC Calculation (Bezet) 519 ms   Calculated P Axis 59 degrees   Calculated R Axis -74 degrees   Calculated T Axis 61 degrees   Diagnosis Sinus tachycardia with frequent premature ventricular complexes  Left axis deviation  Right bundle branch block  Left ventricular hypertrophy with repolarization abnormality  Inferior infarct (cited on or before 27-JAN-2013)  Anterolateral infarct (cited on or before 27-JAN-2013)  Abnormal ECG  When compared with ECG of 07-JUN-2017 16:26,  premature ventricular complexes are now present  Serial changes of Anterior infarct present  Confirmed by Sebastian Balderrama MD. ([id number]) on 2/2/2018 00:52:26 PM      -METABOLIC PANEL, COMPREHENSIVE   Collection Time: 02/02/18  2:58 PM   Result Value Ref Range   Sodium 140 136 - 145 mmol/L   Potassium 4.6 3.5 - 5.1 mmol/L   Chloride 100 97 - 108 mmol/L   CO2 27 21 - 32 mmol/L   Anion gap 13 5 - 15 mmol/L   Glucose 245 (H) 65 - 100 mg/dL   BUN 48 (H) 6 - 20 MG/DL   Creatinine 1.24 (H) 0.55 - 1.02 MG/DL   BUN/Creatinine ratio 39 (H) 12 - 20     GFR est AA 51 (L) >60 ml/min/1.73m2   GFR est non-AA 42 (L) >60 ml/min/1.73m2   Calcium 10.0 8.5 - 10.1 MG/DL   Bilirubin, total 0.4 0.2 - 1.0 MG/DL   ALT (SGPT) 27 12 - 78 U/L   AST (SGOT) 33 15 - 37 U/L   Alk. phosphatase 64 45 - 117 U/L   Protein, total 7.9 6.4 - 8.2 g/dL   Albumin 3.3 (L) 3.5 - 5.0 g/dL   Globulin 4.6 (H) 2.0 - 4.0 g/dL   A-G Ratio 0.7 (L) 1.1 - 2.2     -CBC WITH AUTOMATED DIFF   Collection Time: 02/02/18  2:58 PM   Result Value Ref Range   WBC 12.6 (H) 3.6 - 11.0 K/uL   RBC 3.72 (L) 3.80 - 5.20 M/uL   HGB 11.1 (L) 11.5 - 16.0 g/dL   HCT 35.0 35.0 - 47.0 %   MCV 94.1 80.0 - 99.0 FL   MCH 29.8 26.0 - 34.0 PG   MCHC 31.7 30.0 - 36.5 g/dL   RDW 14.6 (H) 11.5 - 14.5 %   PLATELET 249 446 - 398 K/uL   MPV 10.0 8.9 - 12.9 FL   NRBC 0.2 (H) 0  WBC   ABSOLUTE NRBC 0.02 (H) 0.00 - 0.01 K/uL   NEUTROPHILS 86 (H) 32 - 75 %   LYMPHOCYTES 5 (L) 12 - 49 %   MONOCYTES 8 5 - 13 %   EOSINOPHILS 0 0 - 7 %   BASOPHILS 0 0 - 1 %   IMMATURE GRANULOCYTES 1 (H) 0.0 - 0.5 %   ABS. NEUTROPHILS 10.9 (H) 1.8 - 8.0 K/UL   ABS. LYMPHOCYTES 0.6 (L) 0.8 - 3.5 K/UL   ABS. MONOCYTES 1.0 0.0 - 1.0 K/UL   ABS. EOSINOPHILS 0.0 0.0 - 0.4 K/UL   ABS. BASOPHILS 0.0 0.0 - 0.1 K/UL   ABS. IMM.  GRANS. 0.1 (H) 0.00 - 0.04 K/UL   DF SMEAR SCANNED    RBC COMMENTS NORMOCYTIC, NORMOCHROMIC    -TROPONIN I   Collection Time: 02/02/18  2:58 PM   Result Value Ref Range   Troponin-I, Qt. 0.04 <0.05 ng/mL   -NT-PRO BNP   Collection Time: 02/02/18  2:58 PM   Result Value Ref Range   NT pro-BNP 6734 (H) 0 - 450 PG/ML   -PTT   Collection Time: 02/02/18  2:58 PM   Result Value Ref Range   aPTT 32.4 22.1 - 32.5 sec   aPTT, therapeutic range    58.0 - 77.0 SECS   -PROTHROMBIN TIME + INR   Collection Time: 02/02/18  2:58 PM   Result Value Ref Range   INR 1.0 0.9 - 1.1     Prothrombin time 10.1 9.0 - 11.1 sec   -EKG, 12 LEAD, INITIAL   Collection Time: 02/02/18  3:06 PM   Result Value Ref Range   Ventricular Rate 133 BPM   Atrial Rate 133 BPM   P-R Interval 148 ms   QRS Duration 82 ms   Q-T Interval 272 ms   QTC Calculation (Bezet) 404 ms   Calculated P Axis 46 degrees   Calculated R Axis 3 degrees   Calculated T Axis 77 degrees   Diagnosis Sinus tachycardia  Nonspecific T wave abnormality  Abnormal ECG    Confirmed by Yaneth Bravo MD., [facility] ([id number]) on 2/2/2018 10:56:10 PM      -LACTIC ACID   Collection Time: 02/02/18  3:12 PM   Result Value Ref Range   Lactic acid 2.8 (HH) 0.4 - 2.0 MMOL/L   -INFLUENZA A & B AG (RAPID TEST)   Collection Time: 02/02/18  3:54 PM   Result Value Ref Range   Influenza A Antigen NEGATIVE  NEG     Influenza B Antigen NEGATIVE  NEG     -BLOOD GAS, ARTERIAL   Collection Time: 02/02/18  3:55 PM   Result Value Ref Range   pH 7.43 7.35 - 7.45     PCO2 37 35.0 - 45.0 mmHg   PO2 101 (H) 80 - 100 mmHg   O2 SAT 98 (H) 92 - 97 %   BICARBONATE 24 22 - 26 mmol/L   BASE EXCESS 0.1 mmol/L   O2 METHOD BIPAP    FIO2 60 %   SET RATE 14    IPAP/PIP 12.0    EPAP/CPAP/PEEP 6.0    Sample source ARTERIAL    SITE RIGHT RADIAL    AUBREY'S TEST YES    -URINALYSIS W/ RFLX MICROSCOPIC   Collection Time: 02/02/18  4:20 PM   Result Value Ref Range   Color YELLOW/STRAW    Appearance CLOUDY (A) CLEAR     Specific gravity 1.019 1.003 - 1.030     pH (UA) 5.0 5.0 - 8.0     Protein TRACE (A) NEG mg/dL   Glucose 100 (A) NEG mg/dL   Ketone NEGATIVE  NEG mg/dL   Bilirubin NEGATIVE  NEG     Blood NEGATIVE  NEG     Urobilinogen 0.2 0.2 - 1.0 EU/dL   Nitrites NEGATIVE  NEG     Leukocyte Esterase NEGATIVE  NEG     WBC 0-4 0 - 4 /hpf   RBC 0-5 0 - 5 /hpf   Epithelial cells FEW FEW /lpf   Bacteria NEGATIVE  NEG /hpf   Hyaline cast 2-5 0 - 5 /lpf   -LACTIC ACID   Collection Time: 02/02/18  8:37 PM   Result Value Ref Range   Lactic acid 1.7 0.4 - 2.0 MMOL/L   -TROPONIN I   Collection Time: 02/02/18  8:37 PM   Result Value Ref Range   Troponin-I, Qt. 0.04 <0.05 ng/mL   -GLUCOSE, POC   Collection Time: 02/02/18  9:10 PM   Result Value Ref Range   Glucose (POC) 210 (H) 65 - 100 mg/dL   Performed by Esperanza Barakat (PCT)    -METABOLIC PANEL, COMPREHENSIVE   Collection Time: 02/03/18  3:30 AM   Result Value Ref Range   Sodium 139 136 - 145 mmol/L   Potassium 4.3 3.5 - 5.1 mmol/L   Chloride 106 97 - 108 mmol/L   CO2 24 21 - 32 mmol/L   Anion gap 9 5 - 15 mmol/L   Glucose 213 (H) 65 - 100 mg/dL   BUN 36 (H) 6 - 20 MG/DL   Creatinine 0.91 0.55 - 1.02 MG/DL   BUN/Creatinine ratio 40 (H) 12 - 20     GFR est AA >60 >60 ml/min/1.73m2   GFR est non-AA 60 (L) >60 ml/min/1.73m2   Calcium 8.4 (L) 8.5 - 10.1 MG/DL   Bilirubin, total 0.3 0.2 - 1.0 MG/DL   ALT (SGPT) 31 12 - 78 U/L   AST (SGOT) 27 15 - 37 U/L   Alk.  phosphatase 61 45 - 117 U/L   Protein, total 6.2 (L) 6.4 - 8.2 g/dL   Albumin 2.4 (L) 3.5 - 5.0 g/dL   Globulin 3.8 2.0 - 4.0 g/dL   A-G Ratio 0.6 (L) 1.1 - 2.2     -CBC WITH AUTOMATED DIFF   Collection Time: 02/03/18  3:30 AM   Result Value Ref Range   WBC 5.0 3.6 - 11.0 K/uL   RBC 2.97 (L) 3.80 - 5.20 M/uL   HGB 8.9 (L) 11.5 - 16.0 g/dL   HCT 27.9 (L) 35.0 - 47.0 %   MCV 93.9 80.0 - 99.0 FL   MCH 30.0 26.0 - 34.0 PG   MCHC 31.9 30.0 - 36.5 g/dL   RDW 14.6 (H) 11.5 - 14.5 %   PLATELET 434 364 - 071 K/uL   MPV 9.8 8.9 - 12.9 FL NRBC 0.0 0  WBC   ABSOLUTE NRBC 0.00 0.00 - 0.01 K/uL   NEUTROPHILS 90 (H) 32 - 75 %   LYMPHOCYTES 7 (L) 12 - 49 %   MONOCYTES 2 (L) 5 - 13 %   EOSINOPHILS 0 0 - 7 %   BASOPHILS 0 0 - 1 %   IMMATURE GRANULOCYTES 1 (H) 0.0 - 0.5 %   ABS. NEUTROPHILS 4.5 1.8 - 8.0 K/UL   ABS. LYMPHOCYTES 0.4 (L) 0.8 - 3.5 K/UL   ABS. MONOCYTES 0.1 0.0 - 1.0 K/UL   ABS. EOSINOPHILS 0.0 0.0 - 0.4 K/UL   ABS. BASOPHILS 0.0 0.0 - 0.1 K/UL   ABS. IMM. GRANS. 0.1 (H) 0.00 - 0.04 K/UL   DF AUTOMATED    -LACTIC ACID   Collection Time: 02/03/18  3:30 AM   Result Value Ref Range   Lactic acid 0.8 0.4 - 2.0 MMOL/L   -TROPONIN I   Collection Time: 02/03/18  3:30 AM   Result Value Ref Range   Troponin-I, Qt. <0.04 <0.05 ng/mL   -GLUCOSE, POC   Collection Time: 02/03/18  7:44 AM   Result Value Ref Range   Glucose (POC) 211 (H) 65 - 100 mg/dL   Performed by Herman Grimm        Physical Exam   Constitutional: She is oriented to person, place, and time. She appears well-developed and well-nourished. No distress. HENT:   Head: Normocephalic and atraumatic. Eyes: Conjunctivae are normal.   Neck: Normal range of motion. Cardiovascular: Regular rhythm. No murmur heard. Pulmonary/Chest:   Difficult exam as patient won't breathe quietly--does appear to have scattered crackles  Comfortable on bipap   Abdominal: Soft. She exhibits no distension. There is no tenderness. Musculoskeletal: Normal range of motion. UE edema   Neurological: She is alert and oriented to person, place, and time. Skin: Skin is warm and dry. Psychiatric: She has a normal mood and affect.      Impression/Plan:    Impression:  -ILD of uncertain etiology with acute exacerbation  -acute on chronic hypoxemic respiratory failure requiring bipap  -pulmonary HTN, likely largely group 3 with some component of group 2  -asthma/COPD  -MARIA R not tolerant of PAP therapy     Plan:  -trial off bipap to nasal canula, can use bipap PRN  -check non contrast chest CT  -reduce steroids to 60 mg every 6 hours  -continue ceftriaxone/doxy for now, low threshold to broaden  -check respiratory viral PCR  -pneumonia workup in progress  -stop IVF  -repeat TTE  -continue home albuterol, brovana, pulmicort  -on full anticoagulation--doubt PE   -OOB as tolerated  -DNR/DNI    Thanks for the consult. Will follow.

## 2018-02-03 NOTE — PROGRESS NOTES
Tired several times to send for patient but she was on High-Flow. Carmelo stated she wouldn't be coming to CT today.   Please reorder when pt is stable

## 2018-02-04 NOTE — PROGRESS NOTES
Subjective  Unable to get chest CT yesterday due to inability to remain off bipap long enough to leave the floor. Has been trialed on hi flow several times and despite high flow rates (40L) O2 sats fall into the high 70s or low 80s. This morning she says she still feels short of breath. Is comfortable on the mask but would like to try and eat/drink. Temp:  [97.3 °F (36.3 °C)-100.1 °F (37.8 °C)]   Pulse (Heart Rate):  []   BP: ()/()   Resp Rate:  [18-29]   O2 Sat (%):  [82 %-100 %]   Weight:  [59.5 kg (131 lb 3.2 oz)-63 kg (139 lb)]      02/02 1901 - 02/04 0700  In: 400 [P.O.:400]  Out: 900 [Urine:900]      Objective:  General Appearance:  Comfortable and ill-appearing. Vital signs: (most recent): Blood pressure 113/71, pulse 86, temperature 97.9 °F (36.6 °C), resp. rate 20, height 5' 3\" (1.6 m), weight 60 kg (132 lb 3.2 oz), SpO2 95 %. HEENT: (Bipap)    Lungs:  (Diffuse crackles, comfortable on bipap)  Heart: Regular rhythm. No murmur. Extremities: Normal range of motion. There is no dependent edema. Neurological: Patient is alert. Skin:  Warm and dry. Abdomen: Abdomen is soft and non-distended. There is no abdominal tenderness.            Principal Problem:    Acute respiratory failure ([address] (2/2/2018)    Active Problems:    COPD exacerbation ([address] (2/2/2018)      CAP (community acquired pneumonia) (2/2/2018)          Assessment & Plan    Impression:  -ILD of uncertain etiology with acute exacerbation  -RSV  -acute on chronic hypoxemic respiratory failure requiring bipap  -pulmonary HTN, likely largely group 3 with some component of group 2  -asthma/COPD  -MARIA R not tolerant of PAP therapy      Plan:  -bipap alternating with hi flow as tolerated  -check non contrast chest CT if able to stay off bipap long enough to go to radiology  -reduce steroids to 60 mg every 6 hours  -continue ceftriaxone/doxy for now, low threshold to broaden  -respiratory viral PCR with RSV, likely etiology of exacerbation  -pneumonia workup negative  -stop IVF!! Patient will not tolerate any pulmonary edema. Unless she shows signs of overt dehydration would NOT put her on maintenance fluids (will need diuresis at some point--soon if O2 requirement does not increase)  -repeat TTE pending  -continue home albuterol, brovana, pulmicort  -on full anticoagulation--doubt PE   -OOB as tolerated  -if able can come off bipap for brief periods to eat/drink  -DNR/DNI     Thanks for the consult. Will follow.

## 2018-02-04 NOTE — PROGRESS NOTES
SHIFT CHANGE:  7:38 AM Report received from John Pinto RN. SBAR, Kardex, Procedure Summary, Intake/Output, MAR, Recent Results, Med Rec Status and Cardiac Rhythm NSR/BBB were discussed. SHIFT SUMMARY:  8:52 AM Patient put on hi-flow by respiratory. Sats anywhere from 83-91%. Patient bathed and medicine given. Patient very SOB and labored. O2 sats down to 76%. Placed back on bipap and sats up to 88-89%. Patient resting comfortably at this time. Will monitor closely. 12:00 PM  Taken off Bipap and given medications and sips of clear liquids. Patient had a coughing fit and sats dropped to the 70's. Patient on hi-flow and with deep breathing O2 sats up to 93%. 3:13 PM  Orders received from Dr. Anca Hilton for Full Liquid diet and nutritional supplements with all meals. 5:45 PM  Patient tolerating hi-flow for most of the day. Consumed some of her full liquid tray. O2 sats maintained above 88%. 6:19 PM  RN alerted to the room by telemetry bc O2 sats were in the 70's. Found patient had pulled hi-flow O2 out of nose and was coughing and gasping for air. HR in the 120's. Patient placed back on Bipap and sats slowly up to 87-91%. . Patient slowly calming down. END OF SHIFT REPORT:  Bedside and Verbal shift change report given to John Pinto RN (oncoming nurse) by Zulema Munoz RN (offgoing nurse). Report included the following information SBAR, Kardex, Procedure Summary, Intake/Output, MAR, Recent Results, Med Rec Status and Cardiac Rhythm NSR/ST/BBB.

## 2018-02-04 NOTE — CONSULTS
Scooter Clemente MD [facility] - [facility] [address]  Office [phone]  Mobile [phone]    Date of  Admission: 2/2/2018  2:43 PM  PCP- Magda Hoffman MD    Felicitas Rosa is a 76 y.o. female admitted for Acute respiratory failure ([facility] [address] Consult requested by Juan Stone MD    Assessment  · Acute hypoxic respiratory failure  · Component of diastolic HF?  · ILD, advanced with PA HTN/RV dysfunction  · Valvular disease s/p tissue AVR 2012 - mild-moderate stenosis by echo 11/17, unchanged by exam        Discussion/Recommendations:  Complex case, chronic multimorbid illness, now with rapidly progressive dyspnea with hypoxemia. CXR with diffuse markings, elevated proBNP, overall better with steroids. No clear cut evidence of infection. Echo today reviewed - low normal EF, dilated RV with dysfunction, PA HTN, unchanged. High velocities across prosthetic AoV noted; however, discordant with exam findings (mild stenosis). Will give one dose of iv lasix (takes 20 bid at home) and see how she does    Following. .. Subjective:  Patient well known to me. See office notes from Nov 2017. Class 3 IGNACIO due to ILD, oxygen requiring. Cardiac hx significant for AS s/p tissue AVR 6708 complicated by PAF/bifascicular block. Progressive dyspnea 1-2 days PTA, no chest pain. CXR with diffuse infiltrates without fever/cough. Started on steroids/antibx. Echo reviewed today - unchanged from previous studies (RV dysfunction/PA HTN, preserved LVEF); however transaortic velocities more elevated (see report) suggesting severe prosthetic valve AS, clearly different from 3 months ago. Takes furosemide 70/J for diastolic dysfunction/edema at home. Breathing better today but still requiring HF oxygen (on biPAP yest).  Mild ankle edema noted      Past Medical History:   Diagnosis Date    Aortic stenosis     severe, s/p AVR March 2012 - presented with HF, EF 30%    Asthma 5/10/2012  Atrial fibrillation ([facility] 5/10/2012    Bacterial pneumonia, unspecified     CAD (coronary artery disease)     cor calcifications (LAD) by CT     Chronic diastolic heart failure ([facility] 5/29/2012    Depression     Dyslipidemia     Female stress incontinence     Hyperlipidemia 9/17/2010    Hypertension     Osteoarthritis     Paroxysmal atrial fibrillation (HCC)     post op AVR    Pulmonary fibrosis ([facility]     S/P AVR 11/6/2016    S/P AVR (aortic valve replacement) 3/2012    bovine ([facility])    Sleep apnea     T2DM (type 2 diabetes mellitus) ([facility] 1/14/2011      Past Surgical History:   Procedure Laterality Date    CARDIAC SURG PROCEDURE UNLIST      ECHO 2D ADULT  3/2012    EF 30%, mod LVH, EF 30-35%, PA 50 mmHg    HX CATARACT REMOVAL      bilateral     No current facility-administered medications on file prior to encounter. Current Outpatient Prescriptions on File Prior to Encounter   Medication Sig Dispense Refill    atorvastatin (LIPITOR) 20 mg tablet TAKE 1 TABLET BY MOUTH  NIGHTLY 90 Tab 1    metFORMIN (GLUCOPHAGE) 500 mg tablet Take 1 tablet by mouth 3  times a day with meals 270 Tab 1    cephALEXin (KEFLEX) 250 mg capsule Take 250 mg by mouth nightly. Indications: UTI prophylaxis 30 Cap 0    XARELTO 15 mg tab tablet Take 1 tablet by mouth  daily with dinner 90 Tab 3    furosemide (LASIX) 20 mg tablet Take 1 tablet twice a day. 180 Tab 3    albuterol (PROVENTIL HFA, VENTOLIN HFA, PROAIR HFA) 90 mcg/actuation inhaler Take 1 Puff by inhalation three (3) times daily as needed for Wheezing.  aspirin delayed-release 81 mg tablet Take 162 mg by mouth daily.  potassium 99 mg tablet Take 99 mg by mouth two (2) times a day.  predniSONE (DELTASONE) 5 mg tablet Take 10 mg by mouth daily.  ferrous sulfate (IRON) 325 mg (65 mg iron) EC tablet Take 325 mg by mouth daily.  tiotropium bromide (SPIRIVA RESPIMAT) 2.5 mcg/actuation mist Take 2 Puffs by inhalation daily.       arformoterol (BROVANA) 15 mcg/2 mL nebu neb solution 15 mcg by Nebulization route two (2) times a day.  albuterol (PROVENTIL VENTOLIN) 2.5 mg /3 mL (0.083 %) nebulizer solution 3 mL by Nebulization route three (3) times daily. 1 Package 6    pirfenidone (ESBRIET) 267 mg cap Take 3 Caps by mouth three (3) times daily. Indications: PULMONARY FIBROSIS       No Known Allergies          Review of Symptoms:  Constitutional: negative for fevers and chills  Respiratory: negative for sputum, hemoptysis or pleurisy/chest pain  Gastrointestinal: negative for dysphagia, odynophagia and abdominal pain  Musculoskeletal:negative  Neurological: negative  Other systems reviewed and negative except as above. Physical Exam    Visit Vitals    /63    Pulse 82    Temp 97.9 °F (36.6 °C)    Resp 20    Ht 5' 3\" (1.6 m)    Wt 132 lb 3.2 oz (60 kg)    SpO2 99%    BMI 23.42 kg/m2     Visit Vitals    /63    Pulse 82    Temp 97.9 °F (36.6 °C)    Resp 20    Ht 5' 3\" (1.6 m)    Wt 132 lb 3.2 oz (60 kg)    SpO2 99%    BMI 23.42 kg/m2     General Appearance:  Frail elderly WF alert and oriented x 3, and individual in no acute distress. Ears/Nose/Mouth/Throat:   Hearing grossly normal.         Neck: Supple. + JVP at 45 degrees   Chest:   Lungs coarse dry crackles 1/3 bilateral   Cardiovascular:  Regular rate and rhythm, S1, S2 normal, grade 2-3 early peaking ROSALIE at base. No diastolic murmur   Abdomen:   Soft, non-tender, bowel sounds are active. Extremities: Trace ankle edema bilaterally. Skin: Warm and dry.                Cardiographics    Telemetry: normal sinus rhythm  ECG: normal sinus rhythm, nonspecific ST and T waves changes        Recent Results (from the past 12 hour(s))   METABOLIC PANEL, COMPREHENSIVE    Collection Time: 02/04/18  4:00 AM   Result Value Ref Range    Sodium 143 136 - 145 mmol/L    Potassium 4.2 3.5 - 5.1 mmol/L    Chloride 110 (H) 97 - 108 mmol/L    CO2 26 21 - 32 mmol/L    Anion gap 7 5 - 15 mmol/L    Glucose 217 (H) 65 - 100 mg/dL    BUN 28 (H) 6 - 20 MG/DL    Creatinine 0.84 0.55 - 1.02 MG/DL    BUN/Creatinine ratio 33 (H) 12 - 20      GFR est AA >60 >60 ml/min/1.73m2    GFR est non-AA >60 >60 ml/min/1.73m2    Calcium 8.2 (L) 8.5 - 10.1 MG/DL    Bilirubin, total 0.2 0.2 - 1.0 MG/DL    ALT (SGPT) 57 12 - 78 U/L    AST (SGOT) 45 (H) 15 - 37 U/L    Alk. phosphatase 115 45 - 117 U/L    Protein, total 6.1 (L) 6.4 - 8.2 g/dL    Albumin 2.4 (L) 3.5 - 5.0 g/dL    Globulin 3.7 2.0 - 4.0 g/dL    A-G Ratio 0.6 (L) 1.1 - 2.2     CBC WITH AUTOMATED DIFF    Collection Time: 02/04/18  4:00 AM   Result Value Ref Range    WBC 8.0 3.6 - 11.0 K/uL    RBC 2.83 (L) 3.80 - 5.20 M/uL    HGB 8.4 (L) 11.5 - 16.0 g/dL    HCT 26.8 (L) 35.0 - 47.0 %    MCV 94.7 80.0 - 99.0 FL    MCH 29.7 26.0 - 34.0 PG    MCHC 31.3 30.0 - 36.5 g/dL    RDW 14.6 (H) 11.5 - 14.5 %    PLATELET 228 147 - 633 K/uL    MPV 9.4 8.9 - 12.9 FL    NRBC 0.0 0  WBC    ABSOLUTE NRBC 0.00 0.00 - 0.01 K/uL    NEUTROPHILS 90 (H) 32 - 75 %    LYMPHOCYTES 5 (L) 12 - 49 %    MONOCYTES 4 (L) 5 - 13 %    EOSINOPHILS 0 0 - 7 %    BASOPHILS 0 0 - 1 %    IMMATURE GRANULOCYTES 1 (H) 0.0 - 0.5 %    ABS. NEUTROPHILS 7.2 1.8 - 8.0 K/UL    ABS. LYMPHOCYTES 0.4 (L) 0.8 - 3.5 K/UL    ABS. MONOCYTES 0.3 0.0 - 1.0 K/UL    ABS. EOSINOPHILS 0.0 0.0 - 0.4 K/UL    ABS. BASOPHILS 0.0 0.0 - 0.1 K/UL    ABS. IMM.  GRANS. 0.1 (H) 0.00 - 0.04 K/UL    DF AUTOMATED     GLUCOSE, POC    Collection Time: 02/04/18  7:46 AM   Result Value Ref Range    Glucose (POC) 235 (H) 65 - 100 mg/dL    Performed by Dorothea Perez (PCT)    GLUCOSE, POC    Collection Time: 02/04/18 11:30 AM   Result Value Ref Range    Glucose (POC) 212 (H) 65 - 100 mg/dL    Performed by Dorothea Perez (PCT)

## 2018-02-04 NOTE — PROGRESS NOTES
Physical Therapy: Acknowledged PT consult orders. Chart reviewed. Pt seen by Cardiology for consult as ordered due to acute respiratory failure. Spoke to RN, who reports that pt tolerating only short intervals off BiPAP (during high flow trials). Pt scheduled for lasix as ordered. RN requested deferral of PT evaluation today, although small improvement over yesterday continuous BiPAP need. F/u on Monday, and proceed as indicated if medically stable. Thank you.

## 2018-02-04 NOTE — PROGRESS NOTES
2/4/2018  3:58 PM  CM met with pt for assessment. Demographics and PCP were confirmed. Pt is a 76year old,  male who lives in a private residence with her DIL (1 exterior step, 0 interior steps). PTA, pt was able to complete ADLs with the use of DME:O2 2L continuous, cane, and occasional RW. Pt has prescription drug coverage, and prefers [facility] on [address]. Pt has prior hh (unknown provider), rehab or SNF [facility]. No discharge service needs indicated at this time. Pt's DTR or DIL will provide transport upon discharge. CM will continue to monitor for finalized discharge recommendations. Jacqueline Mortimer, MA    Care Management Interventions  PCP Verified by CM: Yes Laura Ontiveros)  Palliative Care Criteria Met (RRAT>21 & CHF Dx)?: No  Mode of Transport at Discharge:  Other (see comment) (DTR)  MyChart Signup: No  Discharge Durable Medical Equipment: No  Physical Therapy Consult: Yes  Occupational Therapy Consult: Yes  Speech Therapy Consult: No  Current Support Network: Relative's Home (Lives with DIL: Jorge A Archibald)  Confirm Follow Up Transport: Family  Plan discussed with Pt/Family/Caregiver: Yes  Discharge Location  Discharge Placement: Unable to determine at this time

## 2018-02-04 NOTE — PROGRESS NOTES
Accessed chart to assist primary:     Patient spo2 sustaining at 86% on Hi FLow 100% 40L , increased the settings, changed pulse oximetry. Patient's SPO2 unchanged. I placed patient on BIPAP. SPO2 now 99%. Primary nurse will continue to monitor.

## 2018-02-04 NOTE — PROGRESS NOTES
Bedside and Verbal shift change report given to Rimma Woodward RN (oncoming nurse) by Farhana Howell RN (offgoing nurse). Report included the following information SBAR, Kardex, ED Summary, Procedure Summary, Intake/Output, Recent Results, Med Rec Status and Cardiac Rhythm NSR/ST with BBB. 1935: Pt alert, oriented, denies pain. Spo2 at 87% on high-flow. Replaced oxygen sensor and placed pt on Bi-pap. Spo2 now at %. 1956: Notified by Adela Cooley of pt Respiratory panel results; RSV Virus detected. Dr. Laura Gardner notified of pt Status. 2015: Notified RT of pt status. B-ipap settings adjusted to 60%; pt Spo2 now between 92-95%. 2128: Pt on Bi-Pap; removed Bi-Pap to administer oral medications with applesauce; pt Spo2 decompensated quickly to low 80's and into high 70's. Spo2 at 92-93% within 2 minutes after replacing pt on Bi-Pap.    0025: Pt alert, oriented, denies pain. Pt requested water; sat pt upright and forward in bed; offered pt several sips of water; pt tolerated fairly well, Spo2 fell quickly from 96% to 84% within one minute while Bi-Pap was off for sips of water. 8836: Pt currently NPO except for meds and is not receiving any IV fluids. Notified Dr. Laura Gardner of pt hydration status. 0440: NS 1000 ml bolus ordered; Administered fluids at 250 ml/hr, pt tolerating well. Bedside and Verbal shift change report given to Amaury Gonzalez RN (oncoming nurse) by Rimma Woodward RN (offgoing nurse). Report included the following information SBAR, Kardex, ED Summary, Intake/Output, Recent Results, Med Rec Status and Cardiac Rhythm NSR with BBB.

## 2018-02-05 NOTE — PROGRESS NOTES
Nutrition Assessment:    RECOMMENDATIONS/INTERVENTION(S):   Provide feeding assistance- needs 1:1 feeding help so pt can focus on eating and not on lifting/setting up/feeding herself. Advance to 2 gm Na/Consistent carb diet as able  Continue Glucerna TID-   Monitor ability to consume PO or need for alternate means of nutrition until respiratory status improves? ASSESSMENT:   2/5: 76 yr old female admitted for Acute respiratory failure. PMHx: HTN, CAD, COPD, DM, Pulmonary Fibrosis. Pt is currently on Full Liquid diet- No Concentrated sweets. Pt has been unable to eat much food due to need for BiPAP. Pt tried HFNC but wasn't able to eat much. Unable to get any breakfast or any lunch today. Family states pt usually drinks Glucerna at home. Pt over the last ~2 weeks had been struggling to breath a little bit more which hindered some PO intakes, but family states no significant wt loss recently. Pt is similar wt as ~6 months ago. Family states they take daily weights. Family also states pt would benefit from assistance eating. It is currently too taxing to set herself up and feed herself and if someone fed her she would do better. Pt has stage 2 gluteal fold p/i. Bilateral foot swelling. Last BM 2/4. Active BS. , 217, 213 mg/dL. Pt on Solumedrol. , 210 mg/dL.      SUBJECTIVE/OBJECTIVE:   Diet Order: Full liquids (NCS)  % Eaten:  Patient Vitals for the past 168 hrs:   % Diet Eaten   02/04/18 1751 10 %     Pertinent Medications: [x] Reviewed    Past Medical History:   Diagnosis Date    Aortic stenosis     severe, s/p AVR March 2012 - presented with HF, EF 30%    Asthma 5/10/2012    Atrial fibrillation ([facility] [address] 5/10/2012    Bacterial pneumonia, unspecified     CAD (coronary artery disease)     cor calcifications (LAD) by CT     Chronic diastolic heart failure ([facility] [address] 5/29/2012    Depression     Dyslipidemia     Female stress incontinence     Hyperlipidemia 9/17/2010    Hypertension     Osteoarthritis     Paroxysmal atrial fibrillation (HCC)     post op AVR    [facility]     S/P AVR 11/6/2016    S/P AVR (aortic valve replacement) 3/2012    bovine ([facility])    Sleep apnea     T2DM (type 2 diabetes mellitus) ([facility] 1/14/2011        Chemistries:  Lab Results   Component Value Date/Time    Sodium 144 02/05/2018 02:31 AM    Potassium 3.5 02/05/2018 02:31 AM    Chloride 110 02/05/2018 02:31 AM    CO2 29 02/05/2018 02:31 AM    Anion gap 5 02/05/2018 02:31 AM    Glucose 175 02/05/2018 02:31 AM    BUN 30 02/05/2018 02:31 AM    Creatinine 0.82 02/05/2018 02:31 AM    BUN/Creatinine ratio 37 02/05/2018 02:31 AM    GFR est AA >60 02/05/2018 02:31 AM    GFR est non-AA >60 02/05/2018 02:31 AM    Calcium 7.8 02/05/2018 02:31 AM    AST (SGOT) 80 02/05/2018 02:31 AM    Alk. phosphatase 208 02/05/2018 02:31 AM    Protein, total 5.8 02/05/2018 02:31 AM    Albumin 2.3 02/05/2018 02:31 AM    Globulin 3.5 02/05/2018 02:31 AM    A-G Ratio 0.7 02/05/2018 02:31 AM    ALT (SGPT) 123 02/05/2018 02:31 AM      Anthropometrics: Height: 5' 3\" (160 cm) Weight: 60.6 kg (133 lb 8 oz)    IBW (%IBW):   ( ) UBW (%UBW):   (  %)    BMI: Body mass index is 23.65 kg/(m^2). This BMI is indicative of:     [] Underweight    [x] Normal    [] Overweight    []  Obesity    []  Extreme Obesity (BMI>40)    Estimated Nutrition Needs (Based on): 0601 Kcals/day (BMR(1050x1.3) +250) , 61 g (-73g/day(1.0-1.2g/kg)) Protein  Carbohydrate: At Least 130 g/day  Fluids: 1600 mL/day    Last BM: 2/4 []Active     []Hyperactive  []Hypoactive       [] Absent   BS  Skin:    [x] Intact   [] Incision  [] Breakdown   [] DTI   [] Tears/Excoriation/Abrasion  []Edema [] Other:    Wt Readings from Last 30 Encounters:   02/05/18 60.6 kg (133 lb 8 oz)   12/28/17 61.1 kg (134 lb 9.6 oz)   11/10/17 60.3 kg (133 lb)   09/29/17 60.3 kg (133 lb)   08/15/17 60.8 kg (134 lb)   07/18/17 60.8 kg (134 lb)   06/16/17 63.3 kg (139 lb 9.6 oz)   06/08/17 59.7 kg (131 lb 9.8 oz)   06/07/17 59.4 kg (131 lb)   05/12/17 62.5 kg (137 lb 12.8 oz)   04/26/17 62.1 kg (137 lb)   01/03/17 61.7 kg (136 lb)   11/22/16 64.4 kg (142 lb)   11/04/16 66.2 kg (146 lb)   10/25/16 66.4 kg (146 lb 6.4 oz)   08/05/16 65.6 kg (144 lb 9.6 oz)   04/29/16 64 kg (141 lb)   04/19/16 63.9 kg (140 lb 12.8 oz)   01/19/16 65.2 kg (143 lb 12.8 oz)   12/15/15 64.1 kg (141 lb 6.4 oz)   12/09/15 64.4 kg (141 lb 14.4 oz)   10/16/15 67.1 kg (148 lb)   10/16/15 67.5 kg (148 lb 12.8 oz)   05/13/15 68.4 kg (150 lb 12.8 oz)   04/17/15 69.9 kg (154 lb)   01/22/15 68.5 kg (151 lb)   10/15/14 73.9 kg (163 lb)   09/26/14 73.9 kg (163 lb)   09/16/14 72.2 kg (159 lb 3.2 oz)   07/30/14 69.9 kg (154 lb)      NUTRITION DIAGNOSES:   Problem:  Inadequate energy intake     Etiology: related to decreased ability to consume sufficient energy     Signs/Symptoms: as evidenced by inability to consume energy without desaturating, no bfast, no lunch      NUTRITION INTERVENTIONS:  Meals/Snacks: General/healthful diet   Supplements: Commercial supplement              GOAL:   Pt will consume >25% of meals and ONS within 2-4 days    Cultural, Rastafari, or Ethnic Dietary Needs: None     LEARNING NEEDS (Diet, Food/Nutrient-Drug Interaction):    [x] None Identified   [] Identified and Education Provided/Documented   [] Identified and Pt declined/was not appropriate      [x] Interdisciplinary Care Plan Reviewed/Documented    [x] Discharge Needs:    TBD   [] No Nutrition Related Discharge Needs    NUTRITION RISK:   Pt Is At Nutrition Risk  [x]     No Nutrition Risk Identified  []       PT SEEN FOR:    []  MD Consult: []Calorie Count      []Diabetic Diet Education        []Diet Education     []Electrolyte Management     []General Nutrition Management and Supplements     []Management of Tube Feeding     []TPN Recommendations    []  RN Referral:  []MST score >=2     []Enteral/Parenteral Nutrition PTA     []Pregnant: Gestational DM or Multigestation [] Pressure Ulcer      []  Low BMI      []  Length of Stay       [] Dysphagia Diet     [] Ventilator      [] Follow-Up        Previous Recommendations:   [] Implemented          [] Not Implemented          [x] Not Applicable    Previous Goal:   [] Met              [] Progressing Towards Goal              [] Not Progressing Towards Goal   [x] Not Applicable              Dora Cortes, [address] [place] [address]  Pager: [phone]  Office: [phone]

## 2018-02-05 NOTE — PROGRESS NOTES
[facility] PROGRESS NOTE  Pulmonary, Critical Care, and Sleep Medicine    Name: Girish Harrington MRN: [id number]   : 1940 Hospital: [address] [facility]   Date: 2018  Admission Date: 2018     Chart and notes reviewed. Data reviewed. I review the patient's current medications in the medical record at each encounter. I have evaluated and examined the patient. Overnight events reviewed:  Afebrile  Sats 89-98% on HF 45L FiO2 100%  Hgb 8.6  Trop 0.08 - bumped slightly overnight  I/O: - 620 ml    ROS: Feels unchanged. Still with SOB. Denies CP. Denies fever or chills. Denies much of a cough. Denies abd pain. Has LE/pedal swelling. 12 point ROS reviewed and negative except as noted above. Vital Signs:  Visit Vitals    /65 (BP 1 Location: Left arm, BP Patient Position: At rest)    Pulse 84    Temp 97.6 °F (36.4 °C)    Resp 19    Ht 5' 3\" (1.6 m)    Wt 60.6 kg (133 lb 8 oz)    SpO2 90%    BMI 23.65 kg/m2       O2 Device: Hi flow nasal cannula   O2 Flow Rate (L/min): 40 l/min   Temp (24hrs), Av.8 °F (36.6 °C), Min:97.4 °F (36.3 °C), Max:98.1 °F (36.7 °C)       Intake/Output:   Last shift:         Last 3 shifts:  1901 -  0700  In: 386 [P.O.:630; I.V.:100]  Out: 1550 [Urine:1550]    Intake/Output Summary (Last 24 hours) at 18 0945  Last data filed at 18 0503   Gross per 24 hour   Intake              730 ml   Output             1350 ml   Net             -620 ml          Physical Exam:   General:  Alert, cooperative, ill-appearing, appears stated age. Head:  Normocephalic, without obvious abnormality, atraumatic. Eyes:  Conjunctivae/corneas clear. Nose: Nares normal. Septum midline. Mucosa normal.  HF in place. Throat: Lips, mucosa, and tongue normal.    Neck: Supple, symmetrical, trachea midline, no adenopathy. Lungs:   Diffuse crackles bilaterally, most prominent in the bases. Chest wall:  No tenderness or deformity. Heart:  Regular rate and rhythm, S1, S2 normal, no murmur, click, rub or gallop. Abdomen:   Soft, non-tender. Bowel sounds normal. No masses,  No organomegaly. Extremities: Extremities normal, atraumatic, no cyanosis, + LE/pedal edema. Pulses: 2+ and symmetric all extremities. Skin: Skin color, texture, turgor normal. No rashes or lesions   Lymph nodes: Cervical, supraclavicular nodes normal.   Neurologic: Grossly nonfocal     DATA:  MAR reviewed and pertinent medications noted or modified as needed    Labs:  Recent Labs      02/05/18   0231  02/04/18   0400  02/03/18   1340  02/03/18   0330   WBC  8.6  8.0   --   5.0   HGB  8.6*  8.4*  9.0*  8.9*   HCT  27.4*  26.8*  28.2*  27.9*   PLT  233  241   --   206     Recent Labs      02/05/18   0231  02/04/18   0400  02/03/18   0330  02/02/18   1458   NA  144  143  139  140   K  3.5  4.2  4.3  4.6   CL  110*  110*  106  100   CO2  29  26  24  27   GLU  175*  217*  213*  245*   BUN  30*  28*  36*  48*   CREA  0.82  0.84  0.91  1.24*   CA  7.8*  8.2*  8.4*  10.0   MG  2.1   --    --    --    ALB  2.3*  2.4*  2.4*  3.3*   TBILI  0.2  0.2  0.3  0.4   SGOT  80*  45*  27  33   ALT  123*  57  31  27   INR   --    --    --   1.0       Imaging:  No new images this morning. IMPRESSION:   -ILD of uncertain etiology with acute exacerbation  -RSV  -acute on chronic hypoxemic respiratory failure requiring bipap  -pulmonary HTN, likely largely group 3 with some component of group 2  -asthma/COPD  -MARIA R not tolerant of PAP therapy       PLAN:   -continue hi flow as tolerated when eating/drinking; wean FiO2 as tolerated to keep sats > 88-90%; continue bipap as needed for hypoxia and increased WOB  -check non contrast high resolution chest CT  -continue schedule duonebs and solumedrol at 60 mg every 6 hours  -continue ceftriaxone/doxy for now, low threshold to broaden  -respiratory viral PCR with RSV, likely etiology of exacerbation  -pneumonia workup negative  -no IVFs. Patient will not tolerate any pulmonary edema. Unless she shows signs of overt dehydration would NOT put her on maintenance fluids  -diuresis with Lasix per cardiology  -repeat TTE: EF 50%; mildly reduced RVSF; severe AS; severe pulm HTN  -continue home albuterol, brovana, pulmicort  -on Xarelto--doubt PE   -OOB as tolerated  -DNR/DNI    Patient is critically ill requiring continuous HF vs BiPAP for severe hypoxia and increased WOB and is high risk for decompensation due to respiratory failure. CC time EOP 32 min. Discussed with nursing.          Eduardo Haskins

## 2018-02-05 NOTE — NURSE NAVIGATOR
Chart reviewed by Heart Failure Nurse Navigator. Heart Failure database completed. Current Echo shows EF 50% with no regional wall motion abnormalities, severe AS, severe pHTN. Patient is s/p bioprosthetic AVR 2012. Per Dr Jennyfer Quiroz, physical exam more consistent with mild to moderate aortic stenosis. ACEi/ARB: not indicated. BB: not indicated. CRT not indicated. NYHA Functional Class III. Heart Failure Teach Back in Patient Education. Heart Failure Avoiding Triggers on Discharge Instructions.     Primary cardiologist: Dr Jennyfer Quiroz

## 2018-02-05 NOTE — CDMP QUERY
2.   Thank you for documenting HFpEF for this patient, who is being treated with IV/PO Lasix. Please further specify as to possible acuity    => Acute HFpEF in the setting of sepsis and AHRF being treated with IV Lasix.   => Other explanation of clinical findings  => Unable to determine (no explanation for clinical findings)    The medical record reflects the following clinical findings, treatment, and risk factors. 77 y/o female admitted for severe sepsis, CAP and AHRF. Dr, Pamala Gaucher note of 2/4 states \"maybe acute heart failure decompensation\". Cardiology documenting \"? component of diastolic heart failure. \" Echocardiogram of 11/27 shows EF of 60-65, and echocardiogram for this admission with EF of 50 %. She is treated with IV lasix 40 mg x 2 doses and now scheduled for 40 mg Lasix po daily. BNP is 6734      Please clarify and document your clinical opinion in the progress notes and discharge summary including the definitive and/or presumptive diagnosis, (suspected or probable), related to the above clinical findings. Please include clinical findings supporting your diagnosis. Thanks for your time.     Eleonora Henriquez RN, BSN  [phone]

## 2018-02-05 NOTE — PROGRESS NOTES
[address] [place] [address] with [address] [place] [address]       Resident Progress Note in Brief    S:    Patient seen and examined at bedside. Paged about patient's elevated troponin (0.08) around 3:30 AM. Initial troponin at 9 PM was negative. Pt remains asymptomatic and breathing comfortably on her BiPAP. Does not have any cp, sob, n/v.      O:  Visit Vitals    /68    Pulse 77    Temp 97.9 °F (36.6 °C)    Resp 20    Ht 5' 3\" (1.6 m)    Wt 132 lb 3.2 oz (60 kg)    SpO2 92%    BMI 23.42 kg/m2     Physical Examination:   General appearance - alert, well appearing, and in no distress   Chest - coarse breath sounds, mild inspiratory stridor, normal work of breathing  Heart - normal rate, regular rhythm, normal S1, S2, grade II/VI systolic ejection murmur. A/P: 77 yo F admitted for AHRF 2/2 CAP and COPD exacerbation. Now found to have elevated Troponin (0.08). Pt currently on BiPAP. Elevated Trop: Pt remains asymptomatic:  -Continue to monitor patient for any symptoms  -Trend Trop (0.04-->0.08), next levels at 9 AM  - Will update Cardio early in AM since they are already following patient. Called Dr. Sonido Mosqueda with initial results and upon reviewing EKG, he noted that there is nothing acute in the her EKG.   -Pt is DNR     Discussed with Dr. Evelin Pastrana (Senior Resident)    Joel Mir MD  Family Medicine Resident

## 2018-02-05 NOTE — PROGRESS NOTES
[address] [place] RESIDENCY PROGRAM  PROGRESS NOTE     2/5/2018  PCP: Kadi Brooks MD     Assessment/Plan:   Afua Barr is a 76 y.o. female with h/o hypertension, CAD, pulmonary fibrosis who is admitted for acute hypoxic respiratory failure 2/2 CAP.    24 hour Events: ST depression and trop at 0.08    CAD/ elevated trops. ST depression, cardiac called with recommendations to trend trops. Trop at 0.08. Supply demand  - trend trops  - continue ASA, Lipitor  - not on BB due to pulmonary fibrosis/ COPD     Acute on chronic Hypoxic Respiratory Failure requiring bipap: Most likely from Severe sepsis 2/2 CAP ( 3/4 SIRS) and + RSV and pulmonary edema. At home on 2L oxygen and prednisone for pulmonary fibrosis, now requiring bipap. RSV positive. - wean off bipap as tolerated. 4L NC now  - Continue ceftriaxone 2g (day 4) and doxi (day 4) ( long QT). Consider switching today on day 4 to po antibiotics   - Continue duoneb, Brovana   - Continue Solumedrol 60 q6  - Blood Cx: No growth for 3 days. - lasix per cardiology       Hypertension  - BP on admission stable. - Continuing home medications of Cardizem. - Will continue to monitor at this time and readjust as BP's trend.     Diabetes Mellitus T2: Last HgA1c 6.0. On 12/28. BS high in the 200s yesterday, likely due to solumedrol.   - Increased Lantus to 20 units  - holding Metformin  - Insulin Sliding Scale normal sensitivity with AC&HS glucose checks. - Hypoglycemia protocols ordered.     Pulmonary fibrosis: likely decompensated with CAD in the setting of increased BNP   - continue home pirfenidore   - solumedrol as above. - f/u high resolution CT      Hx of aortic valve replacement(2012 2/2 AS): stable      Paroxysmal AFib: s/p AVR. Chads >2. NSR rate controlled. Followed by cardiologist Dr. Ernesto Uriostegui   -Continue diltiazem 240mg, digoxin 0.125mg, and Xarelto 15 mg  - f/u digoxin levels      HFpEF: last echo 11/2017 showed with EF 60-65% .  POA ProBNP: 9818: Repeat Echo 50%, severe AS and pulmonary hypertension. No leg swelling or JVD. -  Lasix per cards, no BB       QT prolongation: POA: 519. Resolved. Now 407  Avoid QT prolonging meds      Hx of recurrent UTI: on Keflex at home- Urology following. Stable. Hold Keflex while inpatient      HLD: stable  -Continue lipitor     Acute on chronic anemia: Hb stable at 8.6. ( BL~9-10)    FEN/GI -  eat/drink if tolerated  Activity - Out of bed with assistance  DVT prophylaxis - Xarelto  GI prophylaxis - Not indicated at this time  Disposition - TBD. Code Status - DNR, discussed with patient / caregivers. Subjective:   Pt was seen and examined at bedside. C/o SOB. Denies chest pain, cough, nausea, vomiting, abdominal pain and dizziness. No other complaints this morning. Objective:   Physical examination  Visit Vitals    /72    Pulse 76    Temp 98.1 °F (36.7 °C)    Resp 20    Ht 5' 3\" (1.6 m)    Wt 133 lb 8 oz (60.6 kg)    SpO2 96%    BMI 23.65 kg/m2      Temp (24hrs), Av.8 °F (36.6 °C), Min:97.4 °F (36.3 °C), Max:98.1 °F (36.7 °C)     O2 Flow Rate (L/min): 40 l/min   O2 Device: Hi flow nasal cannula      Date 18 - 18 0659 18 07 - 18 0659   Shift 1900-0659 24 Hour Total  8056-8312 24 Hour Total   I  N  T  A  K  E   P.O. 480 150 630         P. O. 480 150 630       I.V.  (mL/kg/hr)  100  (0.1) 100  (0.1)         Volume (doxycycline (VIBRAMYCIN) 100 mg in 0.9% sodium chloride (MBP/ADV) 100 mL MBP)  100 100       Shift Total  (mL/kg) 480  (8) 250  (4.1) 730  (12.1)      O  U  T  P  U  T   Urine  (mL/kg/hr) 650  (0.9) 700  (1) 1350  (0.9)         Urine Output (mL) (External Female Catheter 18)        Stool            Stool Occurrence(s)  1 x 1 x       Shift Total  (mL/kg) 650  (10.8) 700  (11.6) 1350  (22.3)      NET -268 -498 -234      Weight (kg) 60 60.6 60.6 60.6 60.6 60.6         Last 3 shifts:     1901 -  0700  In: 974 [P.O.:630; I.V.:100]  Out: 1550 [Urine:1550]    General:   Alert, cooperative, no acute distress   Head:   Atraumatic   Eyes:   Conjunctivae clear   ENT:  Oral mucosa normal   Neck:  Supple, trachea midline, no adenopathy   No JVD   Back:    No CVA tenderness    Chest wall:    No tenderness or deformities    Lungs:   on bipap, bilateral crackles lower lung and bilat wheezing with good air movement.    Heart:   Regular rhythm, no murmur   Abdomen:    Soft, non-tender   No masses or organomegaly    Extremities:  no edema   Pulses:  Symmetric all extremities   Skin:  Warm and dry, varicoses   Neurologic:  Oriented   No focal deficits         Data Review:     Recent Labs      02/05/18   0231  02/04/18   0400  02/03/18   1340  02/03/18   0330   WBC  8.6  8.0   --   5.0   HGB  8.6*  8.4*  9.0*  8.9*   HCT  27.4*  26.8*  28.2*  27.9*   PLT  233  241   --   206     Recent Labs      02/05/18   0231  02/04/18   0400  02/03/18   0330  02/02/18   1458   NA  144  143  139  140   K  3.5  4.2  4.3  4.6   CL  110*  110*  106  100   CO2  29  26  24  27   GLU  175*  217*  213*  245*   BUN  30*  28*  36*  48*   CREA  0.82  0.84  0.91  1.24*   CA  7.8*  8.2*  8.4*  10.0   MG  2.1   --    --    --    ALB  2.3*  2.4*  2.4*  3.3*   TBILI  0.2  0.2  0.3  0.4   SGOT  80*  45*  27  33   ALT  123*  57  31  27   INR   --    --    --   1.0     Medications reviewed  Current Facility-Administered Medications   Medication Dose Route Frequency    insulin glargine (LANTUS) injection 20 Units  20 Units SubCUTAneous QHS    And    methylPREDNISolone (PF) (SOLU-MEDROL) injection 60 mg  60 mg IntraVENous Q6H    albuterol-ipratropium (DUO-NEB) 2.5 MG-0.5 MG/3 ML  3 mL Nebulization QID RT    sodium chloride (NS) flush 5-10 mL  5-10 mL IntraVENous Q8H    sodium chloride (NS) flush 5-10 mL  5-10 mL IntraVENous PRN    cefTRIAXone (ROCEPHIN) 2 g in 0.9% sodium chloride (MBP/ADV) 50 mL  2 g IntraVENous Q24H    sodium chloride (NS) flush 5-10 mL  5-10 mL IntraVENous Q8H    sodium chloride (NS) flush 5-10 mL  5-10 mL IntraVENous PRN    insulin lispro (HUMALOG) injection   SubCUTAneous AC&HS    glucose chewable tablet 16 g  4 Tab Oral PRN    dextrose (D50W) injection syrg 12.5-25 g  12.5-25 g IntraVENous PRN    glucagon (GLUCAGEN) injection 1 mg  1 mg IntraMUSCular PRN    digoxin (LANOXIN) tablet 0.125 mg  0.125 mg Oral DAILY    dilTIAZem CD (CARDIZEM CD) capsule 240 mg  240 mg Oral DAILY    atorvastatin (LIPITOR) tablet 20 mg  20 mg Oral QHS    rivaroxaban (XARELTO) tablet 15 mg  15 mg Oral DAILY WITH DINNER    arformoterol (BROVANA) neb solution 15 mcg  15 mcg Nebulization BID RT    doxycycline (VIBRAMYCIN) 100 mg in 0.9% sodium chloride (MBP/ADV) 100 mL MBP  100 mg IntraVENous Q12H    pirfenidone (ESBRIET) capsule 801 mg  (Patient Supplied)  801 mg Oral TID WITH MEALS         Signed:   Leo Pittman MD   Resident, Family Medicine      Attending note: Attending note to follow. ..

## 2018-02-05 NOTE — CDMP QUERY
1.  Please clarify if this patient is (was) being treated/managed for:     => Respiratory syncytial virus pneumonia in the setting of sepsis and AHRF being treated with Ceftriaxone and Doxycycline.  => Other explanation of clinical findings  => Unable to determine (no explanation for clinical findings)    The medical record reflects the following clinical findings, treatment, and risk factors. 75 y/o female admitted for sepsis 2/2 CAP and AHRF. Also noted is lab positive for RSV    Please clarify and document your clinical opinion in the progress notes and discharge summary including the definitive and/or presumptive diagnosis, (suspected or probable), related to the above clinical findings. Please include clinical findings supporting your diagnosis. Thanks for your time.     Jayleen Blanchard RN, BSN  [phone]

## 2018-02-05 NOTE — DIABETES MGMT
[facility]    Elevated Blood Glucose Note     Chart reviewed for elevated blood glucose ( > 180 mg/dL x 2 in the past 24 hours). Recommendations/ Comments: Fasting glucose today: 210 mg/dL (per morning POCT). Required 20 units of correction in the last 24 hours. Receiving methylprednisolone 60mg every 6 hours. Please consider NPH as follows with the following taper: Total daily dose:  IV steroids / Prednisone 40 mg or higher: NPH 12 units every 12 hours (0.4 units/kg/day)  Prednisone 30 mg: NPH 9 units every 12 hours (0.3 units/kg/day)  Prednisone 20 mg: NPH 6 units every 12 hours (0.2 units/kg/day)  Prednisone 10 mg: NPH 3 units every 12 hours (0.1 units/kg/day)*    *Weight-based calculations based on guidelines from  Jessica Castillo, & MAMIE Cespedes (2009). Glucocorticoid-induced hyperglycemia. Endocrine Practice, 15(5), G3913500. Lubna Narvaez is a 76 y.o. female with a past medical history significant for T2DM per Dr. Pati Phan. MD Toya's H&P dated 2/2/2018. Recent Glucose Results:   Lab Results   Component Value Date/Time     (H) 02/05/2018 02:31 AM    GLUCPOC 210 (H) 02/05/2018 07:57 AM    GLUCPOC 254 (H) 02/04/2018 09:16 PM    GLUCPOC 158 (H) 02/04/2018 04:03 PM        Hospital (inpatient) medications:  1. Correction Scale: Lispro (Humalog) Insulin Resistant Sensitivity scale to cover for glucose > 139 mg/dL before meals and for glucose >199 at bedtime      Prior to admission medications: per past medical records  -Metformin 500mg TID with meals      Lab Results   Component Value Date/Time    Hemoglobin A1c 6.0 12/28/2017 04:02 PM    Hemoglobin A1c 6.3 08/15/2017 05:04 PM     Estimated Creatinine Clearance: 46 mL/min (based on Cr of 0.82). Active Orders   Diet    DIET FULL LIQUID No Conc. Sweets        Thank you. Yoandy Cuevas.  Samantha Farmer, MPH, RN, BSN, [address]   [phone] (office)  [phone] (pager)    -For most hospitalized persons with hyperglycemia in the intensive care unit (ICU), a glucose range of 140 to 180 mg/dL is recommended, provided this target can be safely achieved. *  - For general medicine and surgery patients in non-ICU settings, a premeal glucose target <140 mg/dL and a random blood glucose <180 mg/dL are recommended. *    DACIA Regalado, Laney Irby., Angel Sanford., ... & Cathy Porter (1712). AMERICAN ASSOCIATION OF CLINICAL ENDOCRINOLOGISTS AND AMERICAN COLLEGE OF ENDOCRINOLOGY-CLINICAL PRACTICE GUIDELINES FOR DEVELOPING A DIABETES MELLITUS COMPREHENSIVE CARE PLAN-2015-EXECUTIVE SUMMARY: Complete guidelines are available at https://www. aace. com/publications/guidelines. Endocrine Practice, 21(4), E932861.

## 2018-02-05 NOTE — WOUND CARE
Wound care consult:  Initial visit for skin assessment, alert, short of breath and desats with repositioning, nurse at bedside. Family at bedside. Assessment  All skin folds and bony prominences assessed, turned with staff assistance. Incontinent of urine and stool, pure wick in place. Wears depends at home. Sacral area and perineal area is a franchesca discoloration, less than 2 cm area of partial thickness skin loss on the right buttock present on admission, due to incontinence, moisture and decreased mobility. Heels and elbows intact. Treatment  Incontinent care given; zinc applied to skin loss on the buttock. Repositioned in bed   Heels floated    Recommendations/Plan  Nutrition, mobility team consults  Turn, reposition every 2 hours as tolerated, float heels, place in prevalon boots as needed  Incontinent care with comfort shields. Apply Zinc to all open areas, moisture barrier as needed. Will follow, reconsult as needed.     Akanksha Santa

## 2018-02-05 NOTE — PROGRESS NOTES
Bedside and Verbal shift change report given to Shanika Wiley RN (oncoming nurse) by Lynn Heredia RN (offgoing nurse). Report included the following information SBAR, Kardex, ED Summary, Intake/Output, Recent Results, Med Rec Status and Cardiac Rhythm NSR with BBB.     2105: [facility], new onset. Pt alert, oriented, denies pain. Pt resting comfortably, Spo2 @ 93% on Bi-Pap, RR 18, HR 98.     2120: Notified Dr. Darron Goldberg of pt status. Dr. Maksim Dill ECG stat. 2135: Dr Darron Goldberg at pt bedside; Reading ECG. 2144: Troponin drawn, sent to lab. Pt alert, oriented, denies pain. 0231: Second Troponin sent to lab; resulted at 0.08. Pt resting with continuous Bi-Pap; Spo2 at 92%, RR 18, HR 76 in NSR with BBB,  ST depression. Pt denies pain. 0330: Notified Dr. Darron Goldberg of pt status. No new orders at this time. 0405: Pt taken off of Bi-Pap; placed on High Flow 40%. Pt tolerating well, Spo2 at 92-93%. Bedside and Verbal shift change report given to Tim Garcia RN (oncoming nurse) by Shanika Wiley RN (offgoing nurse). Report included the following information SBAR, Kardex, ED Summary, Procedure Summary, Intake/Output, MAR, Recent Results, Med Rec Status and Cardiac Rhythm NSR with BBB; ST depression.

## 2018-02-05 NOTE — PROGRESS NOTES
Occupational Therapy Note:  Orders acknowledged, chart reviewed, and spoke with nursing staff. Patient with increased respiratory distress this AM + associated elevated HR. Patient is not appropriate for OT services at this time. Will follow-up next tx day as appropriate.     Osmel Hutchinson OTR/L

## 2018-02-05 NOTE — CDMP QUERY
3.   Using reference provided below, please specify if this patient is being treated for a Type 2 MI.    => Myocardial infarction type 2 in the setting of Sepsis, AHRF, and CAP as evidenced by troponin level of 0.08, being treated with cardiology consult and serial trending of troponin levels.   => Other explanation of clinical findings  => Unable to determine (no explanation for clinical findings)    The medical record reflects the following clinical findings, treatment, and risk factors. 77 y/o white female admitted for Severe sepsis 2/2 CAP and AHRF. On admission her troponin is < 0.04, repeat values are 0.04 and 0.08. She is being treated with cardiology consult and serial trending of troponin levels. Reference  Type 2 (MI secondary to an ischemic imbalance): MI consequent to increased oxygen demand or decreased supply (eg, coronary endothelial dysfunction, coronary artery spasm, coronary artery embolus, tachy-/kwame-arrhythmias, anemia, respiratory failure, hypertension, or hypotension). Edwar CARSON, Barry Kwok MD MPH, Abel Laughlin. (July 5, 2016). Criteria for the Diagnosis of Acute Myocardial Infarction. In UpToDate (Topic  52, Version 36.0). Retrieved from [email or web address].au    Please clarify and document your clinical opinion in the progress notes and discharge summary including the definitive and/or presumptive diagnosis, (suspected or probable), related to the above clinical findings. Please include clinical findings supporting your diagnosis. Thanks for your time.     Jhon Wiggins RN, BSN  [phone]

## 2018-02-05 NOTE — PROGRESS NOTES
Cardiology Progress Note                             [address] [facility]. [address], [place], [address] [facility]                                 Phone [phone]; Fax [phone]        2018 8:18 AM     Admit Date:           2018  Admit Diagnosis:  Acute respiratory failure ([address]  :          1940   MRN:          [id number]   ASSESSMENT/RECOMMENDATION:   1)Diastolic HF: in the setting of moderate aortic stenosis/severe pulmonary hypertension. -IV lasix given yesterday, -1350 cc, will give another dose of IV lasix this AM and transition to PO tomorrow  Creatinine WNL/k 3.5, will replete K now. 2)Acute respiratory failure/ILD (pulmonary fibrosis): pulmonary following   -on high ivette O2, on steroid/antibiotic     3) s/p AVR , severe aortic stenosis per echo, mild to mod on exam    4) PAF: continue digoxin, Cardizem. Check dig level now. -BP stable  -on xarelto 15 mg    5) Troponin elevation: minimal, suspect d/t supply demand mismatch  - no acute ST changes on ECG  -chronic RBBB    6) Hyperlipidemia: continue statin              Cardiology attending:  Pt seen and examined. Slow but steady improvement. Another dose of iv lasix today, then resume po 40/d from tomorrow. Diandra Iglesias MD                 Last 3 Recorded Weights in this Encounter    18 0457 18 0353 18 0430   Weight: 131 lb 3.2 oz (59.5 kg) 132 lb 3.2 oz (60 kg) 133 lb 8 oz (60.6 kg)          1901 -  0700  In: 764 [P.O.:630; I.V.:100]  Out: [address] [facility] Vesna Dahiana reports feels a \"little SOB\". No chest pain or palpitations. Not eating or drinking much.    Weak      Current Facility-Administered Medications   Medication Dose Route Frequency    insulin glargine (LANTUS) injection 20 Units  20 Units SubCUTAneous QHS    And    methylPREDNISolone (PF) (SOLU-MEDROL) injection 60 mg  60 mg IntraVENous Q6H    albuterol-ipratropium (DUO-NEB) 2.5 MG-0.5 MG/3 ML  3 mL Nebulization QID RT    sodium chloride (NS) flush 5-10 mL  5-10 mL IntraVENous Q8H    sodium chloride (NS) flush 5-10 mL  5-10 mL IntraVENous PRN    cefTRIAXone (ROCEPHIN) 2 g in 0.9% sodium chloride (MBP/ADV) 50 mL  2 g IntraVENous Q24H    sodium chloride (NS) flush 5-10 mL  5-10 mL IntraVENous Q8H    sodium chloride (NS) flush 5-10 mL  5-10 mL IntraVENous PRN    insulin lispro (HUMALOG) injection   SubCUTAneous AC&HS    glucose chewable tablet 16 g  4 Tab Oral PRN    dextrose (D50W) injection syrg 12.5-25 g  12.5-25 g IntraVENous PRN    glucagon (GLUCAGEN) injection 1 mg  1 mg IntraMUSCular PRN    digoxin (LANOXIN) tablet 0.125 mg  0.125 mg Oral DAILY    dilTIAZem CD (CARDIZEM CD) capsule 240 mg  240 mg Oral DAILY    atorvastatin (LIPITOR) tablet 20 mg  20 mg Oral QHS    rivaroxaban (XARELTO) tablet 15 mg  15 mg Oral DAILY WITH DINNER    arformoterol (BROVANA) neb solution 15 mcg  15 mcg Nebulization BID RT    doxycycline (VIBRAMYCIN) 100 mg in 0.9% sodium chloride (MBP/ADV) 100 mL MBP  100 mg IntraVENous Q12H    pirfenidone (ESBRIET) capsule 801 mg  (Patient Supplied)  801 mg Oral TID WITH MEALS      OBJECTIVE               Intake/Output Summary (Last 24 hours) at 02/05/18 0818  Last data filed at 02/05/18 0503   Gross per 24 hour   Intake              730 ml   Output             1350 ml   Net             -620 ml       Review of Systems - History obtained from the patient AS PER  HPI    Telemetry NSR w RBBB    PHYSICAL EXAM        Visit Vitals    /72    Pulse 76    Temp 98.1 °F (36.7 °C)    Resp 20    Ht 5' 3\" (1.6 m)    Wt 133 lb 8 oz (60.6 kg)    SpO2 96%    BMI 23.65 kg/m2       Gen: Well-developed, ill appearing, in no acute distress  alert and oriented x 3  HEENT:  Pink conjunctivae, Hearing grossly normal.No scleral icterus or conjunctival, moist mucous membranes  Neck: Supple,No JVD,   No cervical lymphadenopathy  Resp: No accessory muscle use, crackles Jf bases/rhonchus breath sounds  Card: Regular Rate,Rythm, 2/6 murmur heard at RUSB, rubs or gallop. GI:          soft, non-tender   MSK: No cyanosis or clubbing, good capillary refill  Skin: No rashes or ulcers.  Ecchymosis to BUE  Neuro:  Cranial nerves are grossly intact, moving all four extremities, no focal deficit, follows commands appropriately  Psych:  Good insight, oriented to person, place and time, alert, Nml Affect  LE: +1 BLE edema       DATA REVIEW            Laboratory and Imaging have been reviewed by me and are notable for  Recent Labs      02/05/18   0231  02/04/18   2144  02/03/18   0330   TROIQ  0.08*  0.04  <0.04     Recent Labs      02/05/18   0231  02/04/18   0400  02/03/18   1340  02/03/18   0330   NA  144  143   --   139   K  3.5  4.2   --   4.3   CO2  29  26   --   24   BUN  30*  28*   --   36*   CREA  0.82  0.84   --   0.91   GLU  175*  217*   --   213*   MG  2.1   --    --    --    WBC  8.6  8.0   --   5.0   HGB  8.6*  8.4*  9.0*  8.9*   HCT  27.4*  26.8*  28.2*  27.9*   PLT  233  241   --   [address] [facility],

## 2018-02-05 NOTE — DISCHARGE INSTRUCTIONS
Avoiding Triggers With Heart Failure: Care Instructions  Your Care Instructions    Triggers are anything that make your heart failure flare up. A flare-up is also called \"sudden heart failure\" or \"acute heart failure. \" When you have a flare-up, fluid builds up in your lungs, and you have problems breathing. You might need to go to the hospital. By watching for changes in your condition and avoiding triggers, you can prevent heart failure flare-ups. Follow-up care is a key part of your treatment and safety. Be sure to make and go to all appointments, and call your doctor if you are having problems. It's also a good idea to know your test results and keep a list of the medicines you take. How can you care for yourself at home? Watch for changes in your weight and condition  · Weigh yourself without clothing at the same time each day. Record your weight. Call your doctor if you have sudden weight gain, such as more than 2 to 3 pounds in a day or 5 pounds in a week. (Your doctor may suggest a different range of weight gain.) A sudden weight gain may mean that your heart failure is getting worse. · Keep a daily record of your symptoms. Write down any changes in how you feel, such as new shortness of breath, cough, or problems eating. Also record if your ankles are more swollen than usual and if you feel more tired than usual. Note anything that you ate or did that could have triggered these changes. Limit sodium  Sodium causes your body to hold on to extra water. This may cause your heart failure symptoms to get worse. People get most of their sodium from processed foods. Fast food and restaurant meals also tend to be very high in sodium. · Your doctor may suggest that you limit sodium to 2,000 milligrams (mg) a day or less. That is less than 1 teaspoon of salt a day, including all the salt you eat in cooking or in packaged foods. · Read food labels on cans and food packages.  They tell you how much sodium you get in one serving. Check the serving size. If you eat more than one serving, you are getting more sodium. · Be aware that sodium can come in forms other than salt, including monosodium glutamate (MSG), sodium citrate, and sodium bicarbonate (baking soda). MSG is often added to Asian food. You can sometimes ask for food without MSG or salt. · Slowly reducing salt will help you adjust to the taste. Take the salt shaker off the table. · Flavor your food with garlic, lemon juice, onion, vinegar, herbs, and spices instead of salt. Do not use soy sauce, steak sauce, onion salt, garlic salt, mustard, or ketchup on your food, unless it is labeled \"low-sodium\" or \"low-salt. \"  · Make your own salad dressings, sauces, and ketchup without adding salt. · Use fresh or frozen ingredients, instead of canned ones, whenever you can. Choose low-sodium canned goods. · Eat less processed food and food from restaurants, including fast food. Exercise as directed  Moderate, regular exercise is very good for your heart. It improves your blood flow and helps control your weight. But too much exercise can stress your heart and cause a heart failure flare-up. · Check with your doctor before you start an exercise program.  · Walking is an easy way to get exercise. Start out slowly. Gradually increase the length and pace of your walk. Swimming, riding a bike, and using a treadmill are also good forms of exercise. · When you exercise, watch for signs that your heart is working too hard. You are pushing yourself too hard if you cannot talk while you are exercising. If you become short of breath or dizzy or have chest pain, stop, sit down, and rest.  · Do not exercise when you do not feel well. Take medicines correctly  · Take your medicines exactly as prescribed. Call your doctor if you think you are having a problem with your medicine. · Make a list of all the medicines you take.  Include those prescribed to you by other doctors and any over-the-counter medicines, vitamins, or supplements you take. Take this list with you when you go to any doctor. · Take your medicines at the same time every day. It may help you to post a list of all the medicines you take every day and what time of day you take them. · Make taking your medicine as simple as you can. Plan times to take your medicines when you are doing other things, such as eating a meal or getting ready for bed. This will make it easier to remember to take your medicines. · Get organized. Use helpful tools, such as daily or weekly pill containers. When should you call for help? Call 911 if you have symptoms of sudden heart failure such as:  ? · You have severe trouble breathing. ? · You cough up pink, foamy mucus. ? · You have a new irregular or rapid heartbeat. ?Call your doctor now or seek immediate medical care if:  ? · You have new or increased shortness of breath. ? · You are dizzy or lightheaded, or you feel like you may faint. ? · You have sudden weight gain, such as more than 2 to 3 pounds in a day or 5 pounds in a week. (Your doctor may suggest a different range of weight gain.)   ? · You have increased swelling in your legs, ankles, or feet. ? · You are suddenly so tired or weak that you cannot do your usual activities. ? Watch closely for changes in your health, and be sure to contact your doctor if you develop new symptoms. Where can you learn more? Go to http://lul-ben.info/. Enter O267 in the search box to learn more about \"Avoiding Triggers With Heart Failure: Care Instructions. \"  Current as of: September 21, 2016  Content Version: 11.4  © 7441-6685 [employer]. Care instructions adapted under license by [employer] (which disclaims liability or warranty for this information).  If you have questions about a medical condition or this instruction, always ask your healthcare professional. [employer] disclaims any warranty or liability for your use of this information.

## 2018-02-05 NOTE — PROGRESS NOTES
[address] [place] [address] with [address] [place] [address]       Resident Progress Note in Brief    S:  Patient seen and examined at bedside. Paged by patient's nurse around 9:30PM due to ST-depression on aVR lead noted by Telemetry. On evaluating pt at bedside, she was breathing comfortably on BiPAP. She denies any CP, SOB, n/v, lower extremity edema or headache. Of note, pt has been switched back to BiPAP around 7PM by nurse because she pulled off her O2 tube and became SOB, tachy and Sats down to 70's. Images/Labs:  Stat EKG: Showed Anterolateral and inferior infarcts with RBBB. I personally reviewed the EKG with Dr. Evelin Pastrana (senior resident)  Troponin neg x1  Called Dr. Sonido Mosqueda (Cardio). O:  Visit Vitals    /74    Pulse 92    Temp 98 °F (36.7 °C)    Resp 18    Ht 5' 3\" (1.6 m)    Wt 132 lb 3.2 oz (60 kg)    SpO2 92%    BMI 23.42 kg/m2     Physical Examination:   General appearance - alert, well appearing, and in no distress  Chest - coarse breath sounds, mild inspiratory stridor, normal work of breathing  Heart - normal rate, regular rhythm, normal S1, S2, grade II/VI systolic ejection murmur. Abdomen - soft, nontender, nondistended, no masses or organomegaly  Neurological - alert, oriented, normal speech, no focal findings  Extremities - Trace bilateral ankle edema. A/P:   75 yo F admitted for AHRF 2/2 CAP and COPD exacerbation. Now on BiPAP     Abnormal EKG: Pt is asymptomatic  -Per cardiology (Dr. Sonido Mosqueda), there is no acute finding and this is consistent with some of her previous EKG findings since 2013.    -Trop neg x1. Will cont to trend  -Will continue cardiac monitoring. AHRF:  -On BiPap for the night. Will wean as tolerated tomorrow  -Continue duoneb, Brovana   - Continue Solumedrol 60 q6    Please refer to daily progress note for detail plan.      Plan discussed with Dr. Evelin Pastrana (Senior resident)      Joel Mir MD  Family Medicine Resident

## 2018-02-05 NOTE — MED STUDENT NOTES
General Daily Progress Note    Admit Date: 2/2/2018    Hospital Summary: Jp Keith is a 76 y.o. female with a PHMx significant for HTN, CAD, pulmonary fibrosis, T2DM admitted for acute on chronic hypoxic respiratory failure 2/2 CAP vs RSV. Subjective:     Overnight: Patient placed on BIPAP around 7pm given SOB, tachy, SaO2 70s. Team notified around 9:30pm for ST depression in aVR, cardiology called. Patient denied CP, SOB. Troponin 0.04. This morning patient denies SOB, CP. No complaints.         Current Facility-Administered Medications   Medication Dose Route Frequency    insulin glargine (LANTUS) injection 20 Units  20 Units SubCUTAneous QHS    And    methylPREDNISolone (PF) (SOLU-MEDROL) injection 60 mg  60 mg IntraVENous Q6H    albuterol-ipratropium (DUO-NEB) 2.5 MG-0.5 MG/3 ML  3 mL Nebulization QID RT    sodium chloride (NS) flush 5-10 mL  5-10 mL IntraVENous Q8H    sodium chloride (NS) flush 5-10 mL  5-10 mL IntraVENous PRN    cefTRIAXone (ROCEPHIN) 2 g in 0.9% sodium chloride (MBP/ADV) 50 mL  2 g IntraVENous Q24H    sodium chloride (NS) flush 5-10 mL  5-10 mL IntraVENous Q8H    sodium chloride (NS) flush 5-10 mL  5-10 mL IntraVENous PRN    insulin lispro (HUMALOG) injection   SubCUTAneous AC&HS    glucose chewable tablet 16 g  4 Tab Oral PRN    dextrose (D50W) injection syrg 12.5-25 g  12.5-25 g IntraVENous PRN    glucagon (GLUCAGEN) injection 1 mg  1 mg IntraMUSCular PRN    digoxin (LANOXIN) tablet 0.125 mg  0.125 mg Oral DAILY    dilTIAZem CD (CARDIZEM CD) capsule 240 mg  240 mg Oral DAILY    atorvastatin (LIPITOR) tablet 20 mg  20 mg Oral QHS    rivaroxaban (XARELTO) tablet 15 mg  15 mg Oral DAILY WITH DINNER    arformoterol (BROVANA) neb solution 15 mcg  15 mcg Nebulization BID RT    doxycycline (VIBRAMYCIN) 100 mg in 0.9% sodium chloride (MBP/ADV) 100 mL MBP  100 mg IntraVENous Q12H    pirfenidone (ESBRIET) capsule 801 mg  (Patient Supplied)  801 mg Oral TID WITH MEALS Objective:     Patient Vitals for the past 8 hrs:   BP Temp Pulse Resp SpO2 Weight   02/05/18 0530 - - 76 - 94 % -   02/05/18 0500 112/72 - 75 - 98 % -   02/05/18 0430 - - 82 - 92 % 133 lb 8 oz (60.6 kg)   02/05/18 0400 101/69 98.1 °F (36.7 °C) 82 20 90 % -   02/05/18 0330 - - 78 - 92 % -   02/05/18 0300 114/68 - 77 - 92 % -   02/05/18 0230 - - 77 - 93 % -   02/05/18 0200 108/67 - 76 - 95 % -   02/05/18 0130 - - 79 - 94 % -   02/05/18 0112 - - - - 92 % -   02/05/18 0030 - - 84 - 91 % -   02/05/18 0000 113/68 97.9 °F (36.6 °C) 84 20 93 % -   02/04/18 2330 - - 88 - 95 % -        02/03 1901 - 02/05 0700  In: 730 [P.O.:630; I.V.:100]  Out: 1550 [Urine:1550]    Physical Exam:  General: Comfortable, no acute distress. CV: S1 S2, RRR. No murmurs, rubs, or gallops appreciated. Resp: Mildly labored breathing, decreased breath sounds and diffuse expiratory wheezing bilaterally. Abd: Soft, non-tender, non-distended. BS+. No guarding, no rebound. Ext: Trace peripheral edema appreciated below the ankles bilaterally. Psych: Cooperative, appropriate mood and affect.       Labs:  Recent Results (from the past 12 hour(s))   GLUCOSE, POC    Collection Time: 02/04/18  9:16 PM   Result Value Ref Range    Glucose (POC) 254 (H) 65 - 100 mg/dL    Performed by Stormy Mora (PCT)    EKG, 12 LEAD, INITIAL    Collection Time: 02/04/18  9:39 PM   Result Value Ref Range    Ventricular Rate 93 BPM    Atrial Rate 93 BPM    P-R Interval 188 ms    QRS Duration 158 ms    Q-T Interval 412 ms    QTC Calculation (Bezet) 512 ms    Calculated P Axis 63 degrees    Calculated R Axis -70 degrees    Calculated T Axis 59 degrees    Diagnosis       Normal sinus rhythm  Left axis deviation  Right bundle branch block  Minimal voltage criteria for LVH, may be normal variant  Inferior infarct , age undetermined  Anterolateral infarct , age undetermined  Abnormal ECG  When compared with ECG of 02-FEB-2018 15:06,  Right bundle branch block is now present  Anterolateral infarct is now present  Inferior infarct is now present     TROPONIN I    Collection Time: 02/04/18  9:44 PM   Result Value Ref Range    Troponin-I, Qt. 0.04 <1.09 ng/mL   METABOLIC PANEL, COMPREHENSIVE    Collection Time: 02/05/18  2:31 AM   Result Value Ref Range    Sodium 144 136 - 145 mmol/L    Potassium 3.5 3.5 - 5.1 mmol/L    Chloride 110 (H) 97 - 108 mmol/L    CO2 29 21 - 32 mmol/L    Anion gap 5 5 - 15 mmol/L    Glucose 175 (H) 65 - 100 mg/dL    BUN 30 (H) 6 - 20 MG/DL    Creatinine 0.82 0.55 - 1.02 MG/DL    BUN/Creatinine ratio 37 (H) 12 - 20      GFR est AA >60 >60 ml/min/1.73m2    GFR est non-AA >60 >60 ml/min/1.73m2    Calcium 7.8 (L) 8.5 - 10.1 MG/DL    Bilirubin, total 0.2 0.2 - 1.0 MG/DL    ALT (SGPT) 123 (H) 12 - 78 U/L    AST (SGOT) 80 (H) 15 - 37 U/L    Alk. phosphatase 208 (H) 45 - 117 U/L    Protein, total 5.8 (L) 6.4 - 8.2 g/dL    Albumin 2.3 (L) 3.5 - 5.0 g/dL    Globulin 3.5 2.0 - 4.0 g/dL    A-G Ratio 0.7 (L) 1.1 - 2.2     CBC WITH AUTOMATED DIFF    Collection Time: 02/05/18  2:31 AM   Result Value Ref Range    WBC 8.6 3.6 - 11.0 K/uL    RBC 2.89 (L) 3.80 - 5.20 M/uL    HGB 8.6 (L) 11.5 - 16.0 g/dL    HCT 27.4 (L) 35.0 - 47.0 %    MCV 94.8 80.0 - 99.0 FL    MCH 29.8 26.0 - 34.0 PG    MCHC 31.4 30.0 - 36.5 g/dL    RDW 14.6 (H) 11.5 - 14.5 %    PLATELET 437 833 - 732 K/uL    MPV 9.4 8.9 - 12.9 FL    NRBC 0.2 (H) 0  WBC    ABSOLUTE NRBC 0.02 (H) 0.00 - 0.01 K/uL    NEUTROPHILS 90 (H) 32 - 75 %    LYMPHOCYTES 4 (L) 12 - 49 %    MONOCYTES 5 5 - 13 %    EOSINOPHILS 0 0 - 7 %    BASOPHILS 0 0 - 1 %    IMMATURE GRANULOCYTES 1 (H) 0.0 - 0.5 %    ABS. NEUTROPHILS 7.7 1.8 - 8.0 K/UL    ABS. LYMPHOCYTES 0.4 (L) 0.8 - 3.5 K/UL    ABS. MONOCYTES 0.4 0.0 - 1.0 K/UL    ABS. EOSINOPHILS 0.0 0.0 - 0.4 K/UL    ABS. BASOPHILS 0.0 0.0 - 0.1 K/UL    ABS. IMM.  GRANS. 0.1 (H) 0.00 - 0.04 K/UL    DF AUTOMATED     TROPONIN I    Collection Time: 02/05/18  2:31 AM   Result Value Ref Range Troponin-I, Qt. 0.08 (H) <0.05 ng/mL   MAGNESIUM    Collection Time: 02/05/18  2:31 AM   Result Value Ref Range    Magnesium 2.1 1.6 - 2.4 mg/dL            Assessment and Plan:     77F with a PMHx significant for HTN, CAD, pulmonary fibrosis, T2DM admitted for acute on chronic hypoxic respiratory failure 2/2 CAP vs RSV. #Acute on chronic hypoxic respiratory failure, requiring BiPAP: 2/2 CAP vs RSV. Usually on home 2L NC with pulmonary fibrosis. - On hi flow 31%, goal to wean oxygen to home 2L NC  - Continue solmederol 60mg IV q6h, to taper slowly and appreciate pulm recs   - Ceftriaxone (day 4) and doxycycline (day 4)  - Duoneb QID, Brovana 15mcg BID    #Pulmonary fibrosis  - Home esbriet 801mg TID with meals    #CAD: ST depression noted on tele yesterday evening, cardiology following. Troponin 0.04 --> 0.08.   - FU 0930 troponin  - Remain on tele  - Continue ASA, lipitor; no BB due to pulmonary fibrosis    #HFpEF: no crackles or peripheral edema appreciated on exam.  - Start lasix 40mg po daily  - No BB due to pulmonary fibrosis  - Cards following, appreciate recs    #HTN: BP at goal  - Home cardizem  - Holding home lasix    #T2DM: POC glucose in 150-250s, glucose 217 --> 175 this morning with increased Lantus yesterday.   - Increase lantus to 20 units today  - SSI  - Hold home metformin  - Hypoglycemic protocol    #Paroxysmal a fib s/p AVR: stable  - Continue dilt, digoxin, xarelto    Fluids: po  Electrolytes: K Cl pcket 40mEq  Nutrition: soft GI  DVT ppx: anticoagulated on xarelto  GI ppx: not indicated    Dispo: remain on tele, wean off hi flow to home 2L NC and steroid taper    Sandra Miller, M4 ([employer])  Plan discussed with Dr. Steph Sifuentes

## 2018-02-05 NOTE — CONSULTS
Palliative Medicine Consult  Leo: [phone] ([phone])    Patient Name: Leopold Mars  YOB: 1940    Date of Initial Consult: 02/05/2018  Reason for Consult: Care decisions  Requesting Provider: Liliya Perdomo MD.  Primary Care Physician: Trena Perea MD     SUMMARY:   Leopold Mars is a 76 y.o. with a past history of ILD, chronic hypoxemic respiratory failure, aortic stenosis s/p AVR, diastolic heart failure, pulm HTN, asthma, afib, CAD, depression, HLD, HTN, DM and sleep apnea, , who was admitted on 2/2/2018 from home with a diagnosis of acute respiratory failure. Patient was seen at [facility] prior to arrival and found to be in resp distress with sats in the high 60's. EMS was called and she was brought into the ED. Was started on bipap, given sequential nebs in ED and started on antibiotics for community acquired pneumonia due to low grade fever, elevation of wbc count, and chest xray findings of diffuse interstitial and patchy opacities. Echo done revealed EF of 50% and moderate aortic stenosis and severe pulmonary hypertension. Cardiology recommended IV diuresis. Current medical issues leading to Palliative Medicine involvement include: Care decisions due to risk for decline. SH: , lives with daughter in law. Daughter, Marlee Mohan, is mPOA. PALLIATIVE DIAGNOSES:   1. Shortness of breath  2. Hypoxia-- requiring Bipap/HFNC  3. Hypoalbuminemia  4. Goals of care  5. DNR discussion  6. ACP  7. ILD  8. RSV       PLAN:   1. Met with patient and introduced the role of Palliative Medicine. 2. Symptom Management--> Patient reports that her breathing is better, but had to be placed back on bipap for desaturation this am. Daughter  thinks this happened due to her frustration of being unable to feed herself and needing NIV. Was only on 2L NC at home and has been on oxygen for six years.  Continue duonebs, scheduled solumedrol, ceftriaxone/doxy, diuresis, albuterol, brovana/pulmicort, HFNC as tolerated, and BiPAP as needed. 3. Goals of care--> Discussed current hospitalization and events of today with daughter. Daughter concerned at patient's inability to feed herself and that, given BiPAP requirement, has not been taking in very much. Worried that she will continue to get weaker and weaker. Relayed daughter's concerns to bedside nurse and explained dietician is following along. Daughter is hopeful that patient will recover and be able to return home. Discussed current RSV infection superimposed on chronic ILD,  Increased oxygen support from, plan to attempt to wean patient as tolerated, and the inability of using HFNC outside of the acute inpatient setting. Daughter appears to have appropriate insight into patient's medical condition. Plan to meet again tomorrow and discuss further goals of care. 4. DNR discussion--> Addressed what patient would want done in the event of cardiopulmonary arrest and what resuscitation would entail. Daughter/mPOA states that she would elect to be DNR. Will need DDNR completed prior to discharge. 5. ACP--> Patient has advanced medical directive on file and appoints Suzie Silva (daughter) as primary agent for healthcare decisions, and Hanktammiebetty Gabriel (son) as secondary agent. ACP updated to reflect this. 6. Initial consult note routed to primary continuity provider  7.  Communicated plan of care with: Palliative IDT       GOALS OF CARE / TREATMENT PREFERENCES:     GOALS OF CARE: recover from RSV infection and wean oxygen down to be able to go home         TREATMENT PREFERENCES:   Code Status: DNR    Advance Care Planning:  Advance Care Planning 2/5/2018   Patient's Healthcare Decision Maker is: Named in scanned ACP document   Primary Decision Maker Name Suzie Silva   Primary Decision Maker Phone Number [phone]   Primary Decision Maker Relationship to Patient Adult child   Secondary Decision Maker Name Netta Evans Secondary Decision Maker Phone Number [phone]   Secondary Decision Maker Relationship to Patient Adult child   Confirm Advance Directive Yes, on file   Does the patient have other document types Do Not Resuscitate           Other Instructions: Other:    As far as possible, the palliative care team has discussed with patient / health care proxy about goals of care / treatment preferences for patient. HISTORY:     History obtained from: chart, daughter Jose Koroma: shortness of breath    HPI/SUBJECTIVE:    The patient is:   [] Verbal and participatory  [x] Non-participatory due to: confused, currently on BiPAP    76 y.o. WF who was seen at [facility] prior to arrival and found to be in resp distress with sats in the high 60's. EMS was called and she was brought into the ED. Was started on bipap, given sequential nebs in ED and started on antibiotics for community acquired pneumonia due to low grade fever, elevation of wbc count, and chest xray findings of diffuse interstitial and patchy opacities. Echo done revealed EF of 50% and moderate aortic stenosis and severe pulmonary hypertension. Cardiology recommended IV diuresis.        Clinical Pain Assessment (nonverbal scale for severity on nonverbal patients):   Clinical Pain Assessment  Severity: 0          Duration: for how long has pt been experiencing pain (e.g., 2 days, 1 month, years)  Frequency: how often pain is an issue (e.g., several times per day, once every few days, constant)     FUNCTIONAL ASSESSMENT:     Palliative Performance Scale (PPS):  PPS: 40       PSYCHOSOCIAL/SPIRITUAL SCREENING:     Palliative IDT has assessed this patient for cultural preferences / practices and a referral made as appropriate to needs (Cultural Services, Patient Advocacy, Ethics, etc.)    Advance Care Planning:  Advance Care Planning 2/5/2018   Patient's Healthcare Decision Maker is: Named in scanned ACP document   Primary Decision Maker Name Flex Kirby   Primary Decision Maker Phone Number [phone]   Primary Decision Maker Relationship to Patient Adult child   Secondary Decision Maker Name Sandie Kramer   Secondary Decision Maker Phone Number [phone]   Secondary Decision Maker Relationship to Patient Adult child   Confirm Advance Directive Yes, on file   Does the patient have other document types Do Not Resuscitate       Any spiritual / Jehovah's witness concerns:  [] Yes /  [x] No    Caregiver Burnout:  [] Yes /  [x] No /  [] No Caregiver Present      Anticipatory grief assessment:   [x] Normal  / [] Maladaptive       ESAS Anxiety:      ESAS Depression:          REVIEW OF SYSTEMS:     Positive and pertinent negative findings in ROS are noted above in HPI. The following systems were [] reviewed / [x] unable to be reviewed as noted in HPI  Other findings are noted below. Systems: constitutional, ears/nose/mouth/throat, respiratory, gastrointestinal, genitourinary, musculoskeletal, integumentary, neurologic, psychiatric, endocrine. Positive findings noted below. Modified ESAS Completed by: provider   Fatigue: 4 Drowsiness: 2     Pain: 0 (nonverbal pain scale)           Dyspnea: 0           Stool Occurrence(s): 1        PHYSICAL EXAM:     From RN flowsheet:  Wt Readings from Last 3 Encounters:   02/05/18 133 lb 8 oz (60.6 kg)   12/28/17 134 lb 9.6 oz (61.1 kg)   11/10/17 133 lb (60.3 kg)     Blood pressure 100/62, pulse 72, temperature 97.6 °F (36.4 °C), resp. rate 16, height 5' 3\" (1.6 m), weight 133 lb 8 oz (60.6 kg), SpO2 100 %.     Pain Scale 1: Numeric (0 - 10)  Pain Intensity 1: 0                 Last bowel movement, if known:     Constitutional: frail, NAD, resting in bed  Eyes: pupils equal, anicteric  ENMT: no nasal discharge, dry mucous membranes  Cardiovascular: regular rhythm, distal pulses intact, no SILVIO  Respiratory: breathing not labored, symmetric, BiPAP in place, crackles throughout  Gastrointestinal: soft non-tender, +bowel sounds  Musculoskeletal: no deformity, no tenderness to palpation  Skin: warm, dry  Neurologic: following commands, moving all extremities  Psychiatric: flat affect, no hallucinations  Other:       HISTORY:     Principal Problem:    Acute respiratory failure ([address] (2/2/2018)    Active Problems:    COPD exacerbation ([address] (2/2/2018)      CAP (community acquired pneumonia) (2/2/2018)      Past Medical History:   Diagnosis Date    Aortic stenosis     severe, s/p AVR March 2012 - presented with HF, EF 30%    Asthma 5/10/2012    Atrial fibrillation (HCC) 5/10/2012    Bacterial pneumonia, unspecified     CAD (coronary artery disease)     cor calcifications (LAD) by CT     Chronic diastolic heart failure (HCC) 5/29/2012    Depression     Dyslipidemia     Female stress incontinence     Hyperlipidemia 9/17/2010    Hypertension     Osteoarthritis     Paroxysmal atrial fibrillation (HCC)     post op AVR    [facility]monary fibrosis [facility]     S/P AVR 11/6/2016    S/P AVR (aortic valve replacement) 3/2012    bovine ([facility])    Sleep apnea     T2DM (type 2 diabetes mellitus) ([facility] 1/14/2011      Past Surgical History:   Procedure Laterality Date    CARDIAC SURG PROCEDURE UNLIST      ECHO 2D ADULT  3/2012    EF 30%, mod LVH, EF 30-35%, PA 50 mmHg    HX CATARACT REMOVAL      bilateral      Family History   Problem Relation Age of Onset    Diabetes Mother     Diabetes Father     Hypertension Father       History reviewed, no pertinent family history.   Social History   Substance Use Topics    Smoking status: Never Smoker    Smokeless tobacco: Never Used    Alcohol use No     No Known Allergies   Current Facility-Administered Medications   Medication Dose Route Frequency    insulin glargine (LANTUS) injection 20 Units  20 Units SubCUTAneous QHS    And    methylPREDNISolone (PF) (SOLU-MEDROL) injection 60 mg  60 mg IntraVENous Q6H    [START ON 2/6/2018] furosemide (LASIX) tablet 40 mg  40 mg Oral DAILY    cefTRIAXone (ROCEPHIN) 2 g in 0.9% sodium chloride 100 mL IVPB  2 g IntraVENous Q24H    albuterol-ipratropium (DUO-NEB) 2.5 MG-0.5 MG/3 ML  3 mL Nebulization QID RT    sodium chloride (NS) flush 5-10 mL  5-10 mL IntraVENous Q8H    sodium chloride (NS) flush 5-10 mL  5-10 mL IntraVENous PRN    sodium chloride (NS) flush 5-10 mL  5-10 mL IntraVENous Q8H    sodium chloride (NS) flush 5-10 mL  5-10 mL IntraVENous PRN    insulin lispro (HUMALOG) injection   SubCUTAneous AC&HS    glucose chewable tablet 16 g  4 Tab Oral PRN    dextrose (D50W) injection syrg 12.5-25 g  12.5-25 g IntraVENous PRN    glucagon (GLUCAGEN) injection 1 mg  1 mg IntraMUSCular PRN    digoxin (LANOXIN) tablet 0.125 mg  0.125 mg Oral DAILY    dilTIAZem CD (CARDIZEM CD) capsule 240 mg  240 mg Oral DAILY    atorvastatin (LIPITOR) tablet 20 mg  20 mg Oral QHS    rivaroxaban (XARELTO) tablet 15 mg  15 mg Oral DAILY WITH DINNER    arformoterol (BROVANA) neb solution 15 mcg  15 mcg Nebulization BID RT    doxycycline (VIBRAMYCIN) 100 mg in 0.9% sodium chloride (MBP/ADV) 100 mL MBP  100 mg IntraVENous Q12H    pirfenidone (ESBRIET) capsule 801 mg  (Patient Supplied)  801 mg Oral TID WITH MEALS          LAB AND IMAGING FINDINGS:     Lab Results   Component Value Date/Time    WBC 8.6 02/05/2018 02:31 AM    HGB 8.6 02/05/2018 02:31 AM    PLATELET 563 84/05/6562 02:31 AM     Lab Results   Component Value Date/Time    Sodium 144 02/05/2018 02:31 AM    Potassium 3.5 02/05/2018 02:31 AM    Chloride 110 02/05/2018 02:31 AM    CO2 29 02/05/2018 02:31 AM    BUN 30 02/05/2018 02:31 AM    Creatinine 0.82 02/05/2018 02:31 AM    Calcium 7.8 02/05/2018 02:31 AM    Magnesium 2.1 02/05/2018 02:31 AM      Lab Results   Component Value Date/Time    AST (SGOT) 80 02/05/2018 02:31 AM    Alk.  phosphatase 208 02/05/2018 02:31 AM    Protein, total 5.8 02/05/2018 02:31 AM    Albumin 2.3 02/05/2018 02:31 AM    Globulin 3.5 02/05/2018 02:31 AM     Lab Results Component Value Date/Time    INR 1.0 02/02/2018 02:58 PM    Prothrombin time 10.1 02/02/2018 02:58 PM    aPTT 32.4 02/02/2018 02:58 PM      No results found for: IRON, FE, TIBC, IBCT, PSAT, FERR   Lab Results   Component Value Date/Time    pH 7.40 02/05/2018 12:05 PM    PCO2 44 02/05/2018 12:05 PM    PO2 84 02/05/2018 12:05 PM     No components found for: [place]   Lab Results   Component Value Date/Time    CK 61 06/07/2017 04:33 PM    CK - MB <1.0 06/07/2017 04:33 PM                Total time: 50 min  Counseling / coordination time, spent as noted above: 40 min  > 50% counseling / coordination?: yes    Prolonged service was provided for  []30 min   []75 min in face to face time in the presence of the patient, spent as noted above. Time Start:   Time End:   Note: this can only be billed with 01816 (initial) or 02579 (follow up). If multiple start / stop times, list each separately.

## 2018-02-05 NOTE — PROGRESS NOTES
Physical Therapy orders acknowledged, chart reviewed and discussed with nurse patient has decompensated this morning and overall prognosis is very poor per chart review. We will defer therapy for today and continue to follow up with the patient tomorrow. Thank you.

## 2018-02-06 NOTE — PROGRESS NOTES
[id number] RESIDENCY PROGRAM  PROGRESS NOTE     2/6/2018  PCP: Francesca Arizmendi MD     Assessment/Plan:   Tim Holloway is a 76 y.o. female with h/o hypertension, CAD, pulmonary fibrosis who is admitted for acute hypoxic respiratory failure 2/2 CAP.    24 hour Events: acute decompensation in the afternoon,  Repeat CXR was unchanged. Palliative on boards    Acute HFpEF in the setting of sepsis and AHRF being treated with IV Lasix.  last echo 11/2017 showed with EF 60-65% . POA ProBNP: 6743: Repeat Echo 50%, severe AS and pulmonary hypertension. No leg swelling or JVD.   - PO Lasix 40 mg every day per cards, no BB  - f/u I/O repeat CXR      Myocardial infarction type 2 in the setting of Sepsis, AHRF, and CAP as evidenced by troponin level of 0.08: Supply demand. - continue ASA, Lipitor  - not on BB due to pulmonary fibrosis/ COPD     Acute on chronic Hypoxic Respiratory Failure requiring bipap: in the setting of Severe sepsis 2/2 CAP ( 3/4 SIRS) and + RSV and pulmonary edema. At home on 2L oxygen and prednisone for pulmonary fibrosis, now requiring bipap. RSV positive. - wean off bipap as tolerated. 4L NC now  - Continue ceftriaxone 2g (day 5) and doxi (day 5) . Consider switching today on day 4 to po antibiotics   - Continue Stanley casillas   - Continue Solumedrol 60 q6  - Blood Cx: No growth for 4 days. - lasix per cardiology    RSV bronchiolitis: contributed to worsening of pulmonary function, but patient appear to clinically improved this am  - continue current management as above    Hypertension  - BP on admission stable. - Continuing home medications of Cardizem. Continue Lasix  - Will continue to monitor at this time and readjust as BP's trend.     Diabetes Mellitus T2: Last HgA1c 6.0. On 12/28.  BS high in the 200s yesterday, likely due to solumedrol.   - per Diabetes managemnt recs, swithing Lantus 20 to NPH 12 units BID  - holding Metformin  - Insulin Sliding Scale normal sensitivity with AC&HS glucose checks. - Hypoglycemia protocols ordered.     Pulmonary fibrosis: likely decompensated with CAD in the setting of increased BNP   - continue home pirfenidore   - solumedrol as above. - f/u high resolution CT when patient is stable  - Palliative to discuss goals of care     Hx of aortic valve replacement( AS): stable      Paroxysmal AFib: s/p AVR. Chads >2. NSR rate controlled. Followed by cardiologist Dr. Jorge A Shah   -Continue diltiazem 240mg, digoxin per cards dosing, and Xarelto 15 mg  - f/u digoxin levels    Hx of recurrent UTI: on Keflex at home- Urology following. Stable. Hold Keflex while inpatient      HLD: stable  -Continue lipitor     Acute on chronic anemia: Hb stable at 8.6. ( BL~9-10)    FEN/GI -  eat/drink if tolerated  Activity - Out of bed with assistance  DVT prophylaxis - Xarelto  GI prophylaxis - Not indicated at this time  Disposition - TBD. Code Status - DNR, discussed with patient / caregivers. Subjective:   Pt was seen and examined at bedside. C/o SOB, but which has improved. Denies chest pain, cough, nausea, vomiting, abdominal pain and dizziness. No other complaints this morning.   Objective:   Physical examination  Visit Vitals    /75 (BP 1 Location: Left arm, BP Patient Position: At rest;Hip tilt, left)    Pulse 84    Temp 96.4 °F (35.8 °C)    Resp 18    Ht 5' 3\" (1.6 m)    Wt 131 lb 4.8 oz (59.6 kg)    SpO2 99%    BMI 23.26 kg/m2      Temp (24hrs), Av.3 °F (36.3 °C), Min:96.4 °F (35.8 °C), Max:97.6 °F (36.4 °C)     O2 Flow Rate (L/min): 40 l/min   O2 Device: BIPAP      Date 18 - 18 - 18   Shift 0604-4696 6756-7879 24 Hour Total 5947-6575 5730-5292 24 Hour Total   I  N  T  A  K  E   P.O. 240 350 590         P.O. 240 350 590       I.V.  (mL/kg/hr)  220  (0.3) 220  (0.2)         I.V.  220 220       Shift Total  (mL/kg) 240  (4) 570  (9.6) 810  (13.6)      O  U  T  P  U  T   Urine  (mL/kg/hr) 1050  (1.4) 500  (0.7) 1550  (1.1)         Urine Voided 1050  1050         Urine Output (mL) (External Female Catheter 02/03/18)  500 500       Shift Total  (mL/kg) 1050  (17.3) 500  (8.4) 1550  (26)      NET -810 70 -740      Weight (kg) 60.6 59.6 59.6 59.6 59.6 59.6         Last 3 shifts:    02/04 1901 - 02/06 0700  In: 7412 [P.O.:740; I.V.:320]  Out: 2250 [Urine:2250]    General:   Alert, cooperative, no acute distress   Head:   Atraumatic   Eyes:   Conjunctivae clear   ENT:  Oral mucosa normal   Neck:  Supple, trachea midline, no adenopathy   No JVD   Back:    No CVA tenderness    Chest wall:    No tenderness or deformities    Lungs:   on bipap,on 80%FIO2 bilateral crackles lower lung and bilat wheezing with good air movement, improved from yesterday.     Heart:   Regular rhythm, no murmur   Abdomen:    Soft, non-tender   No masses or organomegaly    Extremities:  no edema   Pulses:  Symmetric all extremities   Skin:  Warm and dry, varicoses   Neurologic:  Oriented   No focal deficits         Data Review:     Recent Labs      02/06/18   0147  02/05/18   0231  02/04/18   0400   WBC  7.9  8.6  8.0   HGB  9.0*  8.6*  8.4*   HCT  29.1*  27.4*  26.8*   PLT  209  233  241     Recent Labs      02/06/18   0147  02/05/18   0231  02/04/18   0400   NA  144  144  143   K  4.1  3.5  4.2   CL  109*  110*  110*   CO2  31  29  26   GLU  149*  175*  217*   BUN  28*  30*  28*   CREA  0.71  0.82  0.84   CA  7.9*  7.8*  8.2*   MG   --   2.1   --    ALB  2.5*  2.3*  2.4*   TBILI  0.2  0.2  0.2   SGOT  68*  80*  45*   ALT  135*  123*  57     Medications reviewed  Current Facility-Administered Medications   Medication Dose Route Frequency    insulin NPH (NOVOLIN N, HUMULIN N) injection 12 Units  12 Units SubCUTAneous ACB&D    methylPREDNISolone (PF) (SOLU-MEDROL) injection 60 mg  60 mg IntraVENous Q6H    furosemide (LASIX) tablet 40 mg  40 mg Oral DAILY    cefTRIAXone (ROCEPHIN) 2 g in 0.9% sodium chloride 100 mL IVPB  2 g IntraVENous Q24H    albuterol-ipratropium (DUO-NEB) 2.5 MG-0.5 MG/3 ML  3 mL Nebulization QID RT    sodium chloride (NS) flush 5-10 mL  5-10 mL IntraVENous Q8H    sodium chloride (NS) flush 5-10 mL  5-10 mL IntraVENous PRN    sodium chloride (NS) flush 5-10 mL  5-10 mL IntraVENous Q8H    sodium chloride (NS) flush 5-10 mL  5-10 mL IntraVENous PRN    insulin lispro (HUMALOG) injection   SubCUTAneous AC&HS    glucose chewable tablet 16 g  4 Tab Oral PRN    dextrose (D50W) injection syrg 12.5-25 g  12.5-25 g IntraVENous PRN    glucagon (GLUCAGEN) injection 1 mg  1 mg IntraMUSCular PRN    digoxin (LANOXIN) tablet 0.125 mg  0.125 mg Oral DAILY    dilTIAZem CD (CARDIZEM CD) capsule 240 mg  240 mg Oral DAILY    atorvastatin (LIPITOR) tablet 20 mg  20 mg Oral QHS    rivaroxaban (XARELTO) tablet 15 mg  15 mg Oral DAILY WITH DINNER    arformoterol (BROVANA) neb solution 15 mcg  15 mcg Nebulization BID RT    doxycycline (VIBRAMYCIN) 100 mg in 0.9% sodium chloride (MBP/ADV) 100 mL MBP  100 mg IntraVENous Q12H    pirfenidone (ESBRIET) capsule 801 mg  (Patient Supplied)  801 mg Oral TID WITH MEALS         Signed:   Leo Pittman MD   Resident, Family Medicine      Attending note: Attending note to follow. ..

## 2018-02-06 NOTE — PROGRESS NOTES
Occupational Therapy Note:  Chart reviewed and spoke with nursing. RRT called this afternoon due to desaturation to 60% on BiPAP. Per RN, patient coughed up a large amount of mucus + blood and placed back on BiPAP. Will hold OT evaluation as this time and follow-up next tx day. Thank you.   Eric Díaz, OTR/L

## 2018-02-06 NOTE — PROGRESS NOTES
Spiritual Care Assessment/Progress Notes    Katja Novak [id number]  [id number]    1940  76 y.o.  female    Patient Telephone Number: [phone] (home)   Holiness Affiliation: [facility]   Language: English   Extended Emergency Contact Information  Primary Emergency Contact: [address] Phone: [phone]  Relation: Child   Patient Active Problem List    Diagnosis Date Noted    Acute respiratory failure ([address] 02/02/2018    COPD exacerbation ([address] 02/02/2018    CAP (community acquired pneumonia) 02/02/2018    Visual loss, left eye 11/12/2017    Dizziness 06/09/2017    Dehydration 06/07/2017    S/P AVR 11/06/2016    DM II (diabetes mellitus, type II), controlled ([address] 12/08/2015    RBBB 10/16/2015    Frozen shoulder 06/25/2014    Dyslipidemia 03/21/2014    Chronic diastolic heart failure ([address] 05/29/2012    Pulmonary fibrosis ([address] 05/15/2012    Atrial fibrillation ([address] 05/10/2012    COPD (chronic obstructive pulmonary disease) ([address] 03/16/2012    History of noncompliance with medical treatment 09/13/2011    Arthritis 01/14/2011    Hypertension     Depression     Female stress incontinence     CAD (coronary artery disease)     Aortic stenosis     Sleep apnea         Date: 2/6/2018       Level of Holiness/Spiritual Activity:  []         Involved in denae tradition/spiritual practice    []         Not involved in denae tradition/spiritual practice  []         Spiritually oriented    []         Claims no spiritual orientation    []         seeking spiritual identity  []         Feels alienated from Jehovah's witness practice/tradition  []         Feels angry about Jehovah's witness practice/tradition  []         Spirituality/Jehovah's witness tradition is a resource for coping at this time.   [x]         Not able to assess due to medical condition    Services Provided Today:  [x]         crisis intervention    []         reading Scriptures  []         spiritual assessment []         prayer  []         empathic listening/emotional support  []         rites and rituals (cite in comments)  []         life review     [x]         Uatsdin support  []         theological development   []         advocacy  []         ethical dialog     []         blessing  []         bereavement support    []         support to family  []         anticipatory grief support   []         help with AMD  []         spiritual guidance    []         meditation      Spiritual Care Needs  []         Emotional Support  []         Spiritual/Episcopalian Care  []         Loss/Adjustment  []         Advocacy/Referral                /Ethics  []         No needs expressed at               this time  []         Other: (note in               comments)  Spiritual Care Plan  []         Follow up visits with               pt/family  []         Provide materials  []         Schedule sacraments  []         Contact Community               Clergy  [x]         Follow up as needed  []         Other: (note in               comments)     Comments:  Responded to RRT call for patient. No family present at this time. Pt had her eyes closed and on Bipap. I went and spoke gently to patient and touched her hand. She did not open her eyes and was struggling at this time. Staff caring for patient. Pastoral care will follow up with patient/family at a later time as able. Please page [phone] as needed. Visit by: Ana Diaz. Hernesto Brizuela.  Ramses Downey MA, [facility]    Lead  Profession Development & Advancement

## 2018-02-06 NOTE — ROUTINE PROCESS
Bedside and Verbal shift change report given to Soo Whiteside (oncoming nurse) by Susanna Knight RN (offgoing nurse). Report included the following information SBAR, Kardex, Intake/Output, MAR, Accordion, Recent Results, Cardiac Rhythm SR and Alarm Parameters .

## 2018-02-06 NOTE — PROGRESS NOTES
Cardiology Progress Note                             [address] [facility], [place], [address] [facility]                                 Phone [phone]; Fax [phone]        2018 8:18 AM     Admit Date:           2018  Admit Diagnosis:  Acute respiratory failure ([address]  :          1940   MRN:          [id number]   ASSESSMENT/RECOMMENDATION:   1)Diastolic HF: in the setting of moderate aortic stenosis/severe pulmonary hypertension.  - 1.5 L last 24 hours, transitioning to PO lasix today  -will repeat chest Xray today    2)Acute respiratory failure/ILD (pulmonary fibrosis): pulmonary following   -on high ivette O2, on steroid/antibiotic     3) s/p AVR , severe aortic stenosis per echo, mild to mod on exam    4) PAF: continue digoxin, Cardizem. Dig level 1.7, will decrease to every other day. -BP stable  -on xarelto 15 mg    5) Troponin elevation: minimal, suspect d/t supply demand mismatch  - no acute ST changes on ECG this AM  -chronic RBBB    6) Hyperlipidemia: continue statin              Cardiology attending:  Pt seen and examined. Volume status seems optimized. Main issue is pulmonary  Agree with reduction in digoxin dosing    Cindra Cockayne, MD                   Last 3 Recorded Weights in this Encounter    18 0353 18 0430 18 0437   Weight: 132 lb 3.2 oz (60 kg) 133 lb 8 oz (60.6 kg) 131 lb 4.8 oz (59.6 kg)          1901 -  0700  In: 7289 [P.O.:740;  I.V.:320]  Out: 2250 [Urine:2250]    Srinivas Jaquez 64 breathing okay, but appears to be in mild resp distress after transitioning from Bipap to High ivette      Current Facility-Administered Medications   Medication Dose Route Frequency    insulin NPH (NOVOLIN N, HUMULIN N) injection 12 Units  12 Units SubCUTAneous ACB&D    methylPREDNISolone (PF) (SOLU-MEDROL) injection 60 mg  60 mg IntraVENous Q6H  furosemide (LASIX) tablet 40 mg  40 mg Oral DAILY    cefTRIAXone (ROCEPHIN) 2 g in 0.9% sodium chloride 100 mL IVPB  2 g IntraVENous Q24H    albuterol-ipratropium (DUO-NEB) 2.5 MG-0.5 MG/3 ML  3 mL Nebulization QID RT    sodium chloride (NS) flush 5-10 mL  5-10 mL IntraVENous Q8H    sodium chloride (NS) flush 5-10 mL  5-10 mL IntraVENous PRN    sodium chloride (NS) flush 5-10 mL  5-10 mL IntraVENous Q8H    sodium chloride (NS) flush 5-10 mL  5-10 mL IntraVENous PRN    insulin lispro (HUMALOG) injection   SubCUTAneous AC&HS    glucose chewable tablet 16 g  4 Tab Oral PRN    dextrose (D50W) injection syrg 12.5-25 g  12.5-25 g IntraVENous PRN    glucagon (GLUCAGEN) injection 1 mg  1 mg IntraMUSCular PRN    digoxin (LANOXIN) tablet 0.125 mg  0.125 mg Oral DAILY    dilTIAZem CD (CARDIZEM CD) capsule 240 mg  240 mg Oral DAILY    atorvastatin (LIPITOR) tablet 20 mg  20 mg Oral QHS    rivaroxaban (XARELTO) tablet 15 mg  15 mg Oral DAILY WITH DINNER    arformoterol (BROVANA) neb solution 15 mcg  15 mcg Nebulization BID RT    doxycycline (VIBRAMYCIN) 100 mg in 0.9% sodium chloride (MBP/ADV) 100 mL MBP  100 mg IntraVENous Q12H    pirfenidone (ESBRIET) capsule 801 mg  (Patient Supplied)  801 mg Oral TID WITH MEALS      OBJECTIVE               Intake/Output Summary (Last 24 hours) at 02/06/18 0939  Last data filed at 02/06/18 5419   Gross per 24 hour   Intake              810 ml   Output             1550 ml   Net             -740 ml       Review of Systems - History obtained from the patient AS PER  HPI    Telemetry NSR w RBBB    PHYSICAL EXAM        Visit Vitals    /75 (BP 1 Location: Left arm, BP Patient Position: At rest;Hip tilt, left)    Pulse 84    Temp 96.4 °F (35.8 °C)    Resp 18    Ht 5' 3\" (1.6 m)    Wt 131 lb 4.8 oz (59.6 kg)    SpO2 99%    BMI 23.26 kg/m2       Gen: Well-developed, ill appearing, mild acute distress  alert and oriented x 3  HEENT:  Pink conjunctivae, Hearing grossly normal.No scleral icterus or conjunctival, moist mucous membranes  Neck: Supple,No JVD,   No cervical lymphadenopathy  Resp: No accessory muscle use, crackles Jf bases breath sounds. High flow O2. Card: Regular Rate,Rythm, 2/6 murmur heard at RUSB, rubs or gallop. GI:          soft, non-tender   MSK: No cyanosis or clubbing, good capillary refill  Skin: No rashes or ulcers.  Ecchymosis to BUE  Neuro:  Cranial nerves are grossly intact, moving all four extremities, no focal deficit, follows commands appropriately  Psych:  Good insight, oriented to person, place and time, alert, Nml Affect  LE: +1 BLE edema       DATA REVIEW            Laboratory and Imaging have been reviewed by me and are notable for  Recent Labs      02/05/18   1101  02/05/18   0231  02/04/18   2144   TROIQ  0.06*  0.08*  0.04     Recent Labs      02/06/18   0147  02/05/18   0231  02/04/18   0400   NA  144  144  143   K  4.1  3.5  4.2   CO2  31  29  26   BUN  28*  30*  28*   CREA  0.71  0.82  0.84   GLU  149*  175*  217*   MG   --   2.1   --    WBC  7.9  8.6  8.0   HGB  9.0*  8.6*  8.4*   HCT  29.1*  27.4*  26.8*   PLT  209  233  [address], NP

## 2018-02-06 NOTE — ROUTINE PROCESS
Bedside and Verbal shift change report given to Lisa Ward RN (oncoming nurse) by Misha Caceres RN (offgoing nurse). Report included the following information SBAR, Kardex, Intake/Output, MAR, Accordion, Recent Results, Cardiac Rhythm SR and Alarm Parameters .

## 2018-02-06 NOTE — CONSULTS
Palliative Medicine Consult  Leo: [phone])    Patient Name: Ana Diaz  YOB: 1940    Date of Initial Consult: 2018  Reason for Consult: Care decisions  Requesting Provider: Severa Ped MD.  Primary Care Physician: Kvng Bolanos MD     SUMMARY:   Ana Diaz is a 76 y.o. with a past history of ILD, chronic hypoxemic respiratory failure, aortic stenosis s/p AVR, diastolic heart failure, pulm HTN, asthma, afib, CAD, depression, HLD, HTN, DM and sleep apnea, , who was admitted on 2018 from home with a diagnosis of acute respiratory failure. Patient was seen at [facility] prior to arrival and found to be in resp distress with sats in the high 60's. EMS was called and she was brought into the ED. Was started on bipap, given sequential nebs in ED and started on antibiotics for community acquired pneumonia due to low grade fever, elevation of wbc count, and chest xray findings of diffuse interstitial and patchy opacities. Echo done revealed EF of 50% and moderate aortic stenosis and severe pulmonary hypertension. Cardiology recommended IV diuresis. Current medical issues leading to Palliative Medicine involvement include: Care decisions due to risk for decline. SH:  (  in ), lives with daughter in law, Milton Grimes. Son Nitin Rogers  2018 suddenly. Daughter, Clif Velazquez, is mPOA. Second son, [place] Vigil, lives locally. Interim History:  2018--> Transitioned to HFNC this am and daughter reports that she ate well this am. Repeat chest xray increased diffuse bilateral interstitial and alveolar opacities,  compatible with worsening pulmonary edema and/or pneumonia despite diuresis. PALLIATIVE DIAGNOSES:   1. Shortness of breath  2. Hypoxia-- requiring Bipap/HFNC  3. Hypoalbuminemia  4. Goals of care  5. DNR   6. ACP  7. ILD  8. RSV       PLAN:   1. Met with patient in follow-up. Daughter Jack Varela at bedside.   2. Symptom Management--> Patient reports that her breathing is better, transitioned to HFNC this am and was able to eat her breakfast. Continue duonebs, scheduled solumedrol, ceftriaxone/doxy, diuresis, albuterol, brovana/pulmicort, HFNC as tolerated, and BiPAP as needed. 3. Goals of care-->Angelic described patient's prior level of functioning prior to this illness--> moved in with daughter-in-law 2 years ago when family thought she was having more issues with memory and daughter in law has background as adult caregiver. Ambulates with walker/cane. Family cooks all of her meals for her. Gamaliel Cheatham explained the impact of sudden death of patient's son and because of this daughter in law wants very much for the patient to return home with her (finds comfort in having her mother in law with her in the home). Gamaliel Cheatham believes that patient has improved today and believes that she is \"taking a step in the right direction\", and is hopeful that as virus runs it's course she will continue to improve. Discussed the current hospitalization with continued high oxygen requirements, PT/OT eval to assess needs when she is appropriate for therapies, and possibility of new baseline. Discussed possibility of need for continued rehabilitation in SNF and while daughter acknowledges that this may be necessary but again would like patient to return home with daughter in law. 4. DNR discussion--> Addressed what patient would want done in the event of cardiopulmonary arrest and what resuscitation would entail. Daughter/mPOA states that she would elect to be DNR. Will need DDNR completed prior to discharge. 5. ACP--> Patient has advanced medical directive on file and appoints Samir Bolaños (daughter) as primary agent for healthcare decisions, and Mayank Mohr (son) as secondary agent. ACP updated to reflect this. 6. Initial consult note routed to primary continuity provider  7.  Communicated plan of care with: Palliative IDT       GOALS OF CARE / TREATMENT PREFERENCES:     GOALS OF CARE: recover from RSV infection and wean oxygen down to be able to go home         TREATMENT PREFERENCES:   Code Status: DNR    Advance Care Planning:  Advance Care Planning 2/5/2018   Patient's Healthcare Decision Maker is: Named in scanned ACP document   Primary Decision Maker Name Lorelei Joyner   Primary Decision Maker Phone Number [phone]   Primary Decision Maker Relationship to Patient Adult child   Secondary Decision Maker Name Hieu Siu   Secondary Decision Maker Phone Number [phone]   Secondary Decision Maker Relationship to Patient Adult child   Confirm Advance Directive Yes, on file   Does the patient have other document types Do Not Resuscitate           Other Instructions: Other:    As far as possible, the palliative care team has discussed with patient / health care proxy about goals of care / treatment preferences for patient. HISTORY:     History obtained from: chart, daughter Jhoan Lady: shortness of breath    HPI/SUBJECTIVE:    The patient is:   [] Verbal and participatory  [x] Non-participatory due to: confused, currently on BiPAP    76 y.o. WF who was seen at [facility] prior to arrival and found to be in resp distress with sats in the high 60's. EMS was called and she was brought into the ED. Was started on bipap, given sequential nebs in ED and started on antibiotics for community acquired pneumonia due to low grade fever, elevation of wbc count, and chest xray findings of diffuse interstitial and patchy opacities. Echo done revealed EF of 50% and moderate aortic stenosis and severe pulmonary hypertension. Cardiology recommended IV diuresis.        Clinical Pain Assessment (nonverbal scale for severity on nonverbal patients):   Clinical Pain Assessment  Severity: 0          Duration: for how long has pt been experiencing pain (e.g., 2 days, 1 month, years)  Frequency: how often pain is an issue (e.g., several times per day, once every few days, constant)     FUNCTIONAL ASSESSMENT:     Palliative Performance Scale (PPS):  PPS: 40       PSYCHOSOCIAL/SPIRITUAL SCREENING:     Palliative IDT has assessed this patient for cultural preferences / practices and a referral made as appropriate to needs (Cultural Services, Patient Advocacy, Ethics, etc.)    Advance Care Planning:  Advance Care Planning 2/5/2018   Patient's Healthcare Decision Maker is: Named in scanned ACP document   Primary Decision Maker Name Maegan Cloud   Primary Decision Maker Phone Number [phone]   Primary Decision Maker Relationship to Patient Adult child   Secondary Decision Maker Name Rome Otto   Secondary Decision Maker Phone Number [phone]   Secondary Decision Maker Relationship to Patient Adult child   Confirm Advance Directive Yes, on file   Does the patient have other document types Do Not Resuscitate       Any spiritual / Scientology concerns:  [] Yes /  [x] No    Caregiver Burnout:  [] Yes /  [x] No /  [] No Caregiver Present      Anticipatory grief assessment:   [x] Normal  / [] Maladaptive       ESAS Anxiety:      ESAS Depression:          REVIEW OF SYSTEMS:     Positive and pertinent negative findings in ROS are noted above in HPI. The following systems were [] reviewed / [x] unable to be reviewed as noted in HPI  Other findings are noted below. Systems: constitutional, ears/nose/mouth/throat, respiratory, gastrointestinal, genitourinary, musculoskeletal, integumentary, neurologic, psychiatric, endocrine. Positive findings noted below. Modified ESAS Completed by: provider   Fatigue: 4 Drowsiness: 4     Pain: 0           Dyspnea: 2           Stool Occurrence(s): 1        PHYSICAL EXAM:     From RN flowsheet:  Wt Readings from Last 3 Encounters:   02/06/18 131 lb 4.8 oz (59.6 kg)   12/28/17 134 lb 9.6 oz (61.1 kg)   11/10/17 133 lb (60.3 kg)     Blood pressure 129/75, pulse 84, temperature 96.4 °F (35.8 °C), resp.  rate 18, height 5' 3\" (1.6 m), weight 131 lb 4.8 oz (59.6 kg), SpO2 99 %.     Pain Scale 1: Numeric (0 - 10)  Pain Intensity 1: 0                 Last bowel movement, if known:     Constitutional: frail, NAD, resting in bed, somnolent  Eyes: pupils equal, anicteric  ENMT: no nasal discharge, dry mucous membranes  Cardiovascular: regular rhythm, distal pulses intact, +1 SILVIO  Respiratory: breathing not labored, symmetric, HFNC in place, crackles throughout  Gastrointestinal: soft non-tender, +bowel sounds  Musculoskeletal: no deformity, no tenderness to palpation  Skin: warm, dry  Neurologic: following commands, moving all extremities  Psychiatric: flat affect, no hallucinations  Other:       HISTORY:     Principal Problem:    Acute respiratory failure (HCC) (2/2/2018)    Active Problems:    COPD exacerbation ([address] (2/2/2018)      CAP (community acquired pneumonia) (2/2/2018)      Past Medical History:   Diagnosis Date    Aortic stenosis     severe, s/p AVR March 2012 - presented with HF, EF 30%    Asthma 5/10/2012    Atrial fibrillation ([address] 5/10/2012    Bacterial pneumonia, unspecified     CAD (coronary artery disease)     cor calcifications (LAD) by CT     Chronic diastolic heart failure ([address] 5/29/2012    Depression     Dyslipidemia     Female stress incontinence     Hyperlipidemia 9/17/2010    Hypertension     Osteoarthritis     Paroxysmal atrial fibrillation (HCC)     post op AVR    Pulmonary fibrosis ([address]     S/P AVR 11/6/2016    S/P AVR (aortic valve replacement) 3/2012    bovine ([facility])    Sleep apnea     T2DM (type 2 diabetes mellitus) ([address] 1/14/2011      Past Surgical History:   Procedure Laterality Date    CARDIAC SURG PROCEDURE UNLIST      ECHO 2D ADULT  3/2012    EF 30%, mod LVH, EF 30-35%, PA 50 mmHg    HX CATARACT REMOVAL      bilateral      Family History   Problem Relation Age of Onset    Diabetes Mother     Diabetes Father     Hypertension Father       History reviewed, no pertinent family history.   Social History   Substance Use Topics    Smoking status: Never Smoker    Smokeless tobacco: Never Used    Alcohol use No     No Known Allergies   Current Facility-Administered Medications   Medication Dose Route Frequency    insulin NPH (NOVOLIN N, HUMULIN N) injection 12 Units  12 Units SubCUTAneous ACB&D    [START ON 2/8/2018] digoxin (LANOXIN) tablet 0.125 mg  0.125 mg Oral EVERY OTHER DAY    methylPREDNISolone (PF) (SOLU-MEDROL) injection 60 mg  60 mg IntraVENous Q6H    furosemide (LASIX) tablet 40 mg  40 mg Oral DAILY    cefTRIAXone (ROCEPHIN) 2 g in 0.9% sodium chloride 100 mL IVPB  2 g IntraVENous Q24H    albuterol-ipratropium (DUO-NEB) 2.5 MG-0.5 MG/3 ML  3 mL Nebulization QID RT    sodium chloride (NS) flush 5-10 mL  5-10 mL IntraVENous Q8H    sodium chloride (NS) flush 5-10 mL  5-10 mL IntraVENous PRN    sodium chloride (NS) flush 5-10 mL  5-10 mL IntraVENous Q8H    sodium chloride (NS) flush 5-10 mL  5-10 mL IntraVENous PRN    insulin lispro (HUMALOG) injection   SubCUTAneous AC&HS    glucose chewable tablet 16 g  4 Tab Oral PRN    dextrose (D50W) injection syrg 12.5-25 g  12.5-25 g IntraVENous PRN    glucagon (GLUCAGEN) injection 1 mg  1 mg IntraMUSCular PRN    dilTIAZem CD (CARDIZEM CD) capsule 240 mg  240 mg Oral DAILY    atorvastatin (LIPITOR) tablet 20 mg  20 mg Oral QHS    rivaroxaban (XARELTO) tablet 15 mg  15 mg Oral DAILY WITH DINNER    arformoterol (BROVANA) neb solution 15 mcg  15 mcg Nebulization BID RT    doxycycline (VIBRAMYCIN) 100 mg in 0.9% sodium chloride (MBP/ADV) 100 mL MBP  100 mg IntraVENous Q12H    pirfenidone (ESBRIET) capsule 801 mg  (Patient Supplied)  801 mg Oral TID WITH MEALS          LAB AND IMAGING FINDINGS:     Lab Results   Component Value Date/Time    WBC 7.9 02/06/2018 01:47 AM    HGB 9.0 02/06/2018 01:47 AM    PLATELET 920 31/01/6513 01:47 AM     Lab Results   Component Value Date/Time    Sodium 144 02/06/2018 01:47 AM Potassium 4.1 02/06/2018 01:47 AM    Chloride 109 02/06/2018 01:47 AM    CO2 31 02/06/2018 01:47 AM    BUN 28 02/06/2018 01:47 AM    Creatinine 0.71 02/06/2018 01:47 AM    Calcium 7.9 02/06/2018 01:47 AM    Magnesium 2.1 02/05/2018 02:31 AM      Lab Results   Component Value Date/Time    AST (SGOT) 68 02/06/2018 01:47 AM    Alk. phosphatase 212 02/06/2018 01:47 AM    Protein, total 6.1 02/06/2018 01:47 AM    Albumin 2.5 02/06/2018 01:47 AM    Globulin 3.6 02/06/2018 01:47 AM     Lab Results   Component Value Date/Time    INR 1.0 02/02/2018 02:58 PM    Prothrombin time 10.1 02/02/2018 02:58 PM    aPTT 32.4 02/02/2018 02:58 PM      No results found for: IRON, FE, TIBC, IBCT, PSAT, FERR   Lab Results   Component Value Date/Time    pH 7.40 02/05/2018 12:05 PM    PCO2 44 02/05/2018 12:05 PM    PO2 84 02/05/2018 12:05 PM     No components found for: [place]   Lab Results   Component Value Date/Time    CK 61 06/07/2017 04:33 PM    CK - MB <1.0 06/07/2017 04:33 PM                Total time: 35 min  Counseling / coordination time, spent as noted above: 25 min  > 50% counseling / coordination?: yes    Prolonged service was provided for  []30 min   []75 min in face to face time in the presence of the patient, spent as noted above. Time Start:   Time End:   Note: this can only be billed with 92665 (initial) or 08088 (follow up). If multiple start / stop times, list each separately.

## 2018-02-06 NOTE — MED STUDENT NOTES
General Daily Progress Note    Admit Date: 2/2/2018    Hospital Summary: Jonny Bhandari is a 76 y.o. female with a PMHx significant for HTN, CAD, pulmonary fibrosis, T2DM admitted for acute on chronic hypoxic respiratory failure 2/2 CAP vs RSV. Subjective:     Overnight: No acute overnight events. This morning patient on BiPAP, complains of stuffy nose. Endorses improvement in breathing. Has not had a BM in a few days.       Current Facility-Administered Medications   Medication Dose Route Frequency    insulin NPH (NOVOLIN N, HUMULIN N) injection 12 Units  12 Units SubCUTAneous ACB&D    methylPREDNISolone (PF) (SOLU-MEDROL) injection 60 mg  60 mg IntraVENous Q6H    furosemide (LASIX) tablet 40 mg  40 mg Oral DAILY    cefTRIAXone (ROCEPHIN) 2 g in 0.9% sodium chloride 100 mL IVPB  2 g IntraVENous Q24H    albuterol-ipratropium (DUO-NEB) 2.5 MG-0.5 MG/3 ML  3 mL Nebulization QID RT    sodium chloride (NS) flush 5-10 mL  5-10 mL IntraVENous Q8H    sodium chloride (NS) flush 5-10 mL  5-10 mL IntraVENous PRN    sodium chloride (NS) flush 5-10 mL  5-10 mL IntraVENous Q8H    sodium chloride (NS) flush 5-10 mL  5-10 mL IntraVENous PRN    insulin lispro (HUMALOG) injection   SubCUTAneous AC&HS    glucose chewable tablet 16 g  4 Tab Oral PRN    dextrose (D50W) injection syrg 12.5-25 g  12.5-25 g IntraVENous PRN    glucagon (GLUCAGEN) injection 1 mg  1 mg IntraMUSCular PRN    digoxin (LANOXIN) tablet 0.125 mg  0.125 mg Oral DAILY    dilTIAZem CD (CARDIZEM CD) capsule 240 mg  240 mg Oral DAILY    atorvastatin (LIPITOR) tablet 20 mg  20 mg Oral QHS    rivaroxaban (XARELTO) tablet 15 mg  15 mg Oral DAILY WITH DINNER    arformoterol (BROVANA) neb solution 15 mcg  15 mcg Nebulization BID RT    doxycycline (VIBRAMYCIN) 100 mg in 0.9% sodium chloride (MBP/ADV) 100 mL MBP  100 mg IntraVENous Q12H    pirfenidone (ESBRIET) capsule 801 mg  (Patient Supplied)  801 mg Oral TID WITH MEALS          Objective: Patient Vitals for the past 8 hrs:   BP Temp Pulse Resp SpO2 Weight   02/06/18 0718 - - - - 99 % -   02/06/18 0701 - - 84 - - -   02/06/18 0437 - - - - - 131 lb 4.8 oz (59.6 kg)   02/06/18 0400 129/75 96.4 °F (35.8 °C) 81 18 94 % -   02/06/18 0300 120/74 - 73 - 100 % -   02/06/18 0200 128/79 - 77 - 99 % -   02/06/18 0100 129/74 - 77 - (!) 85 % -   02/06/18 0050 - - - - 100 % -   02/06/18 0000 117/72 - 77 - 100 % -        02/04 1901 - 02/06 0700  In: 1060 [P.O.:740; I.V.:320]  Out: 2250 [Urine:2250]    Physical Exam:  General: Comfortable, no acute distress. CV: S1 S2, RRR. ROSALIE appreciated in LUSB, no rubs or gallops appreciated. Resp: Non-labored breathing, decreased BS throughout. No wheezing or crackles appreciated. Abd: Soft, non-tender, non-distended. BS+. No guarding, no rebound. Ext: Trace LE edema below the ankles bilaterally. Psych: Cooperative, appropriate mood and affect. Labs:  Recent Results (from the past 12 hour(s))   METABOLIC PANEL, COMPREHENSIVE    Collection Time: 02/06/18  1:47 AM   Result Value Ref Range    Sodium 144 136 - 145 mmol/L    Potassium 4.1 3.5 - 5.1 mmol/L    Chloride 109 (H) 97 - 108 mmol/L    CO2 31 21 - 32 mmol/L    Anion gap 4 (L) 5 - 15 mmol/L    Glucose 149 (H) 65 - 100 mg/dL    BUN 28 (H) 6 - 20 MG/DL    Creatinine 0.71 0.55 - 1.02 MG/DL    BUN/Creatinine ratio 39 (H) 12 - 20      GFR est AA >60 >60 ml/min/1.73m2    GFR est non-AA >60 >60 ml/min/1.73m2    Calcium 7.9 (L) 8.5 - 10.1 MG/DL    Bilirubin, total 0.2 0.2 - 1.0 MG/DL    ALT (SGPT) 135 (H) 12 - 78 U/L    AST (SGOT) 68 (H) 15 - 37 U/L    Alk.  phosphatase 212 (H) 45 - 117 U/L    Protein, total 6.1 (L) 6.4 - 8.2 g/dL    Albumin 2.5 (L) 3.5 - 5.0 g/dL    Globulin 3.6 2.0 - 4.0 g/dL    A-G Ratio 0.7 (L) 1.1 - 2.2     CBC WITH AUTOMATED DIFF    Collection Time: 02/06/18  1:47 AM   Result Value Ref Range    WBC 7.9 3.6 - 11.0 K/uL    RBC 3.09 (L) 3.80 - 5.20 M/uL    HGB 9.0 (L) 11.5 - 16.0 g/dL    HCT 29.1 (L) 35.0 - 47.0 %    MCV 94.2 80.0 - 99.0 FL    MCH 29.1 26.0 - 34.0 PG    MCHC 30.9 30.0 - 36.5 g/dL    RDW 14.7 (H) 11.5 - 14.5 %    PLATELET 098 206 - 171 K/uL    MPV 9.3 8.9 - 12.9 FL    NRBC 0.3 (H) 0  WBC    ABSOLUTE NRBC 0.02 (H) 0.00 - 0.01 K/uL    NEUTROPHILS 90 (H) 32 - 75 %    LYMPHOCYTES 4 (L) 12 - 49 %    MONOCYTES 5 5 - 13 %    EOSINOPHILS 0 0 - 7 %    BASOPHILS 0 0 - 1 %    IMMATURE GRANULOCYTES 1 (H) 0.0 - 0.5 %    ABS. NEUTROPHILS 7.1 1.8 - 8.0 K/UL    ABS. LYMPHOCYTES 0.3 (L) 0.8 - 3.5 K/UL    ABS. MONOCYTES 0.4 0.0 - 1.0 K/UL    ABS. EOSINOPHILS 0.0 0.0 - 0.4 K/UL    ABS. BASOPHILS 0.0 0.0 - 0.1 K/UL    ABS. IMM. GRANS. 0.1 (H) 0.00 - 0.04 K/UL    DF AUTOMATED      RBC COMMENTS NORMOCYTIC, NORMOCHROMIC     GLUCOSE, POC    Collection Time: 02/06/18  6:43 AM   Result Value Ref Range    Glucose (POC) 159 (H) 65 - 100 mg/dL    Performed by Octavio Braxton (PCT)        Imaging:   CXR (2/5): yesterday due to SOB, decompensation requiring BiPAP, lethargy  - No acute change from previous CXR (2/4) revealing heterogenous airspace disease    CXR (2/6): this morning given heart failure  - Increased diffuse bilateral interstitial alveolar opacities, compatible with worsening pulmonary edema and/or pneumonia        Assessment and Plan:   77F with a PMHx significant for HTN, CAD, pulmonary fibrosis, COPD, T2DM admitted for acute on chronic hypoxic respiratory failure 2/2 CAP vs RSV.     #Acute on chronic hypoxic respiratory failure, requiring BiPAP: 2/2 CAP vs RSV. Usually on home 2L NC.  This morning's CXR reveals worsening pulmonary edema and/or pna.  - Hi flow as tolerated, BiPAP PRN  - Continue solmederol 60mg IV q6h, appreciate pulm recs   - Ceftriaxone (day 5) and doxycycline (day 5)  - Duoneb QID, Brovana 15mcg BID  - Lasix 20mg IV today  - Strict I&O's  - Palliative consulted     #Pulmonary fibrosis  - Home esbriet 801mg TID with meals     #HFpEF: no crackles or peripheral edema appreciated on exam. Received lasix 40mg po this morning, but given CXR revealing worsening pulmonary edema and/or pneumonia will receive IV lasix later today.   - Lasix 20mg IV today  - Strict I&O's  - Home cardizem  - No BB due to pulmonary fibrosis  - Cards following, appreciate recs    #CAD: stable  - Continue ASA, lipitor; no BB due to pulmonary fibrosis    #HTN: BP at goal  - See above     #Paroxysmal a fib s/p AVR: stable   - Continue digoxin, cardizem, xarelto    #T2DM: Goal < 150-180  - NPH 12u BID before meals  - SSI lispro  - Hold home metformin  - Hypoglycemic protocol     Fluids: po  Electrolytes: replete PRN  Nutrition: soft GI  DVT ppx: anticoagulated on xarelto  GI ppx: not indicated     Dispo: wean off hi flow to home 2L NC (or re-evaluate goals of care, palliative consult), IV steroid, IV lasix     Adama Berg, M4 ([facility])  Plan discussed with Dr. Jaime Snare

## 2018-02-06 NOTE — PROGRESS NOTES
[facility]  [facility]. [facility] Residency [facility]       Resident Progress Note in Brief    S: RR called. Patient seen and examined at bedside. Denies any CP, SOB is the same. No palpitations. Feels like coughing. Nurse stated patient was weaned to high flow, she was doing well, and suddenly desatted to the 60%      O:  Visit Vitals    BP 98/65    Pulse (!) 117    Temp 96.4 °F (35.8 °C)    Resp 22    Ht 5' 3\" (1.6 m)    Wt 131 lb 4.8 oz (59.6 kg)    SpO2 95%    BMI 23.26 kg/m2     Physical Examination:   General appearance - alert,  in distress, trying to cough up. Coughed up a bloody mucous plug ( like clot) and then the respiratory distress went resolved. Chest - bilateral expiratory wheezing. No crackles. Heart - tachycardic regular, normal S1, S2, no murmurs, rubs, clicks or gallops,   Neurological - alert, oriented, normal speech, no focal findings  Extremities - peripheral pulses normal, no pedal edema, no clubbing or cyanosis    A/P: Acute respiratory distress 2/2 mucous plug. Dr. Suyapa Peña was called at bed site, recommended chest PT and acetylcysteine. - continue Bipap at 80 % FiO2, will wean down to 70% as tolerated. Leo Pittman MD  Family Medicine Resident  1:29 PM

## 2018-02-06 NOTE — PROGRESS NOTES
Problem: Falls - Risk of  Goal: *Absence of Falls  Document Rina Fall Risk and appropriate interventions in the flowsheet.    Outcome: Progressing Towards Goal  Fall Risk Interventions:  Mobility Interventions: Assess mobility with egress test, Bed/chair exit alarm, Communicate number of staff needed for ambulation/transfer, Mechanical lift, OT consult for ADLs, Patient to call before getting OOB, PT Consult for mobility concerns, PT Consult for assist device competence, Strengthening exercises (ROM-active/passive), Utilize walker, cane, or other assitive device, Utilize gait belt for transfers/ambulation    Mentation Interventions: Adequate sleep, hydration, pain control, Bed/chair exit alarm, Door open when patient unattended, Evaluate medications/consider consulting pharmacy, Eyeglasses and hearing aids, Familiar objects from home, Family/sitter at bedside, Gait belt with transfers/ambulation, Increase mobility, More frequent rounding, Reorient patient, Room close to nurse's station, Self-releasing belt, Toileting rounds, Update white board    Medication Interventions: Assess postural VS orthostatic hypotension, Bed/chair exit alarm, Evaluate medications/consider consulting pharmacy, Patient to call before getting OOB, Teach patient to arise slowly, Utilize gait belt for transfers/ambulation    Elimination Interventions: Bed/chair exit alarm, Call light in reach, Elevated toilet seat, Patient to call for help with toileting needs, Toilet paper/wipes in reach, Toileting schedule/hourly rounds

## 2018-02-06 NOTE — PROGRESS NOTES
Physical Therapy Note:  Chart reviewed and spoke with nursing. RRT called this afternoon due to desaturation to 60% on BiPAP. Per RN, patient coughed up a large amount of mucus + blood and placed back on BiPAP. Will hold PT evaluation as this time and follow-up next tx day. Thank you.

## 2018-02-06 NOTE — PROGRESS NOTES
[facility] Dosing Services: Antimicrobial Stewardship Daily Doc    Consult for antibiotic dosing of Cefepime, flagyl by Dr. Jesus Horton  Indication: CAP (Rocephin 2/2 - 2/5, Doxy since 2/2 - day 5)  Day of Therapy 1    Ht Readings from Last 1 Encounters:   02/02/18 160 cm (63\")        Wt Readings from Last 1 Encounters:   02/06/18 59.6 kg (131 lb 4.8 oz)        Non-Kinetic Antimicrobial Dosing Regimen:   Recommendation: Cefepime 2 g IV every 12 hours    Recommendation: Flagyl 500 mg IV every 8 hours    Other Antimicrobial   (not dosed by pharmacist) Doxycycline 100 mg IV every 12 hours   Cultures 2/3 Resp: RSV (Final)  2/2 Urine: NGTD (Final)  2/2 Blood x 2: NGTD (Pend)   Serum Creatinine Lab Results   Component Value Date/Time    Creatinine 0.71 02/06/2018 01:47 AM    Creatinine (POC) 1.3 05/15/2012 04:20 PM         Creatinine Clearance Estimated Creatinine Clearance: 53.1 mL/min (based on Cr of 0.71).      Temp Temp: 96.4 °F (35.8 °C)       WBC Lab Results   Component Value Date/Time    WBC 7.9 02/06/2018 01:47 AM        H/H Lab Results   Component Value Date/Time    HGB 9.0 (L) 02/06/2018 01:47 AM        Platelets    Lab Results   Component Value Date/Time    PLATELET 876 35/26/0540 01:47 AM              For Antifungals, Metronidazole, and Nafcillin:  ALT:   135     AST:   68    Alk Phos:  212   (~2x ULN)    T Bili:  0.2      Thank you,  Ragini Cowan, PharmD     Contact: [phone]

## 2018-02-06 NOTE — PROGRESS NOTES
[facility] PROGRESS NOTE  Pulmonary, Critical Care, and Sleep Medicine    Name: Aric Caldwell MRN: [id number]   : 1940 Hospital: [address] [facility]   Date: 2018  Admission Date: 2018     Chart and notes reviewed. Data reviewed. I review the patient's current medications in the medical record at each encounter. I have evaluated and examined the patient. Overnight events reviewed:  Afebrile  Sats 91-93% on HF 45L FiO2 100%  Hgb 9.0  LFTs elevated  I/O: - 740 ml  18 ABG 7.40/44/84/26 on BiPAP 14/8 FiO2 80%    ROS: Feels unchanged. Still with SOB at rest.  Reports increased WOB, even with HF in place. Denies CP. Denies fever or chills. Reports nonproductive cough. Denies abd pain. Has mid LE/pedal swelling, but denies LE pain. 12 point ROS reviewed and negative except as noted above. Vital Signs:  Visit Vitals    /75 (BP 1 Location: Left arm, BP Patient Position: At rest;Hip tilt, left)    Pulse 84    Temp 96.4 °F (35.8 °C)    Resp 18    Ht 5' 3\" (1.6 m)    Wt 59.6 kg (131 lb 4.8 oz)    SpO2 99%    BMI 23.26 kg/m2       O2 Device: BIPAP   O2 Flow Rate (L/min): 40 l/min   Temp (24hrs), Av.3 °F (36.3 °C), Min:96.4 °F (35.8 °C), Max:97.6 °F (36.4 °C)       Intake/Output:   Last shift:         Last 3 shifts:  1901 -  0700  In: 0055 [P.O.:740; I.V.:320]  Out: 2250 [Urine:2250]    Intake/Output Summary (Last 24 hours) at 18 0950  Last data filed at 18 5923   Gross per 24 hour   Intake              810 ml   Output             1550 ml   Net             -740 ml          Physical Exam:   General:  Alert, cooperative, ill-appearing, + resp distress, appears stated age. Head:  Normocephalic, without obvious abnormality, atraumatic. Eyes:  Conjunctivae/corneas clear. Nose: Nares normal. Septum midline. Mucosa normal.  HF in place.    Throat: Lips, mucosa, and tongue normal.    Neck: Supple, symmetrical, trachea midline, no adenopathy. Lungs:   Pursed lip breathing, diffuse crackles bilaterally, most prominent in the bases. Chest wall:  No tenderness or deformity. Heart:  Regular rate and rhythm, S1, S2 normal, no murmur, click, rub or gallop. Abdomen:   Soft, non-tender. Bowel sounds normal. No masses,  No organomegaly. Extremities: Extremities normal, atraumatic, no cyanosis, mild LE/pedal edema. Pulses: 2+ and symmetric all extremities. Skin: Skin color, texture, turgor normal. No rashes or lesions   Lymph nodes: Cervical, supraclavicular nodes normal.   Neurologic: Grossly nonfocal     DATA:  MAR reviewed and pertinent medications noted or modified as needed    Labs:  Recent Labs      02/06/18 0147 02/05/18   0231  02/04/18   0400   WBC  7.9  8.6  8.0   HGB  9.0*  8.6*  8.4*   HCT  29.1*  27.4*  26.8*   PLT  209  233  241     Recent Labs      02/06/18 0147 02/05/18   0231  02/04/18   0400   NA  144  144  143   K  4.1  3.5  4.2   CL  109*  110*  110*   CO2  31  29  26   GLU  149*  175*  217*   BUN  28*  30*  28*   CREA  0.71  0.82  0.84   CA  7.9*  7.8*  8.2*   MG   --   2.1   --    ALB  2.5*  2.3*  2.4*   TBILI  0.2  0.2  0.2   SGOT  68*  80*  45*   ALT  135*  123*  57       Imaging:  Personally visualized. CXR (2/6/18):   No significant change in heterogeneous diffuse airspace disease    IMPRESSION:   -ILD of uncertain etiology with acute exacerbation  -RSV  -acute on chronic hypoxemic respiratory failure requiring bipap  -pulmonary HTN, likely largely group 3 with some component of group 2  -asthma/COPD  -MARIA R not tolerant of PAP therapy       PLAN:   -continue hi flow as tolerated when eating/drinking; wean FiO2 as tolerated to keep sats > 88-90%; continue bipap as needed for hypoxia and increased WOB  -unable to check non contrast high resolution chest CT due to tenuous respiratory status  -continue schedule duonebs and solumedrol at 60 mg every 6 hours  -continue ceftriaxone/doxy for now  -respiratory viral PCR with RSV, likely etiology of exacerbation  -pneumonia workup negative  -no IVFs. Patient will not tolerate any pulmonary edema. Unless she shows signs of overt dehydration would NOT put her on maintenance fluids  -diuresis with Lasix per cardiology; f/u repeat CXR  -repeat TTE: EF 50%; mildly reduced RVSF; severe AS; severe pulm HTN  -continue home albuterol, brovana, pulmicort  -on Xarelto--doubt PE   -OOB as tolerated  -DNR/DNI  -Palliative Care eval appreciated    Patient is critically ill requiring continuous HF vs BiPAP for severe hypoxia and increased WOB and is high risk for decompensation due to respiratory failure. CC time EOP 30 min. Discussed with patient and daughter at the bedside.          Eduardo Nelson

## 2018-02-07 NOTE — DIABETES MGMT
[facility]    Elevated Blood Glucose Note     Chart reviewed for elevated blood glucose ( > 180 mg/dL x 2 in the past 24 hours). Recommendations/ Comments: Morning dose of NPH 12 units missed, correction scale HELD. Please consider changing timing of NPH from before meals to Q12 hours to aid in managing hyperglycemia associated with steroids. Fasting glucose: 221 mg/dL. Required 16 units of correction, including 1 held dose. Akanksha Briceno is a 76 y.o. female with a past medical history significant for T2DM per Dr. Ifeoma López. MD Toya's H&P dated 2/2/2018. Recent Glucose Results:   Lab Results   Component Value Date/Time     (H) 02/07/2018 05:10 AM     (H) 02/06/2018 10:34 PM    GLUCPOC 242 (H) 02/07/2018 11:41 AM    GLUCPOC 221 (H) 02/06/2018 09:09 PM    GLUCPOC 150 (H) 02/06/2018 04:55 PM        Hospital (inpatient) medications:  1. Correction Scale: Lispro (Humalog) Insulin Resistant Sensitivity scale to cover for glucose > 139 mg/dL before meals and for glucose >199 at bedtime    2. NPH 12 units with meals     Prior to admission medications: per past medical records  -Metformin 500mg TID with meals      Lab Results   Component Value Date/Time    Hemoglobin A1c 6.0 (H) 12/28/2017 04:02 PM    Hemoglobin A1c 6.3 (H) 08/15/2017 05:04 PM     Estimated Creatinine Clearance: 65.1 mL/min (based on Cr of 0.58). Active Orders   Diet    DIET CARDIAC Mechanical Soft; 1.5 GM NA        Thank you. Derrek Martino, MPH, RN, BSN, [address]   [phone] (office)  [phone] (pager)    -For most hospitalized persons with hyperglycemia in the intensive care unit (ICU), a glucose range of 140 to 180 mg/dL is recommended, provided this target can be safely achieved. *  - For general medicine and surgery patients in non-ICU settings, a premeal glucose target <140 mg/dL and a random blood glucose <180 mg/dL are recommended. *    DACIA Brasher, Avery Edwards., Oly Zepeda, ... & Alison Rodrigues ([id number]). [employer]OC[employer]ATION OF CLINICAL ENDOCRINOLOGISTS AND AMERICAN COLLEGE OF ENDOCRINOLOGY-CLINICAL PRACTICE GUIDELINES FOR DEVELOPING A DIABETES MELLITUS COMPREHENSIVE CARE PLAN-2015-EXECUTIVE SUMMARY: Complete guidelines are available at [email or web address] aace. com/publications/guidelines. Endocrine Practice, 21(4), [id number].

## 2018-02-07 NOTE — PROGRESS NOTES
Palliative Medicine Consult  eLo: [phone] ([phone]    Patient Name: Moon Melton  YOB: 1940    Date of Initial Consult: 2018  Reason for Consult: Care decisions  Requesting Provider: Karly Henriquez MD.  Primary Care Physician: Zainab Hernández MD     SUMMARY:   Moon Melton is a 76 y.o. with a past history of ILD, chronic hypoxemic respiratory failure, aortic stenosis s/p AVR, diastolic heart failure, pulm HTN, asthma, afib, CAD, depression, HLD, HTN, DM and sleep apnea, , who was admitted on 2018 from home with a diagnosis of acute respiratory failure. Patient was seen at [facility] prior to arrival and found to be in resp distress with sats in the high 60's. EMS was called and she was brought into the ED. Was started on bipap, given sequential nebs in ED and started on antibiotics for community acquired pneumonia due to low grade fever, elevation of wbc count, and chest xray findings of diffuse interstitial and patchy opacities. Echo done revealed EF of 50% and moderate aortic stenosis and severe pulmonary hypertension. Cardiology recommended IV diuresis. Current medical issues leading to Palliative Medicine involvement include: Care decisions due to risk for decline. SH:  (  in ), lives with daughter in law, Tuyet Thornton. Son Solis Randolph  2018 suddenly. Daughter, Nuzhat Goldberg, is mPOA. Second son, Azeem Zabala, lives locally. Interim History:  2018--> Transitioned to HFNC this am and daughter reports that she ate well this am. Repeat chest xray increased diffuse bilateral interstitial and alveolar opacities,  compatible with worsening pulmonary edema and/or pneumonia despite diuresis. 2018--> RRT called twice in last 24 hours--> one for desat related to mucous plugging, other for patient pulling off Bipap with desat and tachycardia. Antibiotic coverage broadened to cefepime, flagyl, in addition to doxycycline. Continues on IV solumedrol, duonebs, added mucomyst.      PALLIATIVE DIAGNOSES:   1. Shortness of breath  2. Hypoxia-- requiring Bipap/HFNC  3. Hypoalbuminemia  4. Goals of care  5. DNR   6. ACP  7. ILD  8. RSV       PLAN:   1. Met with patient in follow-up. Daughter-in law Shania at bedside. 2. Symptom Management--> Patient currently on HFNC, sitting up in bed answering questions and interacting with family. Daughter in law Shania inquiring as to why BiPAP has to be utilized at all, given the amount of agitation it causes the patient. Discussed that this is driven by respiratory status, hypoxia. Continue NIV, broad spectrum antibiotics, neb treatments. 3. Goals of care-->(02/06) Mitch Shukla described patient's prior level of functioning prior to this illness--> moved in with daughter-in-law 2 years ago when family thought she was having more issues with memory and daughter in law has background as adult caregiver. Ambulates with walker/cane. Family cooks all of her meals for her. Mitch Shukla explained the impact of sudden death of patient's son and because of this daughter in law wants very much for the patient to return home with her (finds comfort in having her mother in law with her in the home). Mitch Shukla believes that patient has improved today and believes that she is \"taking a step in the right direction\", and is hopeful that as virus runs it's course she will continue to improve. Discussed the current hospitalization with continued high oxygen requirements, PT/OT eval to assess needs when she is appropriate for therapies, and possibility of new baseline. Discussed possibility of need for continued rehabilitation in SNF and while daughter acknowledges that this may be necessary but again would like patient to return home with daughter in law. (02/07)--> Discussed events of 24 hours, 2 RRT calls.  Shania described how panicked she was with the first call, as she is 1 hour away, and is thankful that patient recovered from the desaturation. [facility] also reports that she is hopeful that with continued treatment the patient will improve and be able to return home with her. Describes how the patient has lived with her for some time, how the patient has supported her during her active grieving of the loss of her  (patient's son) and how the patient finds encouragement, comfort and enjoyment with spending time with grandchildren and great grandchildren. Spoke with [employer] and reviewed the events as well, she has appropriate insight into patient's current medical condition, tenuous respiratory status, as well as chronic lung disease, and is realistic that patient may not recover from this. Reports that at the time of diagnosis of ILD, patient was given a prognosis of 5 years and that she \"knew this day would come eventually\". Discussed current respiratory status and drops in oxygen saturation despite maximum support. Addressed that given failure to improve, and signs of possible further respiratory compromise, that we may be at the point where we have exhausted medical management. Daughter wants to see if additional antibiotics added yesterday improve patient's condition. Confirmed again that patient is DNI. 4. DNR discussion--> Addressed what patient would want done in the event of cardiopulmonary arrest and what resuscitation would entail. Daughter/mPOA states that she would elect to be DNR. Will need DDNR completed prior to discharge. 5. ACP--> Patient has advanced medical directive on file and appoints Sigifredo Patrick (daughter) as primary agent for healthcare decisions, and José Luis Webber (son) as secondary agent. ACP updated to reflect this. 6. Initial consult note routed to primary continuity provider  7.  Communicated plan of care with: Palliative IDT       GOALS OF CARE / TREATMENT PREFERENCES:     GOALS OF CARE: recover from RSV infection and wean oxygen down to be able to go home         TREATMENT PREFERENCES:   Code Status: DNR    Advance Care Planning:  Advance Care Planning 2/5/2018   Patient's Healthcare Decision Maker is: Named in scanned ACP document   Primary Decision Maker Name Marlee Mohan   Primary Decision Maker Phone Number [phone]   Primary Decision Maker Relationship to Patient Adult child   Secondary Decision Maker Name Yuli Diaz   Secondary Decision Maker Phone Number [phone]   Secondary Decision Maker Relationship to Patient Adult child   Confirm Advance Directive Yes, on file   Does the patient have other document types Do Not Resuscitate           Other Instructions: Other:    As far as possible, the palliative care team has discussed with patient / health care proxy about goals of care / treatment preferences for patient. HISTORY:     History obtained from: chart, daughter Sammy Peck: shortness of breath    HPI/SUBJECTIVE:    The patient is:   [] Verbal and participatory  [x] Non-participatory due to: confused, currently on BiPAP    76 y.o. WF who was seen at [facility] prior to arrival and found to be in resp distress with sats in the high 60's. EMS was called and she was brought into the ED. Was started on bipap, given sequential nebs in ED and started on antibiotics for community acquired pneumonia due to low grade fever, elevation of wbc count, and chest xray findings of diffuse interstitial and patchy opacities. Echo done revealed EF of 50% and moderate aortic stenosis and severe pulmonary hypertension. Cardiology recommended IV diuresis.        Clinical Pain Assessment (nonverbal scale for severity on nonverbal patients):   Clinical Pain Assessment  Severity: 0          Duration: for how long has pt been experiencing pain (e.g., 2 days, 1 month, years)  Frequency: how often pain is an issue (e.g., several times per day, once every few days, constant)     FUNCTIONAL ASSESSMENT:     Palliative Performance Scale (PPS):  PPS: 30 PSYCHOSOCIAL/SPIRITUAL SCREENING:     Palliative IDT has assessed this patient for cultural preferences / practices and a referral made as appropriate to needs (Cultural Services, Patient Advocacy, Ethics, etc.)    Advance Care Planning:  Advance Care Planning 2/5/2018   Patient's Healthcare Decision Maker is: Named in scanned ACP document   Primary Decision Maker Name Maninder Wing   Primary Decision Maker Phone Number [phone]   Primary Decision Maker Relationship to Patient Adult child   Secondary Decision Maker Name Peggy Gee   Secondary Decision Maker Phone Number [phone]   Secondary Decision Maker Relationship to Patient Adult child   Confirm Advance Directive Yes, on file   Does the patient have other document types Do Not Resuscitate       Any spiritual / Buddhist concerns:  [] Yes /  [x] No    Caregiver Burnout:  [] Yes /  [x] No /  [] No Caregiver Present      Anticipatory grief assessment:   [x] Normal  / [] Maladaptive       ESAS Anxiety:      ESAS Depression:          REVIEW OF SYSTEMS:     Positive and pertinent negative findings in ROS are noted above in HPI. The following systems were [] reviewed / [x] unable to be reviewed as noted in HPI  Other findings are noted below. Systems: constitutional, ears/nose/mouth/throat, respiratory, gastrointestinal, genitourinary, musculoskeletal, integumentary, neurologic, psychiatric, endocrine. Positive findings noted below. Modified ESAS Completed by: provider   Fatigue: 3 Drowsiness: 1     Pain: 0           Dyspnea: 2           Stool Occurrence(s): 1        PHYSICAL EXAM:     From RN flowsheet:  Wt Readings from Last 3 Encounters:   02/07/18 132 lb 7.9 oz (60.1 kg)   12/28/17 134 lb 9.6 oz (61.1 kg)   11/10/17 133 lb (60.3 kg)     Blood pressure 104/68, pulse (!) 101, temperature 98.5 °F (36.9 °C), resp. rate 20, height 5' 3\" (1.6 m), weight 132 lb 7.9 oz (60.1 kg), SpO2 95 %.     Pain Scale 1: Numeric (0 - 10)  Pain Intensity 1: 0 Last bowel movement, if known:     Constitutional: frail, NAD, resting in bed, answering questions and interacting with family  Eyes: pupils equal, anicteric  ENMT: no nasal discharge, dry mucous membranes  Cardiovascular: regular rhythm, distal pulses intact, +1 SILVIO  Respiratory: breathing not labored, symmetric, HFNC in place, crackles throughout  Gastrointestinal: soft non-tender, +bowel sounds  Musculoskeletal: no deformity, no tenderness to palpation  Skin: warm, dry  Neurologic: following commands, moving all extremities  Psychiatric: flat affect, no hallucinations  Other:       HISTORY:     Principal Problem:    Acute respiratory failure (HCC) (2/2/2018)    Active Problems:    COPD exacerbation ([address] (2/2/2018)      CAP (community acquired pneumonia) (2/2/2018)      Past Medical History:   Diagnosis Date    Aortic stenosis     severe, s/p AVR March 2012 - presented with HF, EF 30%    Asthma 5/10/2012    Atrial fibrillation ([address] 5/10/2012    Bacterial pneumonia, unspecified     CAD (coronary artery disease)     cor calcifications (LAD) by CT     Chronic diastolic heart failure (HCC) 5/29/2012    Depression     Dyslipidemia     Female stress incontinence     History of vascular access device 02/07/2018    [facility] VAT 4 FR MIDLINE L basilic for limited access    Hyperlipidemia 9/17/2010    Hypertension     Osteoarthritis     Paroxysmal atrial fibrillation (HCC)     post op AVR    Pulmonary fibrosis ([address]     S/P AVR 11/6/2016    S/P AVR (aortic valve replacement) 3/2012    bovine ([facility])    Sleep apnea     T2DM (type 2 diabetes mellitus) ([address] 1/14/2011      Past Surgical History:   Procedure Laterality Date    CARDIAC SURG PROCEDURE UNLIST      ECHO 2D ADULT  3/2012    EF 30%, mod LVH, EF 30-35%, PA 50 mmHg    HX CATARACT REMOVAL      bilateral      Family History   Problem Relation Age of Onset    Diabetes Mother     Diabetes Father     Hypertension Father       History reviewed, no pertinent family history.   Social History   Substance Use Topics    Smoking status: Never Smoker    Smokeless tobacco: Never Used    Alcohol use No     No Known Allergies   Current Facility-Administered Medications   Medication Dose Route Frequency    albuterol-ipratropium (DUO-NEB) 2.5 MG-0.5 MG/3 ML  3 mL Nebulization QID RT    acetylcysteine (MUCOMYST) 100 mg/mL (10 %) nebulizer solution 200 mg  2 mL Nebulization QID RT    insulin NPH (NOVOLIN N, HUMULIN N) injection 12 Units  12 Units SubCUTAneous BID    cefepime (MAXIPIME) 2 g in 0.9% sodium chloride 100 mL IVPB  2 g IntraVENous Q8H    [START ON 2/8/2018] digoxin (LANOXIN) tablet 0.125 mg  0.125 mg Oral EVERY OTHER DAY    metroNIDAZOLE (FLAGYL) IVPB premix 500 mg  500 mg IntraVENous Q8H    artificial tears (dextran 70-hypromellose) (NATURAL BALANCE) 0.1-0.3 % ophthalmic solution 1 Drop  1 Drop Both Eyes PRN    methylPREDNISolone (PF) (SOLU-MEDROL) injection 60 mg  60 mg IntraVENous Q6H    furosemide (LASIX) tablet 40 mg  40 mg Oral DAILY    sodium chloride (NS) flush 5-10 mL  5-10 mL IntraVENous Q8H    sodium chloride (NS) flush 5-10 mL  5-10 mL IntraVENous PRN    sodium chloride (NS) flush 5-10 mL  5-10 mL IntraVENous Q8H    sodium chloride (NS) flush 5-10 mL  5-10 mL IntraVENous PRN    insulin lispro (HUMALOG) injection   SubCUTAneous AC&HS    glucose chewable tablet 16 g  4 Tab Oral PRN    dextrose (D50W) injection syrg 12.5-25 g  12.5-25 g IntraVENous PRN    glucagon (GLUCAGEN) injection 1 mg  1 mg IntraMUSCular PRN    dilTIAZem CD (CARDIZEM CD) capsule 240 mg  240 mg Oral DAILY    atorvastatin (LIPITOR) tablet 20 mg  20 mg Oral QHS    rivaroxaban (XARELTO) tablet 15 mg  15 mg Oral DAILY WITH DINNER    arformoterol (BROVANA) neb solution 15 mcg  15 mcg Nebulization BID RT    doxycycline (VIBRAMYCIN) 100 mg in 0.9% sodium chloride (MBP/ADV) 100 mL MBP  100 mg IntraVENous Q12H    pirfenidone (ESBRIET) capsule 801 mg  (Patient Supplied)  801 mg Oral TID WITH MEALS          LAB AND IMAGING FINDINGS:     Lab Results   Component Value Date/Time    WBC 14.6 (H) 02/07/2018 10:38 AM    HGB 10.0 (L) 02/07/2018 10:38 AM    PLATELET 516 01/73/6597 10:38 AM     Lab Results   Component Value Date/Time    Sodium 142 02/07/2018 05:10 AM    Potassium 3.9 02/07/2018 05:10 AM    Chloride 104 02/07/2018 05:10 AM    CO2 33 (H) 02/07/2018 05:10 AM    BUN 28 (H) 02/07/2018 05:10 AM    Creatinine 0.58 02/07/2018 05:10 AM    Calcium 7.5 (L) 02/07/2018 05:10 AM    Magnesium 2.1 02/05/2018 02:31 AM      Lab Results   Component Value Date/Time    AST (SGOT) 92 (H) 02/07/2018 05:10 AM    Alk. phosphatase 214 (H) 02/07/2018 05:10 AM    Protein, total 5.6 (L) 02/07/2018 05:10 AM    Albumin 2.4 (L) 02/07/2018 05:10 AM    Globulin 3.2 02/07/2018 05:10 AM     Lab Results   Component Value Date/Time    INR 1.0 02/02/2018 02:58 PM    Prothrombin time 10.1 02/02/2018 02:58 PM    aPTT 32.4 02/02/2018 02:58 PM      No results found for: IRON, FE, TIBC, IBCT, PSAT, FERR   Lab Results   Component Value Date/Time    pH 7.44 02/07/2018 05:19 AM    PCO2 45 02/07/2018 05:19 AM    PO2 105 (H) 02/07/2018 05:19 AM     No components found for: [place]   Lab Results   Component Value Date/Time    CK 61 06/07/2017 04:33 PM    CK - MB <1.0 06/07/2017 04:33 PM                Total time: 65 min  Counseling / coordination time, spent as noted above: 50 min  > 50% counseling / coordination?: yes    Prolonged service was provided for  [x]30 min   []75 min in face to face time in the presence of the patient, spent as noted above. Time Start: 8274/5124  Time End: 9068/0927  Note: this can only be billed with 80985 (initial) or 05  (follow up). If multiple start / stop times, list each separately.

## 2018-02-07 NOTE — PROGRESS NOTES
Dr. Lamar Setting notified pt has no more serviceable IV sites and is very difficult stick to reestablish IV access; possible midline catheter may be needed this am for IVF and medications.

## 2018-02-07 NOTE — PROGRESS NOTES
[facility] PROGRESS NOTE  Pulmonary, Critical Care, and Sleep Medicine    Name: Litzy Lima MRN: [id number]   : 1940 Hospital: [facility]   Date: 2018  Admission Date: 2018     Chart and notes reviewed. Data reviewed. I review the patient's current medications in the medical record at each encounter. I have evaluated and examined the patient. Overnight events reviewed:  Afebrile  Sats 91% on HF 45L FiO2 100%  WBC 13.6  Hgb 10.1  LFTs elevated; trending down some  I/O: - 1450 ml  18 ABG 7.44/45/105/30 on BiPAP 14/9 FiO2 90%  Had RRT yesterday afternoon due to mucous plugging. Also had RRT overnight due to pulling off HF  Patient does not tolerate BiPAP mask per family and it makes her more agitated and uncomfortable    ROS: Feels unchanged. Still with SOB at rest.  Reports increased WOB, even with max HF in place, and asking \"for more air. \"   Denies CP. Denies fever or chills. Nursing reports cough with dark brown sputum mixed with streaks of blood. Denies abd pain. Has mild LE/pedal swelling, but denies LE pain. 12 point ROS reviewed and negative except as noted above. Vital Signs:  Visit Vitals    /63 (BP 1 Location: Left arm, BP Patient Position: At rest)    Pulse 86    Temp 98 °F (36.7 °C)    Resp 20    Ht 5' 3\" (1.6 m)    Wt 60.1 kg (132 lb 7.9 oz)    SpO2 91%    BMI 23.47 kg/m2       O2 Device: Hi flow nasal cannula   O2 Flow Rate (L/min): 45 l/min   Temp (24hrs), Av.7 °F (36.5 °C), Min:97.6 °F (36.4 °C), Max:98 °F (36.7 °C)       Intake/Output:   Last shift:         Last 3 shifts:  1901 -  0700  In: 2020 [P.O.:1200;  I.V.:820]  Out: 3400 [Urine:3400]    Intake/Output Summary (Last 24 hours) at 18 1031  Last data filed at 18 0620   Gross per 24 hour   Intake             1450 ml   Output             2900 ml   Net            -1450 ml          Physical Exam:   General:  Alert, cooperative, ill-appearing, + resp distress/increased WOB, appears stated age. Head:  Normocephalic, without obvious abnormality, atraumatic. Eyes:  Conjunctivae/corneas clear. Nose: Nares normal. Septum midline. Mucosa normal.  HF in place. Throat: Lips, mucosa, and tongue normal.    Neck: Supple, symmetrical, trachea midline, no adenopathy. Lungs:   Pursed lip breathing, diffuse crackles bilaterally, most prominent in the bases. Chest wall:  No tenderness or deformity. Heart:  Regular rate and rhythm, S1, S2 normal, no murmur, click, rub or gallop. Abdomen:   Soft, non-tender. Bowel sounds normal. No masses,  No organomegaly. Extremities: Extremities normal, atraumatic, no cyanosis, mild LE/pedal edema. Pulses: 2+ and symmetric all extremities. Skin: Skin color, texture, turgor normal. No rashes or lesions   Lymph nodes: Cervical, supraclavicular nodes normal.   Neurologic: Grossly nonfocal     DATA:  MAR reviewed and pertinent medications noted or modified as needed    Labs:  Recent Labs      02/06/18 2234 02/06/18   0147  02/05/18   0231   WBC  13.6*  7.9  8.6   HGB  10.1*  9.0*  8.6*   HCT  32.2*  29.1*  27.4*   PLT  257  209  233     Recent Labs      02/07/18   0510  02/06/18   2234  02/06/18   0147  02/05/18   0231   NA  142  139  144  144   K  3.9  4.0  4.1  3.5   CL  104  100  109*  110*   CO2  33*  31  31  29   GLU  118*  215*  149*  175*   BUN  28*  33*  28*  30*   CREA  0.58  0.82  0.71  0.82   CA  7.5*  7.9*  7.9*  7.8*   MG   --    --    --   2.1   ALB  2.4*  2.7*  2.5*  2.3*   TBILI  0.4  0.5  0.2  0.2   SGOT  92*  166*  68*  80*   ALT  176*  212*  135*  123*       Imaging:  Personally visualized. CXR (2/6/18):  Increased diffuse bilateral interstitial and alveolar opacities, compatible with worsening pulmonary edema and/or pneumonia    IMPRESSION:   -ILD of uncertain etiology with acute exacerbation  -RSV  -acute on chronic hypoxemic respiratory failure requiring bipap  -pulmonary HTN, likely largely group 3 with some component of group 2  -asthma/COPD  -MARIA R not tolerant of PAP therapy       PLAN:   -continue hi flow and wean flow/FiO2 as tolerated to keep sats > 88-90%; continue bipap as needed for hypoxia and increased WOB only as tolerated by patient  -repeat CXR  -unable to check non contrast high resolution chest CT due to tenuous respiratory status  -continue schedule duonebs and solumedrol at 60 mg every 6 hours  -continue cefepime/doxy/flagyl; obtain sputum cx  -mucomyst, acapella, and chest PT  -morphine 1mg now for increased WOB/air hunger  -respiratory viral PCR with RSV, likely etiology of exacerbation  -pneumonia workup negative  -no IVFs. Patient will not tolerate any pulmonary edema. Unless she shows signs of overt dehydration would NOT put her on maintenance fluids  -continue diuresis with Lasix per cardiology  -repeat TTE: EF 50%; mildly reduced RVSF; severe AS; severe pulm HTN  -continue brovana/pulmicort  -continue home Esbriet  -on Xarelto--doubt PE   -OOB as tolerated  -DNR/DNI  -Palliative Care eval appreciated. May need to consider comfort care/hospice if patient fails to improve. Patient is critically ill requiring continuous HF vs BiPAP for severe hypoxia and increased WOB and is high risk for decompensation due to respiratory failure. CC time EOP 35 min. Discussed with RT, Family Practice service, patient, and daughter at the bedside.          Carla Kohlerma

## 2018-02-07 NOTE — PROGRESS NOTES
Problem: Falls - Risk of  Goal: *Absence of Falls  Document Rina Fall Risk and appropriate interventions in the flowsheet.    Outcome: Progressing Towards Goal  Fall Risk Interventions:  Mobility Interventions: Assess mobility with egress test, Bed/chair exit alarm, Communicate number of staff needed for ambulation/transfer, OT consult for ADLs, Patient to call before getting OOB, PT Consult for mobility concerns, PT Consult for assist device competence, Strengthening exercises (ROM-active/passive), Utilize walker, cane, or other assitive device, Utilize gait belt for transfers/ambulation    Mentation Interventions: Adequate sleep, hydration, pain control, Bed/chair exit alarm, Door open when patient unattended, Evaluate medications/consider consulting pharmacy, Eyeglasses and hearing aids, Family/sitter at bedside, Gait belt with transfers/ambulation, Increase mobility, More frequent rounding, Reorient patient, Room close to nurse's station, Self-releasing belt, Toileting rounds, Update white board    Medication Interventions: Assess postural VS orthostatic hypotension, Bed/chair exit alarm, Evaluate medications/consider consulting pharmacy, Patient to call before getting OOB, Teach patient to arise slowly, Utilize gait belt for transfers/ambulation    Elimination Interventions: Bed/chair exit alarm, Call light in reach, Elevated toilet seat, Patient to call for help with toileting needs, Toilet paper/wipes in reach, Toileting schedule/hourly rounds

## 2018-02-07 NOTE — PROGRESS NOTES
Physical Therapy Note:  Chart reviewed and spoke with nursing. RN reports patient on High Flow O2 (45L at 100%) with SpO2 ranging 88-90%. Patient still SOB and unable to tolerate any activity. Will hold OT/PT services today per RN request. Patient has been unstable for therapy for several days, this is the third attempt; Will follow-up one additional day to determine if PT services are indicated. Thank you.

## 2018-02-07 NOTE — PROGRESS NOTES
2210: Team initially called about patient who pulled her high-flow O2 off, with HR in 130s-140s. Upon arrival to pt's room, patient is responsive to voice and follow commands. POC gluc 221, pt thought to be in VTach based on monitor. RRT called    2211: POC gluc 221. ABG, CBC, CMP, LA, and trop ordered    2217: ABGs shows pH 7.442, pCO2 45.2, pO2 65.7, HCO3 30.1. o2 saturation at 93.6 % on high-flow O2.     2219 EKG done: sinus tachycardia with PVCs, RBBB, left axis deviation, LVH with repolarization abnormality. VS stable, except RR 22. Patient satting at 100% on high-flow. Will continue to monitor patient. 26: Daugther and daughter-in-law called to inform of events. Spoke with patient's daughter, who states that pt is definitely DNI and pt does not wish to be intubated. Informed daughter of pt's current status, and will like an update at 2300. She will make decisions about coming to the hospital at that time. 11:45 PM  Daughter was called back and updated on pt's status. She will likely come to see mother tomorrow. Labwork results: trop 0.25, lactic acid 2.5. CMP shows elevated glucose at 215. Ordered 500cc NS, and will continue to monitor. Will trend trop and LA Q6(patient is a hard stick).

## 2018-02-07 NOTE — PROGRESS NOTES
MIDLINE PLACEMENT NOTE    Midline catheters are NOT central lines. Approved midline products are peripheral infusion devices with the tip terminating in the arm at or below the axillary vein, distal to the shoulder. The tip DOES NOT ENTER THE CENTRAL VASCULATURE. A Midline is for reliable short term (less than 30 days) vascular access. PRE-PROCEDURE VERIFICATION  4 FR Power MIDLINE  Correct Procedure: yes  Correct Site:  yes  Temperature: Temp: 98 °F (36.7 °C), Temperature Source: Temp Source: Oral  Recent Labs      02/07/18   0510  02/06/18   2234   BUN  28*  33*   CREA  0.58  0.82   PLT   --   257   WBC   --   13.6*     Allergies: Review of patient's allergies indicates no known allergies. Education materials for Midline Care given: yes. See Patient Education activity for further details. Midline Booklet placed at bedside: yes    Midline Catheter Maintenance: For complete information reference Policy # [id number]    A. Dressing Changes       1. Assess the dressing in the first 24 hours for accumulation of blood, fluid or moisture beneath the dressing. During dressing changes, assess the external length of the catheter to determine if migration of the catheter has occurred. Periodically confirm catheter placement, tip location, patency and security of dressing. Dressing changes should be done every 7 days, and PRN using sterile technique if integrity of dressing is compromised. Apply new dressing per hospital policy. Caution: Do not use scissors to remove dressing to minimize the risk of cutting the  Catheter. B. Flushing       1. Flush each lumen of the catheter with 10 mL of sterile saline every 12 hours or after each use. In addition, lock each lumen of the catheter with sterile saline. Note: Flush with 20 mL of sterile saline after blood therapy   Caution: DO NOT USE A SYRINGE SMALLER THAN 10 mL TO FLUSH AND CONFIRM PATENCY.   Patency should be assessed with a 10 mL or larger syringe and sterile saline. Upon confirmation of patency, administration of medication should be  given in a syringe appropriately sized for the dose. Do not infuse against resistance. C. Blood Draws:        1. Stop infusion, draw off and waste 10 ml of blood. Draw sample with 10 mL syringe or          greater. DO NOT USE VACUTAINER . Transfer with appropriate device to lab tubes. Flush        with 20 ml NS.  D. Occluded or Partially Occluded Catheter       1. Catheters that present resistance to flushing and aspiration may be partially or completely  occluded. Do not flush against resistance. PROCEDURE DETAIL:    Verbal consent was obtained and all questions were answered related to risks and benefits. A Midline was inserted as a sterile procedure using ultrasound and Modified Seldinger Technique for vascular access. The following documentation is in addition to the Midline properties in the lines/airways Doc Flow Sheet:  Lot #: [id number]  Lidocaine 1% administered intradermally :yes  Internal Catheter Total Length: 11 (cm)   External catheter length: 0 (cm)  Vein Selection for Midline:left basilic  Sterile CVC Insertion Bundle was used to place line yes  Complication Related to Insertion:no    Prior to use, line patency MUST be verified by flushing at least a 10 mL syringe. Line should flush easily without resistance, and withdrawal of brisk blood return to verify patency.     Line is okay to use: yes        (Remove Midline by:3/7/18 )  Letty Barfield RN

## 2018-02-07 NOTE — ROUTINE PROCESS
Bedside and Verbal shift change report given to DELPHINE ROSARIO RN (oncoming nurse) by Bao Barrett RN (offgoing nurse). Report included the following information SBAR, Kardex, Intake/Output, MAR, Accordion, Recent Results, Cardiac Rhythm SR and Alarm Parameters .

## 2018-02-07 NOTE — MED STUDENT NOTES
This is a medical student note for education purposes only. Please see resident note/attending attestation for patient care purposes. General Daily Progress Note    Admit Date: 2/2/2018    Hospital Summary: Stan Braga is a 76 y.o. female with a PMHx significant for HTN, CAD, pulmonary fibrosis, T2DM admitted for acute on chronic hypoxic respiratory failure 2/2 CAP vs RSV. Subjective:     Overnight:   Around 10pm patient pulled off high flow, -140s. Monitor concerning for v tach, RRT called and EKG obtained revealing sinus tach with PVCs, RBBB, L axis deviation, LVH with repolarization abnormality. Patient was responsive. POC glucose: 221   ABG: pH 7.442 pCO2: 45.2 pO2: 65.7 HCO3: 30.1 O2 sat 93.6% on high flow O2  Trop: 0.25  Lactic acid 2.5, given 500cc NS  CMP significant for gluc 215  No access, patient will need midline    This morning patient on BiPAP (FiO2 90%), reports breathing worsening. Denies CP, nausea/vomiting. Has not had a BM in a few days, denies feeling constipated.        Current Facility-Administered Medications   Medication Dose Route Frequency    insulin NPH (NOVOLIN N, HUMULIN N) injection 12 Units  12 Units SubCUTAneous ACB&D    [START ON 2/8/2018] digoxin (LANOXIN) tablet 0.125 mg  0.125 mg Oral EVERY OTHER DAY    acetylcysteine (MUCOMYST) 100 mg/mL (10 %) nebulizer solution 200 mg  2 mL Nebulization TID RT    cefepime (MAXIPIME) 2 g in 0.9% sodium chloride 100 mL IVPB  2 g IntraVENous Q12H    metroNIDAZOLE (FLAGYL) IVPB premix 500 mg  500 mg IntraVENous Q8H    artificial tears (dextran 70-hypromellose) (NATURAL BALANCE) 0.1-0.3 % ophthalmic solution 1 Drop  1 Drop Both Eyes PRN    methylPREDNISolone (PF) (SOLU-MEDROL) injection 60 mg  60 mg IntraVENous Q6H    furosemide (LASIX) tablet 40 mg  40 mg Oral DAILY    sodium chloride (NS) flush 5-10 mL  5-10 mL IntraVENous Q8H    sodium chloride (NS) flush 5-10 mL  5-10 mL IntraVENous PRN    sodium chloride (NS) flush 5-10 mL  5-10 mL IntraVENous Q8H    sodium chloride (NS) flush 5-10 mL  5-10 mL IntraVENous PRN    insulin lispro (HUMALOG) injection   SubCUTAneous AC&HS    glucose chewable tablet 16 g  4 Tab Oral PRN    dextrose (D50W) injection syrg 12.5-25 g  12.5-25 g IntraVENous PRN    glucagon (GLUCAGEN) injection 1 mg  1 mg IntraMUSCular PRN    dilTIAZem CD (CARDIZEM CD) capsule 240 mg  240 mg Oral DAILY    atorvastatin (LIPITOR) tablet 20 mg  20 mg Oral QHS    rivaroxaban (XARELTO) tablet 15 mg  15 mg Oral DAILY WITH DINNER    arformoterol (BROVANA) neb solution 15 mcg  15 mcg Nebulization BID RT    doxycycline (VIBRAMYCIN) 100 mg in 0.9% sodium chloride (MBP/ADV) 100 mL MBP  100 mg IntraVENous Q12H    pirfenidone (ESBRIET) capsule 801 mg  (Patient Supplied)  801 mg Oral TID WITH MEALS          Objective:     Patient Vitals for the past 8 hrs:   BP Temp Pulse Resp SpO2 Weight   02/07/18 0600 109/68 - - - - -   02/07/18 0400 104/67 97.7 °F (36.5 °C) 74 20 98 % 132 lb 7.9 oz (60.1 kg)   02/07/18 0300 112/66 - 74 - 98 % -   02/07/18 0200 99/61 - 77 - - -   02/07/18 0100 113/78 - 88 - (!) 80 % -   02/07/18 0000 118/84 - 87 - - -        02/05 1901 - 02/07 0700  In: 2020 [P.O.:1200; I.V.:820]  Out: 3400 [Urine:3400]    Physical Exam:  General: Lying omfortably on BiPAP, no acute distress. CV: S1 S2, RRR. ROSALIE appreciated in LUSB, no rubs or gallops appreciated. Resp: Non-labored breathing, decreased BS throughout, bibasilar crackles. No wheezing appreciated. Abd: Soft, non-tender, non-distended. BS+. No guarding, no rebound. Ext: Trace LE edema below the ankles bilaterally. Psych: Cooperative, appropriate mood and affect.         Labs:  Recent Results (from the past 12 hour(s))   GLUCOSE, POC    Collection Time: 02/06/18  9:09 PM   Result Value Ref Range    Glucose (POC) 221 (H) 65 - 100 mg/dL    Performed by Su Jorge (PCT)    BLOOD GAS, ARTERIAL    Collection Time: 02/06/18 10:17 PM   Result Value Ref Range pH 7.44 7.35 - 7.45      PCO2 45 35.0 - 45.0 mmHg    PO2 66 (L) 80 - 100 mmHg    O2 SAT 94 92 - 97 %    BICARBONATE 30 (H) 22 - 26 mmol/L    BASE EXCESS 5.2 mmol/L    O2 METHOD BIPAP      FIO2 70 %    SET RATE 6      IPAP/PIP 14.0      EPAP/CPAP/PEEP 8.0      Sample source ARTERIAL      SITE RIGHT BRACHIAL      AUBREY'S TEST N/A      Critical value read back DENISE CARSON    CBC W/O DIFF    Collection Time: 02/06/18 10:34 PM   Result Value Ref Range    WBC 13.6 (H) 3.6 - 11.0 K/uL    RBC 3.43 (L) 3.80 - 5.20 M/uL    HGB 10.1 (L) 11.5 - 16.0 g/dL    HCT 32.2 (L) 35.0 - 47.0 %    MCV 93.9 80.0 - 99.0 FL    MCH 29.4 26.0 - 34.0 PG    MCHC 31.4 30.0 - 36.5 g/dL    RDW 14.7 (H) 11.5 - 14.5 %    PLATELET 558 667 - 165 K/uL    MPV 9.7 8.9 - 12.9 FL    NRBC 0.1 (H) 0  WBC    ABSOLUTE NRBC 0.02 (H) 0.00 - 0.01 K/uL   TROPONIN I    Collection Time: 02/06/18 10:34 PM   Result Value Ref Range    Troponin-I, Qt. 0.25 (H) <6.78 ng/mL   METABOLIC PANEL, COMPREHENSIVE    Collection Time: 02/06/18 10:34 PM   Result Value Ref Range    Sodium 139 136 - 145 mmol/L    Potassium 4.0 3.5 - 5.1 mmol/L    Chloride 100 97 - 108 mmol/L    CO2 31 21 - 32 mmol/L    Anion gap 8 5 - 15 mmol/L    Glucose 215 (H) 65 - 100 mg/dL    BUN 33 (H) 6 - 20 MG/DL    Creatinine 0.82 0.55 - 1.02 MG/DL    BUN/Creatinine ratio 40 (H) 12 - 20      GFR est AA >60 >60 ml/min/1.73m2    GFR est non-AA >60 >60 ml/min/1.73m2    Calcium 7.9 (L) 8.5 - 10.1 MG/DL    Bilirubin, total 0.5 0.2 - 1.0 MG/DL    ALT (SGPT) 212 (H) 12 - 78 U/L    AST (SGOT) 166 (H) 15 - 37 U/L    Alk.  phosphatase 250 (H) 45 - 117 U/L    Protein, total 6.6 6.4 - 8.2 g/dL    Albumin 2.7 (L) 3.5 - 5.0 g/dL    Globulin 3.9 2.0 - 4.0 g/dL    A-G Ratio 0.7 (L) 1.1 - 2.2     LACTIC ACID    Collection Time: 02/06/18 10:42 PM   Result Value Ref Range    Lactic acid 2.5 (HH) 0.4 - 2.0 MMOL/L   METABOLIC PANEL, COMPREHENSIVE    Collection Time: 02/07/18  5:10 AM   Result Value Ref Range    Sodium 142 136 - 145 mmol/L    Potassium 3.9 3.5 - 5.1 mmol/L    Chloride 104 97 - 108 mmol/L    CO2 33 (H) 21 - 32 mmol/L    Anion gap 5 5 - 15 mmol/L    Glucose 118 (H) 65 - 100 mg/dL    BUN 28 (H) 6 - 20 MG/DL    Creatinine 0.58 0.55 - 1.02 MG/DL    BUN/Creatinine ratio 48 (H) 12 - 20      GFR est AA >60 >60 ml/min/1.73m2    GFR est non-AA >60 >60 ml/min/1.73m2    Calcium 7.5 (L) 8.5 - 10.1 MG/DL    Bilirubin, total 0.4 0.2 - 1.0 MG/DL    ALT (SGPT) 176 (H) 12 - 78 U/L    AST (SGOT) 92 (H) 15 - 37 U/L    Alk. phosphatase 214 (H) 45 - 117 U/L    Protein, total 5.6 (L) 6.4 - 8.2 g/dL    Albumin 2.4 (L) 3.5 - 5.0 g/dL    Globulin 3.2 2.0 - 4.0 g/dL    A-G Ratio 0.8 (L) 1.1 - 2.2     TROPONIN I    Collection Time: 02/07/18  5:10 AM   Result Value Ref Range    Troponin-I, Qt. 0.33 (H) <0.05 ng/mL   LACTIC ACID    Collection Time: 02/07/18  5:10 AM   Result Value Ref Range    Lactic acid 1.2 0.4 - 2.0 MMOL/L       Imaging:   CXR (2/6):   - Increased diffuse bilateral interstitial alveolar opacities, compatible with worsening pulmonary edema and/or pneumonia        Assessment and Plan:   77F with a PMHx significant for HTN, CAD, pulmonary fibrosis, COPD, T2DM admitted for acute on chronic hypoxic respiratory failure 2/2 CAP vs RSV.     #Acute on chronic hypoxic respiratory failure, requiring BiPAP: 2/2 CAP vs RSV. Usually on home 2L NC. Yesterday morning's CXR reveals worsening pulmonary edema and/or pna, decompensated yesterday. Broadened abx coverage and initiated mucomyst nebs. - Hi flow as tolerated, BiPAP PRN  - Continue solmederol 60mg IV q6h  - Day 2 cefepime IV q12h  - Day 2 flagyl IV q8h  - Day 6 doxycycline IV q12h  - Duoneb QID, Brovana 15mcg BID  - Lasix 40mg po daily  - Strict I&O's  - Consider repeat CXR today or tomorrow  - Appreciate pulm recs     #CAD: RRT called for possible v tach overnight. Troponin 0.25 --> 0.33.  Most likely demand ischemia.  - FU troponin  - Continue ASA, lipitor; no BB due to pulmonary fibrosis     #HFpEF: Bibasilar crackles on exam, no peripheral edema appreciated. - Lasix 40mg po daily  - Strict I&O's  - Home cardizem  - No BB due to pulmonary fibrosis  - Cards following, appreciate recs    #Pulmonary fibrosis  - Home esbriet 801mg TID with meals    #Paroxysmal a fib s/p AVR: stable   - Continue digoxin, cardizem, xarelto    #HTN: BP at goal  - See above     #T2DM: POC glucose 150-278 yesterday, received 15u lantus.  Not at goal BG < 150-180.  - Increase NPH to 14u BID before meals  - SSI lispro  - Hold home metformin  - Hypoglycemic protocol     Fluids: po  Electrolytes: replete PRN  Nutrition: soft GI  DVT ppx: anticoagulated on xarelto  GI ppx: not indicated  Lines/access: Patient loss access overnight, will need midline placed    Dispo: TBD    Code status: DNR/DNI     Chloé Mendoza, M4 ([employer])  Plan discussed with Dr. Chris Yeh

## 2018-02-07 NOTE — PROGRESS NOTES
[address] [address] Pharmacy Dosing Services: Antimicrobial Stewardship Daily Doc    Consult for antibiotic dosing of Cefepime, flagyl by Dr. Amanda Peña  Indication: CAP (Rocephin 2/2 - 2/5, Doxy since 2/2 - day 5)  Day of Therapy 2    Ht Readings from Last 1 Encounters:   02/02/18 160 cm (63\")        Wt Readings from Last 1 Encounters:   02/07/18 60.1 kg (132 lb 7.9 oz)        Non-Kinetic Antimicrobial Dosing Regimen:   Current Regimen:  Cefepime 2 g IV every 12 hours  Recommendation: Change to 2 g IV every 8 hours  Dose administration notes:   Doses not given as scheduled - AM dose delayed    Current Regimen:  Flagyl 500 mg IV every 8 hours  Recommendation: Continue regimen  Dose administration notes: Doses not given as scheduled - AM dose delayed    Other Antimicrobial   (not dosed by pharmacist) Doxycycline 100 mg IV every 12 hours   Cultures 2/7 Sputum: Pend  2/3 Resp: RSV (Final)  2/2 Urine: NGTD (Final)  2/2 Blood x 2: NGTD (Pend)   Serum Creatinine Lab Results   Component Value Date/Time    Creatinine 0.58 02/07/2018 05:10 AM    Creatinine (POC) 1.3 05/15/2012 04:20 PM         Creatinine Clearance Estimated Creatinine Clearance: 65.1 mL/min (based on Cr of 0.58).      Temp Temp: 98.5 °F (36.9 °C)       WBC Lab Results   Component Value Date/Time    WBC 14.6 (H) 02/07/2018 10:38 AM        H/H Lab Results   Component Value Date/Time    HGB 10.0 (L) 02/07/2018 10:38 AM        Platelets    Lab Results   Component Value Date/Time    PLATELET 980 34/03/2474 10:38 AM            Thank you,  Sveta Hernandez, PharmD     Contact: [phone]

## 2018-02-07 NOTE — PROGRESS NOTES
0730 - Bedside and Verbal shift change report given to Jazmyne Dixon (oncoming nurse) by Miguelito Ballesteros RN (offgoing nurse). Report included the following information SBAR, Kardex, Intake/Output, Accordion, Recent Results and Cardiac Rhythm NSR-ST. Pt found taking off BIPAP. Hi-flow cannula placed instead. Current sat 90%. Pt in no acute distress. 3122 -FP called to notified re: need for midline. Pt lost preipheral access overnight and is a hard stick. Per Dr. Lety Marino, place order for midline. 3905 - PICC team notified re: order for midline placement. 1625 - Per tele tech, pt BP in the 40E systolic, HR up to 148U, and O2 sat 74%. This RN to room, found pt with Bipap off, face is blue with visible sweating and in respiratory distress. Pt very drowsy but responds to voice. RRT called. BiPap replaced. pt BP improved to 646J systolic, HR down to around 107-117. O2 sat up to 95% on BiPap. Pt now sleeping, RR 20, and in no acute distress. 18 - Family practice notified of pt blood sugar of 360 and not covered by SSI. Order for 12 units received. 1900 - Bedside and Verbal shift change report given to Irlanda RN (oncoming nurse) by Jazmyne Dixon (offgoing nurse). Report included the following information SBAR, Kardex, Intake/Output, Accordion, Recent Results and Cardiac Rhythm NSR-sinus arrhythmia-sinus tach.

## 2018-02-07 NOTE — PROGRESS NOTES
Occupational Therapy Note:  Chart reviewed and spoke with nursing. RN reports patient on High Flow O2 (45L at 100%) with SpO2 ranging 88-90%. Patient still SOB and unable to tolerate any activity. Will hold OT/PT services today per RN request. Patient has been unstable for therapy for several days, this is the third attempt; Will follow-up one additional day to determine if OT services are indicated. Thank you.   Aisha Lemus, OTR/L

## 2018-02-07 NOTE — PROGRESS NOTES
RR called. Patient transiently had desaturations as she was pulling and tugging on her mask. Similar to previous desaturations. Patient responded well to being placed on the BiPAP. Saturations currently in the 90's. Patient is appears to be at her baseline. Patient Vitals for the past 4 hrs:   Pulse Resp BP SpO2   02/07/18 1623 (!) 114 16 102/65 92 %   02/07/18 1342 - - - 95 %   02/07/18 1341 - - - (!) 67 %       Assessment and Plan  AHRF: continue current plan of care on BiPAP. No further work up needed at this time. Patient is at the end stage of her disease process. She does not tolerate the high flow or BiPAP mask very well and usually tugs on the mask, resulting in desaturations. Family meeting with hospice tomorrow morning. In the mean time, would recommend that nurses directly call provider for concerns about transient desaturations.     Arelis Short MD

## 2018-02-07 NOTE — PROGRESS NOTES
Nutrition Assessment:    RECOMMENDATIONS/INTERVENTION(S):   Provide feeding assistance- needs 1:1 feeding help so pt can focus on eating and not on lifting/setting up/feeding herself. Continue 1.5 gm Na/ Mech soft diet    Continue Glucerna TID  Monitor ability to consume PO or need for alternate means of nutrition until respiratory status improves, POC    Recs for PPN if midline becomes available/usable below. Consult for TPN if needed. ASSESSMENT:   2/7: Follow up. Pt felt hungry this morning for breakfast but was unable to eat due to having a midline placed for antibiotics. Pt did drink 2 Glucerna drinks so far today. Pt is lethargic now due to morphine. Pt not meeting nutrition needs. Intake yesterday for dinner documented as 50%. When family assists, pt can eat a little more. RN states midline being using for multiple antibiotics constantly. If/when antibiotics are finished, or if central line is placed, Peripheral parenteral nutrition or TPN could be used to provide pt with nutrition while on BiPAP. Pt has gluteal pressure injury. Recs for PPN if possible to use midline - goal of PPN (D10/AA4.25) @ 75 mg/dL provides 76 g Pro, 916 kcal. Monitor line placement and need for PPN or TPN if PO doesn't improve. 2/5: 76 yr old female admitted for Acute respiratory failure. PMHx: HTN, CAD, COPD, DM, Pulmonary Fibrosis. Pt is currently on Full Liquid diet- No Concentrated sweets. Pt has been unable to eat much food due to need for BiPAP. Pt tried HFNC but wasn't able to eat much. Unable to get any breakfast or any lunch today. Family states pt usually drinks Glucerna at home. Pt over the last ~2 weeks had been struggling to breath a little bit more which hindered some PO intakes, but family states no significant wt loss recently. Pt is similar wt as ~6 months ago. Family states they take daily weights. Family also states pt would benefit from assistance eating.  It is currently too taxing to set herself up and feed herself and if someone fed her she would do better. Pt has stage 2 gluteal fold p/i. Bilateral foot swelling. Last BM 2/4. Active BS. , 217, 213 mg/dL. Pt on Solumedrol. , 210 mg/dL. SUBJECTIVE/OBJECTIVE:   Diet Order: Full liquids (NCS)  % Eaten:    Patient Vitals for the past 168 hrs:   % Diet Eaten   02/06/18 1850 50 %   02/04/18 1751 10 %     Pertinent Medications: [x] Reviewed    Past Medical History:   Diagnosis Date    Aortic stenosis     severe, s/p AVR March 2012 - presented with HF, EF 30%    Asthma 5/10/2012    Atrial fibrillation ([address] 5/10/2012    Bacterial pneumonia, unspecified     CAD (coronary artery disease)     cor calcifications (LAD) by CT     Chronic diastolic heart failure ([address] 5/29/2012    Depression     Dyslipidemia     Female stress incontinence     History of vascular access device 02/07/2018    [facility] VAT 4 FR MIDLINE L basilic for limited access    Hyperlipidemia 9/17/2010    Hypertension     Osteoarthritis     Paroxysmal atrial fibrillation ([address]     post op AVR    Pulmonary fibrosis ([address]     S/P AVR 11/6/2016    S/P AVR (aortic valve replacement) 3/2012    bovine ([facility])    Sleep apnea     T2DM (type 2 diabetes mellitus) ([address] 1/14/2011        Chemistries:  Lab Results   Component Value Date/Time    Sodium 142 02/07/2018 05:10 AM    Potassium 3.9 02/07/2018 05:10 AM    Chloride 104 02/07/2018 05:10 AM    CO2 33 (H) 02/07/2018 05:10 AM    Anion gap 5 02/07/2018 05:10 AM    Glucose 118 (H) 02/07/2018 05:10 AM    BUN 28 (H) 02/07/2018 05:10 AM    Creatinine 0.58 02/07/2018 05:10 AM    BUN/Creatinine ratio 48 (H) 02/07/2018 05:10 AM    GFR est AA >60 02/07/2018 05:10 AM    GFR est non-AA >60 02/07/2018 05:10 AM    Calcium 7.5 (L) 02/07/2018 05:10 AM    AST (SGOT) 92 (H) 02/07/2018 05:10 AM    Alk.  phosphatase 214 (H) 02/07/2018 05:10 AM    Protein, total 5.6 (L) 02/07/2018 05:10 AM    Albumin 2.4 (L) 02/07/2018 05:10 AM    Globulin 3.2 02/07/2018 05:10 AM    A-G Ratio 0.8 (L) 02/07/2018 05:10 AM    ALT (SGPT) 176 (H) 02/07/2018 05:10 AM      Anthropometrics: Height: 5' 3\" (160 cm) Weight: 60.1 kg (132 lb 7.9 oz)    IBW (%IBW):   ( ) UBW (%UBW):   (  %)    BMI: Body mass index is 23.47 kg/(m^2). This BMI is indicative of:     [] Underweight    [x] Normal    [] Overweight    []  Obesity    []  Extreme Obesity (BMI>40)    Estimated Nutrition Needs (Based on): 2049 Kcals/day (BMR(1050x1.3) +250) , 61 g (-73g/day(1.0-1.2g/kg)) Protein  Carbohydrate: At Least 130 g/day  Fluids: 1600 mL/day    Last BM: 2/6 []Active     []Hyperactive  []Hypoactive       [] Absent   BS  Skin:    [] Intact   [] Incision  [x] Breakdown - stage 2 gluteal fold  [] DTI   [] Tears/Excoriation/Abrasion  []Edema [] Other:    Wt Readings from Last 30 Encounters:   02/07/18 60.1 kg (132 lb 7.9 oz)   12/28/17 61.1 kg (134 lb 9.6 oz)   11/10/17 60.3 kg (133 lb)   09/29/17 60.3 kg (133 lb)   08/15/17 60.8 kg (134 lb)   07/18/17 60.8 kg (134 lb)   06/16/17 63.3 kg (139 lb 9.6 oz)   06/08/17 59.7 kg (131 lb 9.8 oz)   06/07/17 59.4 kg (131 lb)   05/12/17 62.5 kg (137 lb 12.8 oz)   04/26/17 62.1 kg (137 lb)   01/03/17 61.7 kg (136 lb)   11/22/16 64.4 kg (142 lb)   11/04/16 66.2 kg (146 lb)   10/25/16 66.4 kg (146 lb 6.4 oz)   08/05/16 65.6 kg (144 lb 9.6 oz)   04/29/16 64 kg (141 lb)   04/19/16 63.9 kg (140 lb 12.8 oz)   01/19/16 65.2 kg (143 lb 12.8 oz)   12/15/15 64.1 kg (141 lb 6.4 oz)   12/09/15 64.4 kg (141 lb 14.4 oz)   10/16/15 67.1 kg (148 lb)   10/16/15 67.5 kg (148 lb 12.8 oz)   05/13/15 68.4 kg (150 lb 12.8 oz)   04/17/15 69.9 kg (154 lb)   01/22/15 68.5 kg (151 lb)   10/15/14 73.9 kg (163 lb)   09/26/14 73.9 kg (163 lb)   09/16/14 72.2 kg (159 lb 3.2 oz)   07/30/14 69.9 kg (154 lb)      NUTRITION DIAGNOSES:   Problem:  Inadequate energy intake     Etiology: related to decreased ability to consume sufficient energy     Signs/Symptoms: as evidenced by inability to consume energy without desaturating, no bfast, no lunch      NUTRITION INTERVENTIONS:  Meals/Snacks: General/healthful diet   Supplements: Commercial supplement              GOAL:   Pt will tolerate PO without desaturating or alternate nutrition will iniate within 2-4 days    Cultural, Sabianism, or Ethnic Dietary Needs: None     LEARNING NEEDS (Diet, Food/Nutrient-Drug Interaction):    [x] None Identified   [] Identified and Education Provided/Documented   [] Identified and Pt declined/was not appropriate      [x] Interdisciplinary Care Plan Reviewed/Documented    [x] Discharge Needs:    TBD   [] No Nutrition Related Discharge Needs    NUTRITION RISK:   Pt Is At Nutrition Risk  [x]     No Nutrition Risk Identified  []       PT SEEN FOR:    []  MD Consult: []Calorie Count      []Diabetic Diet Education        []Diet Education     []Electrolyte Management     []General Nutrition Management and Supplements     []Management of Tube Feeding     []TPN Recommendations    []  RN Referral:  []MST score >=2     []Enteral/Parenteral Nutrition PTA     []Pregnant: Gestational DM or Multigestation                 [] Pressure Ulcer      []  Low BMI      []  Length of Stay       [] Dysphagia Diet     [] Ventilator      [x] Follow-Up        Previous Recommendations:   [x] Implemented          [] Not Implemented          [] Not Applicable    Previous Goal:   [] Met              [] Progressing Towards Goal              [x] Not Progressing Towards Goal   [x] Not Applicable              Carlos Naylor, [address] [place] [address]  Pager: [phone]  Office: [phone]

## 2018-02-07 NOTE — PROGRESS NOTES
[address] [facility] RESIDENCY PROGRAM  PROGRESS NOTE     2/7/2018  PCP: Jerod Barnes MD     Assessment/Plan:   Jese Noble is a 76 y.o. female with h/o hypertension, CAD, pulmonary fibrosis who is admitted for acute hypoxic respiratory failure 2/2 CAP.    24 hour Events: Overnight pt pulled off high-flow max. HR was in 130s-140s with suspected VTac, however EKG showed sinus tachycardia with PVCs. LA elevated at 2.5 and patient given 500cc NS fluid bolus. Acute HFpEF in the setting of sepsis and AHRF being treated with PO Lasix.  last echo 11/2017 showed with EF 60-65% . POA ProBNP: 6743: Repeat Echo 50%, severe AS and pulmonary hypertension. No leg swelling or JVD.   - PO Lasix 40 mg daily per cards, no BB  -  repeat CXR on 2/6/18 showed Increased diffuse bilateral interstitial and alveolar opacities,  compatible with worsening pulmonary edema and/or pneumonia.  -Patient is not improving on current respiratory therapies. Palliative following and will discuss goals of care with patient and family. Elevated troponins in the setting of Sepsis, AHRF, and CAP as evidenced by troponin level of 0.08:  Likely Supply demand. - continue ASA, Lipitor  - not on BB due to pulmonary fibrosis/ COPD  - Troponin 0.25--> 0.33; will continue to trend     Acute on chronic Hypoxic Respiratory Failure requiring bipap: in the setting of Severe sepsis 2/2 CAP ( 3/4 SIRS) and + RSV and pulmonary edema. At home on 2L oxygen and prednisone for pulmonary fibrosis, now requiring bipap. RSV positive. - wean off bipap as tolerated. Currently on 90% FIO2. - Abx broadened to cefepime (day 2) and flagyl (day 2) yesterday, and continuing doxycycline (day 6) . - Continue duoneb, Brovana   - Continue Solumedrol 60 q6  - Blood Cx: No growth for 5 days.   - Continue PO lasix 40mg daily     RSV bronchiolitis: contributed to worsening of pulmonary function, but patient appear to clinically improved this am  - continue current management as above    Hypertension  - BP stable. - Continuing home medications of Cardizem. Continue Lasix daily. - Will continue to monitor at this time and readjust as BP's trend.     Diabetes Mellitus T2: Last HgA1c 6.0. On 12/28. BS high in the 200s yesterday, likely due to solumedrol.   - per Diabetes managemnt recs, switched Lantus 20 to NPH 12 units BID. BS still not at goal, so will increase NPH to 14 units BID today. - holding Metformin  - Insulin Sliding Scale normal sensitivity with AC&HS glucose checks. - Hypoglycemia protocols ordered.     Pulmonary fibrosis: likely decompensated with CAD in the setting of increased BNP   - continue home pirfenidone   - solumedrol as above. - high resolution CT when patient is stable  - Patient evaluated by palliative care team and goals of care discussed. Patient is DNR/DNI.     Hx of aortic valve replacement(2012 2/2 AS): stable, no intervention needed currently     Paroxysmal AFib: s/p AVR. Chads >2. NSR rate controlled. Followed by cardiologist Dr. Alicia Gaxiola   -Continue diltiazem 240mg, digoxin per cards dosing, and Xarelto 15 mg  - digoxin level wnL at 1.7 on 2/5    Hx of recurrent UTI: on Keflex at home- Urology following. Stable. Hold Keflex while inpatient      HLD: stable  -Continue lipitor     Acute on chronic anemia: Hb stable at 8.6. ( BL~9-10)    FEN/GI -  eat/drink if tolerated  Activity - Out of bed with assistance  DVT prophylaxis - Xarelto  GI prophylaxis - Not indicated at this time  Disposition - TBD. Code Status - DNR/DNI, discussed with patient / caregivers. Subjective:   Pt was seen and examined at bedside on BiPap. Reports her breathing is the same as yesterday. Denies chest pain, cough, nausea, vomiting, abdominal pain and dizziness. No other complaints this morning.   Objective:   Physical examination  Visit Vitals    /63 (BP 1 Location: Left arm, BP Patient Position: At rest)    Pulse 86    Temp 98 °F (36.7 °C)    Resp 20  Ht 5' 3\" (1.6 m)    Wt 132 lb 7.9 oz (60.1 kg)    SpO2 91%    BMI 23.47 kg/m2      Temp (24hrs), Av.7 °F (36.5 °C), Min:97.6 °F (36.4 °C), Max:98 °F (36.7 °C)     O2 Flow Rate (L/min): 45 l/min   O2 Device: Hi flow nasal cannula      Date 18 - 18 - 18   Shift 5559-8813 4218-3063 24 Hour Total 5880-5361 1222-7098 24 Hour Total   I  N  T  A  K  E   P.O. 450 400 850         P. O. 450 400 850       I.V.  (mL/kg/hr)  600  (0.8) 600  (0.4)         I.V.  600 600       Shift Total  (mL/kg) 450  (7.6) 1000  (16.6) 1450  (24.1)      O  U  T  P  U  T   Urine  (mL/kg/hr)  2900  (4) 2900  (2)         Urine Voided  1300 1300         Urine Output (mL) (External Female Catheter 18)  1600 1600       Stool            Stool Occurrence(s)  2 x 2 x       Shift Total  (mL/kg)  2900  (48.3) 2900  (48.3)       -1900 -1450      Weight (kg) 59.6 60.1 60.1 60.1 60.1 60.1         Last 3 shifts:     190 -  0700  In: 2020 [P.O.:1200; I.V.:820]  Out: 3400 [Urine:3400]    General:   Alert, cooperative, no acute distress   Head:   Atraumatic   Eyes:   Conjunctivae clear   ENT:  Oral mucosa normal   Neck:  Supple, trachea midline, no adenopathy   No JVD   Back:    No CVA tenderness    Chest wall:    No tenderness or deformities    Lungs: On BiPap, on 90%FIO2. Bilateral crackles in lower lungs with good air movement.     Heart:   Regular rhythm, no murmur   Abdomen:    Soft, non-tender   No masses or organomegaly    Extremities:  no edema   Pulses:  Symmetric all extremities   Skin:  Warm and dry   Neurologic:  Oriented   No focal deficits         Data Review:     Recent Labs      18   0147  18   0231   WBC  13.6*  7.9  8.6   HGB  10.1*  9.0*  8.6*   HCT  32.2*  29.1*  27.4*   PLT  257  209  233     Recent Labs      18   0510  187  18   0231   NA  142  139  144  144   K  3.9  4.0  4.1  3.5   CL  104 100  109*  110*   CO2  33*  31  31  29   GLU  118*  215*  149*  175*   BUN  28*  33*  28*  30*   CREA  0.58  0.82  0.71  0.82   CA  7.5*  7.9*  7.9*  7.8*   MG   --    --    --   2.1   ALB  2.4*  2.7*  2.5*  2.3*   TBILI  0.4  0.5  0.2  0.2   SGOT  92*  166*  68*  80*   ALT  176*  212*  135*  123*     Medications reviewed  Current Facility-Administered Medications   Medication Dose Route Frequency    albuterol-ipratropium (DUO-NEB) 2.5 MG-0.5 MG/3 ML  3 mL Nebulization QID RT    insulin NPH (NOVOLIN N, HUMULIN N) injection 12 Units  12 Units SubCUTAneous ACB&D    [START ON 2/8/2018] digoxin (LANOXIN) tablet 0.125 mg  0.125 mg Oral EVERY OTHER DAY    acetylcysteine (MUCOMYST) 100 mg/mL (10 %) nebulizer solution 200 mg  2 mL Nebulization TID RT    cefepime (MAXIPIME) 2 g in 0.9% sodium chloride 100 mL IVPB  2 g IntraVENous Q12H    metroNIDAZOLE (FLAGYL) IVPB premix 500 mg  500 mg IntraVENous Q8H    artificial tears (dextran 70-hypromellose) (NATURAL BALANCE) 0.1-0.3 % ophthalmic solution 1 Drop  1 Drop Both Eyes PRN    methylPREDNISolone (PF) (SOLU-MEDROL) injection 60 mg  60 mg IntraVENous Q6H    furosemide (LASIX) tablet 40 mg  40 mg Oral DAILY    sodium chloride (NS) flush 5-10 mL  5-10 mL IntraVENous Q8H    sodium chloride (NS) flush 5-10 mL  5-10 mL IntraVENous PRN    sodium chloride (NS) flush 5-10 mL  5-10 mL IntraVENous Q8H    sodium chloride (NS) flush 5-10 mL  5-10 mL IntraVENous PRN    insulin lispro (HUMALOG) injection   SubCUTAneous AC&HS    glucose chewable tablet 16 g  4 Tab Oral PRN    dextrose (D50W) injection syrg 12.5-25 g  12.5-25 g IntraVENous PRN    glucagon (GLUCAGEN) injection 1 mg  1 mg IntraMUSCular PRN    dilTIAZem CD (CARDIZEM CD) capsule 240 mg  240 mg Oral DAILY    atorvastatin (LIPITOR) tablet 20 mg  20 mg Oral QHS    rivaroxaban (XARELTO) tablet 15 mg  15 mg Oral DAILY WITH DINNER    arformoterol (BROVANA) neb solution 15 mcg  15 mcg Nebulization BID RT    doxycycline (VIBRAMYCIN) 100 mg in 0.9% sodium chloride (MBP/ADV) 100 mL MBP  100 mg IntraVENous Q12H    pirfenidone (ESBRIET) capsule 801 mg  (Patient Supplied)  801 mg Oral TID WITH MEALS         Signed:   Chong Pedro MD   Resident, Family Medicine      Attending note: Attending note to follow. ..

## 2018-02-08 PROBLEM — J96.90 RESPIRATORY FAILURE (HCC): Status: ACTIVE | Noted: 2018-01-01

## 2018-02-08 NOTE — PROGRESS NOTES
[facility] PROGRESS NOTE  Pulmonary, Critical Care, and Sleep Medicine    Name: Litzy Lima MRN: [id number]   : 1940 Hospital: [facility]   Date: 2018  Admission Date: 2018     Chart and notes reviewed. Data reviewed. I review the patient's current medications in the medical record at each encounter. I have evaluated and examined the patient. Overnight events reviewed:  Afebrile  Tachycardic  Sats 92-94% on BiPAP 12/6 FiO2 100%  WBC 19.7 - worse  Hgb 10.5  LFTs remain elevated  I/O: + 610 ml  CXR unchanged  Trop 0.62 and code STEMI called; canceled by Dr. Sadiq Mendiola   18 ABG 7.44/45/105/30 on BiPAP 14/9 FiO2 90%      ROS: Unable to obtain from patient as she is minimally responsive this morning. Mostly moaning softly. Speech not intelligible. Coughing up bloody sputum overnight per nursing. Has increased WOB, even with BiPAP in place. 12 point ROS reviewed and negative except as noted above.     Vital Signs:  Visit Vitals    /75 (BP 1 Location: Left arm, BP Patient Position: At rest)    Pulse (!) 115    Temp 97.6 °F (36.4 °C)    Resp 22    Ht 5' 3\" (1.6 m)    Wt 59 kg (130 lb)    SpO2 94%    BMI 23.03 kg/m2       O2 Device: BIPAP   O2 Flow Rate (L/min): 45 l/min   Temp (24hrs), Av.8 °F (36.6 °C), Min:97.6 °F (36.4 °C), Max:98.5 °F (36.9 °C)       Intake/Output:   Last shift:         Last 3 shifts:  1901 -  0700  In: 0729 [P.O.:1360; I.V.:1500]  Out: 4150 [Urine:4150]    Intake/Output Summary (Last 24 hours) at 18 1053  Last data filed at 18 4276   Gross per 24 hour   Intake             1760 ml   Output             1250 ml   Net              510 ml        Current Facility-Administered Medications:     insulin NPH (NOVOLIN N, HUMULIN N) injection 14 Units, 14 Units, SubCUTAneous, Q12H, Shanice Hawkins MD, Stopped at 18 0800    albuterol-ipratropium (DUO-NEB) 2.5 MG-0.5 MG/3 ML, 3 mL, Nebulization, QID RT, Jany Garcia, [address], 3 mL at 02/08/18 0820    acetylcysteine (MUCOMYST) 100 mg/mL (10 %) nebulizer solution 200 mg, 2 mL, Nebulization, QID RT, Bessie Cochran, [address]    cefepime (MAXIPIME) 2 g in 0.9% sodium chloride 100 mL IVPB, 2 g, IntraVENous, Q8H, Rina Cintron MD, 2 g at 02/08/18 2114    digoxin (LANOXIN) tablet 0.125 mg, 0.125 mg, Oral, EVERY OTHER DAY, Emili Juarez NP, Stopped at 02/08/18 0900    metroNIDAZOLE (FLAGYL) IVPB premix 500 mg, 500 mg, IntraVENous, Q8H, Rina Cintron MD, Last Rate: 100 mL/hr at 02/08/18 0509, 500 mg at 02/08/18 0509    artificial tears (dextran 70-hypromellose) (NATURAL BALANCE) 0.1-0.3 % ophthalmic solution 1 Drop, 1 Drop, Both Eyes, PRN, Sadi Heck MD  [facility]  [DISCONTINUED] insulin glargine (LANTUS) injection 20 Units, 20 Units, SubCUTAneous, QHS, 20 Units at 02/05/18 2121 **AND** methylPREDNISolone (PF) (SOLU-MEDROL) injection 60 mg, 60 mg, IntraVENous, Q6H, Quyen Quinn MD, 60 mg at 02/08/18 7239    furosemide (LASIX) tablet 40 mg, 40 mg, Oral, DAILY, Emili Juarez NP, Stopped at 02/08/18 0900    sodium chloride (NS) flush 5-10 mL, 5-10 mL, IntraVENous, Q8H, Alvin Deyanira Petit MD, 10 mL at 02/07/18 1400    sodium chloride (NS) flush 5-10 mL, 5-10 mL, IntraVENous, PRN, Andie Ghosh MD    sodium chloride (NS) flush 5-10 mL, 5-10 mL, IntraVENous, Q8H, Quyen Quinn MD, 10 mL at 02/08/18 0512    sodium chloride (NS) flush 5-10 mL, 5-10 mL, IntraVENous, PRN, Adrian Doing, MD    insulin lispro (HUMALOG) injection, , SubCUTAneous, AC&HS, Adrian Maxwell MD, Stopped at 02/07/18 2200    glucose chewable tablet 16 g, 4 Tab, Oral, PRN, Adrian Maxwell MD    dextrose (D50W) injection syrg 12.5-25 g, 12.5-25 g, IntraVENous, PRN, Adrian Maxwell MD    glucagon (GLUCAGEN) injection 1 mg, 1 mg, IntraMUSCular, PRN, Adrian Maxwell MD    dilTIAZem CD (CARDIZEM CD) capsule 240 mg, 240 mg, Oral, DAILY, Adrian Maxwell MD, Stopped at 02/08/18 0900   atorvastatin (LIPITOR) tablet 20 mg, 20 mg, Oral, QHS, Quyen Quinn MD, 20 mg at 02/07/18 2255    rivaroxaban (XARELTO) tablet 15 mg, 15 mg, Oral, DAILY WITH DINNER, Quyen Quinn MD, 15 mg at 02/07/18 1752    arformoterol (BROVANA) neb solution 15 mcg, 15 mcg, Nebulization, BID RT, Quyen Quinn MD, 15 mcg at 02/07/18 1018    doxycycline (VIBRAMYCIN) 100 mg in 0.9% sodium chloride (MBP/ADV) 100 mL MBP, 100 mg, IntraVENous, Q12H, eLo Pittman MD, Last Rate: 100 mL/hr at 02/08/18 1008, 100 mg at 02/08/18 1008    pirfenidone (ESBRIET) capsule 801 mg  (Patient Supplied), 801 mg, Oral, TID WITH MEALS, Yulia Em MD, Stopped at 02/08/18 0800      Physical Exam:   General:  Somnolent, ill-appearing, + resp distress/increased WOB, appears stated age. Head:  Normocephalic, without obvious abnormality, atraumatic. Eyes:  Conjunctivae/corneas clear. Nose: Nares normal. Septum midline. Mucosa normal.  HF in place. Throat: Lips, mucosa, and tongue normal.    Neck: Supple, symmetrical, trachea midline, no adenopathy. Lungs:   Pursed lip breathing, diffuse crackles bilaterally, most prominent in the bases. + accessory muscle use. Chest wall:  No tenderness or deformity. Heart:  Regular rate and rhythm, S1, S2 normal, no murmur, click, rub or gallop. Abdomen:   Soft, non-tender. Bowel sounds normal. No masses,  No organomegaly. Extremities: Extremities normal, atraumatic, no cyanosis, 1-2+ LE/pedal edema. Pulses: 2+ and symmetric all extremities.    Skin: Skin color, texture, turgor normal. No rashes or lesions   Lymph nodes: Cervical, supraclavicular nodes normal.   Neurologic: Grossly nonfocal     DATA:  MAR reviewed and pertinent medications noted or modified as needed    Labs:  Recent Labs      02/08/18   0550  02/07/18   1038  02/06/18   2234   WBC  19.7*  14.6*  13.6*   HGB  10.5*  10.0*  10.1*   HCT  33.3*  31.6*  32.2*   PLT  203  218  257     Recent Labs      02/08/18   0550 02/07/18   0510  02/06/18   2234   NA  144  142  139   K  3.7  3.9  4.0   CL  103  104  100   CO2  34*  33*  31   GLU  199*  118*  215*   BUN  34*  28*  33*   CREA  0.76  0.58  0.82   CA  7.9*  7.5*  7.9*   ALB  2.8*  2.4*  2.7*   TBILI  1.0  0.4  0.5   SGOT  53*  92*  166*   ALT  141*  176*  212*       Imaging:  Personally visualized. CXR (2/7/18): Unchanged bilateral interstitial and airspace opacities. IMPRESSION:   -ILD of uncertain etiology with acute exacerbation  -RSV  -acute on chronic hypoxemic respiratory failure requiring bipap  -pulmonary HTN, likely largely group 3 with some component of group 2  -asthma/COPD  -MARIA R not tolerant of PAP therapy       PLAN:   -continuous BiPAP for now to keep sats > 88-90%; continue bipap for hypoxia and increased WOB only as tolerated by patient. If it increases her agitation/discomfort, would switch back to HF, despite hypoxia. -unable to check non contrast high resolution chest CT due to tenuous respiratory status  -continue schedule duonebs and solumedrol at 60 mg every 6 hours  -continue cefepime/doxy/flagyl; f/u sputum cx  -mucomyst, acapella, and chest PT as tolerated  -morphine 1mg q 2hr prn for increased WOB/air hunger  -respiratory viral PCR with RSV, likely etiology of exacerbation  -pneumonia workup negative  -no IVFs. Patient will not tolerate any pulmonary edema. Unless she shows signs of overt dehydration would NOT put her on maintenance fluids  -continue diuresis with Lasix per cardiology  -repeat TTE: EF 50%; mildly reduced RVSF; severe AS; severe pulm HTN  -continue brovana/pulmicort  -continue home Esbriet  -on Xarelto--doubt PE   -DNR/DNI  -Palliative Care eval appreciated. We need to consider comfort care/hospice at this point as patient failing to improve. Patient is critically ill requiring continuous BiPAP for severe hypoxia and increased WOB and is high risk for decompensation due to respiratory failure. CC time EOP 35 min.   Discussed with Palliative Care. Prognosis very guarded.          Eduardo Ullao

## 2018-02-08 NOTE — H&P
[address] [facility] Help to Those in Need  [phone]    Patient Name: Tobias Monet  YOB: 1940    Date of Provider Hospice Visit: 02/08/18    Level of Care:   [x] General Inpatient (GIP)    [] Routine   [] Respite    Location of Care:  [] [facility] [place] [x] [facility] [] [facility] [] [facility] [] [facility]    Date of [facility] Admission: 2/8/18  Hospice Medical Director at time of admission: Jascha Diagnosis: Acute respiratory failure  Diagnoses RELATED to the terminal prognosis: ILD, AS, asthma, diastolic CHF, sleep apnea, pneumonia  Other Diagnoses: CAD, afib,     HOSPICE SUMMARY     Tobias Monet is a 76y.o. year old who was admitted to [facility]. Patient admitted to [facility] with acute respiratory failure with b/l infiltrates. Patient started on broad spectrum abx, oxygen therapy, steroids, nebs and attempts at diuresis. Unfortunately, patient continued to decline and unable to weaned from BIPAP despite all aggressive measures. Patient with less responsiveness and increased work of breathing and family elected to transition to inpatient hospice care since patient currently on BIPAP(12/6, FIO2 of 100%)    The patient's principle diagnosis has resulted in respiratory distress  Refer to LCD     Functionally, the patient's Karnofsky and/or Palliative Performance Scale has declined over a period of weeks and is estimated at 10-20 The patient is dependent on the following ADLs:    Objective information that support this patients limited prognosis includes:   CXR   IMPRESSION:     Overall stable appearance of the chest with bilateral interstitial and airspace  opacities.        The patient/family chose comfort measures with the support of Hospice. HOSPICE DIAGNOSES   Active Symptoms:  1. SOB  2. Restlessness with agitation  3. Debility  4. BIPAP dependent  5. Goals of care     PLAN   1.  Patient will be admitted [facility] level of care because of ongoing respiratory distress and she will need frequent nursing assessment and adjustment of medications  2. Will given morphine 4 mg and ativan 2 mg IV prior to compassionate removal of BIPAP which will occur sometime this evening when family arrives. Explained to family, she may pass quickly after removed  3. Comfort order meds placed for fever, SOB, pain, secretions  4. Plan reviewed with all family in the room to include daughter who is MPOA. Jarod(Hospice) present during the discussion  5. Discussed with bedside nurse(Mario)    6.  and SW to support family needs  7. Disposition: home if stabilizes    Prognosis estimated based on 02/08/18 clinical assessment is:   [x] Hours to Days    [] Days to Weeks    [] Other:    Communicated plan of care with: Hospice Case Manager;  Hospice IDT; Care Team     GOALS OF CARE     Resuscitation Status: DNR  Durable DNR: [x] Yes [] No    Advance Care Planning 2/5/2018   Patient's Healthcare Decision Maker is: Named in scanned ACP document   Primary Decision Maker Name Kylee Farley   Primary Decision Maker Phone Number [phone]   Primary Decision Maker Relationship to Patient Adult child   Secondary Decision Maker Name Jay Cazares   Secondary Decision Maker Phone Number [phone]   Secondary Decision Maker Relationship to Patient Adult child   Confirm Advance Directive Yes, on file   Does the patient have other document types Do Not Resuscitate        HISTORY     History obtained from: chart, family    CHIEF COMPLAINT: sob  The patient is:   [] Verbal  [] Nonverbal  [x] Unresponsive patient minimally responsive but clearly with SOB    HPI/SUBJECTIVE:  Patient with bipap in place but barely opens her eyes to voice       REVIEW OF SYSTEMS     The following systems were: [x] reviewed  [] unable to be reviewed    Positive ROS include:  Constitutional: fatigue, weakness, short of breath  Ears/nose/mouth/throat: increased airway secretions  Respiratory:shortness of breath, wheezing  Gastrointestinal:poor appetite  Neurologic: weakness    Adult Non-Verbal Pain Assessment Score: 6/10    Face  [] 0   No particular expression or smile  [] 1   Occasional grimace, tearing, frowning, wrinkled forehead  [x] 2   Frequent grimace, tearing, frowning, wrinkled forehead    Activity (movement)  [] 0   Lying quietly, normal position  [] 1   Seeking attention through movement or slow, cautious movement  [x] 2   Restless, excessive activity and/or withdrawal reflexes    Guarding  [] 0   Lying quietly, no positioning of hands over areas of body  [x] 1   Splinting areas of the body, tense  [] 2   Rigid, stiff    Physiology (vital signs)  [x] 0   Stable vital signs  [] 1   Change in any of the following: SBP > 20mm Hg; HR > 20/minute  [] 2   Change in any of the following: SBP > 30mm Hg; HR > 25/minute    Respiratory  [] 0   Baseline RR/SpO2, compliant with ventilator  [x] 1   RR > 10 above baseline, or 5% drop SpO2, mild asynchrony with ventilator  [] 2   RR > 20 above baseline, or 10% drop SpO2, asynchrony with ventilator     FUNCTIONAL ASSESSMENT     Palliative Performance Scale (PPS):10-20     PSYCHOSOCIAL/SPIRITUAL ASSESSMENT     Active Problems:    Respiratory failure (HCC) (2/8/2018)      Past Medical History:   Diagnosis Date    Aortic stenosis     severe, s/p AVR March 2012 - presented with HF, EF 30%    Asthma 5/10/2012    Atrial fibrillation ([address] 5/10/2012    Bacterial pneumonia, unspecified     CAD (coronary artery disease)     cor calcifications (LAD) by CT     Chronic diastolic heart failure ([address] 5/29/2012    Depression     Dyslipidemia     Female stress incontinence     History of vascular access device 02/07/2018    [facility] VAT 4 FR MIDLINE L basilic for limited access    Hyperlipidemia 9/17/2010    Hypertension     Osteoarthritis     Paroxysmal atrial fibrillation (HCC)     post op AVR    Pulmonary fibrosis ([address]     S/P AVR 11/6/2016    S/P AVR (aortic valve replacement) 3/2012    bovine ([facility])    Sleep apnea     T2DM (type 2 diabetes mellitus) ([facility] 1/14/2011      Past Surgical History:   Procedure Laterality Date    CARDIAC SURG PROCEDURE UNLIST      ECHO 2D ADULT  3/2012    EF 30%, mod LVH, EF 30-35%, PA 50 mmHg    HX CATARACT REMOVAL      bilateral      Social History   Substance Use Topics    Smoking status: Never Smoker    Smokeless tobacco: Never Used    Alcohol use No     Family History   Problem Relation Age of Onset    Diabetes Mother     Diabetes Father     Hypertension Father       No Known Allergies   Current Facility-Administered Medications   Medication Dose Route Frequency    ondansetron (ZOFRAN) injection 4 mg  4 mg IntraVENous Q4H PRN    LORazepam (ATIVAN) injection 1 mg  1 mg IntraVENous Q15MIN PRN    glycopyrrolate (ROBINUL) injection 0.2 mg  0.2 mg IntraVENous Q4H PRN    scopolamine (TRANSDERM-SCOP) 1 mg 1 Patch  1 Patch TransDERmal Q72H    bisacodyl (DULCOLAX) suppository 10 mg  10 mg Rectal DAILY PRN    morphine injection 2 mg  2 mg IntraVENous Q15MIN PRN    acetaminophen (TYLENOL) suppository 650 mg  650 mg Rectal Q4H PRN    morphine injection 4 mg  4 mg IntraVENous ONCE    LORazepam (ATIVAN) injection 2 mg  2 mg IntraVENous ONCE        PHYSICAL EXAM     Wt Readings from Last 3 Encounters:   02/08/18 59 kg (130 lb)   12/28/17 61.1 kg (134 lb 9.6 oz)   11/10/17 60.3 kg (133 lb)       There were no vitals taken for this visit.     Supplemental O2  [x] Yes  [] NO  Last bowel movement:     Currently this patient has:  [] Peripheral IV [] PICC  [] PORT [] ICD    [x] Zazueta Catheter [] NG Tube   [] PEG Tube    [] Rectal Tube [] Drain  [x] Other:  Mid-line    Constitutional:ill appearing, thin, minimally responsive  Eyes: sunken  ENMT: dry oral mucosa but BIPAP in place  Cardiovascular: tachycardic  Respiratory: moderate distress with accessory muscle use, decreased BS  Gastrointestinal: soft  Musculoskeletal:muscle wasting  Skin:nl  Neurologic:minimally responsive        Pertinent Lab and or Imaging Tests:  Lab Results   Component Value Date/Time    Sodium 144 02/08/2018 05:50 AM    Potassium 3.7 02/08/2018 05:50 AM    Chloride 103 02/08/2018 05:50 AM    CO2 34 (H) 02/08/2018 05:50 AM    Anion gap 7 02/08/2018 05:50 AM    Glucose 199 (H) 02/08/2018 05:50 AM    BUN 34 (H) 02/08/2018 05:50 AM    Creatinine 0.76 02/08/2018 05:50 AM    BUN/Creatinine ratio 45 (H) 02/08/2018 05:50 AM    GFR est AA >60 02/08/2018 05:50 AM    GFR est non-AA >60 02/08/2018 05:50 AM    Calcium 7.9 (L) 02/08/2018 05:50 AM     Lab Results   Component Value Date/Time    Protein, total 6.0 (L) 02/08/2018 05:50 AM    Albumin 2.8 (L) 02/08/2018 05:50 AM           Total time: 70  Counseling / coordination time: 50  > 50% counseling / coordination?: yes    This patient meets Hospice General Inpatient (GIP) Level of Care.     The precipitating event that resulted in the need for GIP was: respiratory failure    The interventions tried that have been unsuccessful at controlling symptoms include: IV morphine, BIPAP, ativan    Supporting documentation for GIP need for pain control:  [x] Frequent evaluation by a doctor, nurse practitioner, nurse   [x] Frequent medication adjustment    [] IVs that cannot be administered at home   [] Aggressive pain management   [] Complicated technical delivery of medications              Supporting documentation for GIP need for symptom control:  []  Sudden decline necessitating intensive nursing intervention  []  Uncontrolled / intractable nausea or vomiting   []  Pathological fractures  []  Advanced open wounds requiring frequent skilled care  [x] Unmanageable respiratory distress  [] New or worsening delirium   [] Delirium with behavior issues  [x] Imminent death  with skilled nursing needs documented above

## 2018-02-08 NOTE — PROGRESS NOTES
[address] [facility] RESIDENCY PROGRAM  PROGRESS NOTE     2/8/2018  PCP: Jade Murray MD     Assessment/Plan:   Rosangela Kerr is a 76 y.o. female with h/o hypertension, CAD, pulmonary fibrosis who is admitted for acute hypoxic respiratory failure 2/2 CAP.    24 hour Events: Overnight, RRT was called for patient desatting after pulling off BiPap mask, this resolved after patient replaced mask. Night team was also called for ST elevation on telemetry. EKG was however unchanged from previous. Patient was given 0.5mg ativan for agitation. Acute HFpEF in the setting of sepsis and AHRF being treated with PO Lasix.  last echo 11/2017 showed with EF 60-65% . POA ProBNP: 6743: Repeat Echo 50%, severe AS and pulmonary hypertension. No leg swelling or JVD.   -  PO Lasix 40 mg daily per cards, no BB   -  repeat CXR on 2/6/18 showed Increased diffuse bilateral interstitial and alveolar opacities,  compatible with worsening pulmonary edema and/or pneumonia.  -Patient is not improving on current respiratory therapies. Palliative following and has discussed goals of care with patient. Plan to discuss hospice with patient's family. Patient's family has decided to come say their good byes to patient today and tomorrow. Elevated troponins in the setting of Sepsis, AHRF, and CAP as evidenced by troponin level of 0.08:  Likely Supply demand. - continue ASA, Lipitor  - not on BB due to pulmonary fibrosis/ COPD  - Troponin 0.25--> 0.33--> 0.30 --> 0.62     Acute on chronic Hypoxic Respiratory Failure requiring bipap: in the setting of Severe sepsis 2/2 CAP ( 3/4 SIRS) and + RSV and pulmonary edema. At home on 2L oxygen and prednisone for pulmonary fibrosis, now requiring bipap. RSV positive. - Continue BiPap but can transition to high flow O2 if continues to not tolerate/is agitated. On 90% FIO2 when seen this AM. Morphine PRN for air hunger. Per Pulmonary, will likely transition to comfort care later today.    - respiratory cx: positive for budding yeast, gram positive cocci, speciation pending  - Abx: cefepime (day 3) and flagyl (day 3), and doxycycline (day 7) . - Continue duoneb Brovana   - Continue Solumedrol 60 q6  - Blood Cx: No growth for 6 days. - Continue PO lasix 40mg daily     RSV bronchiolitis: contributed to worsening of pulmonary function  - continue current management as above    Hypertension  - BP stable. - Continuing home medications of Cardizem. Continue Lasix daily. - Will continue to monitor at this time and readjust as BP's trend.     Diabetes Mellitus T2: Last HgA1c 6.0. On 12/28. - per Diabetes managemnt recs, switched Lantus 20 to NPH 12 units BID. BS was still not at goal, so increased NPH to 14 units BID on 2/7/18.   - holding Metformin  - Insulin Sliding Scale normal sensitivity with AC&HS glucose checks. - Hypoglycemia protocols ordered.     Pulmonary fibrosis: likely decompensated with CAD in the setting of increased BNP   - continue home pirfenidone   - solumedrol as above. - Previously planned for CT when patient stable but unlikely given patient will be going to hospice. - Patient evaluated by palliative care team and goals of care discussed. Patient is DNR/DNI.     Hx of aortic valve replacement(2012 2/2 AS): stable, no intervention needed currently     Paroxysmal AFib: s/p AVR. Chads >2. NSR rate controlled. Followed by cardiologist Dr. Zoe Min   -Continue diltiazem 240mg, digoxin per cards dosing, and Xarelto 15 mg  - digoxin level wnL at 1.7 on 2/5    Hx of recurrent UTI: on Keflex at home- Urology following. Stable. Hold Keflex while inpatient      HLD: stable  -Continue lipitor     Acute on chronic anemia: Hb stable at 8.6. ( BL~9-10)    FEN/GI -  eat/drink if tolerated  Activity - Out of bed with assistance  DVT prophylaxis - Xarelto  GI prophylaxis - Not indicated at this time  Disposition - TBD. Code Status - DNR/DNI, discussed with patient / caregivers. Subjective:   Pt was seen and examined at bedside on BiPap. Reports her breathing is the same as yesterday. Denies chest pain, cough, nausea, vomiting, abdominal pain and dizziness. No other complaints this morning. Objective:   Physical examination  Visit Vitals    /79 (BP 1 Location: Left arm, BP Patient Position: At rest)    Pulse (!) 113    Temp 97.3 °F (36.3 °C)    Resp 24    Ht 5' 3\" (1.6 m)    Wt 130 lb (59 kg)    SpO2 93%    BMI 23.03 kg/m2      Temp (24hrs), Av.6 °F (36.4 °C), Min:97.3 °F (36.3 °C), Max:97.8 °F (36.6 °C)     O2 Flow Rate (L/min): 45 l/min   O2 Device: BIPAP      Date 18 - 18 0618 - 18 0659   Shift 1915-7128 9733-8927 24 Hour Total 1853-3229 7934-1108 24 Hour Total   I  N  T  A  K  E   P.O. 480 480 960         P. O. 480 480 960       I.V.  (mL/kg/hr) 400  (0.6) 500  (0.7) 900  (0.6)         Volume (cefepime (MAXIPIME) 2 g in 0.9% sodium chloride 100 mL IVPB)  200 200         Volume (doxycycline (VIBRAMYCIN) 100 mg in 0.9% sodium chloride (MBP/ADV) 100 mL MBP) 100 100 200         Volume (cefepime (MAXIPIME) 2 g in 0.9% sodium chloride 100 mL IVPB) 100  100         Volume (metroNIDAZOLE (FLAGYL) IVPB premix 500 mg) 200 200 400       Shift Total  (mL/kg) 880  (14.6) 980  (16.6) 1860  (31.5)      O  U  T  P  U  T   Urine  (mL/kg/hr) 650  (0.9) 600  (0.8) 1250  (0.9)         Urine Occurrence(s) 2 x 1 x 3 x         Urine Output (mL) (External Female Catheter 18)        Stool            Stool Occurrence(s) 2 x 1 x 3 x       Shift Total  (mL/kg) 650  (10.8) 600  (10.2) 1250  (21.2)       380 610      Weight (kg) 60.1 59 59 59 59 59         Last 3 shifts:    1901 -  0700  In: 8500 [P.O.:1360; I.V.:1500]  Out: 8527 [Urine:4150]    General:   Alert, cooperative, no acute distress   Head:   Atraumatic   Eyes:   Conjunctivae clear   ENT:  Oral mucosa normal   Neck:  Supple, trachea midline, no adenopathy   No JVD   Back:    No CVA tenderness    Chest wall:    No tenderness or deformities    Lungs: On BiPap, on 90%FIO2. Bilateral crackles in lower lungs with good air movement.     Heart:   Regular rhythm, no murmur   Abdomen:    Soft, non-tender   No masses or organomegaly    Extremities:  no edema   Pulses:  Symmetric all extremities   Skin:  Warm and dry   Neurologic:  Oriented   No focal deficits         Data Review:     Recent Labs      02/08/18   0550  02/07/18   1038  02/06/18   2234   WBC  19.7*  14.6*  13.6*   HGB  10.5*  10.0*  10.1*   HCT  33.3*  31.6*  32.2*   PLT  203  218  257     Recent Labs      02/08/18   0550  02/07/18   0510  02/06/18   2234   NA  144  142  139   K  3.7  3.9  4.0   CL  103  104  100   CO2  34*  33*  31   GLU  199*  118*  215*   BUN  34*  28*  33*   CREA  0.76  0.58  0.82   CA  7.9*  7.5*  7.9*   ALB  2.8*  2.4*  2.7*   TBILI  1.0  0.4  0.5   SGOT  53*  92*  166*   ALT  141*  176*  212*     Medications reviewed  Current Facility-Administered Medications   Medication Dose Route Frequency    insulin NPH (NOVOLIN N, HUMULIN N) injection 14 Units  14 Units SubCUTAneous Q12H    morphine injection 1 mg  1 mg IntraVENous Q2H PRN    albuterol-ipratropium (DUO-NEB) 2.5 MG-0.5 MG/3 ML  3 mL Nebulization QID RT    acetylcysteine (MUCOMYST) 100 mg/mL (10 %) nebulizer solution 200 mg  2 mL Nebulization QID RT    cefepime (MAXIPIME) 2 g in 0.9% sodium chloride 100 mL IVPB  2 g IntraVENous Q8H    digoxin (LANOXIN) tablet 0.125 mg  0.125 mg Oral EVERY OTHER DAY    metroNIDAZOLE (FLAGYL) IVPB premix 500 mg  500 mg IntraVENous Q8H    artificial tears (dextran 70-hypromellose) (NATURAL BALANCE) 0.1-0.3 % ophthalmic solution 1 Drop  1 Drop Both Eyes PRN    methylPREDNISolone (PF) (SOLU-MEDROL) injection 60 mg  60 mg IntraVENous Q6H    furosemide (LASIX) tablet 40 mg  40 mg Oral DAILY    sodium chloride (NS) flush 5-10 mL  5-10 mL IntraVENous Q8H    sodium chloride (NS) flush 5-10 mL  5-10 mL IntraVENous PRN    sodium chloride (NS) flush 5-10 mL  5-10 mL IntraVENous Q8H    sodium chloride (NS) flush 5-10 mL  5-10 mL IntraVENous PRN    insulin lispro (HUMALOG) injection   SubCUTAneous AC&HS    glucose chewable tablet 16 g  4 Tab Oral PRN    dextrose (D50W) injection syrg 12.5-25 g  12.5-25 g IntraVENous PRN    glucagon (GLUCAGEN) injection 1 mg  1 mg IntraMUSCular PRN    dilTIAZem CD (CARDIZEM CD) capsule 240 mg  240 mg Oral DAILY    atorvastatin (LIPITOR) tablet 20 mg  20 mg Oral QHS    rivaroxaban (XARELTO) tablet 15 mg  15 mg Oral DAILY WITH DINNER    arformoterol (BROVANA) neb solution 15 mcg  15 mcg Nebulization BID RT    doxycycline (VIBRAMYCIN) 100 mg in 0.9% sodium chloride (MBP/ADV) 100 mL MBP  100 mg IntraVENous Q12H    pirfenidone (ESBRIET) capsule 801 mg  (Patient Supplied)  801 mg Oral TID WITH MEALS         Signed:   Juan Diego Curry MD   Resident, Family Medicine      Attending note: Attending note to follow. ..

## 2018-02-08 NOTE — PROGRESS NOTES
SHIFT CHANGE:  1930 Bedside and Verbal shift change report given to Irlanda GATES (oncoming nurse) by Cliff Ruby (offgoing nurse). Report included the following information SBAR, Kardex, MAR and Recent Results. SHIFT SUMMARY:  2041  Family at nursing station, explained her need for bipap at this time. Patient told family to leave, \"I'm tired\". Daughter expressed concern over her struggling this admission, \"I don't want her to suffer\". 2320  Call received from tele about possible st elevation. Patient's vs stable, family practice notified. ECG performed, and troponin sent. Patient asymptomatic, reports no pain/pressure. Bipap in place. 65  [facility] practice bedside to eval patient and ECG.  0027  Critical troponin called to [facility], no new orders at this time. 36  Charge RN and house supervisor consulted. 6596  Code stemi called, Dr. Julio Lerner from cardiology paged stat. ICU RN and charge nurse consulted. 0100  Code stemi cancelled per Dr. Julio Lerner. No orders given at this time. Gagan Taylor [facility] practice to call daughter and report events. 0245  Mepilex transfer placed on the bridge of nose and forehead for patient comfort. 200  Called family practice about patient's discomfort with bipap mask, orders to follow. 0530  Patient coughed up bloody sputum, resident Adriana Estrada on floor notified. END OF SHIFT REPORT:  0730  Bedside shift change report given to [id number] [address]  (oncoming nurse) by Kristin Almaraz (offgoing nurse). Report included the following information SBAR, Kardex, Intake/Output and MAR.

## 2018-02-08 NOTE — PROGRESS NOTES
Palliative Medicine Consult  [place]: [phone] ([phone])    Patient Name: Isabella Parkinson  YOB: 1940    Date of Initial Consult: 2018  Reason for Consult: Care decisions  Requesting Provider: Marty Meneses MD.  Primary Care Physician: Orquidea Cleary MD     SUMMARY:   Isabella Parkinson is a 76 y.o. with a past history of ILD, chronic hypoxemic respiratory failure, aortic stenosis s/p AVR, diastolic heart failure, pulm HTN, asthma, afib, CAD, depression, HLD, HTN, DM and sleep apnea, , who was admitted on 2018 from home with a diagnosis of acute respiratory failure. Patient was seen at [facility] prior to arrival and found to be in resp distress with sats in the high 60's. EMS was called and she was brought into the ED. Was started on bipap, given sequential nebs in ED and started on antibiotics for community acquired pneumonia due to low grade fever, elevation of wbc count, and chest xray findings of diffuse interstitial and patchy opacities. Echo done revealed EF of 50% and moderate aortic stenosis and severe pulmonary hypertension. Cardiology recommended IV diuresis. Current medical issues leading to Palliative Medicine involvement include: Care decisions due to risk for decline. SH:  (  in ), lives with daughter in law, Alex Juárez. Son Tristan Nova  2018 suddenly. Daughter, Stephenie Chavez, is mPOA. Second son, Sindy Bolivar, lives locally. Has four additional children. Interim History:  2018--> Transitioned to HFNC this am and daughter reports that she ate well this am. Repeat chest xray increased diffuse bilateral interstitial and alveolar opacities,  compatible with worsening pulmonary edema and/or pneumonia despite diuresis. 2018--> RRT called twice in last 24 hours--> one for desat related to mucous plugging, other for patient pulling off Bipap with desat and tachycardia.  Antibiotic coverage broadened to cefepime, flagyl, in addition to doxycycline. Continues on IV solumedrol, duonebs, added mucomyst.     02/08/2018--> RRT called twice again overnight, one for patient removing bipap with desat and one for ST elevation seen on cardiac monitor. Code stemi called then cancelled. PALLIATIVE DIAGNOSES:   1. Shortness of breath  2. Hypoxia-- requiring Bipap/HFNC  3. Hypoalbuminemia  4. Goals of care  5. DNR   6. ACP  7. ILD of unknown etiology  8. RSV       PLAN:   1. Met with patient in follow-up. Daughter/JoieA Gamaliel Cheatham at bedside. 2. Symptom Management--> Patient currently on BiPAP, confused and agitated. Received dose of morphine yesterday for dyspnea, and has received two doses of ativan overnight for agitation. 3. Goals of care-->(02/06) Gamaliel Cheatham described patient's prior level of functioning prior to this illness--> moved in with daughter-in-law 7 months ago when family thought she was having more issues with memory and daughter in law has background as adult caregiver. Ambulates with walker/cane. Family cooks all of her meals for her. Gamaliel Cheatham explained the impact of sudden death of patient's son and because of this daughter in law wants very much for the patient to return home with her (finds comfort in having her mother in law with her in the home). Gamaliel Cheatham believes that patient has improved today and believes that she is \"taking a step in the right direction\", and is hopeful that as virus runs it's course she will continue to improve. Discussed the current hospitalization with continued high oxygen requirements, PT/OT eval to assess needs when she is appropriate for therapies, and possibility of new baseline. Discussed possibility of need for continued rehabilitation in SNF and while daughter acknowledges that this may be necessary but again would like patient to return home with daughter in law. (02/07)--> Discussed events of 24 hours, 2 RRT calls.  Shania described how panicked she was with the first call, as she is 1 hour away, and is thankful that patient recovered from the desaturation. Randy Pretty also reports that she is hopeful that with continued treatment the patient will improve and be able to return home with her. Describes how the patient has lived with her for some time, how the patient has supported her during her active grieving of the loss of her  (patient's son) and how the patient finds encouragement, comfort and enjoyment with spending time with grandchildren and great grandchildren. Spoke with [employer] and reviewed the events as well, she has appropriate insight into patient's current medical condition, tenuous respiratory status, as well as chronic lung disease, and is realistic that patient may not recover from this. Reports that at the time of diagnosis of ILD, patient was given a prognosis of 5 years and that she \"knew this day would come eventually\". Discussed current respiratory status and drops in oxygen saturation despite maximum support. Addressed that given failure to improve, and signs of possible further respiratory compromise, that we may be at the point where we have exhausted medical management. Daughter wants to see if additional antibiotics added yesterday improve patient's condition. Confirmed again that patient is DNI. (02/08)--> Daughter Shahbaz Mcgee at bedside, very frustrated with events from overnight, call from [facility] at 2am and not sure of the reason (\"heart attack or no heart attack? \"), and questioning if patient \"is receiving the care she needs\" based on overheard conversation from nursing that patient \"was found blue\" after removing BiPAP. Shahbaz Mcgee acknowledges how the patient appeared to be making slow improvement in the beginning of the week, but has \"gotten a little bit worse every day\", and that, due to bipap, she is unable to eat and \"getting weaker and weaker\". Discussed that, given ILD and current infection that lungs may not get any better and that this could be patient's new baseline.  Daughter acknowledges this but wants to continue to give her a chance to recover, however slim of a chance that may be. Had to step out of the room for a period of time, and when I returned daughter was tearful stating that the patient was increasingly agitated and that she \"knew this time would come\"-- was told by provider (FM? Pulm?) that patient's current condition is unlikely to improve and medical management has been exhausted and that hospice would be appropriate. Daughter states that Vika Bar is not ready to let her go\" but knows that she \"doesn't want her to suffer\". Asked if patient and daughter want a  to come and she explained that she talked with her mother this morning about her salvation and that Vika Bar is going to a better place and is at peace with that\". Discussed comfort care/hospice with daughter and she is agreeable to transition when all siblings/family have arrived today. Explained what transition would look like: discontinuing monitoring, weaning BiPAP to NC, and focusing on symptom management by liberalizing medications for pain, anxiety, shortness of breath. Will plan for hospice liaison to meet with daughter Abdirizak Salazar for enrollment and transfer to [address] [place] [address] to initiate transition when family is ready. 4. DNR discussion--> Addressed what patient would want done in the event of cardiopulmonary arrest and what resuscitation would entail. Daughter/Lilly states that she would elect to be DNR. 5. ACP--> Patient has advanced medical directive on file and appoints Margaret Mott (daughter) as primary agent for healthcare decisions, and Snehal Burnham (son) as secondary agent. ACP updated to reflect this. 6. Initial consult note routed to primary continuity provider  7. Communicated plan of care with: Palliative IDT       GOALS OF CARE / TREATMENT PREFERENCES:     GOALS OF CARE: transition to comfort care/in patient hospice when all family have arrived.          TREATMENT PREFERENCES:   Code Status: DNR    Advance Care Planning:  Advance Care Planning 2/5/2018   Patient's Healthcare Decision Maker is: Named in scanned ACP document   Primary Decision Maker Name Hakan Farmer   Primary Decision Maker Phone Number [phone]   Primary Decision Maker Relationship to Patient Adult child   Secondary Decision Maker Name Catina Cosby   Secondary Decision Maker Phone Number [phone]   Secondary Decision Maker Relationship to Patient Adult child   Confirm Advance Directive Yes, on file   Does the patient have other document types Do Not Resuscitate           Other Instructions: Other:    As far as possible, the palliative care team has discussed with patient / health care proxy about goals of care / treatment preferences for patient. HISTORY:     History obtained from: chart, daughter Trena Henriquez: shortness of breath    HPI/SUBJECTIVE:    The patient is:   [] Verbal and participatory  [x] Non-participatory due to: confused, currently on BiPAP    76 y.o. WF who was seen at [facility] prior to arrival and found to be in resp distress with sats in the high 60's. EMS was called and she was brought into the ED. Was started on bipap, given sequential nebs in ED and started on antibiotics for community acquired pneumonia due to low grade fever, elevation of wbc count, and chest xray findings of diffuse interstitial and patchy opacities. Echo done revealed EF of 50% and moderate aortic stenosis and severe pulmonary hypertension. Cardiology recommended IV diuresis.        Clinical Pain Assessment (nonverbal scale for severity on nonverbal patients):   Clinical Pain Assessment  Severity: 0          Duration: for how long has pt been experiencing pain (e.g., 2 days, 1 month, years)  Frequency: how often pain is an issue (e.g., several times per day, once every few days, constant)     FUNCTIONAL ASSESSMENT:     Palliative Performance Scale (PPS):  PPS: 30       PSYCHOSOCIAL/SPIRITUAL SCREENING:     Palliative IDT has assessed this patient for cultural preferences / practices and a referral made as appropriate to needs (Cultural Services, Patient Advocacy, Ethics, etc.)    Advance Care Planning:  Advance Care Planning 2/5/2018   Patient's Healthcare Decision Maker is: Named in scanned ACP document   Primary Decision Maker Name Rama Oneal   Primary Decision Maker Phone Number [phone]   Primary Decision Maker Relationship to Patient Adult child   Secondary Decision Maker Name Kamran Mohan   Secondary Decision Maker Phone Number [phone]   Secondary Decision Maker Relationship to Patient Adult child   Confirm Advance Directive Yes, on file   Does the patient have other document types Do Not Resuscitate       Any spiritual / Scientology concerns:  [] Yes /  [x] No    Caregiver Burnout:  [] Yes /  [x] No /  [] No Caregiver Present      Anticipatory grief assessment:   [x] Normal  / [] Maladaptive       ESAS Anxiety:      ESAS Depression:          REVIEW OF SYSTEMS:     Positive and pertinent negative findings in ROS are noted above in HPI. The following systems were [] reviewed / [x] unable to be reviewed as noted in HPI  Other findings are noted below. Systems: constitutional, ears/nose/mouth/throat, respiratory, gastrointestinal, genitourinary, musculoskeletal, integumentary, neurologic, psychiatric, endocrine. Positive findings noted below. Modified ESAS Completed by: provider   Fatigue: 3 Drowsiness: 1     Pain: 0           Dyspnea: 2           Stool Occurrence(s): 1        PHYSICAL EXAM:     From RN flowsheet:  Wt Readings from Last 3 Encounters:   02/08/18 130 lb (59 kg)   12/28/17 134 lb 9.6 oz (61.1 kg)   11/10/17 133 lb (60.3 kg)     Blood pressure 114/75, pulse (!) 115, temperature 97.6 °F (36.4 °C), resp. rate 22, height 5' 3\" (1.6 m), weight 130 lb (59 kg), SpO2 94 %.     Pain Scale 1: Visual  Pain Intensity 1: 3                 Last bowel movement, if known:     Constitutional: frail, agitated, confused  Eyes: pupils equal, anicteric  ENMT: no nasal discharge, dry mucous membranes  Cardiovascular: regular rhythm, distal pulses intact, +1 SILVIO  Respiratory: breathing not labored, symmetric, bipap in place, crackles throughout  Gastrointestinal: soft non-tender, +bowel sounds  Musculoskeletal: no deformity, no tenderness to palpation  Skin: warm, dry  Neurologic: following some commands, moving all extremities, confused  Psychiatric: flat affect, no hallucinations, agitated  Other:       HISTORY:     Principal Problem:    Acute respiratory failure (HCC) (2/2/2018)    Active Problems:    COPD exacerbation ([address] (2/2/2018)      CAP (community acquired pneumonia) (2/2/2018)      Past Medical History:   Diagnosis Date    Aortic stenosis     severe, s/p AVR March 2012 - presented with HF, EF 30%    Asthma 5/10/2012    Atrial fibrillation (HCC) 5/10/2012    Bacterial pneumonia, unspecified     CAD (coronary artery disease)     cor calcifications (LAD) by CT     Chronic diastolic heart failure (HCC) 5/29/2012    Depression     Dyslipidemia     Female stress incontinence     History of vascular access device 02/07/2018    [facility] VAT 4 FR MIDLINE L basilic for limited access    Hyperlipidemia 9/17/2010    Hypertension     Osteoarthritis     Paroxysmal atrial fibrillation (HCC)     post op AVR    [facility]monary fibrosis [facility]     S/P AVR 11/6/2016    S/P AVR (aortic valve replacement) 3/2012    bovine ([facility])    Sleep apnea     T2DM (type 2 diabetes mellitus) ([facility] 1/14/2011      Past Surgical History:   Procedure Laterality Date    CARDIAC SURG PROCEDURE UNLIST      ECHO 2D ADULT  3/2012    EF 30%, mod LVH, EF 30-35%, PA 50 mmHg    HX CATARACT REMOVAL      bilateral      Family History   Problem Relation Age of Onset    Diabetes Mother     Diabetes Father     Hypertension Father       History reviewed, no pertinent family history.   Social History   Substance Use Topics    Smoking status: Never Smoker    Smokeless tobacco: Never Used    Alcohol use No     No Known Allergies   Current Facility-Administered Medications   Medication Dose Route Frequency    insulin NPH (NOVOLIN N, HUMULIN N) injection 14 Units  14 Units SubCUTAneous Q12H    albuterol-ipratropium (DUO-NEB) 2.5 MG-0.5 MG/3 ML  3 mL Nebulization QID RT    acetylcysteine (MUCOMYST) 100 mg/mL (10 %) nebulizer solution 200 mg  2 mL Nebulization QID RT    cefepime (MAXIPIME) 2 g in 0.9% sodium chloride 100 mL IVPB  2 g IntraVENous Q8H    digoxin (LANOXIN) tablet 0.125 mg  0.125 mg Oral EVERY OTHER DAY    metroNIDAZOLE (FLAGYL) IVPB premix 500 mg  500 mg IntraVENous Q8H    artificial tears (dextran 70-hypromellose) (NATURAL BALANCE) 0.1-0.3 % ophthalmic solution 1 Drop  1 Drop Both Eyes PRN    methylPREDNISolone (PF) (SOLU-MEDROL) injection 60 mg  60 mg IntraVENous Q6H    furosemide (LASIX) tablet 40 mg  40 mg Oral DAILY    sodium chloride (NS) flush 5-10 mL  5-10 mL IntraVENous Q8H    sodium chloride (NS) flush 5-10 mL  5-10 mL IntraVENous PRN    sodium chloride (NS) flush 5-10 mL  5-10 mL IntraVENous Q8H    sodium chloride (NS) flush 5-10 mL  5-10 mL IntraVENous PRN    insulin lispro (HUMALOG) injection   SubCUTAneous AC&HS    glucose chewable tablet 16 g  4 Tab Oral PRN    dextrose (D50W) injection syrg 12.5-25 g  12.5-25 g IntraVENous PRN    glucagon (GLUCAGEN) injection 1 mg  1 mg IntraMUSCular PRN    dilTIAZem CD (CARDIZEM CD) capsule 240 mg  240 mg Oral DAILY    atorvastatin (LIPITOR) tablet 20 mg  20 mg Oral QHS    rivaroxaban (XARELTO) tablet 15 mg  15 mg Oral DAILY WITH DINNER    arformoterol (BROVANA) neb solution 15 mcg  15 mcg Nebulization BID RT    doxycycline (VIBRAMYCIN) 100 mg in 0.9% sodium chloride (MBP/ADV) 100 mL MBP  100 mg IntraVENous Q12H    pirfenidone (ESBRIET) capsule 801 mg  (Patient Supplied)  801 mg Oral TID WITH MEALS          LAB AND IMAGING FINDINGS:     Lab Results   Component Value Date/Time    WBC 19.7 (H) 02/08/2018 05:50 AM    HGB 10.5 (L) 02/08/2018 05:50 AM    PLATELET 518 87/38/4996 05:50 AM     Lab Results   Component Value Date/Time    Sodium 144 02/08/2018 05:50 AM    Potassium 3.7 02/08/2018 05:50 AM    Chloride 103 02/08/2018 05:50 AM    CO2 34 (H) 02/08/2018 05:50 AM    BUN 34 (H) 02/08/2018 05:50 AM    Creatinine 0.76 02/08/2018 05:50 AM    Calcium 7.9 (L) 02/08/2018 05:50 AM    Magnesium 2.1 02/05/2018 02:31 AM      Lab Results   Component Value Date/Time    AST (SGOT) 53 (H) 02/08/2018 05:50 AM    Alk. phosphatase 251 (H) 02/08/2018 05:50 AM    Protein, total 6.0 (L) 02/08/2018 05:50 AM    Albumin 2.8 (L) 02/08/2018 05:50 AM    Globulin 3.2 02/08/2018 05:50 AM     Lab Results   Component Value Date/Time    INR 1.0 02/02/2018 02:58 PM    Prothrombin time 10.1 02/02/2018 02:58 PM    aPTT 32.4 02/02/2018 02:58 PM      No results found for: IRON, FE, TIBC, IBCT, PSAT, FERR   Lab Results   Component Value Date/Time    pH 7.44 02/07/2018 05:19 AM    PCO2 45 02/07/2018 05:19 AM    PO2 105 (H) 02/07/2018 05:19 AM     No components found for: [place]   Lab Results   Component Value Date/Time    CK 61 06/07/2017 04:33 PM    CK - MB <1.0 06/07/2017 04:33 PM                Total time: 65 min  Counseling / coordination time, spent as noted above: 55 min  > 50% counseling / coordination?: yes    Prolonged service was provided for  [x]30 min   []75 min in face to face time in the presence of the patient, spent as noted above. Time Start: 9066/3268    Time End: 0850/1030  Note: this can only be billed with  (initial) or 21 [phone] (follow up). If multiple start / stop times, list each separately.

## 2018-02-08 NOTE — HOSPICE
[facility]  [phone]    Inpatient Nursing Admission   Patient Name: Kataj Novak  YOB: 1940  Age: 76 y.o. Date of Hospice Admission: 2/8/2018  Hospice Attending Elected by Patient: Jayro Mir MD  Primary Care Physician: Ilana Winston MD  Admitting RN: Elizabeth Kor RN  : Kailey TANNER     Level of Care (GIP/Routine/Respite): GIP  Facility of Care: [facility]   Patient Room: 322/01     HOSPICE SUMMARY   ER Visits/ Hospitalizations in past year: Hospitalization-06/2017-metabolic encephalopathy  Hospice Diagnosis: respiratory Failure  Respiratory failure [facility])  Onset Date of Hospice Diagnosis: February 2018  Summary of Disease Progression Leading to Hospice Diagnosis:   Katja Novak is a 76 y.o. with a past history of interstitial lung disease, chronic hypoxemic respiratory failure, aortic stenosis s/p AVR, diastolic heart failure, pulm HTN, asthma, afib, CAD, depression, HLD, HTN, DM and sleep apnea, who was admitted on 2/2/2018 from home with a diagnosis of acute respiratory failure. [facility] prior to arrival and found to be in respiratory distress with sats in the high 60's. EMS was called and she was brought into the ED. The patient was started on BIPAP, given sequential nebs in ED and started on antibiotics for community acquired pneumonia due to low grade fever, elevation of wbc count, and chest xray findings of diffuse interstitial and patchy opacities. Her current hospitalization has been complicated by inability to wean off positive pressure ventilation. Yesterday, two RRTs called on patient; one due to desaturation related to mucous plugging and the other for the patient pulling of BIPAP with resultant desaturation and tachycardia because of terminal agitation.   Patient has remained objectively short of breath with RR >25, accessory muscle usage, and pursed lip breathing despite multiple doses of IVP opioids. As a result, the family have elected to utilize hospice services.           Co-Morbidities:   Patient Active Problem List   Diagnosis Code    Hypertension I10    Depression F32.9    Female stress incontinence N39.3    CAD (coronary artery disease) I25.10    Aortic stenosis I35.0    Sleep apnea G47.30    Arthritis M19.90    History of noncompliance with medical treatment Z91.19    COPD (chronic obstructive pulmonary disease) ([facility] J44.9    Atrial fibrillation (HCC) I48.91    Pulmonary fibrosis ([facility]) J84.10    Chronic diastolic heart failure ([facility]) I50.32    Dyslipidemia E78.5    Frozen shoulder M75.00    RBBB I45.10    DM II (diabetes mellitus, type II), controlled ([facility] E11.9    S/P AVR Z95.2    Dehydration E86.0    Dizziness R42    Visual loss, left eye H54.62    Acute respiratory failure ([facility]) J96.00    COPD exacerbation ([facility]) J44.1    CAP (community acquired pneumonia) J18.9    Respiratory failure ([facility]) J96.90     Diagnoses RELATED to the terminal prognosis: COPD, pulmonary fibrosis, CAD, CAP, aortic stenosis, chronic diastolic heart failure, s/p AVR  Other Diagnoses: RBBB, dehydration    Rationale for a prognosis of life expectancy of 6 months or less if the disease follows its normal course (Disease Specific History):     Akanksha Briceno is a 76 y.o. who was admitted to [address] [place] [address]. The patient's principle diagnosis of respiratory failure has resulted in reliance on high-dose positive pressure oxygen therapy, dependence for all ADLs, and generalized debility. Functionally, the patient's Palliative Performance Scale has declined over a period of days and is estimated at 10%.  Objective information that support this patients limited prognosis includes: reliance on non-invasive ventilation with 100% FiO2 and 12/6, CXR 2/6 demonstrating increased diffuse bilateral interstitial and alveolar opacities, compatible with worsening pulmonary edema and/or pneumonia, chest CT 02/2016 demonstrating pulmonary fibrosis, and respiratory panel 02/03 demonstrating respiratory synctial disease  The patient/family chose comfort measures with the support of Hospice. Patient meets for GIP LOC as evidenced by profound shortness of breath necessitating non-invasive positive ventilation and repeated doses of IV medications    Prognosis estimated based on 02/08/18 clinical assessment is:   [] Few to Many Hours  [x] Hours to Days   [] Few to Many Days   [] Days to Weeks   [] Few to Many Weeks   [] Weeks to Months   [] Few to Many Months    ASSESSMENT    Patient self-reports:  []  Yes    [x] No    SYMPTOMS: profound shortness of breath; generalized pain; terminal agitation     SIGNS/PHYSICAL FINDINGS: BIPAP 100% FiO2 12/6; midline left peripheral arm catheter    KARNOFSKY: 10%    FAST for all dementia:  N/A    Learning Assessment:  Patient  Is patient willing/able to learn? No  What is the highest level of education completed? High school  Learning preference (written material, demonstration, visual)? Demonstration  Learning barriers (ESOL, [place], poor vision)? Illness severity    Caregiver  Is caregiver willing to learn care for patient? Yes  What is the highest level of education completed? College  Learning preference (written material, demonstration, visual)? Demonstration  Learning barriers (ESOL, [place], poor vision)? None    CLINICAL INFORMATION     Wt Readings from Last 3 Encounters:   02/08/18 59 kg (130 lb)   12/28/17 61.1 kg (134 lb 9.6 oz)   11/10/17 60.3 kg (133 lb)     Ht Readings from Last 3 Encounters:   02/02/18 5' 3\" (1.6 m)   12/28/17 5' 2\" (1.575 m)   11/10/17 5' 2\" (1.575 m)     There is no height or weight on file to calculate BMI. There were no vitals taken for this visit. LAB VALUES  No results found for this visit on 02/08/18 (from the past 12 hour(s)). No results found for this visit on 02/08/18 (from the past 6 hour(s)).   Lab Results   Component Value Date/Time Protein, total 6.0 (L) 02/08/2018 05:50 AM    Albumin 2.8 (L) 02/08/2018 05:50 AM       Currently this patient has:  [x] Supplemental O2 [x] Peripheral IV  [] PICC    [] PORT   [] Zazueta Catheter [] NG Tube   [] PEG Tube [] Ostomy    [] AICD: Has ICD been deactivated? [] Yes [] No:______    PLAN     1. Admit to hospice at [facility] for profound shortness of breath, generalized pain, and terminal agitation  2. We will remove BIPAP mask and transition patient to regular nasal cannula at 6 L/min; prior to BIPAP removal we will premedicate patient with 4 mg morphine IVP and 2 mg lorazepam IVP  3. Utilize 2 mg morphine IVP [id number] prn for shortness of breath/generalized pain; if multiple doses of IVP opioids given overnight we will start scheduled IV opioids  4. Utilize 1 mg lorazepam IVP [id number] prn for terminal agitation; if multiple doses of IVP benzodiazepines given overnight, we will start scheduled IV benzodiazepines  5. Begin scheduled scopolamine 1 mg transdermal [id number] and glycopyrollate 0.2 mg IVP q4hrs prn for excessive upper airway secretions  6. Hospice IDT to continue to coordinate care. Hospice Team Frequency Orders:  Skilled Nurse -   Daily x 7 days /every other day x 7 days  with 5 PRN visits for symptom control. FLORES  1 visit for initial assessment/evaluation for family support and need for volunteer services. Napoleon Mackay  1 visit for initial assessment/evaluation for spiritual support.      ADVANCE CARE PLANNING (Complete in ACP Flow Sheet)   Code Status: DNR   Durable DNR: []  Yes  [x]  No  Code Status Discussed/Confirmed: Yes  Preference for Other Life Sustaining Treatment Discussed/Confirmed: Yes  Hospitalization Preference: home  Advance Care Planning 2/5/2018   Patient's Healthcare Decision Maker is: Named in scanned ACP document   Primary Decision Maker Name Pj Sunshine   Primary Decision Maker Phone Number [phone]   Primary Decision Maker Relationship to Patient Adult child   Secondary Decision Maker Name Geena Curran   Secondary Decision Maker Phone Number [phone]   Secondary Decision Maker Relationship to Patient Adult child   Confirm Advance Directive Yes, on file   Does the patient have other document types Do Not Resuscitate        Service: [] Yes  []  No      [x] Unknown  Appropriate for Pinning Ceremony:  [] Yes     [x] No  Adventist: Tenriism   Home: TBD    DISCHARGE PLANNING     1. Discharge Plan: home with hospice should patient's symptoms become manageable and their condition stabilize  2. Patient/Family teaching: family educated about symptom management and end of life care  3. Response to patient/family teaching: family verbalized understanding of teaching    SOCIAL/EMOTIONAL/SPIRITUAL NEEDS     Spiritual Issues Identified: patient self identifies as Jew; patient had some involvement in daughter Biju Trejo; hospice chaplain to make initial assessment      Psych/ Social/ Emotional Issues Identified: patient has six surviving children; patient's son Sivakumar Bearden just passed away last month; hospice MSW to make initial assesment    Caregiver Support:  [x] Provided information on End of Life Care   [] Material Provided: Gone From My Sight or [place] Batters   Dr. Shaunna Mccoy contacted, discharge to hospice order received  Dr. Antwon Singh contacted, agrees to serve as attending provider for hospice and provided verbal certification of terminal illness with life expectancy of 6 months or less. Orders for hospice admission, medications and plan of treatment received. Medication reconciliation completed.   MEDS: See medication list below  DME: Per hospital  Supplies: Per hospital  IDT communication to include MD, SN, SW, CH and support team    ALLERGIES AND MEDICATIONS     Allergies: No Known Allergies  Current Facility-Administered Medications   Medication Dose Route Frequency    ondansetron (ZOFRAN) injection 4 mg  4 mg IntraVENous Q4H PRN  LORazepam (ATIVAN) injection 1 mg  1 mg IntraVENous Q15MIN PRN    glycopyrrolate (ROBINUL) injection 0.2 mg  0.2 mg IntraVENous Q4H PRN    scopolamine (TRANSDERM-SCOP) 1 mg 1 Patch  1 Patch TransDERmal Q72H    bisacodyl (DULCOLAX) suppository 10 mg  10 mg Rectal DAILY PRN    morphine injection 2 mg  2 mg IntraVENous Q15MIN PRN    acetaminophen (TYLENOL) suppository 650 mg  650 mg Rectal Q4H PRN    morphine injection 4 mg  4 mg IntraVENous ONCE    LORazepam (ATIVAN) injection 2 mg  2 mg IntraVENous ONCE

## 2018-02-08 NOTE — HOSPICE
[facility] RN note:    Hospice consult received; chart reviewed; case discussed with palliative care team.  This RN, accompanied by Milagro Kimble NP and Dr Neri Fried, in to meet with patient, RITAA daughter Ventura Stauffer, and large extended family. The patient is currently BIPAP dependent, minimally responsive to verbal stimuli, and tachpneic with RR >20. We reviewed hospice philosophy (palliaitve-focus) and the plan of care (symptom-management). We briefly discussed the services available under the hospice benefit. We reviewed that given how symptomatic patient is currently and the fact that she is BIPAP dependent that we would need to start hospice care here inpatient with eventual goal of transitioning care home if possible. After our discussion, the family is in agreement to pursue inpatient hospice. Consents were signed outside the room by Nicole Whitlock. We will admit the patient GIP because of severe shortness of breath with hospice diagnosis of respiratory failure. Several members of the family are still attempting to get to the hospital and once they arrive we will premedicate the patient and then transition the BIPAP off to regular nasal cannula. Thank you for the opportunity to care for this patient and family. Please contact hospice at [phone] with any questions or concerns.

## 2018-02-08 NOTE — PROGRESS NOTES
[address] [address] Pharmacy Dosing Services: Antimicrobial Stewardship Daily Doc    Consult for antibiotic dosing of Cefepime, flagyl by Dr. Carlton Sharpe  Indication: CAP (Rocephin 2/2 - 2/5, Doxy since 2/2 - day 7)  Day of Therapy 3    Ht Readings from Last 1 Encounters:   02/02/18 160 cm (63\")        Wt Readings from Last 1 Encounters:   02/08/18 59 kg (130 lb)      Non-Kinetic Antimicrobial Dosing Regimen:   Current Regimen:  Cefepime 2 g IV every 8 hours  Recommendation: Change to 2 g IV every 12 hours due to renal function     Current Regimen:  Flagyl 500 mg IV every 8 hours  Recommendation: Continue regimen  Dose administration notes: Doses not given as scheduled - 1400 dose delayed    Other Antimicrobial   (not dosed by pharmacist) Doxycycline 100 mg IV every 12 hours   Cultures 2/7 Sputum: +2 budding yeast, +2 GPC (Pend)  2/3 Resp: RSV (Final)  2/2 Urine: NGTD (Final)  2/2 Blood x 2: NGTD (Pend)   Serum Creatinine Lab Results   Component Value Date/Time    Creatinine 0.76 02/08/2018 05:50 AM    Creatinine (POC) 1.3 05/15/2012 04:20 PM         Creatinine Clearance Estimated Creatinine Clearance: 49.7 mL/min (based on Cr of 0.76).      Temp Temp: 97.3 °F (36.3 °C)       WBC Lab Results   Component Value Date/Time    WBC 19.7 (H) 02/08/2018 05:50 AM        H/H Lab Results   Component Value Date/Time    HGB 10.5 (L) 02/08/2018 05:50 AM        Platelets    Lab Results   Component Value Date/Time    PLATELET 310 30/96/4888 05:50 AM            Thank you,  Salome Trevino, PharmD     Contact: [phone]

## 2018-02-08 NOTE — DISCHARGE SUMMARY
[address]   Transfer to Inpatient Hospice    Date: February 8, 2018    Cecelia Resendiz  MRN: [id number]  YOB: 1940  Age: 76 y.o. Date of admission: 2/2/2018     Date of discharge/transfer: 2/8/2018    Primary Care Physician: Vandana Membreno MD     Attending physician at admission: Dr. Bo Dinero     Attending physician at discharge/transfer: Dr. Bo Dinero     Resident physician at discharge/transfer: Warden Bradley MD     Consultants during hospitalization  Dr. Bean Catherine , Cardiology  Dr. Drew Camp, Pulmonology     Admission diagnoses   Acute respiratory failure [facility])    Discharge diagnoses  Hospital Problems  Date Reviewed: 12/28/2017          Codes Class Noted POA    * (Principal)Acute respiratory failure ([address] ICD-10-CM: J96.00  ICD-9-CM: 518.81  2/2/2018 Unknown        COPD exacerbation ([facility] ICD-10-CM: J44.1  ICD-9-CM: 491.21  2/2/2018 Unknown        CAP (community acquired pneumonia) ICD-10-CM: J18.9  ICD-9-CM: 486  2/2/2018 Unknown               History of Present Illness  Ms. Natty Anderson is a 76 y.o.  female with a history of hypertension, pulmonary fibrosis, CAD, aortic stenosis s/p AVR who presented to the Emergency Department today complaining of 3 days h/o sudden onset of SOB at rest that is progressively worsening. Patient woke up from bed SOB. She is on home oxygen 2L continuous. SOB is associated with increased phlegm production, and purulence described as brown and pinking. No CP, palpitation dizziness, cough, edema, fever, sick contact. Daughter took her to the clinic and she was so winded that they called EMS and sent her to the ED. Her Sat had dropped to 63% per daughter.   [facility] HOSPITAL course:     Acute HFpEF in the setting of pneumonia sepsis and AHRF  not responding well to diuretics. Repeat Echo this admission  showed, severe AS pulmonary hypertension and decline in EF to 50 % ( a decline from 60- 65 % in 11/17).  Patient's family ( daughter who is POA) has decided for comfort and hospice care for the patient. Patient transferred to hospice on 2/8/18. Acute on chronic Hypoxic Respiratory Failure requiring bipap: in the setting of Severe sepsis 2/2 CAP ( 3/4 SIRS) and + RSV and pulmonary edema. At home on 2L oxygen and prednisone for pulmonary fibrosis, now requiring bipap, discharged to hospice on NC.  - continue lasix 40 mg PO daily  - symptoms management per hospice care    Sepsis 2/2 CAP: sepsis resolved. On IV Cefepime (day 3) , Flagyl (day 3), and Doxicycline (day 7)  - will differ continuation of abx to hospice team     RSV bronchiolitis: contributed to worsening of pulmonary function  - on solumedrol 60mg q8, will defer continuation to hospice team     Type 2 MI  the setting of Sepsis, AHRF, and CAPs : trop 0.25--> 0.33 --> 0.30 --> 0.62; likely secondary to demand ischemia  - On ASA and Lipitor at transfer, will defer continuation to hospice team  - not on BB due to pulmonary fibrosis/ COPD    Hypertension  - BP stable, BP at transfer 109/79  - On home medications of Cardizem and well as lasix daily at transfer, will defer continuation to hospice team      Diabetes Mellitus T2: Last HgA1c 6.0. On 12/28.   -  On home Lantus 20 , increased to NPH to 14 units BID on 2/7/18.       Pulmonary fibrosis: likely decompensated. On home prednisone 10 mg every day, now on IV solumedrol during hospitalization. - continue home pirfenidone   - solumedrol   - comfort care per hospice care team. Patient is DNR/DNI.      Hx of aortic valve replacement(2012 2/2 AS): stable, no intervention needed currently      Paroxysmal AFib: s/p AVR. Chads >2. NSR rate controlled. Followed by cardiologist Dr. Colby Martinez. On diltiazem 240mg, digoxin 0.125 qod , and Xarelto 15 mg  -Continue treatment per hospice care protocol. Hx of recurrent UTI: on Keflex at home- Urology following. Stable.  Held Keflex while inpatient      HLD: stable  -Continue lipitor    Acute on chronic anemia: Hb stable at 8.6. ( BL~9-10)        Physical exam at discharge:     General:   Alert, cooperative, no acute distress   Head:   Atraumatic   Eyes:   Conjunctivae clear   ENT:  Oral mucosa normal   Neck:  Supple, trachea midline, no adenopathy   No JVD   Back:    No CVA tenderness    Chest wall:    No tenderness or deformities    Lungs:   Bilateral crackles in lower lungs. Heart:  Labored breathing while on BiPap, Bibasilar crackles heard   Abdomen:    Soft, non-tender   No masses or organomegaly    Extremities:  no edema   Pulses:  Symmetric all extremities   Skin:  Warm and dry   Neurologic:  Oriented   No focal deficits         Condition at discharge: stable    Labs  Recent Labs      02/08/18   0550  02/07/18   1038  02/06/18   2234   WBC  19.7*  14.6*  13.6*   HGB  10.5*  10.0*  10.1*   HCT  33.3*  31.6*  32.2*   PLT  203  218  257     Recent Labs      02/08/18   0550  02/07/18   0510  02/06/18   2234   NA  144  142  139   K  3.7  3.9  4.0   CL  103  104  100   CO2  34*  33*  31   BUN  34*  28*  33*   CREA  0.76  0.58  0.82   GLU  199*  118*  215*   CA  7.9*  7.5*  7.9*     Recent Labs      02/08/18   0550  02/07/18   0510  02/06/18   2234   SGOT  53*  92*  166*   ALT  141*  176*  212*   AP  251*  214*  250*   TBILI  1.0  0.4  0.5   TP  6.0*  5.6*  6.6   ALB  2.8*  2.4*  2.7*   GLOB  3.2  3.2  3.9     No results for input(s): INR, PTP, APTT in the last 72 hours. No lab exists for component: INREXT, INREXT   No results for input(s): FE, TIBC, PSAT, FERR in the last 72 hours. Recent Labs      02/07/18   0519  02/06/18   2217   PH  7.44  7.44   PCO2  45  45   PO2  105*  66*     No results for input(s): CPK, CKMB in the last 72 hours.     No lab exists for component: TROPONINI  Lab Results   Component Value Date/Time    Glucose (POC) 272 (H) 02/08/2018 11:26 AM    Glucose (POC) 215 (H) 02/08/2018 07:54 AM    Glucose (POC) 166 (H) 02/07/2018 09:48 PM    Glucose (POC) 360 (H) 02/07/2018 04:21 PM    Glucose (POC) 242 (H) 02/07/2018 11:41 AM        Cultures:   -Blood cx from 2/2: No growth 6 days X 2 bottles   -Urine cx from 2/2: No growth 1 day (lab sample was transferred to [facility] and this was the final read)  -Respiratory viral panel: + RSV  -Resp suptum cx: 2+ budding yeast, 2+ gram positive coccobacilli       Diagnostic studies: none     Procedures: none    Disposition:  Inpatient hospice     Discharge/Transfer Medications  Discharge Medication List as of 2/8/2018  3:22 PM          Recommended follow-up tests after discharge  · None     Diet:  diabetic diet    Activity:  As tolerated     Discharge instructions to patient/family  Please seek medical attention for any new or worsening symptoms particularly fever, chest pain, shortness of breath, abdominal pain, nausea, vomiting    Follow up plans/appointments  Follow-up Information     Follow up With Details Comments Contact Info    Please return patient specific Esbriet at discharge located in patient specific bin in [facility].         Unique Dockery MD   [address] [id number] [place] MD  Family Medicine Resident    Cc: Unique Dockery MD

## 2018-02-09 ENCOUNTER — HOME CARE VISIT (OUTPATIENT)
Dept: HOSPICE | Facility: HOSPICE | Age: 78
End: 2018-02-09
Payer: MEDICARE

## 2018-02-09 VITALS — HEART RATE: 104 BPM

## 2018-02-09 LAB
BACTERIA SPEC CULT: NORMAL
GRAM STN SPEC: NORMAL
SERVICE CMNT-IMP: NORMAL

## 2018-02-09 NOTE — PROGRESS NOTES
I was called to see Ms. Maynor Alvarado due to patient's death. On my arrival, She was lying in bed; there was no response to verbal or painful stimuli; pupils were fixed and dilated; there was no pulse, no respirations and no heart sounds.   Zackary Cesia was pronounced dead at 11:45 PM.      Nichelle Koroma MD  11:49 PM, 2/8/2018

## 2018-02-09 NOTE — PROGRESS NOTES
Noted pt asystole along with no pt respiration. Auscultated no heart sounds present per RN and Fritz Clancy RN. Phone call to Dr. Charles Driver to notify pt's death. RN asked to call hospitalist to verify pt expiration. Dr. Andrés Lanza on; will come to bedside shortly.  contacted and will be at bedside shortly.

## 2018-02-09 NOTE — PROGRESS NOTES
is responding to page for pt's death in room 322. Pt's daughter's were present by the room at the time.  offered a supportive presence with daughter's, who processed pt's decline and death. Pt's daughter notes some comfort in having discussed salvation with her mother, re-affirming her mother's denae commitments as a Yazidi, before her dying. This appears to have provided comfort and support for the family and patient.  offered a supportive presence with family and prayer by bedside.      [address] Lito Álvarez M.Div, M.S, [address] available at [phone])

## 2018-02-09 NOTE — HOSPICE
[facility]  [phone]    Discharge/Death Nursing Note   Patient Name: Felicitas Rosa  YOB: 1940  Age: 76 y.o.     Date of Death: 2018  Admitted Date: 2018  Time of Death: 23:45    Facility of Care: [facility]  Level of Care: [facility]  Patient Room: 322/01     Hospice Attending: No att. providers found  Hospice Diagnosis: respiratory Failure  Respiratory failure ([address]    Death Pronouncement   Pronouncement of death completed by: Desiderio Crigler MD    Agency staff WAS NOT present at the time of death    At the time of death the patient was documented as apneic, absent blood pressure, absent heart rate; pupils fixed and dilated with no other signs of life    The pt  within [facility] RM 18    The following were notified of the patient's death: daughter Corina Cevallos at bedside at time of death    Medications were disposed of per facility protocol     Discharge Summary   Discharge Reason: Death    Summary of Care Provided:    [x] Post mortem care provided by unit nursing staff  [x] Notification of  home by nursing supervisor  [x] Referrals/Community resources provided:   [x] Goals completed  [x] Durable Medical Equipment vendor notified     Disciplines involved: [x] RN [x] SW [x]  [] STRINGER [] Vol [] PT [] OT [] ST [] BC    [x] IDT communication/notification    Attending Physician, Dr. Valeria Jasso, notified of death    Bereaved   Verify bereaved identified with name, address, telephone number and risk level      Advance Care Planning 2018   Patient's Healthcare Decision Maker is: Named in scanned ACP document   Primary Decision Maker Name Ryan Aguirre   Primary Decision Maker Phone Number [phone]   Primary Decision Maker Relationship to Patient Adult child   Secondary Decision Maker Name Patricio Ontiveros   Secondary Decision Maker Phone Number [phone]   Secondary Decision Maker Relationship to Patient Adult child   Confirm Advance Directive Yes, on file Does the patient have other document types Do Not Resuscitate

## 2018-02-09 NOTE — ROUTINE PROCESS
Bedside and Verbal shift change report given to Ascencion Epps RN (oncoming nurse) by Shelley Cantrell RN (offgoing nurse). Report included the following information SBAR, Kardex, Intake/Output, MAR, Accordion, Recent Results, Cardiac Rhythm ST and Alarm Parameters .

## 2018-02-09 NOTE — PROGRESS NOTES
Noted pt agonal breathing along with agonal rhythm  on cardiac monitor. Family at bedside waiting pt expiration.

## 2018-02-12 NOTE — DISCHARGE SUMMARY
Hospice Discharge Summary    [address] [place] [address]  [facility] to Those in Need        Date of Admission: 2/8/2018  Date of Discharge: 2/8/2018    Wesly Richard is a 76y.o. year old who was admitted to [address] [place] [address] at [facility] with a Hospice diagnosis of respiratory Failure; Respiratory failure ([address] The patient's care was focused on comfort and the patient passed away on 2/8/2018.

## 2019-09-15 NOTE — PHYSICIAN ADVISORY
Letter of Status Determination:   Recommend hospitalization status upgraded from   Observation to Inpatient  Status     Pt Name:  Dameon Velazco   MR#  [id number]   [facility]#   [id number]   Room and Hospital  [facility]/01  @ [address] [place] [address]   Hospitalization date  6/7/2017  3:55 PM   Current Attending Physician  Diana Lebron DO   Principal diagnosis  Dehydration      Clinicals  64 y.o. y.o  female hospitalized with above diagnosis   The pt presented from PCP's office with complain of recent GLF, altered mentation. She was noted to have normal head CT but possible UTI. Urine culture was not collected prior to Abx - thus cx result might be misleading. The pt's mentation is reportedly better with current rx. [place] (MCG) criteria   Does  NOT apply    STATUS DETERMINATION  This patient is at above juliano risk of deterioration based on documented presenting clinical data, comorbid conditions, high risk of adverse events and current acute care course. Ms. Dameon Velazco now meets Inpatient Admission status criteria in accordance with CMS regulation Section 43 .3. Specifically, due to medical necessity the patient's stay now exceeds Two Midnights. It is our recommendation that this patient's hospitalization status should be upgraded from  OBSERVATION to INPATIENT status.      The final decision of the patient's hospitalization status depends on the attending physician's judgment            Additional comments     Payor: Varinder Noonan / Plan: [facility] / Product Type: Medicare /         Kathy Gabriel MD MPH FACP     Physician [address]   [facility] [employer], [address] [place] [address]    Cell  [phone]
Cigarettes

## 2023-01-10 NOTE — PROGRESS NOTES
Pt transitioned over from BIPAP to 6L NC. Pt had received Ativan 2 mg IV and Morphine Sulfate 4 mg IV at about 2200. Clindamycin Pregnancy And Lactation Text: This medication can be used in pregnancy if certain situations. Clindamycin is also present in breast milk.